# Patient Record
Sex: FEMALE | Race: BLACK OR AFRICAN AMERICAN | NOT HISPANIC OR LATINO | Employment: OTHER | URBAN - METROPOLITAN AREA
[De-identification: names, ages, dates, MRNs, and addresses within clinical notes are randomized per-mention and may not be internally consistent; named-entity substitution may affect disease eponyms.]

---

## 2017-06-26 ENCOUNTER — HOSPITAL ENCOUNTER (EMERGENCY)
Facility: HOSPITAL | Age: 82
Discharge: HOME | End: 2017-06-26
Attending: EMERGENCY MEDICINE
Payer: MEDICARE

## 2017-06-26 VITALS
OXYGEN SATURATION: 97 % | DIASTOLIC BLOOD PRESSURE: 80 MMHG | WEIGHT: 184.25 LBS | RESPIRATION RATE: 15 BRPM | TEMPERATURE: 98 F | BODY MASS INDEX: 33.9 KG/M2 | SYSTOLIC BLOOD PRESSURE: 143 MMHG | HEART RATE: 74 BPM | HEIGHT: 62 IN

## 2017-06-26 DIAGNOSIS — R06.2 WHEEZING: ICD-10-CM

## 2017-06-26 DIAGNOSIS — R03.0 ELEVATED BLOOD PRESSURE READING: Primary | ICD-10-CM

## 2017-06-26 LAB
ALBUMIN SERPL BCP-MCNC: 2.6 G/DL
ALP SERPL-CCNC: 98 U/L
ALT SERPL W/O P-5'-P-CCNC: 11 U/L
ANION GAP SERPL CALC-SCNC: 7 MMOL/L
ANISOCYTOSIS BLD QL SMEAR: SLIGHT
AST SERPL-CCNC: 21 U/L
BASOPHILS # BLD AUTO: 0.03 K/UL
BASOPHILS NFR BLD: 0.5 %
BILIRUB SERPL-MCNC: 0.3 MG/DL
BNP SERPL-MCNC: 53 PG/ML
BUN SERPL-MCNC: 9 MG/DL
CALCIUM SERPL-MCNC: 9 MG/DL
CHLORIDE SERPL-SCNC: 107 MMOL/L
CO2 SERPL-SCNC: 26 MMOL/L
CREAT SERPL-MCNC: 0.6 MG/DL
DIFFERENTIAL METHOD: ABNORMAL
EOSINOPHIL # BLD AUTO: 0.5 K/UL
EOSINOPHIL NFR BLD: 9.1 %
ERYTHROCYTE [DISTWIDTH] IN BLOOD BY AUTOMATED COUNT: 15.3 %
EST. GFR  (AFRICAN AMERICAN): >60 ML/MIN/1.73 M^2
EST. GFR  (NON AFRICAN AMERICAN): >60 ML/MIN/1.73 M^2
GLUCOSE SERPL-MCNC: 88 MG/DL
HCT VFR BLD AUTO: 32.6 %
HGB BLD-MCNC: 10 G/DL
HYPOCHROMIA BLD QL SMEAR: ABNORMAL
LYMPHOCYTES # BLD AUTO: 2 K/UL
LYMPHOCYTES NFR BLD: 33.9 %
MCH RBC QN AUTO: 21.6 PG
MCHC RBC AUTO-ENTMCNC: 30.7 %
MCV RBC AUTO: 71 FL
MONOCYTES # BLD AUTO: 0.7 K/UL
MONOCYTES NFR BLD: 12.7 %
NEUTROPHILS # BLD AUTO: 2.6 K/UL
NEUTROPHILS NFR BLD: 43.8 %
OVALOCYTES BLD QL SMEAR: ABNORMAL
PLATELET # BLD AUTO: 302 K/UL
PLATELET BLD QL SMEAR: ABNORMAL
PMV BLD AUTO: 9.1 FL
POLYCHROMASIA BLD QL SMEAR: ABNORMAL
POTASSIUM SERPL-SCNC: 3.4 MMOL/L
PROT SERPL-MCNC: 8 G/DL
RBC # BLD AUTO: 4.62 M/UL
SODIUM SERPL-SCNC: 140 MMOL/L
TARGETS BLD QL SMEAR: ABNORMAL
TROPONIN I SERPL DL<=0.01 NG/ML-MCNC: 0.01 NG/ML
WBC # BLD AUTO: 5.84 K/UL

## 2017-06-26 PROCEDURE — 85025 COMPLETE CBC W/AUTO DIFF WBC: CPT

## 2017-06-26 PROCEDURE — 99284 EMERGENCY DEPT VISIT MOD MDM: CPT

## 2017-06-26 PROCEDURE — 93010 ELECTROCARDIOGRAM REPORT: CPT | Mod: S$GLB,,, | Performed by: INTERNAL MEDICINE

## 2017-06-26 PROCEDURE — 80053 COMPREHEN METABOLIC PANEL: CPT

## 2017-06-26 PROCEDURE — 84484 ASSAY OF TROPONIN QUANT: CPT

## 2017-06-26 PROCEDURE — 83880 ASSAY OF NATRIURETIC PEPTIDE: CPT

## 2017-06-26 PROCEDURE — 93005 ELECTROCARDIOGRAM TRACING: CPT

## 2017-06-26 RX ORDER — LORAZEPAM 1 MG/1
TABLET ORAL EVERY 6 HOURS PRN
Status: ON HOLD | COMMUNITY
End: 2019-07-26 | Stop reason: HOSPADM

## 2017-06-26 RX ORDER — LISINOPRIL 40 MG/1
40 TABLET ORAL DAILY
Qty: 90 TABLET | Refills: 11 | Status: SHIPPED | OUTPATIENT
Start: 2017-06-26 | End: 2018-01-20

## 2017-06-26 RX ORDER — ALBUTEROL SULFATE 90 UG/1
1-2 AEROSOL, METERED RESPIRATORY (INHALATION) EVERY 6 HOURS PRN
Qty: 1 INHALER | Refills: 0 | Status: SHIPPED | OUTPATIENT
Start: 2017-06-26

## 2017-06-26 NOTE — ED PROVIDER NOTES
Encounter Date: 6/26/2017       History     Chief Complaint   Patient presents with    Shortness of Breath     Patient has been out of blood pressure medicationfor 6 months.  Also shortness of breath and wheezing and refusing to do breathing tratment.  Patient is refusing to follow up with doctor's so she has not had refills on medications.     Patient has had a cough and wheezing for the past several days.  Her caregiver is concerned because she is running out of her medications.  No chest pain or fever.          Review of patient's allergies indicates:   Allergen Reactions    Haldol [haloperidol lactate]      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-s/p right distal femur ORIF 6/5/15 6/5/2015    Seizure disorder     Seizures      Past Surgical History:   Procedure Laterality Date    EYE SURGERY      JOINT REPLACEMENT      TOTAL KNEE ARTHROPLASTY  Left     Family History   Problem Relation Age of Onset    Seizures Sister     Dementia Brother     Seizures Brother      Social History   Substance Use Topics    Smoking status: Former Smoker     Types: Cigarettes     Quit date: 9/4/2011    Smokeless tobacco: Former User    Alcohol use No     Review of Systems   Constitutional: Negative for chills and fever.   HENT: Negative for congestion.    Eyes: Negative for photophobia.   Respiratory: Positive for cough and wheezing.    Cardiovascular: Negative for chest pain and palpitations.   Gastrointestinal: Negative for abdominal pain.   Endocrine: Negative for polyuria.   Genitourinary: Negative for difficulty urinating.   Musculoskeletal: Negative for back pain.   Skin: Negative for rash.   Neurological: Negative for headaches.   Hematological: Negative for adenopathy.   Psychiatric/Behavioral: Positive for agitation.       Physical Exam     Initial Vitals [06/26/17 0925]   BP Pulse  Resp Temp SpO2   (!) 248/113 77 17 98.1 °F (36.7 °C) 95 %      MAP       158         Physical Exam    HENT:   Head: Normocephalic and atraumatic.   Eyes: Pupils are equal, round, and reactive to light.   Neck: Normal range of motion. Neck supple.   Cardiovascular: Normal rate, regular rhythm, normal heart sounds and intact distal pulses.   No murmur heard.  Pulmonary/Chest: Breath sounds normal.   Abdominal: Soft. Bowel sounds are normal.   Musculoskeletal: Normal range of motion.   Neurological: She is alert. She has normal strength. No sensory deficit.   Skin: Skin is warm and dry. Capillary refill takes less than 2 seconds.         ED Course   Procedures  Labs Reviewed   CBC W/ AUTO DIFFERENTIAL - Abnormal; Notable for the following:        Result Value    Hemoglobin 10.0 (*)     Hematocrit 32.6 (*)     MCV 71 (*)     MCH 21.6 (*)     MCHC 30.7 (*)     RDW 15.3 (*)     MPV 9.1 (*)     Eosinophil% 9.1 (*)     All other components within normal limits   COMPREHENSIVE METABOLIC PANEL - Abnormal; Notable for the following:     Potassium 3.4 (*)     Albumin 2.6 (*)     Anion Gap 7 (*)     All other components within normal limits   TROPONIN I   B-TYPE NATRIURETIC PEPTIDE     EKG Readings: (Independently Interpreted)   NSR at 70 without ischemic changes       X-Rays:   Independently Interpreted Readings:   Chest X-Ray: Normal heart size.  No infiltrates.  No acute abnormalities.                  Attending Attestation:   Physician Attestation Statement for Resident:  As the supervising MD . -: Doubt acs, PE.  Does have markedly high BP.    -: BP trended down spontaneosly  She feels fine - asymptomatic  Will refill Lisinopril and albuterol  Stable for discharge                    ED Course     Clinical Impression:   The primary encounter diagnosis was Elevated blood pressure reading. A diagnosis of Wheezing was also pertinent to this visit.                           Alcides Brito MD  06/26/17 1917

## 2017-06-26 NOTE — ED TRIAGE NOTES
"Pt to the ED with complaints of SOB and nonproductive cough that has been going on for about 2 weeks. Pt has been having high blood pressure since this morning. Family member states "I have tried to give her breathing treatments but she refuses it." Family member states that the patient has dementia. Pt denies any chest pain. Pt's family member states that patient has not taken BP meds for the past 6 months due to the patient's refusal to the see the doctor.  "

## 2017-07-10 ENCOUNTER — HOSPITAL ENCOUNTER (EMERGENCY)
Facility: HOSPITAL | Age: 82
Discharge: HOME | End: 2017-07-10
Attending: EMERGENCY MEDICINE
Payer: MEDICARE

## 2017-07-10 VITALS
DIASTOLIC BLOOD PRESSURE: 58 MMHG | RESPIRATION RATE: 22 BRPM | SYSTOLIC BLOOD PRESSURE: 135 MMHG | OXYGEN SATURATION: 93 % | WEIGHT: 170 LBS | TEMPERATURE: 98 F | BODY MASS INDEX: 31.09 KG/M2 | HEART RATE: 75 BPM

## 2017-07-10 DIAGNOSIS — R11.2 NAUSEA AND VOMITING, INTRACTABILITY OF VOMITING NOT SPECIFIED, UNSPECIFIED VOMITING TYPE: Primary | ICD-10-CM

## 2017-07-10 LAB
ALBUMIN SERPL BCP-MCNC: 2.5 G/DL
ALP SERPL-CCNC: 107 U/L
ALT SERPL W/O P-5'-P-CCNC: 8 U/L
ANION GAP SERPL CALC-SCNC: 11 MMOL/L
AST SERPL-CCNC: 15 U/L
BASOPHILS # BLD AUTO: 0.01 K/UL
BASOPHILS NFR BLD: 0.1 %
BILIRUB SERPL-MCNC: 0.4 MG/DL
BUN SERPL-MCNC: 12 MG/DL
CALCIUM SERPL-MCNC: 8.9 MG/DL
CHLORIDE SERPL-SCNC: 106 MMOL/L
CO2 SERPL-SCNC: 23 MMOL/L
CREAT SERPL-MCNC: 0.6 MG/DL
DIFFERENTIAL METHOD: ABNORMAL
EOSINOPHIL # BLD AUTO: 0 K/UL
EOSINOPHIL NFR BLD: 0.4 %
ERYTHROCYTE [DISTWIDTH] IN BLOOD BY AUTOMATED COUNT: 15.5 %
EST. GFR  (AFRICAN AMERICAN): >60 ML/MIN/1.73 M^2
EST. GFR  (NON AFRICAN AMERICAN): >60 ML/MIN/1.73 M^2
GLUCOSE SERPL-MCNC: 102 MG/DL
HCT VFR BLD AUTO: 34.4 %
HGB BLD-MCNC: 11 G/DL
LYMPHOCYTES # BLD AUTO: 1.8 K/UL
LYMPHOCYTES NFR BLD: 22.1 %
MCH RBC QN AUTO: 22.4 PG
MCHC RBC AUTO-ENTMCNC: 32 %
MCV RBC AUTO: 70 FL
MONOCYTES # BLD AUTO: 0.6 K/UL
MONOCYTES NFR BLD: 7 %
NEUTROPHILS # BLD AUTO: 5.7 K/UL
NEUTROPHILS NFR BLD: 70.2 %
PLATELET # BLD AUTO: 373 K/UL
PMV BLD AUTO: 9.3 FL
POTASSIUM SERPL-SCNC: 3.9 MMOL/L
PROT SERPL-MCNC: 8.3 G/DL
RBC # BLD AUTO: 4.92 M/UL
SODIUM SERPL-SCNC: 140 MMOL/L
WBC # BLD AUTO: 8.15 K/UL

## 2017-07-10 PROCEDURE — 25000003 PHARM REV CODE 250: Performed by: STUDENT IN AN ORGANIZED HEALTH CARE EDUCATION/TRAINING PROGRAM

## 2017-07-10 PROCEDURE — 96374 THER/PROPH/DIAG INJ IV PUSH: CPT

## 2017-07-10 PROCEDURE — 63600175 PHARM REV CODE 636 W HCPCS: Performed by: STUDENT IN AN ORGANIZED HEALTH CARE EDUCATION/TRAINING PROGRAM

## 2017-07-10 PROCEDURE — 96376 TX/PRO/DX INJ SAME DRUG ADON: CPT

## 2017-07-10 PROCEDURE — 80053 COMPREHEN METABOLIC PANEL: CPT

## 2017-07-10 PROCEDURE — 63600175 PHARM REV CODE 636 W HCPCS: Performed by: EMERGENCY MEDICINE

## 2017-07-10 PROCEDURE — 96375 TX/PRO/DX INJ NEW DRUG ADDON: CPT

## 2017-07-10 PROCEDURE — 99284 EMERGENCY DEPT VISIT MOD MDM: CPT | Mod: 25

## 2017-07-10 PROCEDURE — 99285 EMERGENCY DEPT VISIT HI MDM: CPT | Mod: ,,, | Performed by: EMERGENCY MEDICINE

## 2017-07-10 PROCEDURE — 85025 COMPLETE CBC W/AUTO DIFF WBC: CPT

## 2017-07-10 PROCEDURE — 96361 HYDRATE IV INFUSION ADD-ON: CPT

## 2017-07-10 PROCEDURE — 25000003 PHARM REV CODE 250: Performed by: EMERGENCY MEDICINE

## 2017-07-10 RX ORDER — ONDANSETRON 4 MG/1
4 TABLET, ORALLY DISINTEGRATING ORAL EVERY 6 HOURS PRN
Qty: 10 TABLET | Refills: 0 | Status: SHIPPED | OUTPATIENT
Start: 2017-07-10 | End: 2019-07-04

## 2017-07-10 RX ORDER — ONDANSETRON 2 MG/ML
4 INJECTION INTRAMUSCULAR; INTRAVENOUS
Status: COMPLETED | OUTPATIENT
Start: 2017-07-10 | End: 2017-07-10

## 2017-07-10 RX ORDER — HYDRALAZINE HYDROCHLORIDE 20 MG/ML
10 INJECTION INTRAMUSCULAR; INTRAVENOUS
Status: COMPLETED | OUTPATIENT
Start: 2017-07-10 | End: 2017-07-10

## 2017-07-10 RX ORDER — LISINOPRIL 20 MG/1
40 TABLET ORAL
Status: COMPLETED | OUTPATIENT
Start: 2017-07-10 | End: 2017-07-10

## 2017-07-10 RX ORDER — SODIUM CHLORIDE 9 MG/ML
500 INJECTION, SOLUTION INTRAVENOUS
Status: COMPLETED | OUTPATIENT
Start: 2017-07-10 | End: 2017-07-10

## 2017-07-10 RX ADMIN — LISINOPRIL 40 MG: 20 TABLET ORAL at 12:07

## 2017-07-10 RX ADMIN — HYDRALAZINE HYDROCHLORIDE 10 MG: 20 INJECTION INTRAMUSCULAR; INTRAVENOUS at 10:07

## 2017-07-10 RX ADMIN — ONDANSETRON 4 MG: 2 INJECTION INTRAMUSCULAR; INTRAVENOUS at 10:07

## 2017-07-10 RX ADMIN — HYDRALAZINE HYDROCHLORIDE 10 MG: 20 INJECTION INTRAMUSCULAR; INTRAVENOUS at 12:07

## 2017-07-10 RX ADMIN — SODIUM CHLORIDE 500 ML: 0.9 INJECTION, SOLUTION INTRAVENOUS at 10:07

## 2017-07-10 NOTE — ED PROVIDER NOTES
Encounter Date: 7/10/2017    SCRIBE #1 NOTE: I, Sherice Knapp, am scribing for, and in the presence of,  Dr. Brito. I have scribed the following portions of the note - the Resident attestation.       History     Chief Complaint   Patient presents with    Abdominal Pain     n/v/d; coffee-ground emesis noted by EMS.       HPI   Patient is 81 yo female with history of dementia, HTN and seizures presents to the ER with acute vomiting and nausea for two days. This morning she vomited twice and the EMS states vomitus looks coffee ground/dark in color. Patient has not history of PUD, she does not take NSAIDs. Patient has no recent weight loss, no blood in the stools. Patient denies stomach pain, fever, chills. Patient unable to provide a complete history,taken from daughter who is her care giver.   Review of patient's allergies indicates:   Allergen Reactions    Haldol [haloperidol lactate]      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-s/p right distal femur ORIF 6/5/15 6/5/2015    Seizure disorder     Seizures      Past Surgical History:   Procedure Laterality Date    EYE SURGERY      JOINT REPLACEMENT      TOTAL KNEE ARTHROPLASTY  Left     Family History   Problem Relation Age of Onset    Seizures Sister     Dementia Brother     Seizures Brother      Social History   Substance Use Topics    Smoking status: Former Smoker     Types: Cigarettes     Quit date: 9/4/2011    Smokeless tobacco: Former User    Alcohol use No     Review of Systems   Constitutional: Negative for appetite change, fatigue, fever and unexpected weight change.   HENT: Negative for trouble swallowing and voice change.    Respiratory: Negative for cough, chest tightness and shortness of breath.    Cardiovascular: Negative for chest pain, palpitations and leg swelling.   Gastrointestinal: Negative for abdominal  distention, abdominal pain, constipation, diarrhea, nausea and vomiting.   Endocrine: Negative for polydipsia, polyphagia and polyuria.   Genitourinary: Negative for difficulty urinating, dysuria, frequency and urgency.   Musculoskeletal: Negative for gait problem and joint swelling.   Skin: Negative for pallor and rash.   Neurological: Negative for dizziness, light-headedness and headaches.   Psychiatric/Behavioral: Positive for confusion.       Physical Exam     Initial Vitals [07/10/17 0907]   BP Pulse Resp Temp SpO2   (!) 163/66 72 18 98.2 °F (36.8 °C) 95 %      MAP       98.33         Physical Exam    Constitutional: She appears well-developed and well-nourished.   HENT:   Head: Normocephalic and atraumatic.   Eyes: EOM are normal. Pupils are equal, round, and reactive to light.   Neck: Normal range of motion. Neck supple.   Cardiovascular: Normal rate and regular rhythm. Exam reveals no friction rub.    No murmur heard.  Pulmonary/Chest: Breath sounds normal. No respiratory distress. She has no wheezes.   Abdominal: Soft. Bowel sounds are normal. She exhibits no distension. There is no tenderness. There is no rebound and no guarding.   Musculoskeletal: Normal range of motion. She exhibits no edema or tenderness.   Neurological: She is alert and oriented to person, place, and time. She has normal strength. No cranial nerve deficit.   Skin: Skin is warm. No rash noted. No erythema.         ED Course   Procedures  Labs Reviewed   CBC W/ AUTO DIFFERENTIAL - Abnormal; Notable for the following:        Result Value    Hemoglobin 11.0 (*)     Hematocrit 34.4 (*)     MCV 70 (*)     MCH 22.4 (*)     RDW 15.5 (*)     Platelets 373 (*)     All other components within normal limits   COMPREHENSIVE METABOLIC PANEL - Abnormal; Notable for the following:     Albumin 2.5 (*)     ALT 8 (*)     All other components within normal limits             Medical Decision Making:   History:   I obtained history from: someone other than  patient.  Old Medical Records: I decided to obtain old medical records.  Initial Assessment:   Patient with coffee ground emesis for 2 days. Patient clinically stable in the ER. DDx includes Indigestion vs upper GI bleed PUD vs gastritis. FOBT negative,lower GI bleed such as diverticulitis, ischemic bowel/colon less likely given patient has no blood in the stool, no fever, no abdominal pain, no significant history of atherosclerotic disease.  Differential Diagnosis:   Indigestion vs upper GI bleed 2/2 PUD vs Gastritis vs lower GI bleed/ischemic bowel/diverticulitis  Clinical Tests:   Lab Tests: Ordered and Reviewed  Medical Tests: Ordered and Reviewed  ED Management:  Zofran for nausea  CBC, CMP  FOBT  10 mg hydralazine for BP elevation  Another dose of hydralazin added and home dose lisinopril- SBP at 200's    Other:   I discussed test(s) with the performing physician.  I have discussed this case with another health care provider.  Lab result show anemia but improved from last lab values  FOBT was negative for occult blood.   Patient doing well, tolerating fluid, has not had v/n since presentation. Will discharge home with zofran.            Scribe Attestation:   Scribe #1: I performed the above scribed service and the documentation accurately describes the services I performed. I attest to the accuracy of the note.    Attending Attestation:   Physician Attestation Statement for Resident:  As the supervising MD   Physician Attestation Statement: I have personally seen and examined this patient.   I agree with the above history. -: Pt presents with vomiting, several episodes today She denies pain or nausea. There was a question of coffee ground emesis.   As the supervising MD I agree with the above PE.   -: Pt appears comfortable. She is hypertensive. Abdomen is soft and nontender.   As the supervising MD I agree with the above treatment, course, plan, and disposition.   -: Pt with vomiting. She had hemoccult  negative stool here. Will check labs. Hemoglobin is up compared to last time. Will give meds to lower BP. I doubt surgical abdomen, GI bleed or caridac event. Will d/c home.          Physician Attestation for Scribe:  Physician Attestation Statement for Scribe #1: I, Dr. Brito, reviewed documentation, as scribed by Sherice Knapp in my presence, and it is both accurate and complete.                 ED Course     Clinical Impression:   Nausea/vomiting                           Alcides Brito MD  07/11/17 1620

## 2017-07-10 NOTE — ED TRIAGE NOTES
Report received from EMS patient has abdominal pain and coffee ground emesis since yesterday but got worse today.  Patient has a hx of alzheimer's but does not take her meds.

## 2018-01-20 ENCOUNTER — HOSPITAL ENCOUNTER (EMERGENCY)
Facility: HOSPITAL | Age: 83
Discharge: HOME | End: 2018-01-20
Attending: EMERGENCY MEDICINE
Payer: MEDICARE

## 2018-01-20 VITALS
RESPIRATION RATE: 18 BRPM | OXYGEN SATURATION: 100 % | WEIGHT: 170 LBS | HEART RATE: 72 BPM | SYSTOLIC BLOOD PRESSURE: 128 MMHG | BODY MASS INDEX: 31.28 KG/M2 | DIASTOLIC BLOOD PRESSURE: 82 MMHG | TEMPERATURE: 98 F | HEIGHT: 62 IN

## 2018-01-20 DIAGNOSIS — J02.9 PHARYNGITIS: ICD-10-CM

## 2018-01-20 DIAGNOSIS — R22.0 FACIAL SWELLING: Primary | ICD-10-CM

## 2018-01-20 DIAGNOSIS — R05.9 COUGH: ICD-10-CM

## 2018-01-20 LAB
FLUAV AG SPEC QL IA: NEGATIVE
FLUBV AG SPEC QL IA: NEGATIVE
SPECIMEN SOURCE: NORMAL

## 2018-01-20 PROCEDURE — 63600175 PHARM REV CODE 636 W HCPCS: Performed by: EMERGENCY MEDICINE

## 2018-01-20 PROCEDURE — 99283 EMERGENCY DEPT VISIT LOW MDM: CPT

## 2018-01-20 PROCEDURE — 87400 INFLUENZA A/B EACH AG IA: CPT | Mod: 59

## 2018-01-20 RX ORDER — DEXAMETHASONE 4 MG/1
8 TABLET ORAL
Status: COMPLETED | OUTPATIENT
Start: 2018-01-20 | End: 2018-01-20

## 2018-01-20 RX ADMIN — DEXAMETHASONE 8 MG: 4 TABLET ORAL at 05:01

## 2018-01-20 NOTE — ED NOTES
Patient identifiers verified and correct for Susanna Byrnes.  Pt sitting up on edge of bed, awake and alert, daughter at bedside. PTs daughter stated that she gets some swelling to her face when she eats certain foods and it usually resolves with benadryl. No swelling noted, no resp distress noted. Pts daughter stated that she brought her here only because the pt stated she would go.   LOC: The patient is awake, alert and aware of environment with an appropriate affect, the patient is oriented x 3 and speaking appropriately.  APPEARANCE: Patient resting comfortably and in no acute distress, patient is clean and well groomed, patient's clothing is properly fastened.  SKIN: The skin is warm and dry, color consistent with ethnicity, patient has normal skin turgor and moist mucus membranes, skin intact, no breakdown or bruising noted.  MUSCULOSKELETAL: Patient moving all extremities spontaneously, no obvious swelling or deformities noted.  RESPIRATORY: Airway is open and patent, respirations are spontaneous, patient has a normal effort and rate, no accessory muscle use noted, BS clear upper, diminished lower.  CARDIAC: Patient has a normal rate and regular rhythm, no periphreal edema noted, capillary refill < 3 seconds.  ABDOMEN: Soft and non tender to palpation, no distention noted, normoactive bowel sounds present in all four quadrants.  NEUROLOGIC: PERRL, eyes open spontaneously, behavior appropriate to situation, follows commands, facial expression symmetrical, bilateral hand grasp equal and even, purposeful motor response noted, normal sensation in all extremities when touched with a finger.

## 2018-01-21 NOTE — ED PROVIDER NOTES
"Encounter Date: 1/20/2018       History     Chief Complaint   Patient presents with    Allergic Reaction     pt presents to ED today with daughter who reprots pt had an allergic reaction to unknow food last night. reports swelling to left side of mouth. pt was given one dose of benadryl last night and two today. pt in on acute distress. airway patent      82-year-old female brought to the emergency department by family for facial swelling, cough.  Cough is been going on intermittently for a few months now.  Family reports, "she never wants to go to the doctor, so when she was willing to go today which is brought her to the emergency room."  Family reports she has had swelling to her face in the past, and first noticed it last night.  She is given Benadryl last night and this morning.  Family reports the swelling is improving, but just wanted to make sure that there was not a bigger problem they were missing.  Patient is been tolerating by mouth today.  No increased work of breathing reported by family.  No other symptoms reported.  History of present illness and ROS limited secondary to dementia.          Review of patient's allergies indicates:   Allergen Reactions    Haldol [haloperidol lactate]      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-s/p right distal femur ORIF 6/5/15 6/5/2015    Seizure disorder     Seizures      Past Surgical History:   Procedure Laterality Date    EYE SURGERY      JOINT REPLACEMENT      TOTAL KNEE ARTHROPLASTY  Left     Family History   Problem Relation Age of Onset    Seizures Sister     Dementia Brother     Seizures Brother      Social History   Substance Use Topics    Smoking status: Former Smoker     Types: Cigarettes     Quit date: 9/4/2011    Smokeless tobacco: Former User    Alcohol use No     Review of Systems   Unable to perform ROS: " Dementia       Physical Exam     Initial Vitals [01/20/18 1601]   BP Pulse Resp Temp SpO2   (!) 133/92 68 18 98 °F (36.7 °C) 100 %      MAP       105.67         Physical Exam    Nursing note and vitals reviewed.  Constitutional: She appears well-developed and well-nourished. No distress.   HENT:   Head: Normocephalic and atraumatic.   Oropharynx clear; No oropharyngeal swelling; Mild swelling to upper lip, left > right   Eyes: Conjunctivae and EOM are normal. Pupils are equal, round, and reactive to light.   Neck: Normal range of motion. Neck supple. No tracheal deviation present.   Cardiovascular: Normal rate, regular rhythm, normal heart sounds and intact distal pulses.   Pulmonary/Chest: Breath sounds normal. No respiratory distress. She has no wheezes. She has no rhonchi. She has no rales.   Abdominal: Soft. Bowel sounds are normal. She exhibits no distension. There is no tenderness. There is no rebound and no guarding.   Musculoskeletal: Normal range of motion. She exhibits no tenderness.   Neurological: She is alert. She has normal strength. No cranial nerve deficit or sensory deficit.   Skin: Skin is warm and dry. Capillary refill takes less than 2 seconds.         ED Course   Procedures  Labs Reviewed   INFLUENZA A AND B ANTIGEN          X-Rays:   Independently Interpreted Readings:   Other Readings:  Imaging interpreted by radiologist and visualized by me:    Imaging Results          X-Ray Chest PA And Lateral (Final result)  Result time 01/20/18 17:14:27    Final result by Wendi Chicas MD (01/20/18 17:14:27)                 Impression:        No detrimental change or radiographic acute intrathoracic process seen.      Electronically signed by: WENDI CHICAS MD, MD  Date:     01/20/18  Time:    17:14              Narrative:    COMPARISON: Chest radiograph 6/26/17    FINDINGS: PA and lateral views of the chest. Senescent costochondral calcifications again noted.   Pulmonary vasculature and hilar regions  are within normal limits. Bibasilar few scattered linear opacities consistent with subsegmental scarring versus atelectasis. Otherwise, the bilateral lungs are symmetrically well expanded without large consolidation or new focal opacity.  No pleural effusion or pneumothorax.  Heart size is normal. Calcific atherosclerosis with grossly stable tortuosity of the thoracic aorta.  Included osseous structures appear grossly unchanged including remote deformity of the left clavicle, without acute process seen.                             X-Ray Neck Soft Tissue (Final result)  Result time 01/20/18 17:17:38    Final result by Wendi Chicas MD (01/20/18 17:17:38)                 Impression:        No radiographic acute process seen.      Electronically signed by: WENDI CHICAS MD, MD  Date:     01/20/18  Time:    17:17              Narrative:    COMPARISON: Chest radiograph same day and 6/26/17    FINDINGS:  2 views neck soft tissue series.    Included upper airway appears relatively midline and remains patent. The valleculae and piriform recesses are within normal limits. Epiglottis is normal in thickness. No prevertebral soft tissue swelling. Atherosclerotic calcifications at the bilateral carotid bifurcations. No radiodense foreign bodies seen within the included airway. No subcutaneous emphysema or radiodense retained foreign body. Included lung apices are clear. Moderate to advanced degenerative change of the included mid to upper cervical spine with chronic deformity of the left clavicle.                              Medical Decision Making:   Initial Assessment:   82-year-old female brought to emergency department for cough and upper lip swelling  Differential Diagnosis:   ALLERGIC reaction, angioedema, URI, pneumonia  Independently Interpreted Test(s):   I have ordered and independently interpreted X-rays - see prior notes.  Clinical Tests:   Lab Tests: Reviewed       <> Summary of Lab: Influenza negative  ED  Management:  Patient given oral Decadron.  Family reports swelling has continued to decrease.  Of note, patient is on lisinopril.  I counseled the family to hold the lisinopril until they're able to follow-up with primary care physician.  Instructed him to call the primary care physician Monday for follow-up appointment, discussed reasons return to the emergency room.  Patient and family are comfortable with discharge at this time.                   ED Course      Clinical Impression:   The primary encounter diagnosis was Facial swelling. Diagnoses of Pharyngitis and Cough were also pertinent to this visit.    Disposition:   Disposition: Discharged  Condition: Stable                        Sabas Ramires MD  01/20/18 2112

## 2018-02-11 ENCOUNTER — OFFICE VISIT (OUTPATIENT)
Dept: URGENT CARE | Facility: CLINIC | Age: 83
End: 2018-02-11
Payer: MEDICARE

## 2018-02-11 VITALS
HEART RATE: 64 BPM | TEMPERATURE: 98 F | WEIGHT: 170 LBS | OXYGEN SATURATION: 95 % | SYSTOLIC BLOOD PRESSURE: 159 MMHG | HEIGHT: 62 IN | BODY MASS INDEX: 31.28 KG/M2 | DIASTOLIC BLOOD PRESSURE: 83 MMHG | RESPIRATION RATE: 16 BRPM

## 2018-02-11 DIAGNOSIS — R06.2 WHEEZE: ICD-10-CM

## 2018-02-11 DIAGNOSIS — J40 BRONCHITIS: Primary | ICD-10-CM

## 2018-02-11 DIAGNOSIS — R05.9 COUGH: ICD-10-CM

## 2018-02-11 LAB
CTP QC/QA: YES
FLUAV AG NPH QL: NEGATIVE
FLUBV AG NPH QL: NEGATIVE

## 2018-02-11 PROCEDURE — 3008F BODY MASS INDEX DOCD: CPT | Mod: S$GLB,,, | Performed by: FAMILY MEDICINE

## 2018-02-11 PROCEDURE — 1159F MED LIST DOCD IN RCRD: CPT | Mod: S$GLB,,, | Performed by: FAMILY MEDICINE

## 2018-02-11 PROCEDURE — 99214 OFFICE O/P EST MOD 30 MIN: CPT | Mod: 25,S$GLB,, | Performed by: FAMILY MEDICINE

## 2018-02-11 PROCEDURE — 87804 INFLUENZA ASSAY W/OPTIC: CPT | Mod: 59,QW,S$GLB, | Performed by: FAMILY MEDICINE

## 2018-02-11 PROCEDURE — 96372 THER/PROPH/DIAG INJ SC/IM: CPT | Mod: S$GLB,,, | Performed by: FAMILY MEDICINE

## 2018-02-11 PROCEDURE — 94640 AIRWAY INHALATION TREATMENT: CPT | Mod: 59,S$GLB,, | Performed by: FAMILY MEDICINE

## 2018-02-11 RX ORDER — ALBUTEROL SULFATE 0.83 MG/ML
2.5 SOLUTION RESPIRATORY (INHALATION)
Status: COMPLETED | OUTPATIENT
Start: 2018-02-11 | End: 2018-02-11

## 2018-02-11 RX ORDER — BROMPHENIRAMINE MALEATE, PSEUDOEPHEDRINE HYDROCHLORIDE, AND DEXTROMETHORPHAN HYDROBROMIDE 2; 30; 10 MG/5ML; MG/5ML; MG/5ML
2.5 SYRUP ORAL
Qty: 100 ML | Refills: 0 | Status: SHIPPED | OUTPATIENT
Start: 2018-02-11 | End: 2018-02-18

## 2018-02-11 RX ORDER — PREDNISOLONE 15 MG/5ML
21 SOLUTION ORAL DAILY
Qty: 70 ML | Refills: 0 | Status: SHIPPED | OUTPATIENT
Start: 2018-02-11 | End: 2018-02-21

## 2018-02-11 RX ORDER — LEVOFLOXACIN 500 MG/1
500 TABLET, FILM COATED ORAL DAILY
Qty: 10 TABLET | Refills: 0 | Status: SHIPPED | OUTPATIENT
Start: 2018-02-11 | End: 2018-02-21

## 2018-02-11 RX ADMIN — ALBUTEROL SULFATE 2.5 MG: 0.83 SOLUTION RESPIRATORY (INHALATION) at 12:02

## 2018-02-11 NOTE — PATIENT INSTRUCTIONS
What Is Acute Bronchitis?  Acute bronchitis is when the airways in your lungs (bronchial tubes) become red and swollen (inflamed). It is usually caused by a viral infection. But it can also occur because of a bacteria or allergen. Symptoms include a cough that produces yellow or greenish mucus and can last for days or sometimes weeks.  Inside healthy lungs    Air travels in and out of the lungs through the airways. The linings of these airways produce sticky mucus. This mucus traps particles that enter the lungs. Tiny structures called cilia then sweep the particles out of the airways.     Healthy airway: Airways are normally open. Air moves in and out easily.      Healthy cilia: Tiny, hairlike cilia sweep mucus and particles up and out of the airways.   Lungs with bronchitis  Bronchitis often occurs with a cold or the flu virus. The airways become inflamed (red and swollen). There is a deep hacking cough from the extra mucus. Other symptoms may include:  · Wheezing  · Chest discomfort  · Shortness of breath  · Mild fever  A second infection, this time due to bacteria, may then occur. And airways irritated by allergens or smoke are more likely to get infected.        Inflamed airway: Inflammation and extra mucus narrow the airway, causing shortness of breath.      Impaired cilia: Extra mucus impairs cilia, causing congestion and wheezing. Smoking makes the problem worse.   Making a diagnosis  A physical exam, health history, and certain tests help your healthcare provider make the diagnosis.  Health history  Your healthcare provider will ask you about your symptoms.  The exam  Your provider listens to your chest for signs of congestion. He or she may also check your ears, nose, and throat.  Possible tests  · A sputum test for bacteria. This requires a sample of mucus from your lungs.  · A nasal or throat swab. This tests to see if you have a bacterial infection.  · A chest X-ray. This is done if your healthcare  provider thinks you have pneumonia.  · Tests to check for an underlying condition. Other tests may be done to check for things such as allergies, asthma, or COPD (chronic obstructive pulmonary disease). You may need to see a specialist for more lung function testing.  Treating a cough  The main treatment for bronchitis is easing symptoms. Avoiding smoke, allergens, and other things that trigger coughing can often help. If the infection is bacterial, you may be given antibiotics. During the illness, it's important to get plenty of sleep. To ease symptoms:  · Dont smoke. Also avoid secondhand smoke.  · Use a humidifier. Or try breathing in steam from a hot shower. This may help loosen mucus.  · Drink a lot of water and juice. They can soothe the throat and may help thin mucus.  · Sit up or use extra pillows when in bed. This helps to lessen coughing and congestion.  · Ask your provider about using medicine. Ask about using cough medicine, pain and fever medicine, or a decongestant.  Antibiotics  Most cases of bronchitis are caused by cold or flu viruses. They dont need antibiotics to treat them, even if your mucus is thick and green or yellow. Antibiotics dont treat viral illness and antibiotics have not been shown to have any benefit in cases of acute bronchitis. Taking antibiotics when they are not needed increases your risk of getting an infection later that is antibiotic-resistant. Antibiotics can also cause severe cases of diarrhea that require other antibiotics to treat.  It is important that you accept your healthcare provider's opinion to not use antibiotics. Your provider will prescribe antibiotics if the infection is caused by bacteria. If they are prescribed:  · Take all of the medicine. Take the medicine until it is used up, even if symptoms have improved. If you dont, the bronchitis may come back.  · Take the medicines as directed. For instance, some medicines should be taken with food.  · Ask about  side effects. Ask your provider or pharmacist what side effects are common, and what to do about them.  Follow-up care  You should see your provider again in 2 to 3 weeks. By this time, symptoms should have improved. An infection that lasts longer may mean you have a more serious problem.  Prevention  · Avoid tobacco smoke. If you smoke, quit. Stay away from smoky places. Ask friends and family not to smoke around you, or in your home or car.  · Get checked for allergies.  · Ask your provider about getting a yearly flu shot. Also ask about pneumococcal or pneumonia shots.  · Wash your hands often. This helps reduce the chance of picking up viruses that cause colds and flu.  Call your healthcare provider if:  · Symptoms worsen, or you have new symptoms  · Breathing problems worsen or  become severe  · Symptoms dont get better within a week, or within 3 days of taking antibiotics   Date Last Reviewed: 2/1/2017  © 2221-1495 [employer]. [address] [place] [address], [place] [address]. All rights reserved. This information is not intended as a substitute for professional medical care. Always follow your healthcare professional's instructions.    Follow up with your doctor in a few days.  Go to the ER if symptoms get worse.    London Lord MD

## 2018-02-11 NOTE — PROGRESS NOTES
Subjective:       Patient ID: Susanna Byrnes is a 83 y.o. female.    Vitals:    02/11/18 1202   BP: (!) 159/83   Pulse: 64   Resp: 16   Temp: 98 °F (36.7 °C)       Chief Complaint: Wheezing    Pt.'s daughter states pt was seen in the ER on 1/20/18. Pt.'s daughter states patient had an episode of wheezing this am. Pt was given a nebulizer treatment.      Wheezing    This is a recurrent problem. The current episode started today. The problem occurs intermittently. The problem has been gradually improving. Associated symptoms include coughing. Pertinent negatives include no abdominal pain, chest pain, chills, ear pain, fever, headaches, shortness of breath, sore throat or sputum production. Nothing aggravates the symptoms. Treatments tried: nebulizer treatment.     Review of Systems   Constitution: Negative for chills, fever and malaise/fatigue.   HENT: Negative for congestion, ear pain, hoarse voice and sore throat.    Eyes: Negative for discharge and redness.   Cardiovascular: Negative for chest pain, dyspnea on exertion and leg swelling.   Respiratory: Positive for cough and wheezing. Negative for shortness of breath and sputum production.    Musculoskeletal: Negative for myalgias.   Gastrointestinal: Negative for abdominal pain and nausea.   Neurological: Negative for headaches.       Objective:      Physical Exam   Constitutional: She is oriented to person, place, and time. She appears well-developed and well-nourished. She is cooperative.  Non-toxic appearance. She does not appear ill. No distress.   HENT:   Head: Normocephalic and atraumatic.   Right Ear: Hearing, tympanic membrane, external ear and ear canal normal.   Left Ear: Hearing, tympanic membrane, external ear and ear canal normal.   Nose: No mucosal edema, rhinorrhea or nasal deformity. No epistaxis. Right sinus exhibits no maxillary sinus tenderness and no frontal sinus tenderness. Left sinus exhibits no maxillary sinus tenderness and no frontal sinus  tenderness.   Mouth/Throat: Uvula is midline and mucous membranes are normal. No trismus in the jaw. Normal dentition. No uvula swelling. No posterior oropharyngeal erythema.   Eyes: Conjunctivae and lids are normal. No scleral icterus.   Sclera clear bilat   Neck: Trachea normal, full passive range of motion without pain and phonation normal. Neck supple.   Cardiovascular: Normal rate, regular rhythm, normal heart sounds, intact distal pulses and normal pulses.    Pulmonary/Chest: Effort normal and breath sounds normal. No respiratory distress.   Abdominal: Soft. Normal appearance and bowel sounds are normal. She exhibits no distension. There is no tenderness.   Musculoskeletal: Normal range of motion. She exhibits no edema or deformity.   Neurological: She is alert and oriented to person, place, and time. She exhibits normal muscle tone. Coordination normal.   Skin: Skin is warm, dry and intact. She is not diaphoretic. No pallor.   Psychiatric: She has a normal mood and affect. Her speech is normal and behavior is normal. Judgment and thought content normal. Cognition and memory are normal.   Nursing note and vitals reviewed.      Assessment:       1. Bronchitis    2. Cough    3. Wheeze        Plan:       Susanna was seen today for wheezing.    Diagnoses and all orders for this visit:    Bronchitis    Cough  -     POCT Influenza A/B  -     X-Ray Chest PA And Lateral; Future    Wheeze  -     methylPREDNISolone sod suc(PF) injection 125 mg; Inject 125 mg into the muscle one time.  -     albuterol nebulizer solution 2.5 mg; Take 3 mLs (2.5 mg total) by nebulization one time.    Other orders  -     levoFLOXacin (LEVAQUIN) 500 MG tablet; Take 1 tablet (500 mg total) by mouth once daily.  -     brompheniramine-pseudoeph-DM (BROMFED DM) 2-30-10 mg/5 mL Syrp; Take 2.5 mLs by mouth every 4 to 6 hours as needed.  -     prednisoLONE (PRELONE) 15 mg/5 mL syrup; Take 7 mLs (21 mg total) by mouth once daily.    Follow up with  your doctor in a few days.  Go to the ER if symptoms get worse.    London Lord MD

## 2018-03-09 ENCOUNTER — HOSPITAL ENCOUNTER (OUTPATIENT)
Facility: HOSPITAL | Age: 83
Discharge: HOME | End: 2018-03-11
Attending: EMERGENCY MEDICINE | Admitting: HOSPITALIST
Payer: MEDICARE

## 2018-03-09 DIAGNOSIS — I10 ESSENTIAL HYPERTENSION: ICD-10-CM

## 2018-03-09 DIAGNOSIS — R56.9 SEIZURE: ICD-10-CM

## 2018-03-09 DIAGNOSIS — G40.919 BREAKTHROUGH SEIZURE: Primary | ICD-10-CM

## 2018-03-09 LAB
ALBUMIN SERPL BCP-MCNC: 2.8 G/DL
ALP SERPL-CCNC: 108 U/L
ALT SERPL W/O P-5'-P-CCNC: 14 U/L
ANION GAP SERPL CALC-SCNC: 12 MMOL/L
AST SERPL-CCNC: 33 U/L
BASOPHILS # BLD AUTO: 0.01 K/UL
BASOPHILS NFR BLD: 0.2 %
BILIRUB SERPL-MCNC: 0.3 MG/DL
BUN SERPL-MCNC: 14 MG/DL
CALCIUM SERPL-MCNC: 9.2 MG/DL
CHLORIDE SERPL-SCNC: 107 MMOL/L
CO2 SERPL-SCNC: 21 MMOL/L
CREAT SERPL-MCNC: 0.6 MG/DL
DIFFERENTIAL METHOD: ABNORMAL
EOSINOPHIL # BLD AUTO: 0.4 K/UL
EOSINOPHIL NFR BLD: 6.8 %
ERYTHROCYTE [DISTWIDTH] IN BLOOD BY AUTOMATED COUNT: 16 %
EST. GFR  (AFRICAN AMERICAN): >60 ML/MIN/1.73 M^2
EST. GFR  (NON AFRICAN AMERICAN): >60 ML/MIN/1.73 M^2
GLUCOSE SERPL-MCNC: 81 MG/DL
HCT VFR BLD AUTO: 31.1 %
HGB BLD-MCNC: 9.6 G/DL
IMM GRANULOCYTES # BLD AUTO: 0 K/UL
IMM GRANULOCYTES NFR BLD AUTO: 0 %
LYMPHOCYTES # BLD AUTO: 2.5 K/UL
LYMPHOCYTES NFR BLD: 41.7 %
MCH RBC QN AUTO: 21.7 PG
MCHC RBC AUTO-ENTMCNC: 30.9 G/DL
MCV RBC AUTO: 70 FL
MONOCYTES # BLD AUTO: 0.6 K/UL
MONOCYTES NFR BLD: 9.8 %
NEUTROPHILS # BLD AUTO: 2.5 K/UL
NEUTROPHILS NFR BLD: 41.5 %
NRBC BLD-RTO: 0 /100 WBC
PLATELET # BLD AUTO: 334 K/UL
PMV BLD AUTO: 10.1 FL
POTASSIUM SERPL-SCNC: 4.4 MMOL/L
PROT SERPL-MCNC: 8.3 G/DL
RBC # BLD AUTO: 4.42 M/UL
SODIUM SERPL-SCNC: 140 MMOL/L
TSH SERPL DL<=0.005 MIU/L-ACNC: 2.04 UIU/ML
WBC # BLD AUTO: 6.02 K/UL

## 2018-03-09 PROCEDURE — 96375 TX/PRO/DX INJ NEW DRUG ADDON: CPT

## 2018-03-09 PROCEDURE — 99285 EMERGENCY DEPT VISIT HI MDM: CPT | Mod: 25

## 2018-03-09 PROCEDURE — 63600175 PHARM REV CODE 636 W HCPCS: Performed by: EMERGENCY MEDICINE

## 2018-03-09 PROCEDURE — 99220 PR INITIAL OBSERVATION CARE,LEVL III: CPT | Mod: ,,, | Performed by: PHYSICIAN ASSISTANT

## 2018-03-09 PROCEDURE — 63600175 PHARM REV CODE 636 W HCPCS: Performed by: STUDENT IN AN ORGANIZED HEALTH CARE EDUCATION/TRAINING PROGRAM

## 2018-03-09 PROCEDURE — 80053 COMPREHEN METABOLIC PANEL: CPT

## 2018-03-09 PROCEDURE — 84443 ASSAY THYROID STIM HORMONE: CPT

## 2018-03-09 PROCEDURE — 96374 THER/PROPH/DIAG INJ IV PUSH: CPT

## 2018-03-09 PROCEDURE — G0378 HOSPITAL OBSERVATION PER HR: HCPCS

## 2018-03-09 PROCEDURE — 85025 COMPLETE CBC W/AUTO DIFF WBC: CPT

## 2018-03-09 RX ORDER — IPRATROPIUM BROMIDE AND ALBUTEROL SULFATE 2.5; .5 MG/3ML; MG/3ML
3 SOLUTION RESPIRATORY (INHALATION) EVERY 4 HOURS PRN
Status: DISCONTINUED | OUTPATIENT
Start: 2018-03-09 | End: 2018-03-11 | Stop reason: HOSPADM

## 2018-03-09 RX ORDER — HYDRALAZINE HYDROCHLORIDE 20 MG/ML
20 INJECTION INTRAMUSCULAR; INTRAVENOUS ONCE
Status: COMPLETED | OUTPATIENT
Start: 2018-03-09 | End: 2018-03-09

## 2018-03-09 RX ORDER — RAMELTEON 8 MG/1
8 TABLET ORAL NIGHTLY PRN
Status: DISCONTINUED | OUTPATIENT
Start: 2018-03-09 | End: 2018-03-11 | Stop reason: HOSPADM

## 2018-03-09 RX ORDER — MONTELUKAST SODIUM 10 MG/1
10 TABLET ORAL NIGHTLY
Status: DISCONTINUED | OUTPATIENT
Start: 2018-03-09 | End: 2018-03-11 | Stop reason: HOSPADM

## 2018-03-09 RX ORDER — LORAZEPAM 2 MG/ML
2 INJECTION INTRAMUSCULAR
Status: DISCONTINUED | OUTPATIENT
Start: 2018-03-09 | End: 2018-03-09

## 2018-03-09 RX ORDER — ONDANSETRON 2 MG/ML
4 INJECTION INTRAMUSCULAR; INTRAVENOUS EVERY 8 HOURS PRN
Status: DISCONTINUED | OUTPATIENT
Start: 2018-03-09 | End: 2018-03-11 | Stop reason: HOSPADM

## 2018-03-09 RX ORDER — IBUPROFEN 200 MG
24 TABLET ORAL
Status: DISCONTINUED | OUTPATIENT
Start: 2018-03-09 | End: 2018-03-11 | Stop reason: HOSPADM

## 2018-03-09 RX ORDER — SODIUM CHLORIDE 0.9 % (FLUSH) 0.9 %
5 SYRINGE (ML) INJECTION
Status: DISCONTINUED | OUTPATIENT
Start: 2018-03-09 | End: 2018-03-11 | Stop reason: HOSPADM

## 2018-03-09 RX ORDER — LEVETIRACETAM 100 MG/ML
1000 SOLUTION ORAL 2 TIMES DAILY
Status: DISCONTINUED | OUTPATIENT
Start: 2018-03-10 | End: 2018-03-11 | Stop reason: HOSPADM

## 2018-03-09 RX ORDER — IBUPROFEN 200 MG
16 TABLET ORAL
Status: DISCONTINUED | OUTPATIENT
Start: 2018-03-09 | End: 2018-03-11 | Stop reason: HOSPADM

## 2018-03-09 RX ORDER — DIAZEPAM 5 MG/ML
5 INJECTION, SOLUTION INTRAMUSCULAR; INTRAVENOUS EVERY 4 HOURS PRN
Status: DISCONTINUED | OUTPATIENT
Start: 2018-03-09 | End: 2018-03-10 | Stop reason: ALTCHOICE

## 2018-03-09 RX ORDER — LEVETIRACETAM 10 MG/ML
1000 INJECTION INTRAVASCULAR
Status: COMPLETED | OUTPATIENT
Start: 2018-03-09 | End: 2018-03-09

## 2018-03-09 RX ORDER — ONDANSETRON 8 MG/1
8 TABLET, ORALLY DISINTEGRATING ORAL EVERY 8 HOURS PRN
Status: DISCONTINUED | OUTPATIENT
Start: 2018-03-09 | End: 2018-03-11 | Stop reason: HOSPADM

## 2018-03-09 RX ORDER — ACETAMINOPHEN 325 MG/1
650 TABLET ORAL EVERY 4 HOURS PRN
Status: DISCONTINUED | OUTPATIENT
Start: 2018-03-09 | End: 2018-03-11 | Stop reason: HOSPADM

## 2018-03-09 RX ORDER — ACETAMINOPHEN 500 MG
1000 TABLET ORAL EVERY 8 HOURS PRN
Status: DISCONTINUED | OUTPATIENT
Start: 2018-03-09 | End: 2018-03-11 | Stop reason: HOSPADM

## 2018-03-09 RX ORDER — GLUCAGON 1 MG
1 KIT INJECTION
Status: DISCONTINUED | OUTPATIENT
Start: 2018-03-09 | End: 2018-03-11 | Stop reason: HOSPADM

## 2018-03-09 RX ORDER — ACETAMINOPHEN 325 MG/1
650 TABLET ORAL EVERY 8 HOURS PRN
Status: DISCONTINUED | OUTPATIENT
Start: 2018-03-09 | End: 2018-03-11 | Stop reason: HOSPADM

## 2018-03-09 RX ORDER — LORAZEPAM 2 MG/ML
INJECTION INTRAMUSCULAR
Status: DISPENSED
Start: 2018-03-09 | End: 2018-03-10

## 2018-03-09 RX ORDER — POLYETHYLENE GLYCOL 3350 17 G/17G
17 POWDER, FOR SOLUTION ORAL DAILY
Status: DISCONTINUED | OUTPATIENT
Start: 2018-03-10 | End: 2018-03-11 | Stop reason: HOSPADM

## 2018-03-09 RX ORDER — NAPROXEN SODIUM 220 MG/1
81 TABLET, FILM COATED ORAL DAILY
Status: DISCONTINUED | OUTPATIENT
Start: 2018-03-10 | End: 2018-03-11 | Stop reason: HOSPADM

## 2018-03-09 RX ADMIN — LEVETIRACETAM 1000 MG: 10 INJECTION INTRAVENOUS at 07:03

## 2018-03-09 RX ADMIN — HYDRALAZINE HYDROCHLORIDE 20 MG: 20 INJECTION INTRAMUSCULAR; INTRAVENOUS at 08:03

## 2018-03-10 PROBLEM — I16.0 HYPERTENSIVE URGENCY: Status: ACTIVE | Noted: 2018-03-10

## 2018-03-10 PROBLEM — I16.1 HYPERTENSIVE EMERGENCY: Status: ACTIVE | Noted: 2018-03-10

## 2018-03-10 LAB
ANION GAP SERPL CALC-SCNC: 9 MMOL/L
BASOPHILS # BLD AUTO: 0.01 K/UL
BASOPHILS NFR BLD: 0.2 %
BILIRUB UR QL STRIP: NEGATIVE
BUN SERPL-MCNC: 13 MG/DL
CALCIUM SERPL-MCNC: 9 MG/DL
CHLORIDE SERPL-SCNC: 107 MMOL/L
CLARITY UR REFRACT.AUTO: CLEAR
CO2 SERPL-SCNC: 24 MMOL/L
COLOR UR AUTO: YELLOW
CREAT SERPL-MCNC: 0.5 MG/DL
DIFFERENTIAL METHOD: ABNORMAL
EOSINOPHIL # BLD AUTO: 0.2 K/UL
EOSINOPHIL NFR BLD: 3.5 %
ERYTHROCYTE [DISTWIDTH] IN BLOOD BY AUTOMATED COUNT: 15.8 %
EST. GFR  (AFRICAN AMERICAN): >60 ML/MIN/1.73 M^2
EST. GFR  (NON AFRICAN AMERICAN): >60 ML/MIN/1.73 M^2
GLUCOSE SERPL-MCNC: 67 MG/DL
GLUCOSE UR QL STRIP: NEGATIVE
HCT VFR BLD AUTO: 31.1 %
HGB BLD-MCNC: 9.6 G/DL
HGB UR QL STRIP: NEGATIVE
IMM GRANULOCYTES # BLD AUTO: 0.01 K/UL
IMM GRANULOCYTES NFR BLD AUTO: 0.2 %
KETONES UR QL STRIP: ABNORMAL
LEUKOCYTE ESTERASE UR QL STRIP: NEGATIVE
LYMPHOCYTES # BLD AUTO: 1.6 K/UL
LYMPHOCYTES NFR BLD: 32.8 %
MAGNESIUM SERPL-MCNC: 1.7 MG/DL
MCH RBC QN AUTO: 22 PG
MCHC RBC AUTO-ENTMCNC: 30.9 G/DL
MCV RBC AUTO: 71 FL
MONOCYTES # BLD AUTO: 0.5 K/UL
MONOCYTES NFR BLD: 10.6 %
NEUTROPHILS # BLD AUTO: 2.5 K/UL
NEUTROPHILS NFR BLD: 52.7 %
NITRITE UR QL STRIP: NEGATIVE
NRBC BLD-RTO: 0 /100 WBC
PH UR STRIP: 6 [PH] (ref 5–8)
PHOSPHATE SERPL-MCNC: 2.7 MG/DL
PLATELET # BLD AUTO: 305 K/UL
PMV BLD AUTO: 9.9 FL
POTASSIUM SERPL-SCNC: 3.6 MMOL/L
PROT UR QL STRIP: NEGATIVE
RBC # BLD AUTO: 4.37 M/UL
SODIUM SERPL-SCNC: 140 MMOL/L
SP GR UR STRIP: 1.01 (ref 1–1.03)
URN SPEC COLLECT METH UR: ABNORMAL
UROBILINOGEN UR STRIP-ACNC: NEGATIVE EU/DL
WBC # BLD AUTO: 4.81 K/UL

## 2018-03-10 PROCEDURE — G0378 HOSPITAL OBSERVATION PER HR: HCPCS

## 2018-03-10 PROCEDURE — 80048 BASIC METABOLIC PNL TOTAL CA: CPT

## 2018-03-10 PROCEDURE — 99214 OFFICE O/P EST MOD 30 MIN: CPT | Mod: GC,,, | Performed by: PSYCHIATRY & NEUROLOGY

## 2018-03-10 PROCEDURE — 85025 COMPLETE CBC W/AUTO DIFF WBC: CPT

## 2018-03-10 PROCEDURE — 63600175 PHARM REV CODE 636 W HCPCS: Performed by: PHYSICIAN ASSISTANT

## 2018-03-10 PROCEDURE — 99226 PR SUBSEQUENT OBSERVATION CARE,LEVEL III: CPT | Mod: ,,, | Performed by: PHYSICIAN ASSISTANT

## 2018-03-10 PROCEDURE — 36415 COLL VENOUS BLD VENIPUNCTURE: CPT

## 2018-03-10 PROCEDURE — 25000003 PHARM REV CODE 250: Performed by: PHYSICIAN ASSISTANT

## 2018-03-10 PROCEDURE — 84100 ASSAY OF PHOSPHORUS: CPT

## 2018-03-10 PROCEDURE — 83735 ASSAY OF MAGNESIUM: CPT

## 2018-03-10 PROCEDURE — 81003 URINALYSIS AUTO W/O SCOPE: CPT

## 2018-03-10 RX ORDER — AMLODIPINE BESYLATE 5 MG/1
5 TABLET ORAL DAILY
Status: DISCONTINUED | OUTPATIENT
Start: 2018-03-10 | End: 2018-03-11 | Stop reason: HOSPADM

## 2018-03-10 RX ORDER — HYDRALAZINE HYDROCHLORIDE 25 MG/1
25 TABLET, FILM COATED ORAL EVERY 8 HOURS PRN
Status: DISCONTINUED | OUTPATIENT
Start: 2018-03-10 | End: 2018-03-11 | Stop reason: HOSPADM

## 2018-03-10 RX ORDER — PANTOPRAZOLE SODIUM 40 MG/1
40 TABLET, DELAYED RELEASE ORAL DAILY
Status: DISCONTINUED | OUTPATIENT
Start: 2018-03-10 | End: 2018-03-11 | Stop reason: HOSPADM

## 2018-03-10 RX ORDER — LORAZEPAM 2 MG/ML
2 INJECTION INTRAMUSCULAR EVERY 4 HOURS PRN
Status: DISCONTINUED | OUTPATIENT
Start: 2018-03-10 | End: 2018-03-11 | Stop reason: HOSPADM

## 2018-03-10 RX ADMIN — LEVETIRACETAM 1000 MG: 100 SOLUTION ORAL at 10:03

## 2018-03-10 RX ADMIN — POLYETHYLENE GLYCOL 3350 17 G: 17 POWDER, FOR SOLUTION ORAL at 10:03

## 2018-03-10 RX ADMIN — PANTOPRAZOLE SODIUM 40 MG: 40 TABLET, DELAYED RELEASE ORAL at 10:03

## 2018-03-10 RX ADMIN — LORAZEPAM 2 MG: 2 INJECTION INTRAMUSCULAR; INTRAVENOUS at 12:03

## 2018-03-10 RX ADMIN — AMLODIPINE BESYLATE 5 MG: 5 TABLET ORAL at 10:03

## 2018-03-10 RX ADMIN — ASPIRIN 81 MG CHEWABLE TABLET 81 MG: 81 TABLET CHEWABLE at 10:03

## 2018-03-10 RX ADMIN — MONTELUKAST SODIUM 10 MG: 10 TABLET, FILM COATED ORAL at 11:03

## 2018-03-10 RX ADMIN — LEVETIRACETAM 1000 MG: 100 SOLUTION ORAL at 11:03

## 2018-03-10 NOTE — NURSING TRANSFER
Nursing Transfer Note      3/10/2018     Transfer to 1009 from ED.    Transfer via stretcher    Transfer with     Transported by transporter    Medicines sent: n/a    Chart send with patient: Yes    Notified: daughter is aware of transfer. Daughter went home to get belongings and will return.    Patient reassessed at: 3/10/18 @ 0145     Upon arrival to floor: Pt transferred to bed. Side rails padded. Suction set-up at bedside. Vitals stable. Unable to determine orientation. Pt mumbling. Pt arouses easily. Bed alarm on. Teds/Scds & yellow socks applied. Call light at bedside. Fall risk & allergy band applied. Will complete admit assessment when daughter arrives.

## 2018-03-10 NOTE — ASSESSMENT & PLAN NOTE
Ms. Byrnes is an 84 yo female w/ PMH significant for COPD, NSTEMI (2014), HTN, Dementia, seizure disorder, who presented on 3/10 with complaints of breakthrough seizure.      Suspect breakthrough seizure related to poor compliance with medications.  Daughter states hard to get pt to take full dose (1 g or 10 cc of keppra BID) at home, so pt has been receiving only ~7 cc ~BID.  Pt likely post-ictal on resident exam this AM, since improved by afternoon attending rounds.    Recommendations  -Keppra 500 mg TID (may help with medicine compliance)  -Consider CXR to look for other potential seizure triggers (e.g. Pneumonia/infection).    -No need for repeat EEG/MRI at this time unless pt clinically deteriorates or doesn't return to her baseline  -Correct/treat any metabolic/infectious abnormalities  -Seizure precautions  -No driving x6 months following a seizure    General neurology will sign off, please call for any questions/concerns.

## 2018-03-10 NOTE — H&P
History and Physical  Hospital Medicine       Patient Name: Ssuanna Byrnes  MRN:  [id number]  Hospital Medicine Team: [facility] MED F Connor Sharp PA-C  Date of Admission:  3/9/2018     Principal Problem:  Breakthrough seizure   Primary care Physician: Ignacia River MD      History of Present Illness:     Ms. Santamaria is a 83 y.o. female with PMH of COPD, NSTEMI (), HTN, dementia, and seizures presented to ED after seizure episode this afternoon. Patient not able to provide history, obtained from family.     Family reports patient was agitated last night and would not allow the family to put her to bed. Behavior improved today, however at 4:40 PM she had a seizure where she was shaking, not arousable, and possibly had an episode of bowel/ bladder incontinence. After the seizure, patient was noted to be confused, talkative. While in the ED the patient was having a grand mal seizure with her eyes rolled back, lip smacking, cleanching arms and straightened legs, and tachycardia. Patient's seizure aborted before administration of ativan, total lasting around 30 seconds. Patient was loaded with 1000 mg IV keppra and is now post-ictal. Family notes that the patient has been having some difficulty with behavior at home, and seeing  family members. Of note, patient missed her morning dose of Keppra today.     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 Patient takes ativan PO 3-4 times a month. Last dose was Friday per family, prior to that was several weeks ago.    Also of note, patient discontinue lisinopril last month after episode of angioedema. She has not been on anti-hypertensive medications since. BP on arrival 205/97, as macario as 220/90. She was given hydralazine IV with adequate response.       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    Past Surgical History: Patient has a past surgical history that includes Total knee arthroplasty (Left); Eye surgery; and Joint replacement.    Social History: Patient reports that she quit smoking about 6 years ago. Her smoking use included Cigarettes. She has quit using smokeless tobacco. She reports that she does not drink alcohol or use drugs.    Family History: family history includes Dementia in her brother; Seizures in her brother and sister.    Medications: Scheduled Meds:   amLODIPine  5 mg Oral Daily    aspirin  81 mg Oral Daily    levetiracetam oral soln  1,000 mg Oral BID    montelukast  10 mg Oral QHS    polyethylene glycol  17 g Oral Daily     Continuous Infusions:  PRN Meds:.acetaminophen, acetaminophen, acetaminophen, albuterol-ipratropium 2.5mg-0.5mg/3mL, dextrose 50%, dextrose 50%, glucagon (human recombinant), glucose, glucose, lorazepam, ondansetron, ondansetron, ramelteon, sodium chloride 0.9%    Allergies: Patient is allergic to haldol [haloperidol lactate].    Physical Exam:     Vital Signs (Most Recent):  Temp: 97.7 °F (36.5 °C) (03/09/18 1741)  Pulse: 93 (03/10/18 0100)  Resp: 18 (03/10/18 0100)  BP: 134/71 (03/10/18 0100)  SpO2: 99 % (03/09/18 2023) Vital Signs Range (Last 24H):  Temp:  [97.7 °F (36.5 °C)]   Pulse:  [79-93]   Resp:  [18]   BP: (134-205)/()   SpO2:  [98 %-99 %]    Body mass index is 35.15 kg/m².     Physical Exam:  Constitutional: Appears well-developed and well-nourished.   Head: Normocephalic and atraumatic.   Mouth/Throat: Oropharynx is clear and moist.   Eyes: EOM are normal. Pupils are equal, round, and reactive to light. No scleral icterus.   Neck: Normal range of motion. Neck supple.   Cardiovascular: Normal rate  and regular rhythm.  No murmur heard.  Pulmonary/Chest: Effort normal and breath sounds normal. No respiratory distress. No wheezes, rales, or rhonchi  Abdominal: Soft. Bowel sounds are normal.  No distension or tenderness  Musculoskeletal: Normal range of motion. No edema.   Neurological: Patient is oriented to person but not place or time. Is post-ictal and serverely confused. Agitated and did not want to speak with me. Does not understand what is going on and was talking to herself when I entered the room. Moving all extremities, does not follow simple commands.  Skin: Skin is warm and dry.   Psychiatric: Normal mood and affect. Behavior is normal.   Vitals reviewed.      Recent Labs  Lab 03/09/18 1945   WBC 6.02   HGB 9.6*   HCT 31.1*            Recent Labs  Lab 03/09/18 1945      K 4.4      CO2 21*   BUN 14   CREATININE 0.6   GLU 81   CALCIUM 9.2       Recent Labs  Lab 03/09/18 1945   ALKPHOS 108   ALT 14   AST 33   ALBUMIN 2.8*   PROT 8.3   BILITOT 0.3      No results for input(s): POCTGLUCOSE in the last 168 hours.      Assessment and Plan:     Ms. Santamaria is a 83 y.o. female who presented to [facility] on 3/9/2018 with seizures.     Active Hospital Problems    Diagnosis  POA    *Breakthrough seizure [G40.919]  Yes    Hypertensive emergency [I16.1]  Yes    Chronic combined systolic and diastolic CHF (congestive heart failure) [I50.42]  Yes    Dementia without behavioral disturbance [F03.90]  Yes    Seizure disorder [G40.909]  Yes    Essential hypertension [I10]  Yes      Resolved Hospital Problems    Diagnosis Date Resolved POA   No resolved problems to display.     Breakthru Seizure  83F with PMH of dementia and seizures who presents for break thru seizure. Admission in 2015 for breakthrough seizures felt 2/2 due to withdrawal from BZD and barbiturates. Patient continues to take ativan Rx about 3-4 times a month, administered by family, last done 1 day PTA, prior dose several  weeks ago. Patient noted to have missed her dose of Keppra this morning.     - general neurology consulted with appreciation  - pending CTH, UA   - continue keppra 1000 mg BID  - ?if behavior changes during this week are 2/2 hypertensive encephalopathy  - seizure precautions, ativan PRN GTC    Hypertensive Emergency  - BP on arrival 210/95, 220/90, with AMS and seizure  - BP improved with hydralazine IV, add PO PRN  - last month D/C'd lisinopril 2/2 suspected angioedema, no other medications given for BP  - start norvasc 5 mg in AM, titrate    Dementia with Behavioral disturbance  - previously on seroquel 50mg QHS during 2015 admission, patient developed tremor and was not acting her self per family and it was d/c'd    Combined CHF  - last echo 2014 with EF 30 and DD  - not on BB, ACE/ARB, or diuretic  - euvolemic on exam     Diet:  Cardiac  GI PPx:  PPI  DVT PPx:  SCD/QING  Goals of Care:  full    Disposition:  Home pending neuro reccs          Connor Sharp PA-C  [facility] Medicine

## 2018-03-10 NOTE — SUBJECTIVE & OBJECTIVE
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-s/p right distal femur ORIF 6/5/15 6/5/2015    Seizure disorder     Seizures        Current Facility-Administered Medications   Medication Dose Route Frequency Provider Last Rate Last Dose    acetaminophen tablet 1,000 mg  1,000 mg Oral Q8H PRN Connor Sharp, PA-C        acetaminophen tablet 650 mg  650 mg Oral Q4H PRN Connor Sharp, PA-C        acetaminophen tablet 650 mg  650 mg Oral Q8H PRN Connor Sharp, PA-C        albuterol-ipratropium 2.5mg-0.5mg/3mL nebulizer solution 3 mL  3 mL Nebulization Q4H PRN Connor Sharp, PA-C        amLODIPine tablet 5 mg  5 mg Oral Daily Connor Sharp, PA-C   5 mg at 03/10/18 1016    aspirin chewable tablet 81 mg  81 mg Oral Daily Connor Sharp, PA-C   81 mg at 03/10/18 1016    dextrose 50% injection 12.5 g  12.5 g Intravenous PRN Connor Sharp, PA-C        dextrose 50% injection 25 g  25 g Intravenous PRN Connor Sharp, PA-C        glucagon (human recombinant) injection 1 mg  1 mg Intramuscular PRN Connor Sharp, PA-C        glucose chewable tablet 16 g  16 g Oral PRN Connor Sharp, PA-C        glucose chewable tablet 24 g  24 g Oral PRN Connor Sharp, PA-C        hydrALAZINE tablet 25 mg  25 mg Oral Q8H PRN Connor Sharp, PA-C        levetiracetam oral soln Soln 1,000 mg  1,000 mg Oral BID Connor Sharp, PA-C   1,000 mg at 03/10/18 1022    lorazepam injection 2 mg  2 mg Intravenous Q4H PRN Connor Sharp, PA-C   2 mg at 03/10/18 0027    montelukast tablet 10 mg  10 mg Oral QHS Connor Sharp, PA-C        ondansetron disintegrating tablet 8 mg  8 mg Oral Q8H PRN Connor Sharp, PA-C        ondansetron injection 4 mg  4 mg Intravenous Q8H PRN Connor Sharp, PA-C        pantoprazole EC tablet 40 mg  40 mg Oral Daily Connor Sharp PA-C   40 mg at 03/10/18 1016    polyethylene glycol packet 17 g   17 g Oral Daily Connor Sharp PA-C   17 g at 03/10/18 1023    ramelteon tablet 8 mg  8 mg Oral Nightly PRN Connor Sharp PA-C        sodium chloride 0.9% flush 5 mL  5 mL Intravenous PRN Connor Sharp PA-C           Review of Systems   Unable to perform ROS: Mental status change   Constitutional: Negative for fever.   Neurological: Positive for seizures and weakness.     Objective:     Vital Signs (Most Recent):  Temp: 97.4 °F (36.3 °C) (03/10/18 1200)  Pulse: 71 (03/10/18 1200)  Resp: 14 (03/10/18 1200)  BP: (!) 116/55 (03/10/18 1200)  SpO2: 95 % (03/10/18 1200) Vital Signs (24h Range):  Temp:  [96.6 °F (35.9 °C)-97.7 °F (36.5 °C)] 97.4 °F (36.3 °C)  Pulse:  [66-94] 71  Resp:  [14-18] 14  SpO2:  [95 %-99 %] 95 %  BP: (116-205)/() 116/55     Weight: 70 kg (154 lb 5.2 oz)  Body mass index is 30.14 kg/m².    Physical Exam   Constitutional: She appears well-developed. No distress.   HENT:   Head: Normocephalic.   Cardiovascular: Normal rate and regular rhythm.  Exam reveals no friction rub.    No murmur heard.  Pulmonary/Chest: Effort normal. No respiratory distress. She has no wheezes.   Abdominal: Soft. She exhibits no distension. There is no tenderness.   Musculoskeletal: She exhibits no edema.   Neurological:   Reflex Scores:       Tricep reflexes are 1+ on the right side and 1+ on the left side.       Bicep reflexes are 1+ on the right side and 1+ on the left side.       Brachioradialis reflexes are 1+ on the right side and 1+ on the left side.       Patellar reflexes are 0 on the right side and 0 on the left side.       Achilles reflexes are 0 on the right side and 0 on the left side.      NEUROLOGICAL EXAMINATION:     MENTAL STATUS   Level of consciousness: alert       Pt drowsy on resident exam, but awake/interactive and eating on attending rounds.     CRANIAL NERVES     CN XI   Right sternocleidomastoid strength: normal  Left sternocleidomastoid strength: normal  Right trapezius strength:  normal  Left trapezius strength: normal       Blinks to threat b/l  EOMI  Corneal reflexes intact b/l  Face grossly symmetrical       MOTOR EXAM   Muscle bulk: normal  Overall muscle tone: normal    Strength   Right neck flexion: 4/5  Left neck flexion: 4/5  Right neck extension: 4/5  Left neck extension: 4/5  Right deltoid: 4/5  Left deltoid: 4/5  Right biceps: 4/5  Left biceps: 4/5  Right triceps: 4/5  Left triceps: 4/5  Right wrist flexion: 4/5  Left wrist flexion: 4/5  Right wrist extension: 4/5  Left wrist extension: 4/5  Right interossei: 4/5  Left interossei: 4/5  Right iliopsoas: 4/5  Left iliopsoas: 4/5  Right quadriceps: 4/5  Left quadriceps: 4/5  Right hamstrin/5  Left hamstrin/5  Right glutei: 4/5  Left glutei: 4/5  Right anterior tibial: 4/5  Left anterior tibial: 4/5  Right posterior tibial: 4/5  Left posterior tibial: 4/5  Right peroneal: 4/5  Left peroneal: 4/5  Right gastroc: 4/5  Left gastroc: 4/5       Exam limited by AMS     REFLEXES     Reflexes   Right brachioradialis: 1+  Left brachioradialis: 1+  Right biceps: 1+  Left biceps: 1+  Right triceps: 1+  Left triceps: 1+  Right patellar: 0  Left patellar: 0  Right achilles: 0  Left achilles: 0  Right plantar: upgoing  Left plantar: upgoing  Right ankle clonus: absent  Left ankle clonus: absent    SENSORY EXAM        Withdrawals to pain in all 4 extremities. Exam limited by AMS     GAIT AND COORDINATION        Deferred 2/2 seizure precautions and limited by AMS       Significant Labs:   Recent Results (from the past 24 hour(s))   CBC auto differential    Collection Time: 18  7:45 PM   Result Value Ref Range    WBC 6.02 3.90 - 12.70 K/uL    RBC 4.42 4.00 - 5.40 M/uL    Hemoglobin 9.6 (L) 12.0 - 16.0 g/dL    Hematocrit 31.1 (L) 37.0 - 48.5 %    MCV 70 (L) 82 - 98 fL    MCH 21.7 (L) 27.0 - 31.0 pg    MCHC 30.9 (L) 32.0 - 36.0 g/dL    RDW 16.0 (H) 11.5 - 14.5 %    Platelets 334 150 - 350 K/uL    MPV 10.1 9.2 - 12.9 fL    Immature  Granulocytes 0.0 0.0 - 0.5 %    Gran # (ANC) 2.5 1.8 - 7.7 K/uL    Immature Grans (Abs) 0.00 0.00 - 0.04 K/uL    Lymph # 2.5 1.0 - 4.8 K/uL    Mono # 0.6 0.3 - 1.0 K/uL    Eos # 0.4 0.0 - 0.5 K/uL    Baso # 0.01 0.00 - 0.20 K/uL    nRBC 0 0 /100 WBC    Gran% 41.5 38.0 - 73.0 %    Lymph% 41.7 18.0 - 48.0 %    Mono% 9.8 4.0 - 15.0 %    Eosinophil% 6.8 0.0 - 8.0 %    Basophil% 0.2 0.0 - 1.9 %    Differential Method Automated    Comprehensive metabolic panel    Collection Time: 03/09/18  7:45 PM   Result Value Ref Range    Sodium 140 136 - 145 mmol/L    Potassium 4.4 3.5 - 5.1 mmol/L    Chloride 107 95 - 110 mmol/L    CO2 21 (L) 23 - 29 mmol/L    Glucose 81 70 - 110 mg/dL    BUN, Bld 14 8 - 23 mg/dL    Creatinine 0.6 0.5 - 1.4 mg/dL    Calcium 9.2 8.7 - 10.5 mg/dL    Total Protein 8.3 6.0 - 8.4 g/dL    Albumin 2.8 (L) 3.5 - 5.2 g/dL    Total Bilirubin 0.3 0.1 - 1.0 mg/dL    Alkaline Phosphatase 108 55 - 135 U/L    AST 33 10 - 40 U/L    ALT 14 10 - 44 U/L    Anion Gap 12 8 - 16 mmol/L    eGFR if African American >60.0 >60 mL/min/1.73 m^2    eGFR if non African American >60.0 >60 mL/min/1.73 m^2   TSH    Collection Time: 03/09/18  7:45 PM   Result Value Ref Range    TSH 2.044 0.400 - 4.000 uIU/mL   Urinalysis    Collection Time: 03/10/18  2:20 AM   Result Value Ref Range    Specimen UA Urine, Catheterized     Color, UA Yellow Yellow, Straw, Kasey    Appearance, UA Clear Clear    pH, UA 6.0 5.0 - 8.0    Specific Gravity, UA 1.015 1.005 - 1.030    Protein, UA Negative Negative    Glucose, UA Negative Negative    Ketones, UA 1+ (A) Negative    Bilirubin (UA) Negative Negative    Occult Blood UA Negative Negative    Nitrite, UA Negative Negative    Urobilinogen, UA Negative <2.0 EU/dL    Leukocytes, UA Negative Negative   Basic Metabolic Panel (BMP)    Collection Time: 03/10/18  6:26 AM   Result Value Ref Range    Sodium 140 136 - 145 mmol/L    Potassium 3.6 3.5 - 5.1 mmol/L    Chloride 107 95 - 110 mmol/L    CO2 24 23 - 29  mmol/L    Glucose 67 (L) 70 - 110 mg/dL    BUN, Bld 13 8 - 23 mg/dL    Creatinine 0.5 0.5 - 1.4 mg/dL    Calcium 9.0 8.7 - 10.5 mg/dL    Anion Gap 9 8 - 16 mmol/L    eGFR if African American >60.0 >60 mL/min/1.73 m^2    eGFR if non African American >60.0 >60 mL/min/1.73 m^2   Magnesium    Collection Time: 03/10/18  6:26 AM   Result Value Ref Range    Magnesium 1.7 1.6 - 2.6 mg/dL   CBC with Automated Differential    Collection Time: 03/10/18  6:26 AM   Result Value Ref Range    WBC 4.81 3.90 - 12.70 K/uL    RBC 4.37 4.00 - 5.40 M/uL    Hemoglobin 9.6 (L) 12.0 - 16.0 g/dL    Hematocrit 31.1 (L) 37.0 - 48.5 %    MCV 71 (L) 82 - 98 fL    MCH 22.0 (L) 27.0 - 31.0 pg    MCHC 30.9 (L) 32.0 - 36.0 g/dL    RDW 15.8 (H) 11.5 - 14.5 %    Platelets 305 150 - 350 K/uL    MPV 9.9 9.2 - 12.9 fL    Immature Granulocytes 0.2 0.0 - 0.5 %    Gran # (ANC) 2.5 1.8 - 7.7 K/uL    Immature Grans (Abs) 0.01 0.00 - 0.04 K/uL    Lymph # 1.6 1.0 - 4.8 K/uL    Mono # 0.5 0.3 - 1.0 K/uL    Eos # 0.2 0.0 - 0.5 K/uL    Baso # 0.01 0.00 - 0.20 K/uL    nRBC 0 0 /100 WBC    Gran% 52.7 38.0 - 73.0 %    Lymph% 32.8 18.0 - 48.0 %    Mono% 10.6 4.0 - 15.0 %    Eosinophil% 3.5 0.0 - 8.0 %    Basophil% 0.2 0.0 - 1.9 %    Differential Method Automated    Phosphorus    Collection Time: 03/10/18  6:26 AM   Result Value Ref Range    Phosphorus 2.7 2.7 - 4.5 mg/dL         Significant Imaging:   3/10/18 C[facility]: Per independent resident review and interpretation,  Remote L posterior parietal infarction.  No acute hemorrhage, mass effect, nor infarct visualized.

## 2018-03-10 NOTE — HPI
Ms. Byrnes is an 82 yo female w/ PMH significant for COPD, NSTEMI (2014), HTN, Dementia, seizure disorder, who presented on 3/10 with complaints of breakthrough seizure.      Pt reported to have had GTC in ED.  Pt lives with daughter, who reports that she has trouble getting pt to take her keppra.  As a result, she normally places ~ 7cc of keppra in pt's drinks.  No signs of recent/current infection.  UA negative, WBC wnl on admit.  SBP elevated in 200s on arrival.    Takes ativan ~4x/month.  Seizures initially thought to be secondary to withdrawal from benzos in 2015.

## 2018-03-10 NOTE — ED PROVIDER NOTES
Encounter Date: 3/9/2018    SCRIBE #1 NOTE: I, Jen Desai, am scribing for, and in the presence of,  Dr. Graham. I have scribed the following portions of the note - the Resident attestation.       History     Chief Complaint   Patient presents with    Seizures     Pt had witnessed seizure lasting 1-2 min.  Pt has hx of seizures & alzheimers.  Pt missed dose of Keppra today.      Patient is a 83-year-old female with dementia, seizures, COPD, who presents with a seizure episode.  Patient was noted to be agitated last night and not allowing the family to get her to bed. She was normal today when at 4:40PM she had a seizure where she was not arousable, was shaking, and family is not sure if she lost bowel or bladder continence. Upon arousal patient was confused. Upon entry to the patient's room in ED, the patient had a grand mal seizure, she was lip smacking, cleanching arms and straightened legs, tachycardic, tachypnic with her eyes rolled back.  Patient's seizure aborted before administration of 2mg ativan, total lasting around 30 seconds. Patient was loaded with keppra. Patient is post ictal. Family notes that the patient has been having some difficulty with behavior at home, and seeing family that has not been present.  Patient is chronically on Keppra, but missed her morning dose of Keppra today. Patient does not answer any questions about her symptoms besides stating that she is hungry.           Review of patient's allergies indicates:   Allergen Reactions    Haldol [haloperidol lactate]      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-s/p right distal femur ORIF 6/5/15 6/5/2015    Seizure disorder     Seizures      Past Surgical History:   Procedure Laterality Date    EYE SURGERY      JOINT REPLACEMENT      TOTAL KNEE ARTHROPLASTY  Left     Family History   Problem Relation  Age of Onset    Seizures Sister     Dementia Brother     Seizures Brother      Social History   Substance Use Topics    Smoking status: Former Smoker     Types: Cigarettes     Quit date: 9/4/2011    Smokeless tobacco: Former User    Alcohol use No     Review of Systems   Unable to perform ROS: Acuity of condition       Physical Exam     Initial Vitals [03/09/18 1741]   BP Pulse Resp Temp SpO2   (!) 205/97 79 18 97.7 °F (36.5 °C) 98 %      MAP       133         Physical Exam    Vitals reviewed.  Constitutional: She appears well-developed and well-nourished. She is not diaphoretic. No distress.   HENT:   Head: Normocephalic and atraumatic.   Eyes: EOM are normal. Pupils are equal, round, and reactive to light.   Neck: Normal range of motion. No thyromegaly present. No tracheal deviation present. No JVD present.   Cardiovascular: Normal rate, regular rhythm and intact distal pulses.   No murmur heard.  Pulmonary/Chest: Breath sounds normal. She has no wheezes. She has no rales.   Abdominal: Soft. She exhibits no distension. There is no tenderness.   Musculoskeletal: Normal range of motion. She exhibits no edema or tenderness.   Neurological: No cranial nerve deficit or sensory deficit.   Patient is post-ictal   Skin: Skin is warm and dry. No rash noted.   Psychiatric: She has a normal mood and affect. Thought content normal.         ED Course   Procedures  Labs Reviewed   CBC W/ AUTO DIFFERENTIAL - Abnormal; Notable for the following:        Result Value    Hemoglobin 9.6 (*)     Hematocrit 31.1 (*)     MCV 70 (*)     MCH 21.7 (*)     MCHC 30.9 (*)     RDW 16.0 (*)     All other components within normal limits   COMPREHENSIVE METABOLIC PANEL - Abnormal; Notable for the following:     CO2 21 (*)     Albumin 2.8 (*)     All other components within normal limits   TSH             Medical Decision Making:   History:   Old Medical Records: I decided to obtain old medical records.  Initial Assessment:   Patient is a  83-year-old female presents with a seizure.  Suspect this is due to the patient missing her Keppra dose this morning.  We'll obtain basic labs, and a UA.  Patient was treated with 2 mg of Ativan, as well as Keppra loaded with 1000 IV.  Patient is currently post ictal.  Patient's vital signs are stable despite elevated blood pressure 210/95.  We'll continue to monitor.  We'll recheck this patient once she is less confused.  Clinical Tests:   Lab Tests: Ordered and Reviewed  Radiological Study: Ordered and Reviewed  ED Management:  8:05 PM  Patient still has elevated blood pressure 220/90, she is starting to waken from her postictal state.  Patient has not been able to have any blood pressure medication today, as family was stating that she had angioedema in January and was holding her lisinopril.  She was supposed to follow up with primary care, however she had exacerbation of COPD and has not been able to follow up with primary care.  Therefore she has not taking any blood pressure medication today.  We will start hydralazine.  10:13 PM  I have been checking on this patient every 20 minutes or so, and she is still remaining altered and postictal. Blood pressure back to normal. We will obtain CT of the head, and request observation admission to ensure that the patient returns to her normal baseline.            Scribe Attestation:   Scribe #1: I performed the above scribed service and the documentation accurately describes the services I performed. I attest to the accuracy of the note.    Attending Attestation:   Physician Attestation Statement for Resident:  As the supervising MD   Physician Attestation Statement: I have personally seen and examined this patient.   I agree with the above history. -:   As the supervising MD I agree with the above PE.    As the supervising MD I agree with the above treatment, course, plan, and disposition.          Physician Attestation for Scribe:      Comments: I, Dr. Graham,  personally performed the services described in this documentation. All medical record entries made by the scribe were at my direction and in my presence.  I have reviewed the chart and agree that the record reflects my personal performance and is accurate and complete.                  Clinical Impression:   The primary encounter diagnosis was Breakthrough seizure. Diagnoses of Seizure and Essential hypertension were also pertinent to this visit.                           Blake Graham MD  03/21/18 6841

## 2018-03-10 NOTE — NURSING
Patient difficult to arouse, very lethargic. Answers simple questions and will dose off mid sentence.   No family at bedside, unable to determine baseline mentation.

## 2018-03-10 NOTE — CONSULTS
[facility]  Neurology  Consult Note    Patient Name: Susanna Byrnes  MRN: [id number]  Admission Date: 3/9/2018  Hospital Length of Stay: 0 days  Code Status: Full Code   Attending Provider: Casie Merrill MD   Consulting Provider: Jhon Haider MD  Primary Care Physician: Ignacia River MD  Principal Problem:Breakthrough seizure    Inpatient consult to Neurology  Consult performed by: JHON HAIDER  Consult ordered by: CONNOR JUNG         Subjective:     Chief Complaint:  Breakthrough Seizure     HPI:   Ms. Byrnes is an 84 yo female w/ PMH significant for COPD, NSTEMI (2014), HTN, Dementia, seizure disorder, who presented on 3/10 with complaints of breakthrough seizure.      Pt reported to have had GTC in ED.  Pt lives with daughter, who reports that she has trouble getting pt to take her keppra.  As a result, she normally places ~ 7cc of keppra in pt's drinks.  No signs of recent/current infection.  UA negative, WBC wnl on admit.  SBP elevated in 200s on arrival.    Takes ativan ~4x/month.  Seizures initially thought to be secondary to withdrawal from benzos in 2015.     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-s/p right distal femur ORIF 6/5/15 6/5/2015    Seizure disorder     Seizures        Current Facility-Administered Medications   Medication Dose Route Frequency Provider Last Rate Last Dose    acetaminophen tablet 1,000 mg  1,000 mg Oral Q8H PRN Connor Jung PA-C        acetaminophen tablet 650 mg  650 mg Oral Q4H PRN TEAGAN Ricketts-LISANDRA        acetaminophen tablet 650 mg  650 mg Oral Q8H PRN Connor Jung PA-C        albuterol-ipratropium 2.5mg-0.5mg/3mL nebulizer solution 3 mL  3 mL Nebulization Q4H PRN Connor Jung PA-C        amLODIPine tablet 5 mg  5 mg Oral Daily Connor Jung PA-C   5 mg at 03/10/18 1016    aspirin chewable tablet  81 mg  81 mg Oral Daily Connor Sharp, PA-C   81 mg at 03/10/18 1016    dextrose 50% injection 12.5 g  12.5 g Intravenous PRN Connor Sharp, PA-C        dextrose 50% injection 25 g  25 g Intravenous PRN Connor Sharp, PA-C        glucagon (human recombinant) injection 1 mg  1 mg Intramuscular PRN Connor Sharp, PA-C        glucose chewable tablet 16 g  16 g Oral PRN Connor Sharp, PA-C        glucose chewable tablet 24 g  24 g Oral PRN Connor Sharp, PA-C        hydrALAZINE tablet 25 mg  25 mg Oral Q8H PRN Connor Sharp, PA-C        levetiracetam oral soln Soln 1,000 mg  1,000 mg Oral BID Connor Sharp, PA-C   1,000 mg at 03/10/18 1022    lorazepam injection 2 mg  2 mg Intravenous Q4H PRN Connor Sharp, PA-C   2 mg at 03/10/18 0027    montelukast tablet 10 mg  10 mg Oral QHS Connor Sharp, PA-C        ondansetron disintegrating tablet 8 mg  8 mg Oral Q8H PRN Connor Sharp, PA-C        ondansetron injection 4 mg  4 mg Intravenous Q8H PRN Connor Sharp, PA-C        pantoprazole EC tablet 40 mg  40 mg Oral Daily Connor Sharp, PA-C   40 mg at 03/10/18 1016    polyethylene glycol packet 17 g  17 g Oral Daily Connor Sharp, PA-C   17 g at 03/10/18 1023    ramelteon tablet 8 mg  8 mg Oral Nightly PRN Connor Sharp, PA-C        sodium chloride 0.9% flush 5 mL  5 mL Intravenous PRN Connor Sharp, PA-C           Review of Systems   Unable to perform ROS: Mental status change   Constitutional: Negative for fever.   Neurological: Positive for seizures and weakness.     Objective:     Vital Signs (Most Recent):  Temp: 97.4 °F (36.3 °C) (03/10/18 1200)  Pulse: 71 (03/10/18 1200)  Resp: 14 (03/10/18 1200)  BP: (!) 116/55 (03/10/18 1200)  SpO2: 95 % (03/10/18 1200) Vital Signs (24h Range):  Temp:  [96.6 °F (35.9 °C)-97.7 °F (36.5 °C)] 97.4 °F (36.3 °C)  Pulse:  [66-94] 71  Resp:  [14-18] 14  SpO2:  [95 %-99 %] 95 %  BP: (116-205)/() 116/55     Weight: 70 kg (154 lb 5.2 oz)  Body mass index is 30.14  kg/m².    Physical Exam   Constitutional: She appears well-developed. No distress.   HENT:   Head: Normocephalic.   Cardiovascular: Normal rate and regular rhythm.  Exam reveals no friction rub.    No murmur heard.  Pulmonary/Chest: Effort normal. No respiratory distress. She has no wheezes.   Abdominal: Soft. She exhibits no distension. There is no tenderness.   Musculoskeletal: She exhibits no edema.   Neurological:   Reflex Scores:       Tricep reflexes are 1+ on the right side and 1+ on the left side.       Bicep reflexes are 1+ on the right side and 1+ on the left side.       Brachioradialis reflexes are 1+ on the right side and 1+ on the left side.       Patellar reflexes are 0 on the right side and 0 on the left side.       Achilles reflexes are 0 on the right side and 0 on the left side.      NEUROLOGICAL EXAMINATION:     MENTAL STATUS   Level of consciousness: alert       Pt drowsy on resident exam, but awake/interactive and eating on attending rounds.     CRANIAL NERVES     CN XI   Right sternocleidomastoid strength: normal  Left sternocleidomastoid strength: normal  Right trapezius strength: normal  Left trapezius strength: normal       Blinks to threat b/l  EOMI  Corneal reflexes intact b/l  Face grossly symmetrical       MOTOR EXAM   Muscle bulk: normal  Overall muscle tone: normal    Strength   Right neck flexion: 4/5  Left neck flexion: 4/5  Right neck extension: 4/5  Left neck extension: 4/5  Right deltoid: 4/5  Left deltoid: 4/5  Right biceps: 4/5  Left biceps: 4/5  Right triceps: 4/5  Left triceps: 4/5  Right wrist flexion: 4/5  Left wrist flexion: 4/5  Right wrist extension: 4/5  Left wrist extension: 4/5  Right interossei: 4/5  Left interossei: 4/5  Right iliopsoas: 4/5  Left iliopsoas: 4/5  Right quadriceps: 4/5  Left quadriceps: 4/5  Right hamstrin/5  Left hamstrin/5  Right glutei: 4/5  Left glutei: 4/5  Right anterior tibial: 4/5  Left anterior tibial: 4/5  Right posterior tibial:  4/5  Left posterior tibial: 4/5  Right peroneal: 4/5  Left peroneal: 4/5  Right gastroc: 4/5  Left gastroc: 4/5       Exam limited by AMS     REFLEXES     Reflexes   Right brachioradialis: 1+  Left brachioradialis: 1+  Right biceps: 1+  Left biceps: 1+  Right triceps: 1+  Left triceps: 1+  Right patellar: 0  Left patellar: 0  Right achilles: 0  Left achilles: 0  Right plantar: upgoing  Left plantar: upgoing  Right ankle clonus: absent  Left ankle clonus: absent    SENSORY EXAM        Withdrawals to pain in all 4 extremities. Exam limited by AMS     GAIT AND COORDINATION        Deferred 2/2 seizure precautions and limited by AMS       Significant Labs:   Recent Results (from the past 24 hour(s))   CBC auto differential    Collection Time: 03/09/18  7:45 PM   Result Value Ref Range    WBC 6.02 3.90 - 12.70 K/uL    RBC 4.42 4.00 - 5.40 M/uL    Hemoglobin 9.6 (L) 12.0 - 16.0 g/dL    Hematocrit 31.1 (L) 37.0 - 48.5 %    MCV 70 (L) 82 - 98 fL    MCH 21.7 (L) 27.0 - 31.0 pg    MCHC 30.9 (L) 32.0 - 36.0 g/dL    RDW 16.0 (H) 11.5 - 14.5 %    Platelets 334 150 - 350 K/uL    MPV 10.1 9.2 - 12.9 fL    Immature Granulocytes 0.0 0.0 - 0.5 %    Gran # (ANC) 2.5 1.8 - 7.7 K/uL    Immature Grans (Abs) 0.00 0.00 - 0.04 K/uL    Lymph # 2.5 1.0 - 4.8 K/uL    Mono # 0.6 0.3 - 1.0 K/uL    Eos # 0.4 0.0 - 0.5 K/uL    Baso # 0.01 0.00 - 0.20 K/uL    nRBC 0 0 /100 WBC    Gran% 41.5 38.0 - 73.0 %    Lymph% 41.7 18.0 - 48.0 %    Mono% 9.8 4.0 - 15.0 %    Eosinophil% 6.8 0.0 - 8.0 %    Basophil% 0.2 0.0 - 1.9 %    Differential Method Automated    Comprehensive metabolic panel    Collection Time: 03/09/18  7:45 PM   Result Value Ref Range    Sodium 140 136 - 145 mmol/L    Potassium 4.4 3.5 - 5.1 mmol/L    Chloride 107 95 - 110 mmol/L    CO2 21 (L) 23 - 29 mmol/L    Glucose 81 70 - 110 mg/dL    BUN, Bld 14 8 - 23 mg/dL    Creatinine 0.6 0.5 - 1.4 mg/dL    Calcium 9.2 8.7 - 10.5 mg/dL    Total Protein 8.3 6.0 - 8.4 g/dL    Albumin 2.8 (L) 3.5 -  5.2 g/dL    Total Bilirubin 0.3 0.1 - 1.0 mg/dL    Alkaline Phosphatase 108 55 - 135 U/L    AST 33 10 - 40 U/L    ALT 14 10 - 44 U/L    Anion Gap 12 8 - 16 mmol/L    eGFR if African American >60.0 >60 mL/min/1.73 m^2    eGFR if non African American >60.0 >60 mL/min/1.73 m^2   TSH    Collection Time: 03/09/18  7:45 PM   Result Value Ref Range    TSH 2.044 0.400 - 4.000 uIU/mL   Urinalysis    Collection Time: 03/10/18  2:20 AM   Result Value Ref Range    Specimen UA Urine, Catheterized     Color, UA Yellow Yellow, Straw, Kasey    Appearance, UA Clear Clear    pH, UA 6.0 5.0 - 8.0    Specific Gravity, UA 1.015 1.005 - 1.030    Protein, UA Negative Negative    Glucose, UA Negative Negative    Ketones, UA 1+ (A) Negative    Bilirubin (UA) Negative Negative    Occult Blood UA Negative Negative    Nitrite, UA Negative Negative    Urobilinogen, UA Negative <2.0 EU/dL    Leukocytes, UA Negative Negative   Basic Metabolic Panel (BMP)    Collection Time: 03/10/18  6:26 AM   Result Value Ref Range    Sodium 140 136 - 145 mmol/L    Potassium 3.6 3.5 - 5.1 mmol/L    Chloride 107 95 - 110 mmol/L    CO2 24 23 - 29 mmol/L    Glucose 67 (L) 70 - 110 mg/dL    BUN, Bld 13 8 - 23 mg/dL    Creatinine 0.5 0.5 - 1.4 mg/dL    Calcium 9.0 8.7 - 10.5 mg/dL    Anion Gap 9 8 - 16 mmol/L    eGFR if African American >60.0 >60 mL/min/1.73 m^2    eGFR if non African American >60.0 >60 mL/min/1.73 m^2   Magnesium    Collection Time: 03/10/18  6:26 AM   Result Value Ref Range    Magnesium 1.7 1.6 - 2.6 mg/dL   CBC with Automated Differential    Collection Time: 03/10/18  6:26 AM   Result Value Ref Range    WBC 4.81 3.90 - 12.70 K/uL    RBC 4.37 4.00 - 5.40 M/uL    Hemoglobin 9.6 (L) 12.0 - 16.0 g/dL    Hematocrit 31.1 (L) 37.0 - 48.5 %    MCV 71 (L) 82 - 98 fL    MCH 22.0 (L) 27.0 - 31.0 pg    MCHC 30.9 (L) 32.0 - 36.0 g/dL    RDW 15.8 (H) 11.5 - 14.5 %    Platelets 305 150 - 350 K/uL    MPV 9.9 9.2 - 12.9 fL    Immature Granulocytes 0.2 0.0 - 0.5 %     Gran # (ANC) 2.5 1.8 - 7.7 K/uL    Immature Grans (Abs) 0.01 0.00 - 0.04 K/uL    Lymph # 1.6 1.0 - 4.8 K/uL    Mono # 0.5 0.3 - 1.0 K/uL    Eos # 0.2 0.0 - 0.5 K/uL    Baso # 0.01 0.00 - 0.20 K/uL    nRBC 0 0 /100 WBC    Gran% 52.7 38.0 - 73.0 %    Lymph% 32.8 18.0 - 48.0 %    Mono% 10.6 4.0 - 15.0 %    Eosinophil% 3.5 0.0 - 8.0 %    Basophil% 0.2 0.0 - 1.9 %    Differential Method Automated    Phosphorus    Collection Time: 03/10/18  6:26 AM   Result Value Ref Range    Phosphorus 2.7 2.7 - 4.5 mg/dL         Significant Imaging:   3/10/18 CTH: Per independent resident review and interpretation,  Remote L posterior parietal infarction.  No acute hemorrhage, mass effect, nor infarct visualized.        Assessment and Plan:     * Breakthrough seizure    Ms. Byrnes is an 84 yo female w/ PMH significant for COPD, NSTEMI (2014), HTN, Dementia, seizure disorder, who presented on 3/10 with complaints of breakthrough seizure.      Suspect breakthrough seizure related to poor compliance with medications.  Daughter states hard to get pt to take full dose (1 g or 10 cc of keppra BID) at home, so pt has been receiving only ~7 cc ~BID.  Pt likely post-ictal on resident exam this AM, since improved by afternoon attending rounds.    Recommendations  -Keppra 500 mg TID (may help with medicine compliance)  -Consider CXR to look for other potential seizure triggers (e.g. Pneumonia/infection).    -No need for repeat EEG/MRI at this time unless pt clinically deteriorates or doesn't return to her baseline  -Correct/treat any metabolic/infectious abnormalities  -Seizure precautions  -No driving x6 months following a seizure    General neurology will sign off, please call for any questions/concerns.              VTE Risk Mitigation         Ordered     Medium Risk of VTE  Once      03/09/18 2224     Place sequential compression device  Until discontinued      03/09/18 2224     Place QING hose  Until discontinued      03/09/18 2224           Thank you for your consult. I will sign off. Please contact us if you have any additional questions.    Jhon Haider MD  Neurology  [facility]

## 2018-03-10 NOTE — PROGRESS NOTES
Progress Note   Hospital Medicine       Team: [facility] XIOMARA Lee PA-C  Admit Date: 3/9/2018  Length of Stay:  LOS: 0 days   LETICIA 3/11/18  Principal Problem:  Breakthrough seizure    Interval hx / overnight events: No events, patient agitated. Daughter at bedside redirecting patient    Areas of concern/ handoff: Seizures    Review of Systems   Unable to perform ROS: Acuity of condition       Temp:  [96.6 °F (35.9 °C)-97.7 °F (36.5 °C)]   Pulse:  [66-94]   Resp:  [16-18]   BP: (134-205)/()   SpO2:  [95 %-99 %]       Physical Exam   Constitutional: She is well-developed, well-nourished, and in no distress.   Eyes: EOM are normal. Pupils are equal, round, and reactive to light. No scleral icterus.   Cardiovascular: Normal rate, regular rhythm, normal heart sounds and intact distal pulses.    Pulmonary/Chest: Effort normal and breath sounds normal. No respiratory distress.   Abdominal: Soft. Bowel sounds are normal. There is no tenderness.   Neurological:   Patient is oriented to person but not place or time. Is post-ictal and serverely confused. Agitated and did not want to speak with me. Does not understand what is going on and was talking to herself when I entered the room. Moving all extremities, does not follow simple commands.       Recent Labs  Lab 03/09/18  1945 03/10/18  0626   WBC 6.02 4.81   HGB 9.6* 9.6*   HCT 31.1* 31.1*    305       Recent Labs  Lab 03/09/18  1945 03/10/18  0626    140   K 4.4 3.6    107   CO2 21* 24   BUN 14 13   CREATININE 0.6 0.5   GLU 81 67*   CALCIUM 9.2 9.0   MG  --  1.7   PHOS  --  2.7       Recent Labs  Lab 03/09/18 1945   ALKPHOS 108   ALT 14   AST 33   ALBUMIN 2.8*   PROT 8.3   BILITOT 0.3     No results for input(s): POCTGLUCOSE in the last 168 hours.         amLODIPine  5 mg Oral Daily    aspirin  81 mg Oral Daily    levetiracetam oral soln  1,000 mg Oral BID    montelukast  10 mg Oral QHS    pantoprazole  40 mg Oral Daily     polyethylene glycol  17 g Oral Daily       Assessment and Plan     Active Hospital Problems    Diagnosis  POA    *Breakthrough seizure [G40.919]  Yes    Hypertensive emergency [I16.1]  Yes    Chronic combined systolic and diastolic CHF (congestive heart failure) [I50.42]  Yes    Dementia without behavioral disturbance [F03.90]  Yes    Seizure disorder [G40.909]  Yes    Essential hypertension [I10]  Yes      Resolved Hospital Problems    Diagnosis Date Resolved POA   No resolved problems to display.       Overview:    Ms. Santamaria is a 83 y.o. female with PMH of COPD, NSTEMI (), HTN, dementia, and seizures presented to ED after seizure episode this afternoon. Patient not able to provide history, obtained from family.      Family reports patient was agitated last night and would not allow the family to put her to bed. Behavior improved today, however at 4:40 PM she had a seizure where she was shaking, not arousable, and possibly had an episode of bowel/ bladder incontinence. After the seizure, patient was noted to be confused, talkative. While in the ED the patient was having a grand mal seizure with her eyes rolled back, lip smacking, cleanching arms and straightened legs, and tachycardia. Patient's seizure aborted before administration of ativan, total lasting around 30 seconds. Patient was loaded with 1000 mg IV keppra and is now post-ictal. Family notes that the patient has been having some difficulty with behavior at home, and seeing  family members. Of note, patient missed her morning dose of Keppra today.      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 Patient takes ativan PO 3-4 times a month. Last dose was Friday per family, prior to that was several weeks ago.     Also of note, patient discontinue lisinopril last month after episode of angioedema. She has not been on anti-hypertensive  medications since. BP on arrival 205/97, as macario as 220/90. She was given hydralazine IV with adequate response.     Problems Addressed today:    Breakthru Seizure  83F with PMH of dementia and seizures who presents for break thru seizure. Admission in 2015 for breakthrough seizures felt 2/2 due to withdrawal from BZD and barbiturates. Patient continues to take ativan Rx about 3-4 times a month, administered by family, last done 1 day PTA, prior dose several weeks ago. Patient noted to have missed her dose of Keppra 3/9.     - general neurology recs pending  - CTH, UA negative  - continue keppra 1000 mg BID  - seizure precautions, ativan PRN GTC     Hypertensive Emergency  - BP on arrival 210/95, 220/90, with AMS and seizure  - BP improved with hydralazine IV, add PO PRN  - last month D/C'd lisinopril 2/2 suspected angioedema, no other medications given for BP  - start norvasc 5 mg in AM, titrate     Dementia with Behavioral disturbance  - previously on seroquel 50mg QHS during 2015 admission, patient developed tremor and was not acting her self per family and it was d/c'd     Combined CHF  - last echo 2014 with EF 30 and DD  - not on BB, ACE/ARB, or diuretic  - euvolemic on exam    HIGH RISK CONDITION(S):     Patient has an abrupt change in neurologic status: Seizure     DVT/ GI PPX: SCD/QING, Protonix    Goals of Care: Full    Disposition: Pending neurology recs, home in 1 day.    Time spent in care of the patient (Greater than 1/2 spent in direct face-to-face contact) 36 minutes        Jayy Lee PA-C   [facility]

## 2018-03-10 NOTE — ED TRIAGE NOTES
83 year old pt presents with seizure activity following a hx of seizure. Pts family states pt had seizure at 16:42 x 1 minute. Pt now presents in ems with seizure, moved to room 5 , and given antiepileptic medication. Pt is now post ictal. Pt lives at home. Pt does not complain of chest pain or any signs of cva.

## 2018-03-11 VITALS
BODY MASS INDEX: 30.29 KG/M2 | RESPIRATION RATE: 16 BRPM | HEIGHT: 60 IN | OXYGEN SATURATION: 96 % | SYSTOLIC BLOOD PRESSURE: 184 MMHG | TEMPERATURE: 98 F | DIASTOLIC BLOOD PRESSURE: 82 MMHG | WEIGHT: 154.31 LBS | HEART RATE: 73 BPM

## 2018-03-11 LAB
ANION GAP SERPL CALC-SCNC: 7 MMOL/L
BASOPHILS # BLD AUTO: 0.01 K/UL
BASOPHILS NFR BLD: 0.2 %
BUN SERPL-MCNC: 13 MG/DL
CALCIUM SERPL-MCNC: 8.6 MG/DL
CHLORIDE SERPL-SCNC: 108 MMOL/L
CO2 SERPL-SCNC: 24 MMOL/L
CREAT SERPL-MCNC: 0.5 MG/DL
DIFFERENTIAL METHOD: ABNORMAL
EOSINOPHIL # BLD AUTO: 0.3 K/UL
EOSINOPHIL NFR BLD: 7.2 %
ERYTHROCYTE [DISTWIDTH] IN BLOOD BY AUTOMATED COUNT: 16.1 %
EST. GFR  (AFRICAN AMERICAN): >60 ML/MIN/1.73 M^2
EST. GFR  (NON AFRICAN AMERICAN): >60 ML/MIN/1.73 M^2
GLUCOSE SERPL-MCNC: 64 MG/DL
HCT VFR BLD AUTO: 29 %
HGB BLD-MCNC: 9.1 G/DL
IMM GRANULOCYTES # BLD AUTO: 0.01 K/UL
IMM GRANULOCYTES NFR BLD AUTO: 0.2 %
LYMPHOCYTES # BLD AUTO: 1.9 K/UL
LYMPHOCYTES NFR BLD: 40.7 %
MAGNESIUM SERPL-MCNC: 1.5 MG/DL
MCH RBC QN AUTO: 22 PG
MCHC RBC AUTO-ENTMCNC: 31.4 G/DL
MCV RBC AUTO: 70 FL
MONOCYTES # BLD AUTO: 0.6 K/UL
MONOCYTES NFR BLD: 12.7 %
NEUTROPHILS # BLD AUTO: 1.8 K/UL
NEUTROPHILS NFR BLD: 39 %
NRBC BLD-RTO: 0 /100 WBC
PHOSPHATE SERPL-MCNC: 3.2 MG/DL
PLATELET # BLD AUTO: 286 K/UL
PMV BLD AUTO: 9.5 FL
POTASSIUM SERPL-SCNC: 3.6 MMOL/L
RBC # BLD AUTO: 4.13 M/UL
SODIUM SERPL-SCNC: 139 MMOL/L
WBC # BLD AUTO: 4.57 K/UL

## 2018-03-11 PROCEDURE — 36415 COLL VENOUS BLD VENIPUNCTURE: CPT

## 2018-03-11 PROCEDURE — 83735 ASSAY OF MAGNESIUM: CPT

## 2018-03-11 PROCEDURE — 25000003 PHARM REV CODE 250: Performed by: PHYSICIAN ASSISTANT

## 2018-03-11 PROCEDURE — 99217 PR OBSERVATION CARE DISCHARGE: CPT | Mod: ,,, | Performed by: PHYSICIAN ASSISTANT

## 2018-03-11 PROCEDURE — 84100 ASSAY OF PHOSPHORUS: CPT

## 2018-03-11 PROCEDURE — 80048 BASIC METABOLIC PNL TOTAL CA: CPT

## 2018-03-11 PROCEDURE — 85025 COMPLETE CBC W/AUTO DIFF WBC: CPT

## 2018-03-11 PROCEDURE — G0378 HOSPITAL OBSERVATION PER HR: HCPCS

## 2018-03-11 RX ORDER — LEVETIRACETAM 100 MG/ML
500 SOLUTION ORAL 3 TIMES DAILY
Status: ON HOLD
Start: 2018-03-11 | End: 2019-07-26 | Stop reason: HOSPADM

## 2018-03-11 RX ORDER — AMLODIPINE BESYLATE 5 MG/1
5 TABLET ORAL DAILY
Qty: 30 TABLET | Refills: 5 | Status: ON HOLD | OUTPATIENT
Start: 2018-03-11 | End: 2019-07-26 | Stop reason: HOSPADM

## 2018-03-11 RX ADMIN — PANTOPRAZOLE SODIUM 40 MG: 40 TABLET, DELAYED RELEASE ORAL at 09:03

## 2018-03-11 RX ADMIN — ASPIRIN 81 MG CHEWABLE TABLET 81 MG: 81 TABLET CHEWABLE at 09:03

## 2018-03-11 RX ADMIN — HYDRALAZINE HYDROCHLORIDE 25 MG: 25 TABLET, FILM COATED ORAL at 09:03

## 2018-03-11 RX ADMIN — LEVETIRACETAM 1000 MG: 100 SOLUTION ORAL at 09:03

## 2018-03-11 RX ADMIN — AMLODIPINE BESYLATE 5 MG: 5 TABLET ORAL at 09:03

## 2018-03-11 NOTE — PLAN OF CARE
[facility]    HOME HEALTH ORDERS  FACE TO FACE ENCOUNTER    Patient Name: Susanna Byrnes  YOB: 1935    PCP: Ignacia River MD   PCP Address: [address] / [place]  PCP Phone Number: [phone]  PCP Fax: [phone]    Encounter Date: 03/11/2018    Admit to Home Health    Diagnoses:  Active Hospital Problems    Diagnosis  POA    *Breakthrough seizure [G40.919]  Yes    Hypertensive emergency [I16.1]  Yes    Chronic combined systolic and diastolic CHF (congestive heart failure) [I50.42]  Yes    Dementia without behavioral disturbance [F03.90]  Yes    Seizure disorder [G40.909]  Yes    Essential hypertension [I10]  Yes      Resolved Hospital Problems    Diagnosis Date Resolved POA   No resolved problems to display.       No future appointments.        I have seen and examined this patient face to face today. My clinical findings that support the need for the home health skilled services and home bound status are the following:  Weakness/numbness causing balance and gait disturbance due to Weakness/Debility making it taxing to leave home.    Allergies:  Review of patient's allergies indicates:   Allergen Reactions    Haldol [haloperidol lactate]        Diet: cardiac diet    Activities: Activity as tolerated    Nursing:   SN to complete comprehensive assessment including routine vital signs. Instruct on disease process and s/s of complications to report to MD. Review/verify medication list sent home with the patient at time of discharge  and instruct patient/caregiver as needed. Frequency may be adjusted depending on start of care date.    Notify MD if SBP > 160 or < 90; DBP > 90 or < 50; HR > 120 or < 50; Temp > 101; Other:        CONSULTS:    Physical Therapy to evaluate and treat. Evaluate for home safety and equipment needs; Establish/upgrade home exercise program. Perform / instruct on therapeutic exercises, gait training, transfer training, and  Range of Motion.  Occupational Therapy to evaluate and treat. Evaluate home environment for safety and equipment needs. Perform/Instruct on transfers, ADL training, ROM, and therapeutic exercises.   to evaluate for community resources/long-range planning.  Aide to provide assistance with personal care, ADLs, and vital signs.    MISCELLANEOUS CARE:  N/A    WOUND CARE ORDERS  n/a      Medications: Review discharge medications with patient and family and provide education.      Current Discharge Medication List      START taking these medications    Details   amLODIPine (NORVASC) 5 MG tablet Take 1 tablet (5 mg total) by mouth once daily.  Qty: 30 tablet, Refills: 5         CONTINUE these medications which have CHANGED    Details   levetiracetam oral soln 500 mg/5 mL (5 mL) Soln Take 5 mLs (500 mg total) by mouth 3 (three) times daily.         CONTINUE these medications which have NOT CHANGED    Details   albuterol 90 mcg/actuation inhaler Inhale 1-2 puffs into the lungs every 6 (six) hours as needed for Wheezing. Rescue  Qty: 1 Inhaler, Refills: 0      aspirin 81 MG Chew Take 1 tablet (81 mg total) by mouth once daily.  Qty: 30 tablet, Refills: 1      cetirizine (ZYRTEC) 10 MG tablet Take 10 mg by mouth once daily.      cholecalciferol, vitamin D3, (VITAMIN D3) 2,000 unit Cap Take 2 capsules by mouth once daily.        lorazepam (ATIVAN) 1 MG tablet Take 1 mg by mouth every 6 (six) hours as needed for Anxiety.      mirtazapine (REMERON) 15 MG tablet Take 1 tablet (15 mg total) by mouth nightly as needed (insomnia).      montelukast (SINGULAIR) 10 mg tablet TAKE 1 TABLET BY MOUTH EVERY EVENING  Qty: 90 tablet, Refills: 11    Comments: **Patient requests 90 days supply**      multivitamin (MULTIVITAMIN) per tablet Take 1 tablet by mouth once daily.        ondansetron (ZOFRAN-ODT) 4 MG TbDL Take 1 tablet (4 mg total) by mouth every 6 (six) hours as needed.  Qty: 10 tablet, Refills: 0      polyethylene glycol  (GLYCOLAX) 17 gram PwPk Take 17 g by mouth once daily.      triamcinolone acetonide 0.1% (KENALOG) 0.1 % cream Apply topically to affected area as needed for skin rash      vitamin E 1000 UNIT capsule Take 1,000 Units by mouth once daily.               I certify that this patient is confined to her home and needs intermittent skilled nursing care, physical therapy and occupational therapy.

## 2018-03-11 NOTE — NURSING
Patient discharge packet reviewed with patient, daughter, and granddaughter. Team notified about family request for home health,  notified and in contact with family. Patient and family verbalized understanding of discharge information. Telemetry and IV removed from patient, patient transported to [facility] via wheelchair by family.

## 2018-03-11 NOTE — PLAN OF CARE
Pt being discharged home with HH.  Pt has PHN, [phone].  Spoke with Marsha, on call nurse and she stated they will set patient up and call us back with company.  CM faxed orders and paperwork to [phone].  CM did confirm patient's address and phone numbers.  Pt's family is providing transportation home.  Pt ready to discharge home.

## 2018-03-11 NOTE — PLAN OF CARE
Problem: Patient Care Overview  Goal: Plan of Care Review  Outcome: Ongoing (interventions implemented as appropriate)  Pt remained free from falls. Call light and personal items in reach. Side rails padded. Bed in lowest position. Suction at bed side. No new changes over night. Will continue to monitor pt.

## 2018-03-13 NOTE — DISCHARGE SUMMARY
Discharge Summary  Hospital Medicine    Attending Provider on Discharge: Jayy Lee PA-C  [facility] Medicine Team: [facility] MED E  Date of Admission:  3/9/2018     Date of Discharge:  3/11/2018    Active Hospital Problems    Diagnosis  POA    *Breakthrough seizure [G40.919]  Yes    Hypertensive emergency [I16.1]  Yes    Chronic combined systolic and diastolic CHF (congestive heart failure) [I50.42]  Yes    Dementia without behavioral disturbance [F03.90]  Yes    Seizure disorder [G40.909]  Yes    Essential hypertension [I10]  Yes      Resolved Hospital Problems    Diagnosis Date Resolved POA   No resolved problems to display.       History of the Present Illness:      Ms. Santamaria is a 83 y.o. female with PMH of COPD, NSTEMI (), HTN, dementia, and seizures presented to ED after seizure episode this afternoon. Patient not able to provide history, obtained from family.      Family reports patient was agitated last night and would not allow the family to put her to bed. Behavior improved today, however at 4:40 PM she had a seizure where she was shaking, not arousable, and possibly had an episode of bowel/ bladder incontinence. After the seizure, patient was noted to be confused, talkative. While in the ED the patient was having a grand mal seizure with her eyes rolled back, lip smacking, cleanching arms and straightened legs, and tachycardia. Patient's seizure aborted before administration of ativan, total lasting around 30 seconds. Patient was loaded with 1000 mg IV keppra and is now post-ictal. Family notes that the patient has been having some difficulty with behavior at home, and seeing  family members. Of note, patient missed her morning dose of Keppra today.      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 Patient takes ativan PO 3-4 times a month. Last dose was Friday per family, prior to  that was several weeks ago.     Also of note, patient discontinue lisinopril last month after episode of angioedema. She has not been on anti-hypertensive medications since. BP on arrival 205/97, as macario as 220/90. She was given hydralazine IV with adequate response.     Hospital Course of Principle Problem Addressed:       Breakthru Seizure  83F with PMH of dementia and seizures who presents for break thru seizure. Admission in 2015 for breakthrough seizures felt 2/2 due to withdrawal from BZD and barbiturates. Patient continues to take ativan Rx about 3-4 times a month, administered by family, last done 1 day PTA, prior dose several weeks ago. Patient noted to have missed her dose of Keppra 3/9.     - general neurology reccommed Keppra 500mg TID to ease dosing  - CTH, UA negative  - continue keppra 1000 mg BID  - seizure precautions, ativan PRN GTC  - patient back to baseline, stable for discharge    Other Medical Problems Addressed in the Hospital:     Hypertensive Emergency  - BP on arrival 210/95, 220/90, with AMS and seizure  - BP improved with hydralazine IV, add PO PRN  - last month D/C'd lisinopril 2/2 suspected angioedema, no other medications given for BP  - start norvasc 5 mg in AM, titrate     Dementia with Behavioral disturbance  - previously on seroquel 50mg QHS during 2015 admission, patient developed tremor and was not acting her self per family and it was d/c'd     Combined CHF  - last echo 2014 with EF 30 and DD  - not on BB, ACE/ARB, or diuretic  - euvolemic on exam    Significant diagnostic tests       Recent Labs  Lab 03/09/18  1945 03/10/18  0626 03/11/18  0631   WBC 6.02 4.81 4.57   HGB 9.6* 9.6* 9.1*   HCT 31.1* 31.1* 29.0*    305 286       Recent Labs  Lab 03/09/18  1945 03/10/18  0626 03/11/18  0631    140 139   K 4.4 3.6 3.6    107 108   CO2 21* 24 24   BUN 14 13 13   CREATININE 0.6 0.5 0.5   CALCIUM 9.2 9.0 8.6*   MG  --  1.7 1.5*   PHOS  --  2.7 3.2   PROT 8.3  --    --    BILITOT 0.3  --   --    ALKPHOS 108  --   --    ALT 14  --   --    AST 33  --   --        Other diagnostic tests     Special Treatments/ Procedures:     None    Discharge Medications:        Discharge Medication List as of 3/11/2018 11:58 AM      START taking these medications    Details   amLODIPine (NORVASC) 5 MG tablet Take 1 tablet (5 mg total) by mouth once daily., Starting Sun 3/11/2018, Until Mon 3/11/2019, Normal         CONTINUE these medications which have CHANGED    Details   levetiracetam oral soln 500 mg/5 mL (5 mL) Soln Take 5 mLs (500 mg total) by mouth 3 (three) times daily., Starting Sun 3/11/2018, No Print         CONTINUE these medications which have NOT CHANGED    Details   albuterol 90 mcg/actuation inhaler Inhale 1-2 puffs into the lungs every 6 (six) hours as needed for Wheezing. Rescue, Starting Mon 6/26/2017, Print      aspirin 81 MG Chew Take 1 tablet (81 mg total) by mouth once daily., Starting Wed 4/30/2014, Until Mon 6/26/2017, Normal      cetirizine (ZYRTEC) 10 MG tablet Take 10 mg by mouth once daily., Until Discontinued, Historical Med      cholecalciferol, vitamin D3, (VITAMIN D3) 2,000 unit Cap Take 2 capsules by mouth once daily.  , Until Discontinued, Historical Med      lorazepam (ATIVAN) 1 MG tablet Take 1 mg by mouth every 6 (six) hours as needed for Anxiety., Historical Med      mirtazapine (REMERON) 15 MG tablet Take 1 tablet (15 mg total) by mouth nightly as needed (insomnia)., Starting 6/19/2015, Until Discontinued, No Print      montelukast (SINGULAIR) 10 mg tablet TAKE 1 TABLET BY MOUTH EVERY EVENING, Normal      multivitamin (MULTIVITAMIN) per tablet Take 1 tablet by mouth once daily.  , Until Discontinued, Historical Med      ondansetron (ZOFRAN-ODT) 4 MG TbDL Take 1 tablet (4 mg total) by mouth every 6 (six) hours as needed., Starting Mon 7/10/2017, Print      polyethylene glycol (GLYCOLAX) 17 gram PwPk Take 17 g by mouth once daily., Starting 6/19/2015, Until  Discontinued, No Print      triamcinolone acetonide 0.1% (KENALOG) 0.1 % cream Apply topically to affected area as needed for skin rash, Starting 12/6/2012, Until Discontinued, Historical Med      vitamin E 1000 UNIT capsule Take 1,000 Units by mouth once daily.  , Until Discontinued, Historical Med             Discharge Diet:cardiac diet     Activity: Activity as tolerated    Discharge Condition: Good    Disposition: Home-Health Care Svc    Tests pending at the time of discharge: none      Time spent  on the discharge of the patient including review of hospital course with the patient. reviewing discharge medications and arranging follow-up care 37 minutes    Discharge examination Patient was seen and examined on the date of discharge and determined to be suitable for discharge.    No future appointments.        GREGORY HermosilloC   [facility] Medicine

## 2018-03-22 ENCOUNTER — OFFICE VISIT (OUTPATIENT)
Dept: URGENT CARE | Facility: CLINIC | Age: 83
End: 2018-03-22
Payer: MEDICARE

## 2018-03-22 VITALS
HEIGHT: 60 IN | SYSTOLIC BLOOD PRESSURE: 168 MMHG | OXYGEN SATURATION: 96 % | WEIGHT: 154 LBS | TEMPERATURE: 98 F | HEART RATE: 80 BPM | BODY MASS INDEX: 30.23 KG/M2 | DIASTOLIC BLOOD PRESSURE: 70 MMHG

## 2018-03-22 DIAGNOSIS — N30.00 ACUTE CYSTITIS WITHOUT HEMATURIA: Primary | ICD-10-CM

## 2018-03-22 DIAGNOSIS — Z86.59 HISTORY OF DEMENTIA: ICD-10-CM

## 2018-03-22 DIAGNOSIS — R30.0 DYSURIA: ICD-10-CM

## 2018-03-22 LAB
BILIRUB UR QL STRIP: NEGATIVE
GLUCOSE UR QL STRIP: NEGATIVE
KETONES UR QL STRIP: NEGATIVE
LEUKOCYTE ESTERASE UR QL STRIP: POSITIVE
PH, POC UA: 5.5 (ref 5–8)
POC BLOOD, URINE: NEGATIVE
POC NITRATES, URINE: POSITIVE
PROT UR QL STRIP: POSITIVE
SP GR UR STRIP: 1.02 (ref 1–1.03)
UROBILINOGEN UR STRIP-ACNC: ABNORMAL (ref 0.1–1.1)

## 2018-03-22 PROCEDURE — 3077F SYST BP >= 140 MM HG: CPT | Mod: CPTII,S$GLB,, | Performed by: NURSE PRACTITIONER

## 2018-03-22 PROCEDURE — 81003 URINALYSIS AUTO W/O SCOPE: CPT | Mod: QW,S$GLB,, | Performed by: NURSE PRACTITIONER

## 2018-03-22 PROCEDURE — 87186 SC STD MICRODIL/AGAR DIL: CPT

## 2018-03-22 PROCEDURE — 87088 URINE BACTERIA CULTURE: CPT

## 2018-03-22 PROCEDURE — 87086 URINE CULTURE/COLONY COUNT: CPT

## 2018-03-22 PROCEDURE — 87077 CULTURE AEROBIC IDENTIFY: CPT

## 2018-03-22 PROCEDURE — 99214 OFFICE O/P EST MOD 30 MIN: CPT | Mod: 25,S$GLB,, | Performed by: NURSE PRACTITIONER

## 2018-03-22 PROCEDURE — 3078F DIAST BP <80 MM HG: CPT | Mod: CPTII,S$GLB,, | Performed by: NURSE PRACTITIONER

## 2018-03-22 RX ORDER — SULFAMETHOXAZOLE AND TRIMETHOPRIM 800; 160 MG/1; MG/1
1 TABLET ORAL 2 TIMES DAILY
Qty: 10 TABLET | Refills: 0 | Status: SHIPPED | OUTPATIENT
Start: 2018-03-22 | End: 2018-03-27

## 2018-03-22 NOTE — PATIENT INSTRUCTIONS
"  Please follow up with your Primary Provider if symptoms do not resolve.  If patient becomes febrile, or her condition worsens, please go to the Emergency Department.    Please make sure the patient is getting adequate intake of water daily (at least eight 8 ounce glasses of water).    Bladder Infection, Female (Adult)     Urine is normally doesn't have any bacteria in it. But bacteria can get into the urinary tract from the skin around the rectum. Or they can travel in the blood from elsewhere in the body. Once they are in your urinary tract, they can cause infection in the urethra (urethritis), the bladder (cystitis), or the kidneys (pyelonephritis).  The most common place for an infection is in the bladder. This is called a bladder infection. This is one of the most common infections in women. Most bladder infections are easily treated. They are not serious unless the infection spreads to the kidney.  The phrases "bladder infection," "UTI," and "cystitis" are often used to describe the same thing. But they are not always the same. Cystitis is an inflammation of the bladder. The most common cause of cystitis is an infection.  Symptoms  The infection causes inflammation in the urethra and bladder. This causes many of the symptoms. The most common symptoms of a bladder infection are:  · Pain or burning when urinating  · Having to urinate more often than usual  · Urgent need to urinate  · Only a small amount of urine comes out  · Blood in urine  · Abdominal discomfort. This is usually in the lower abdomen above the pubic bone.  · Cloudy urine  · Strong- or bad-smelling urine  · Unable to urinate (urinary retention)  · Unable to hold urine in (urinary incontinence)  · Fever  · Loss of appetite  · Confusion (in older adults)  Causes  Bladder infections are not contagious. You can't get one from someone else, from a toilet seat, or from sharing a bath.  The most common cause of bladder infections is bacteria from the " bowels. The bacteria get onto the skin around the opening of the urethra. From there, they can get into the urine and travel up to the bladder, causing inflammation and infection. This usually happens because of:  · Wiping improperly after urinating. Always wipe from front to back.  · Bowel incontinence  · Pregnancy  · Procedures such as having a catheter inserted  · Older age  · Not emptying your bladder. This can allow bacteria a chance to grow in your urine.  · Dehydration  · Constipation  · Sex  · Use of a diaphragm for birth control   Treatment  Bladder infections are diagnosed by a urine test. They are treated with antibiotics and usually clear up quickly without complications. Treatment helps prevent a more serious kidney infection.  Medicines  Medicines can help in the treatment of a bladder infection:  · Take antibiotics until they are used up, even if you feel better. It is important to finish them to make sure the infection has cleared.  · You can use acetaminophen or ibuprofen for pain, fever, or discomfort, unless another medicine was prescribed. If you have chronic liver or kidney disease, talk with your healthcare provider before using these medicines. Also talk with your provider if you've ever had a stomach ulcer or gastrointestinal bleeding, or are taking blood-thinner medicines.  · If you are given phenazopydridine to reduce burning with urination, it will cause your urine to become a bright orange color. This can stain clothing.  Care and prevention  These self-care steps can help prevent future infections:  · Drink plenty of fluids to prevent dehydration and flush out your bladder. Do this unless you must restrict fluids for other health reasons, or your doctor told you not to.  · Proper cleaning after going to the bathroom is important. Wipe from front to back after using the toilet to prevent the spread of bacteria.  · Urinate more often. Don't try to hold urine in for a long time.  · Wear  loose-fitting clothes and cotton underwear. Avoid tight-fitting pants.  · Improve your diet and prevent constipation. Eat more fresh fruit and vegetables, and fiber, and less junk and fatty foods.  · Avoid sex until your symptoms are gone.  · Avoid caffeine, alcohol, and spicy foods. These can irritate your bladder.  · Urinate right after intercourse to flush out your bladder.  · If you use birth control pills and have frequent bladder infections, discuss it with your doctor.  Follow-up care  Call your healthcare provider if all symptoms are not gone after 3 days of treatment. This is especially important if you have repeat infections.  If a culture was done, you will be told if your treatment needs to be changed. If directed, you can call to find out the results.  If X-rays were done, you will be told if the results will affect your treatment.  Call 911  Call 911 if any of the following occur:  · Trouble breathing  · Hard to wake up or confusion  · Fainting or loss of consciousness  · Rapid heart rate  When to seek medical advice  Call your healthcare provider right away if any of these occur:  · Fever of 100.4ºF (38.0ºC) or higher, or as directed by your healthcare provider  · Symptoms are not better by the third day of treatment  · Back or belly (abdominal) pain that gets worse  · Repeated vomiting, or unable to keep medicine down  · Weakness or dizziness  · Vaginal discharge  · Pain, redness, or swelling in the outer vaginal area (labia)  Date Last Reviewed: 10/1/2016  © 7087-7119 [employer]. [address] [place] [address], [place] [address]. All rights reserved. This information is not intended as a substitute for professional medical care. Always follow your healthcare professional's instructions.

## 2018-03-22 NOTE — PROGRESS NOTES
"Subjective:       Patient ID: Susanna Byrnes is a 83 y.o. female.    Vitals:  height is 5' (1.524 m) and weight is 69.9 kg (154 lb). Her temperature is 98.1 °F (36.7 °C). Her blood pressure is 168/70 (abnormal) and her pulse is 80. Her oxygen saturation is 96%.     Chief Complaint: Urinary Tract Infection    Patient is complaining of body pain. Extreme dementia and recently hospitalized for seizures.    Daughter is historian and related the patient has had worsening delirium lately ("not her normal self") and expresses discomfort in her grown/lower abdomen.  No teatments tried. Hx of UTI. Recently hospitalized with cath. Wears adult attends daily.      Urinary Tract Infection    This is a new problem. The current episode started in the past 7 days. She is not sexually active. Pertinent negatives include no chills, hematuria, nausea, urgency or vomiting.     Review of Systems   Constitution: Negative for chills and fever.   Skin: Negative for itching.   Musculoskeletal: Negative for back pain.   Gastrointestinal: Negative for abdominal pain, nausea and vomiting.   Genitourinary: Negative for dysuria, genital sores, hematuria, missed menses, non-menstrual bleeding and urgency.   All other systems reviewed and are negative.      Objective:      Physical Exam   Constitutional: She is oriented to person, place, and time. She appears well-developed and well-nourished.   HENT:   Head: Normocephalic and atraumatic.   Right Ear: External ear normal.   Left Ear: External ear normal.   Nose: Nose normal. No nasal deformity. No epistaxis.   Mouth/Throat: Mucous membranes are normal.   Eyes: Conjunctivae and lids are normal.   Neck: Trachea normal, normal range of motion and phonation normal. Neck supple.   Cardiovascular: Normal rate, regular rhythm, normal heart sounds and normal pulses.    No murmur heard.  Pulmonary/Chest: Effort normal and breath sounds normal. No respiratory distress. She has no wheezes.   Abdominal: Soft. " Normal appearance and bowel sounds are normal. She exhibits no distension and no mass. There is no tenderness. There is no CVA tenderness.   Neurological: She is alert and oriented to person, place, and time.   Skin: Skin is warm, dry and intact.   Psychiatric: Her mood appears anxious. Her speech is rapid and/or pressured and tangential. She is agitated. Cognition and memory are impaired. She expresses impulsivity. She exhibits abnormal recent memory and abnormal remote memory. She is inattentive.   Nursing note and vitals reviewed.      POCT Urinalysis, Dipstick, Automated, W/O Scope   Order: [id number]   Status:  Final result   Visible to patient:  No (Not Released)   Next appt:  None   Dx:  Dysuria    Ref Range & Units 17:05   POC Blood, Urine Negative Negative    POC Bilirubin, Urine Negative Negative    POC Urobilinogen, Urine 0.1 - 1.1 norm    POC Ketones, Urine Negative Negative    POC Protein, Urine Negative Positive     Comments: 30   POC Nitrates, Urine Negative Positive     POC Glucose, Urine Negative Negative    pH, UA 5 - 8 5.5    POC Specific Gravity, Urine 1.003 - 1.029 1.025    POC Leukocytes, Urine Negative Positive     Comments: 500   Resulting Agency  [facility]             Assessment:       1. Acute cystitis without hematuria    2. Dysuria    3. History of dementia        Plan:         Acute cystitis without hematuria  -     Urine culture  -     sulfamethoxazole-trimethoprim 800-160mg (BACTRIM DS) 800-160 mg Tab; Take 1 tablet by mouth 2 (two) times daily.  Dispense: 10 tablet; Refill: 0    Dysuria  -     POCT Urinalysis, Dipstick, Automated, W/O Scope  -     Urine culture    History of dementia      Patient Instructions     Please follow up with your Primary Provider if symptoms do not resolve.  If patient becomes febrile, or her condition worsens, please go to the Emergency Department.    Please make sure the patient is getting adequate intake of water daily (at least eight 8 ounce glasses of  "water).    Bladder Infection, Female (Adult)     Urine is normally doesn't have any bacteria in it. But bacteria can get into the urinary tract from the skin around the rectum. Or they can travel in the blood from elsewhere in the body. Once they are in your urinary tract, they can cause infection in the urethra (urethritis), the bladder (cystitis), or the kidneys (pyelonephritis).  The most common place for an infection is in the bladder. This is called a bladder infection. This is one of the most common infections in women. Most bladder infections are easily treated. They are not serious unless the infection spreads to the kidney.  The phrases "bladder infection," "UTI," and "cystitis" are often used to describe the same thing. But they are not always the same. Cystitis is an inflammation of the bladder. The most common cause of cystitis is an infection.  Symptoms  The infection causes inflammation in the urethra and bladder. This causes many of the symptoms. The most common symptoms of a bladder infection are:  · Pain or burning when urinating  · Having to urinate more often than usual  · Urgent need to urinate  · Only a small amount of urine comes out  · Blood in urine  · Abdominal discomfort. This is usually in the lower abdomen above the pubic bone.  · Cloudy urine  · Strong- or bad-smelling urine  · Unable to urinate (urinary retention)  · Unable to hold urine in (urinary incontinence)  · Fever  · Loss of appetite  · Confusion (in older adults)  Causes  Bladder infections are not contagious. You can't get one from someone else, from a toilet seat, or from sharing a bath.  The most common cause of bladder infections is bacteria from the bowels. The bacteria get onto the skin around the opening of the urethra. From there, they can get into the urine and travel up to the bladder, causing inflammation and infection. This usually happens because of:  · Wiping improperly after urinating. Always wipe from front to " back.  · Bowel incontinence  · Pregnancy  · Procedures such as having a catheter inserted  · Older age  · Not emptying your bladder. This can allow bacteria a chance to grow in your urine.  · Dehydration  · Constipation  · Sex  · Use of a diaphragm for birth control   Treatment  Bladder infections are diagnosed by a urine test. They are treated with antibiotics and usually clear up quickly without complications. Treatment helps prevent a more serious kidney infection.  Medicines  Medicines can help in the treatment of a bladder infection:  · Take antibiotics until they are used up, even if you feel better. It is important to finish them to make sure the infection has cleared.  · You can use acetaminophen or ibuprofen for pain, fever, or discomfort, unless another medicine was prescribed. If you have chronic liver or kidney disease, talk with your healthcare provider before using these medicines. Also talk with your provider if you've ever had a stomach ulcer or gastrointestinal bleeding, or are taking blood-thinner medicines.  · If you are given phenazopydridine to reduce burning with urination, it will cause your urine to become a bright orange color. This can stain clothing.  Care and prevention  These self-care steps can help prevent future infections:  · Drink plenty of fluids to prevent dehydration and flush out your bladder. Do this unless you must restrict fluids for other health reasons, or your doctor told you not to.  · Proper cleaning after going to the bathroom is important. Wipe from front to back after using the toilet to prevent the spread of bacteria.  · Urinate more often. Don't try to hold urine in for a long time.  · Wear loose-fitting clothes and cotton underwear. Avoid tight-fitting pants.  · Improve your diet and prevent constipation. Eat more fresh fruit and vegetables, and fiber, and less junk and fatty foods.  · Avoid sex until your symptoms are gone.  · Avoid caffeine, alcohol, and spicy  foods. These can irritate your bladder.  · Urinate right after intercourse to flush out your bladder.  · If you use birth control pills and have frequent bladder infections, discuss it with your doctor.  Follow-up care  Call your healthcare provider if all symptoms are not gone after 3 days of treatment. This is especially important if you have repeat infections.  If a culture was done, you will be told if your treatment needs to be changed. If directed, you can call to find out the results.  If X-rays were done, you will be told if the results will affect your treatment.  Call 911  Call 911 if any of the following occur:  · Trouble breathing  · Hard to wake up or confusion  · Fainting or loss of consciousness  · Rapid heart rate  When to seek medical advice  Call your healthcare provider right away if any of these occur:  · Fever of 100.4ºF (38.0ºC) or higher, or as directed by your healthcare provider  · Symptoms are not better by the third day of treatment  · Back or belly (abdominal) pain that gets worse  · Repeated vomiting, or unable to keep medicine down  · Weakness or dizziness  · Vaginal discharge  · Pain, redness, or swelling in the outer vaginal area (labia)  Date Last Reviewed: 10/1/2016  © 1679-8546 [employer]. [address] [place] [address], [place] [address]. All rights reserved. This information is not intended as a substitute for professional medical care. Always follow your healthcare professional's instructions.

## 2018-03-26 ENCOUNTER — TELEPHONE (OUTPATIENT)
Dept: URGENT CARE | Facility: CLINIC | Age: 83
End: 2018-03-26

## 2018-03-26 LAB — BACTERIA UR CULT: NORMAL

## 2018-03-26 RX ORDER — CIPROFLOXACIN 250 MG/1
250 TABLET, FILM COATED ORAL 2 TIMES DAILY
Qty: 6 TABLET | Refills: 0 | Status: SHIPPED | OUTPATIENT
Start: 2018-03-26 | End: 2018-03-29

## 2018-03-26 NOTE — TELEPHONE ENCOUNTER
Spoke with daughter of patient (caregiver, pt with pronounced dementia) regarding lab results. Discontinued bactrim as UTI was resistant and reordered Cipro. Pt daughter acknowledged and will begin new abx today.

## 2018-08-17 ENCOUNTER — OFFICE VISIT (OUTPATIENT)
Dept: URGENT CARE | Facility: CLINIC | Age: 83
End: 2018-08-17
Payer: MEDICARE

## 2018-08-17 VITALS
TEMPERATURE: 98 F | BODY MASS INDEX: 34.36 KG/M2 | SYSTOLIC BLOOD PRESSURE: 146 MMHG | RESPIRATION RATE: 16 BRPM | OXYGEN SATURATION: 95 % | DIASTOLIC BLOOD PRESSURE: 65 MMHG | HEART RATE: 82 BPM | HEIGHT: 60 IN | WEIGHT: 175 LBS

## 2018-08-17 DIAGNOSIS — R06.02 SHORTNESS OF BREATH: Primary | ICD-10-CM

## 2018-08-17 PROCEDURE — 99214 OFFICE O/P EST MOD 30 MIN: CPT | Mod: S$GLB,,, | Performed by: NURSE PRACTITIONER

## 2018-08-17 PROCEDURE — 3078F DIAST BP <80 MM HG: CPT | Mod: CPTII,S$GLB,, | Performed by: NURSE PRACTITIONER

## 2018-08-17 PROCEDURE — 3077F SYST BP >= 140 MM HG: CPT | Mod: CPTII,S$GLB,, | Performed by: NURSE PRACTITIONER

## 2018-08-17 RX ORDER — ESCITALOPRAM OXALATE 5 MG/1
5 TABLET ORAL DAILY
COMMUNITY

## 2018-08-17 RX ORDER — FLUTICASONE PROPIONATE 50 MCG
SPRAY, SUSPENSION (ML) NASAL
Refills: 5 | COMMUNITY
Start: 2018-07-11

## 2018-08-17 RX ORDER — RISPERIDONE 0.25 MG/1
0.25 TABLET ORAL DAILY
Refills: 2 | Status: ON HOLD | COMMUNITY
Start: 2018-07-02 | End: 2019-07-26 | Stop reason: HOSPADM

## 2018-08-17 NOTE — PROGRESS NOTES
Subjective:       Patient ID: Susanna Byrnes is a 83 y.o. female.    Vitals:  height is 5' (1.524 m) and weight is 79.4 kg (175 lb). Her oral temperature is 98.4 °F (36.9 °C). Her blood pressure is 146/65 (abnormal) and her pulse is 82. Her respiration is 16 and oxygen saturation is 95%.     Chief Complaint: Wheezing    Susanna complains of wheezing, which has been a recurrent problem the last few months. Her symptoms seem to be worse in the morning and while eating.      Wheezing    This is a recurrent problem. The current episode started more than 1 month ago (may or june). The problem occurs constantly. The problem has been waxing and waning. Associated symptoms include coughing and shortness of breath. Pertinent negatives include no abdominal pain, chest pain, chills, ear pain, fever, headaches, sore throat or sputum production. The symptoms are aggravated by eating. The treatment provided mild relief. Her past medical history is significant for asthma, bronchiolitis and COPD.     Review of Systems   Constitution: Negative for chills, fever and malaise/fatigue.   HENT: Negative for congestion, ear pain, hoarse voice and sore throat.    Eyes: Negative for discharge and redness.   Cardiovascular: Negative for chest pain, dyspnea on exertion and leg swelling.   Respiratory: Positive for cough, shortness of breath and wheezing. Negative for sputum production.    Musculoskeletal: Negative for myalgias.   Gastrointestinal: Negative for abdominal pain and nausea.   Neurological: Negative for headaches.       Objective:      Physical Exam   Constitutional: She is oriented to person, place, and time. She appears well-developed and well-nourished. She is cooperative.  Non-toxic appearance. She does not appear ill. No distress.   HENT:   Head: Normocephalic and atraumatic.   Right Ear: Hearing, tympanic membrane and ear canal normal.   Left Ear: Hearing, tympanic membrane and ear canal normal.   Nose: No mucosal edema,  rhinorrhea or nasal deformity. No epistaxis. Right sinus exhibits no maxillary sinus tenderness and no frontal sinus tenderness. Left sinus exhibits no maxillary sinus tenderness and no frontal sinus tenderness.   Mouth/Throat: Uvula is midline and mucous membranes are normal. No trismus in the jaw. Normal dentition. No uvula swelling. No posterior oropharyngeal erythema.   Eyes: Conjunctivae and lids are normal. No scleral icterus.   Sclera clear bilat   Neck: Trachea normal, full passive range of motion without pain and phonation normal. Neck supple.   Cardiovascular: Normal rate, regular rhythm, normal heart sounds, intact distal pulses and normal pulses.   Pulmonary/Chest: No respiratory distress. She has wheezes.   Abdominal: Normal appearance. She exhibits no distension. There is no tenderness.   Musculoskeletal: Normal range of motion. She exhibits no edema or deformity.   Neurological: She is alert and oriented to person, place, and time. She exhibits normal muscle tone. Coordination normal.   Skin: Skin is warm, dry and intact. She is not diaphoretic. No pallor.   Psychiatric: Her speech is normal. Her mood appears anxious. She is agitated. Cognition and memory are normal.   Pt with hx of dementia   Nursing note and vitals reviewed.      Pt refused XRAY  Assessment:       1. Shortness of breath        Plan:       Patient Instructions     Bronchitis with Wheezing (Viral or Bacterial: Adult)    Bronchitis is an infection of the air passages. It often occurs during a cold and is usually caused by a virus. Symptoms include cough with mucus (phlegm) and low-grade fever. This illness is contagious during the first few days and is spread through the air by coughing and sneezing, or by direct contact (touching the sick person and then touching your own eyes, nose, or mouth).  If there is a lot of inflammation, air flow is restricted. The air passages may also go into spasm, especially if you have asthma. This  causes wheezing and difficulty breathing even in people who do not have asthma.  Bronchitis usually lasts 7 to 14 days. The wheezing should improve with treatment during the first week. An inhaler is often prescribed to relax the air passages and stop wheezing. Antibiotics will be prescribed if your doctor thinks there is also a secondary bacterial infection.  Home care  · If symptoms are severe, rest at home for the first 2 to 3 days. When you go back to your usual activities, don't let yourself get too tired.  · Do not smoke. Also avoid being exposed to secondhand smoke.  · You may use over-the-counter medicine to control fever or pain, unless another medicine was prescribed. Note: If you have chronic liver or kidney disease or have ever had a stomach ulcer or gastrointestinal bleeding, talk with your healthcare provider before using these medicines. Also talk to your provider if you are taking medicine to prevent blood clots.) Aspirin should never be given to anyone younger than 18 years of age who is ill with a viral infection or fever. It may cause severe liver or brain damage.  · Your appetite may be poor, so a light diet is fine. Avoid dehydration by drinking 6 to 8 glasses of fluids per day (such as water, soft drinks, sports drinks, juices, tea, or soup). Extra fluids will help loosen secretions in the nose and lungs.  · Over-the-counter cough, cold, and sore-throat medicines will not shorten the length of the illness, but they may be helpful to reduce symptoms. (Note: Do not use decongestants if you have high blood pressure.)  · If you were given an inhaler, use it exactly as directed. If you need to use it more often than prescribed, your condition may be worsening. If this happens, contact your healthcare provider.  · If prescribed, finish all antibiotic medicine, even if you are feeling better after only a few days.  Follow-up care  Follow up with your healthcare provider, or as advised. If you had an  X-ray or ECG (electrocardiogram), a specialist will review it. You will be notified of any new findings that may affect your care.  Note: If you are age 65 or older, or if you have a chronic lung disease or condition that affects your immune system, or you smoke, talk to your healthcare provider about having a pneumococcal vaccinations and a yearly influenza vaccination (flu shot).  When to seek medical advice  Call your healthcare provider right away if any of these occur:  · Fever of 100.4°F (38°C) or higher  · Coughing up increasing amounts of colored sputum  · Weakness, drowsiness, headache, facial pain, ear pain, or a stiff neck  Call 911, or get immediate medical care  Contact emergency services right away if any of these occur.  · Coughing up blood  · Worsening weakness, drowsiness, headache, or stiff neck  · Increased wheezing not helped with medication, shortness of breath, or pain with breathing  Date Last Reviewed: 9/13/2015  © 6419-9476 [employer]. [address] [place] [address]. All rights reserved. This information is not intended as a substitute for professional medical care. Always follow your healthcare professional's instructions.            Shortness of breath  -     Cancel: X-Ray Chest PA And Lateral; Future; Expected date: 08/17/2018  -     IN OFFICE EKG 12-LEAD (to [place])    Other orders  -     prednisoLONE (ORAPRED) 15 mg/5 mL (3 mg/mL) solution; Take 16.7 mLs (50 mg total) by mouth once daily. for 5 days  Dispense: 83.5 mL; Refill: 0

## 2018-08-20 RX ORDER — PREDNISOLONE SODIUM PHOSPHATE 15 MG/5ML
50 SOLUTION ORAL DAILY
Qty: 83.5 ML | Refills: 0 | Status: SHIPPED | OUTPATIENT
Start: 2018-08-20 | End: 2018-08-25

## 2018-08-20 NOTE — PATIENT INSTRUCTIONS
Bronchitis with Wheezing (Viral or Bacterial: Adult)    Bronchitis is an infection of the air passages. It often occurs during a cold and is usually caused by a virus. Symptoms include cough with mucus (phlegm) and low-grade fever. This illness is contagious during the first few days and is spread through the air by coughing and sneezing, or by direct contact (touching the sick person and then touching your own eyes, nose, or mouth).  If there is a lot of inflammation, air flow is restricted. The air passages may also go into spasm, especially if you have asthma. This causes wheezing and difficulty breathing even in people who do not have asthma.  Bronchitis usually lasts 7 to 14 days. The wheezing should improve with treatment during the first week. An inhaler is often prescribed to relax the air passages and stop wheezing. Antibiotics will be prescribed if your doctor thinks there is also a secondary bacterial infection.  Home care  · If symptoms are severe, rest at home for the first 2 to 3 days. When you go back to your usual activities, don't let yourself get too tired.  · Do not smoke. Also avoid being exposed to secondhand smoke.  · You may use over-the-counter medicine to control fever or pain, unless another medicine was prescribed. Note: If you have chronic liver or kidney disease or have ever had a stomach ulcer or gastrointestinal bleeding, talk with your healthcare provider before using these medicines. Also talk to your provider if you are taking medicine to prevent blood clots.) Aspirin should never be given to anyone younger than 18 years of age who is ill with a viral infection or fever. It may cause severe liver or brain damage.  · Your appetite may be poor, so a light diet is fine. Avoid dehydration by drinking 6 to 8 glasses of fluids per day (such as water, soft drinks, sports drinks, juices, tea, or soup). Extra fluids will help loosen secretions in the nose and lungs.  · Over-the-counter  cough, cold, and sore-throat medicines will not shorten the length of the illness, but they may be helpful to reduce symptoms. (Note: Do not use decongestants if you have high blood pressure.)  · If you were given an inhaler, use it exactly as directed. If you need to use it more often than prescribed, your condition may be worsening. If this happens, contact your healthcare provider.  · If prescribed, finish all antibiotic medicine, even if you are feeling better after only a few days.  Follow-up care  Follow up with your healthcare provider, or as advised. If you had an X-ray or ECG (electrocardiogram), a specialist will review it. You will be notified of any new findings that may affect your care.  Note: If you are age 65 or older, or if you have a chronic lung disease or condition that affects your immune system, or you smoke, talk to your healthcare provider about having a pneumococcal vaccinations and a yearly influenza vaccination (flu shot).  When to seek medical advice  Call your healthcare provider right away if any of these occur:  · Fever of 100.4°F (38°C) or higher  · Coughing up increasing amounts of colored sputum  · Weakness, drowsiness, headache, facial pain, ear pain, or a stiff neck  Call 911, or get immediate medical care  Contact emergency services right away if any of these occur.  · Coughing up blood  · Worsening weakness, drowsiness, headache, or stiff neck  · Increased wheezing not helped with medication, shortness of breath, or pain with breathing  Date Last Reviewed: 9/13/2015  © 7249-6475 [employer]. [address] [place] [address], [place] [address]. All rights reserved. This information is not intended as a substitute for professional medical care. Always follow your healthcare professional's instructions.

## 2018-10-24 DIAGNOSIS — J44.9 CHRONIC OBSTRUCTIVE PULMONARY DISEASE (COPD): Primary | ICD-10-CM

## 2018-11-15 ENCOUNTER — HOSPITAL ENCOUNTER (OUTPATIENT)
Dept: PULMONOLOGY | Facility: HOSPITAL | Age: 83
Discharge: HOME | End: 2018-11-15
Attending: INTERNAL MEDICINE
Payer: MEDICARE

## 2018-11-15 DIAGNOSIS — J44.9 CHRONIC OBSTRUCTIVE PULMONARY DISEASE (COPD): ICD-10-CM

## 2018-11-15 NOTE — PROCEDURES
patient unable to do procedure. After numerous attempts patient was not able to maintain a seal, or do procedure adequately.  Patient did/could not comprehend or follow directions

## 2019-03-08 NOTE — ED NOTES
[id number]   Speech Language Pathology      Sheryl Martines  MRN: [id number]    Patient discharged from ST 2' to transfer to ICU. Please re-consult ST when indicated.    Danny Norman CF-SLP  Speech-Language Pathology  Pager: [phone]

## 2019-07-02 ENCOUNTER — HOSPITAL ENCOUNTER (INPATIENT)
Facility: HOSPITAL | Age: 84
LOS: 6 days | Discharge: NURSING, REHAB OR LONG-TERM CARE | DRG: 872 | End: 2019-07-08
Attending: EMERGENCY MEDICINE | Admitting: HOSPITALIST
Payer: MEDICARE

## 2019-07-02 DIAGNOSIS — N30.01 ACUTE CYSTITIS WITH HEMATURIA: ICD-10-CM
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
## 2019-07-02 DIAGNOSIS — R06.02 SHORTNESS OF BREATH: ICD-10-CM

## 2019-07-02 DIAGNOSIS — A41.9 SEPSIS, DUE TO UNSPECIFIED ORGANISM: Primary | ICD-10-CM
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
## 2019-07-02 PROBLEM — N30.00 ACUTE CYSTITIS WITHOUT HEMATURIA: Status: ACTIVE | Noted: 2019-07-02

## 2019-07-02 PROBLEM — E44.1 MILD MALNUTRITION: Status: ACTIVE | Noted: 2019-07-02

## 2019-07-02 LAB
ALBUMIN SERPL BCP-MCNC: 2.6 G/DL (ref 3.5–5.2)
ALP SERPL-CCNC: 101 U/L (ref 55–135)
ALT SERPL W/O P-5'-P-CCNC: 10 U/L (ref 10–44)
ANION GAP SERPL CALC-SCNC: 11 MMOL/L (ref 8–16)
AST SERPL-CCNC: 20 U/L (ref 10–40)
BACTERIA #/AREA URNS AUTO: ABNORMAL /HPF
BASOPHILS # BLD AUTO: 0.02 K/UL (ref 0–0.2)
BASOPHILS NFR BLD: 0.3 % (ref 0–1.9)
BILIRUB SERPL-MCNC: 0.2 MG/DL (ref 0.1–1)
BILIRUB UR QL STRIP: NEGATIVE
BNP SERPL-MCNC: 62 PG/ML (ref 0–99)
BUN SERPL-MCNC: 6 MG/DL (ref 6–30)
BUN SERPL-MCNC: 8 MG/DL (ref 8–23)
CALCIUM SERPL-MCNC: 8.7 MG/DL (ref 8.7–10.5)
CHLORIDE SERPL-SCNC: 97 MMOL/L (ref 95–110)
CHLORIDE SERPL-SCNC: 99 MMOL/L (ref 95–110)
CLARITY UR REFRACT.AUTO: ABNORMAL
CO2 SERPL-SCNC: 26 MMOL/L (ref 23–29)
COLOR UR AUTO: YELLOW
CREAT SERPL-MCNC: 0.4 MG/DL (ref 0.5–1.4)
CREAT SERPL-MCNC: 0.6 MG/DL (ref 0.5–1.4)
DIFFERENTIAL METHOD: ABNORMAL
EOSINOPHIL # BLD AUTO: 0.1 K/UL (ref 0–0.5)
EOSINOPHIL NFR BLD: 1 % (ref 0–8)
ERYTHROCYTE [DISTWIDTH] IN BLOOD BY AUTOMATED COUNT: 17.5 % (ref 11.5–14.5)
EST. GFR  (AFRICAN AMERICAN): >60 ML/MIN/1.73 M^2
EST. GFR  (NON AFRICAN AMERICAN): >60 ML/MIN/1.73 M^2
GLUCOSE SERPL-MCNC: 105 MG/DL (ref 70–110)
GLUCOSE SERPL-MCNC: 108 MG/DL (ref 70–110)
GLUCOSE UR QL STRIP: NEGATIVE
HCT VFR BLD AUTO: 32.2 % (ref 37–48.5)
HCT VFR BLD CALC: 30 %PCV (ref 36–54)
HGB BLD-MCNC: 9.8 G/DL (ref 12–16)
HGB UR QL STRIP: ABNORMAL
IMM GRANULOCYTES # BLD AUTO: 0.05 K/UL (ref 0–0.04)
IMM GRANULOCYTES NFR BLD AUTO: 0.6 % (ref 0–0.5)
INFLUENZA A, MOLECULAR: NEGATIVE
INFLUENZA B, MOLECULAR: NEGATIVE
KETONES UR QL STRIP: NEGATIVE
LACTATE SERPL-SCNC: 1.1 MMOL/L (ref 0.5–2.2)
LEUKOCYTE ESTERASE UR QL STRIP: ABNORMAL
LYMPHOCYTES # BLD AUTO: 1.4 K/UL (ref 1–4.8)
LYMPHOCYTES NFR BLD: 17.5 % (ref 18–48)
MCH RBC QN AUTO: 21.9 PG (ref 27–31)
MCHC RBC AUTO-ENTMCNC: 30.4 G/DL (ref 32–36)
MCV RBC AUTO: 72 FL (ref 82–98)
MICROSCOPIC COMMENT: ABNORMAL
MONOCYTES # BLD AUTO: 0.6 K/UL (ref 0.3–1)
MONOCYTES NFR BLD: 7.1 % (ref 4–15)
NEUTROPHILS # BLD AUTO: 5.7 K/UL (ref 1.8–7.7)
NEUTROPHILS NFR BLD: 73.5 % (ref 38–73)
NITRITE UR QL STRIP: POSITIVE
NRBC BLD-RTO: 0 /100 WBC
PH UR STRIP: 7 [PH] (ref 5–8)
PLATELET # BLD AUTO: 357 K/UL (ref 150–350)
PMV BLD AUTO: 10.1 FL (ref 9.2–12.9)
POC IONIZED CALCIUM: 1.07 MMOL/L (ref 1.06–1.42)
POC TCO2 (MEASURED): 26 MMOL/L (ref 23–29)
POTASSIUM BLD-SCNC: 3.6 MMOL/L (ref 3.5–5.1)
POTASSIUM SERPL-SCNC: 3.7 MMOL/L (ref 3.5–5.1)
PROT SERPL-MCNC: 7.3 G/DL (ref 6–8.4)
PROT UR QL STRIP: NEGATIVE
RBC # BLD AUTO: 4.47 M/UL (ref 4–5.4)
RBC #/AREA URNS AUTO: 3 /HPF (ref 0–4)
SAMPLE: ABNORMAL
SODIUM BLD-SCNC: 135 MMOL/L (ref 136–145)
SODIUM SERPL-SCNC: 136 MMOL/L (ref 136–145)
SP GR UR STRIP: 1.01 (ref 1–1.03)
SPECIMEN SOURCE: NORMAL
SQUAMOUS #/AREA URNS AUTO: 1 /HPF
TROPONIN I SERPL DL<=0.01 NG/ML-MCNC: 0.03 NG/ML (ref 0–0.03)
URN SPEC COLLECT METH UR: ABNORMAL
WBC # BLD AUTO: 7.78 K/UL (ref 3.9–12.7)
WBC #/AREA URNS AUTO: 44 /HPF (ref 0–5)

## 2019-07-02 PROCEDURE — 96361 HYDRATE IV INFUSION ADD-ON: CPT

## 2019-07-02 PROCEDURE — 93010 ELECTROCARDIOGRAM REPORT: CPT | Mod: ,,, | Performed by: INTERNAL MEDICINE

## 2019-07-02 PROCEDURE — 25000242 PHARM REV CODE 250 ALT 637 W/ HCPCS: Performed by: EMERGENCY MEDICINE

## 2019-07-02 PROCEDURE — 99285 EMERGENCY DEPT VISIT HI MDM: CPT | Mod: ,,, | Performed by: EMERGENCY MEDICINE

## 2019-07-02 PROCEDURE — 85025 COMPLETE CBC W/AUTO DIFF WBC: CPT

## 2019-07-02 PROCEDURE — 99223 PR INITIAL HOSPITAL CARE,LEVL III: ICD-10-PCS | Mod: AI,GC,, | Performed by: HOSPITALIST

## 2019-07-02 PROCEDURE — 25000003 PHARM REV CODE 250: Performed by: EMERGENCY MEDICINE

## 2019-07-02 PROCEDURE — 87040 BLOOD CULTURE FOR BACTERIA: CPT

## 2019-07-02 PROCEDURE — 87088 URINE BACTERIA CULTURE: CPT

## 2019-07-02 PROCEDURE — 93005 ELECTROCARDIOGRAM TRACING: CPT

## 2019-07-02 PROCEDURE — 83540 ASSAY OF IRON: CPT

## 2019-07-02 PROCEDURE — 63600175 PHARM REV CODE 636 W HCPCS: Performed by: EMERGENCY MEDICINE

## 2019-07-02 PROCEDURE — 27000221 HC OXYGEN, UP TO 24 HOURS

## 2019-07-02 PROCEDURE — 80053 COMPREHEN METABOLIC PANEL: CPT

## 2019-07-02 PROCEDURE — 81001 URINALYSIS AUTO W/SCOPE: CPT

## 2019-07-02 PROCEDURE — 87502 INFLUENZA DNA AMP PROBE: CPT

## 2019-07-02 PROCEDURE — 87186 SC STD MICRODIL/AGAR DIL: CPT

## 2019-07-02 PROCEDURE — 99223 1ST HOSP IP/OBS HIGH 75: CPT | Mod: AI,GC,, | Performed by: HOSPITALIST

## 2019-07-02 PROCEDURE — 82728 ASSAY OF FERRITIN: CPT

## 2019-07-02 PROCEDURE — 84484 ASSAY OF TROPONIN QUANT: CPT

## 2019-07-02 PROCEDURE — 25000242 PHARM REV CODE 250 ALT 637 W/ HCPCS: Performed by: STUDENT IN AN ORGANIZED HEALTH CARE EDUCATION/TRAINING PROGRAM

## 2019-07-02 PROCEDURE — 51702 INSERT TEMP BLADDER CATH: CPT

## 2019-07-02 PROCEDURE — 12000002 HC ACUTE/MED SURGE SEMI-PRIVATE ROOM

## 2019-07-02 PROCEDURE — 83880 ASSAY OF NATRIURETIC PEPTIDE: CPT

## 2019-07-02 PROCEDURE — 99285 PR EMERGENCY DEPT VISIT,LEVEL V: ICD-10-PCS | Mod: ,,, | Performed by: EMERGENCY MEDICINE

## 2019-07-02 PROCEDURE — 94761 N-INVAS EAR/PLS OXIMETRY MLT: CPT

## 2019-07-02 PROCEDURE — 99900035 HC TECH TIME PER 15 MIN (STAT)

## 2019-07-02 PROCEDURE — 96372 THER/PROPH/DIAG INJ SC/IM: CPT | Mod: 59

## 2019-07-02 PROCEDURE — 99284 EMERGENCY DEPT VISIT MOD MDM: CPT

## 2019-07-02 PROCEDURE — 87086 URINE CULTURE/COLONY COUNT: CPT

## 2019-07-02 PROCEDURE — 94640 AIRWAY INHALATION TREATMENT: CPT

## 2019-07-02 PROCEDURE — 87077 CULTURE AEROBIC IDENTIFY: CPT

## 2019-07-02 PROCEDURE — 93010 EKG 12-LEAD: ICD-10-PCS | Mod: ,,, | Performed by: INTERNAL MEDICINE

## 2019-07-02 PROCEDURE — 83605 ASSAY OF LACTIC ACID: CPT

## 2019-07-02 PROCEDURE — 96365 THER/PROPH/DIAG IV INF INIT: CPT

## 2019-07-02 RX ORDER — RIVASTIGMINE TARTRATE 1.5 MG/1
1.5 CAPSULE ORAL 2 TIMES DAILY
Status: DISCONTINUED | OUTPATIENT
Start: 2019-07-02 | End: 2019-07-07

## 2019-07-02 RX ORDER — CEFTRIAXONE 1 G/1
1 INJECTION, POWDER, FOR SOLUTION INTRAMUSCULAR; INTRAVENOUS
Status: DISCONTINUED | OUTPATIENT
Start: 2019-07-03 | End: 2019-07-03

## 2019-07-02 RX ORDER — IPRATROPIUM BROMIDE AND ALBUTEROL SULFATE 2.5; .5 MG/3ML; MG/3ML
3 SOLUTION RESPIRATORY (INHALATION)
Status: COMPLETED | OUTPATIENT
Start: 2019-07-02 | End: 2019-07-02

## 2019-07-02 RX ORDER — RISPERIDONE 0.25 MG/1
0.25 TABLET ORAL DAILY
Status: DISCONTINUED | OUTPATIENT
Start: 2019-07-03 | End: 2019-07-04

## 2019-07-02 RX ORDER — MIRTAZAPINE 15 MG/1
15 TABLET, FILM COATED ORAL NIGHTLY PRN
Status: DISCONTINUED | OUTPATIENT
Start: 2019-07-02 | End: 2019-07-08 | Stop reason: HOSPADM

## 2019-07-02 RX ORDER — AMLODIPINE BESYLATE 5 MG/1
5 TABLET ORAL DAILY
Status: DISCONTINUED | OUTPATIENT
Start: 2019-07-03 | End: 2019-07-08 | Stop reason: HOSPADM

## 2019-07-02 RX ORDER — RIVASTIGMINE TARTRATE 1.5 MG/1
1.5 CAPSULE ORAL 2 TIMES DAILY
Status: ON HOLD | COMMUNITY
End: 2019-07-04

## 2019-07-02 RX ORDER — ENOXAPARIN SODIUM 100 MG/ML
40 INJECTION SUBCUTANEOUS EVERY 24 HOURS
Status: DISCONTINUED | OUTPATIENT
Start: 2019-07-02 | End: 2019-07-08 | Stop reason: HOSPADM

## 2019-07-02 RX ORDER — IPRATROPIUM BROMIDE AND ALBUTEROL SULFATE 2.5; .5 MG/3ML; MG/3ML
3 SOLUTION RESPIRATORY (INHALATION)
Status: DISCONTINUED | OUTPATIENT
Start: 2019-07-02 | End: 2019-07-08 | Stop reason: HOSPADM

## 2019-07-02 RX ORDER — NAPROXEN SODIUM 220 MG/1
81 TABLET, FILM COATED ORAL DAILY
Status: DISCONTINUED | OUTPATIENT
Start: 2019-07-03 | End: 2019-07-08 | Stop reason: HOSPADM

## 2019-07-02 RX ORDER — ESCITALOPRAM OXALATE 5 MG/1
5 TABLET ORAL DAILY
Status: DISCONTINUED | OUTPATIENT
Start: 2019-07-03 | End: 2019-07-08 | Stop reason: HOSPADM

## 2019-07-02 RX ORDER — CETIRIZINE HYDROCHLORIDE 5 MG/1
10 TABLET ORAL DAILY
Status: DISCONTINUED | OUTPATIENT
Start: 2019-07-03 | End: 2019-07-08 | Stop reason: HOSPADM

## 2019-07-02 RX ORDER — CHLORTHALIDONE 25 MG/1
12.5 TABLET ORAL DAILY
Status: ON HOLD | COMMUNITY
End: 2019-07-26 | Stop reason: HOSPADM

## 2019-07-02 RX ORDER — ACETAMINOPHEN 325 MG/1
650 TABLET ORAL EVERY 4 HOURS PRN
Status: DISCONTINUED | OUTPATIENT
Start: 2019-07-02 | End: 2019-07-08 | Stop reason: HOSPADM

## 2019-07-02 RX ORDER — BENZONATATE 100 MG/1
100 CAPSULE ORAL 3 TIMES DAILY PRN
Status: DISCONTINUED | OUTPATIENT
Start: 2019-07-02 | End: 2019-07-08 | Stop reason: HOSPADM

## 2019-07-02 RX ORDER — LEVETIRACETAM 100 MG/ML
500 SOLUTION ORAL 3 TIMES DAILY
Status: DISCONTINUED | OUTPATIENT
Start: 2019-07-02 | End: 2019-07-08 | Stop reason: HOSPADM

## 2019-07-02 RX ORDER — ACETAMINOPHEN 650 MG/20.3ML
650 LIQUID ORAL
Status: COMPLETED | OUTPATIENT
Start: 2019-07-02 | End: 2019-07-03

## 2019-07-02 RX ORDER — MONTELUKAST SODIUM 10 MG/1
10 TABLET ORAL NIGHTLY
Status: DISCONTINUED | OUTPATIENT
Start: 2019-07-02 | End: 2019-07-08 | Stop reason: HOSPADM

## 2019-07-02 RX ORDER — SODIUM CHLORIDE 0.9 % (FLUSH) 0.9 %
10 SYRINGE (ML) INJECTION
Status: DISCONTINUED | OUTPATIENT
Start: 2019-07-02 | End: 2019-07-08 | Stop reason: HOSPADM

## 2019-07-02 RX ADMIN — IPRATROPIUM BROMIDE AND ALBUTEROL SULFATE 3 ML: .5; 3 SOLUTION RESPIRATORY (INHALATION) at 05:07

## 2019-07-02 RX ADMIN — IPRATROPIUM BROMIDE AND ALBUTEROL SULFATE 3 ML: .5; 3 SOLUTION RESPIRATORY (INHALATION) at 10:07

## 2019-07-02 RX ADMIN — SODIUM CHLORIDE, SODIUM LACTATE, POTASSIUM CHLORIDE, AND CALCIUM CHLORIDE 1000 ML: .6; .31; .03; .02 INJECTION, SOLUTION INTRAVENOUS at 05:07

## 2019-07-02 RX ADMIN — CEFTRIAXONE 2 G: 2 INJECTION, SOLUTION INTRAVENOUS at 05:07

## 2019-07-02 NOTE — ED PROVIDER NOTES
Encounter Date: 7/2/2019    SCRIBE #1 NOTE: I, Denisse Pardo, am scribing for, and in the presence of,  Dr. Reis. I have scribed the entire note.       History     Chief Complaint   Patient presents with    Fall     arrived via [facility] with c/o fall 20 minutes PTA, reports lost balance walking with walker witnessed by family, c/o right arm and right leg pain, no obvious deformities, at baseline mental status per family, deneis head injury or LOC     84 y.o.  Female with a pmhx of HTN, dementia, and arthritis with complaints of right arm and right leg pain. The patient's daughter, who is the patient's primary care giver, reports that the patient fell approximately 20 minutes prior to arrival to the ED. The patient did not lose consciousness or hit her head. The patient's daughter reports that the patient had a fever of 99.7 and increased thirst yesterday. The patient was given 2 bottles of water by her daughter. The patient has also had an intermittent cough for the past several days. Patient's daughter states that the patient has had a similar cough in the past and visited an urgent care center and was prescribed prednisone at that time. Patient has no history of heart failure or irregular heartbeat. Denies nausea and vomiting.     The history is provided by a relative and medical records.     Review of patient's allergies indicates:   Allergen Reactions    Haldol [haloperidol lactate]      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-s/p right distal femur ORIF 6/5/15 6/5/2015    Seizure disorder     Seizures      Past Surgical History:   Procedure Laterality Date    ARTHROPLASTY, KNEE, TOTAL Right 1/21/2013    [facility]formed [facility] OR [address] [place] [address]    EYE SURGERY      JOINT REPLACEMENT      OPEN REDUCTION INTERNAL FIXATION-FEMUR - Periprosthetic  supracondylar 6/5/15 Right 2015    [facility]    TOTAL KNEE ARTHROPLASTY  Left     Family History   Problem Relation Age of Onset    Seizures Sister     Dementia Brother     Seizures Brother      Social History     Tobacco Use    Smoking status: Former Smoker     Types: Cigarettes     Last attempt to quit: 2011     Years since quittin.8    Smokeless tobacco: Former User   Substance Use Topics    Alcohol use: No    Drug use: No     Review of Systems   Constitutional: Positive for fever.   HENT: Negative for congestion and sore throat.    Eyes: Negative for visual disturbance.   Respiratory: Positive for cough. Negative for shortness of breath.    Cardiovascular: Negative for chest pain.   Gastrointestinal: Negative for nausea and vomiting.   Genitourinary: Negative for dysuria and hematuria.   Musculoskeletal: Negative for back pain.        Right arm and right leg pain.   Skin: Negative for rash.   Neurological: Negative for weakness and numbness.   Hematological: Does not bruise/bleed easily.       Physical Exam     Initial Vitals [19 1508]   BP Pulse Resp Temp SpO2   (!) 167/86 81 18 99.2 °F (37.3 °C) 100 %      MAP       --         Physical Exam    Vitals reviewed.  Constitutional:   84-year-old Afro-American woman, moderate discomfort noted, febrile   HENT:   Head: Normocephalic and atraumatic.   Moderately desiccated mucous membranes noted without evidence of acute intraoral injury   Eyes: EOM are normal. Pupils are equal, round, and reactive to light.   Neck: No tracheal deviation present. No JVD present.   Cardiovascular: Normal rate, regular rhythm, normal heart sounds and intact distal pulses.   Pulmonary/Chest: No stridor.   Air movement slightly diminished at bilateral bases without associated tachypnea or accessory muscle use   Abdominal: Soft. She exhibits no distension. There is tenderness.   Obese, diffusely tender without rebound or  guarding   Musculoskeletal: She exhibits no edema.   Moving all 4 extremities.   Neurological: She is alert and oriented to person, place, and time. GCS score is 15. GCS eye subscore is 4. GCS verbal subscore is 5. GCS motor subscore is 6.   Skin: Skin is warm and dry.   Psychiatric: Her behavior is normal. Thought content normal.         ED Course   Procedures  Labs Reviewed   CBC W/ AUTO DIFFERENTIAL - Abnormal; Notable for the following components:       Result Value    Hemoglobin 9.8 (*)     Hematocrit 32.2 (*)     Mean Corpuscular Volume 72 (*)     Mean Corpuscular Hemoglobin 21.9 (*)     Mean Corpuscular Hemoglobin Conc 30.4 (*)     RDW 17.5 (*)     Platelets 357 (*)     Immature Granulocytes 0.6 (*)     Immature Grans (Abs) 0.05 (*)     Gran% 73.5 (*)     Lymph% 17.5 (*)     All other components within normal limits   URINALYSIS, REFLEX TO URINE CULTURE - Abnormal; Notable for the following components:    Appearance, UA Hazy (*)     Occult Blood UA 1+ (*)     Nitrite, UA Positive (*)     Leukocytes, UA 3+ (*)     All other components within normal limits    Narrative:     Preferred Collection Type->Urine, Clean Catch   TROPONIN I - Abnormal; Notable for the following components:    Troponin I 0.027 (*)     All other components within normal limits   URINALYSIS MICROSCOPIC - Abnormal; Notable for the following components:    WBC, UA 44 (*)     All other components within normal limits    Narrative:     Preferred Collection Type->Urine, Clean Catch   COMPREHENSIVE METABOLIC PANEL - Abnormal; Notable for the following components:    Albumin 2.6 (*)     All other components within normal limits    Narrative:     Lab collect   ISTAT PROCEDURE - Abnormal; Notable for the following components:    POC Creatinine 0.4 (*)     POC Sodium 135 (*)     POC Hematocrit 30 (*)     All other components within normal limits   INFLUENZA A & B BY MOLECULAR   CULTURE, BLOOD   CULTURE, BLOOD   CULTURE, URINE   LACTIC ACID, PLASMA    B-TYPE NATRIURETIC PEPTIDE   POCT INFLUENZA A/B MOLECULAR     EKG Readings: (Independently Interpreted)   Normal sinus rhythm at 71bpm. Normal axis. Normal ST segments.       Imaging Results          X-Ray Pelvis Routine AP (Final result)  Result time 07/02/19 19:21:32    Final result by Jonnathan Marvin MD (07/02/19 19:21:32)                 Impression:      No evidence of fracture or dislocation of the pelvic bones.    Abnormal angulation of the right hip.  Dedicated right hip series is suggested for further evaluation.      Electronically signed by: Jonnathan Marvin MD  Date:    07/02/2019  Time:    19:21             Narrative:    EXAMINATION:  XR PELVIS ROUTINE AP    CLINICAL HISTORY:  fall at home;    TECHNIQUE:  AP view of the pelvis was performed.    COMPARISON:  X-ray of the pelvis dated 06/04/2015.    FINDINGS:  Monitoring EKG leads are present.  There is diffuse osteopenia.  The pelvic ring appears intact.  No cortical step-off is identified.  There is mild leftward curvature of the lumbar alignment.  There is joint space narrowing of the sacroiliac joints.  There is no evidence of widening of the pubic symphysis.  There is no evidence of fracture or dislocation of the pelvic bones.    There is abnormal angulation of the right hip.  This is incompletely assessed on this dedicated pelvic radiograph.                               X-Ray Chest AP Portable (In process)                  Medical Decision Making:   History:   Old Medical Records: I decided to obtain old medical records.  Differential Diagnosis:   Sepsis, septic shock, CHF, pneumonia, pleural effusion, electrolyte derangement, lethal arrhythmia  Independently Interpreted Test(s):   I have ordered and independently interpreted X-rays - see prior notes.  I have ordered and independently interpreted EKG Reading(s) - see prior notes  Clinical Tests:   Lab Tests: Ordered and Reviewed  Radiological Study: Ordered and Reviewed  Medical Tests: Ordered and  Reviewed  Other:   I have discussed this case with another health care provider.            Scribe Attestation:   Scribe #1: I performed the above scribed service and the documentation accurately describes the services I performed. I attest to the accuracy of the note.    Attending Attestation:             Attending ED Notes:   Emergency department findings today do not reveal evidence of acute abnormalities on chest x-ray or electrocardiogram.  Laboratory evaluation reveals a baseline microcytic anemia with a normal WBC with a mild-to-moderate granulocytosis.  Urinalysis does reveal evidence of nitrite; and the serum lactic acid is notably within normal limits. Due to her underlying cardiomyopathy with an ejection fraction approximately 30%, initial aggressive fluid resuscitation with 30 cc/kilogram of crystalloid was withheld due to mild hypertension.  After obtaining cultures of blood in urine, ceftriaxone 2 g has been administered.  Metabolic profile does not reveal evidence of solid organ injury. Findings and concerns have been discussed with internal medicine on call, and the patient will be admitted to their service in fair condition for further therapy and management.             Clinical Impression:       ICD-10-CM ICD-9-CM   1. Sepsis, due to unspecified organism A41.9 038.9     995.91   2. Fall at home, initial encounter W19.XXXA E888.9    Y92.009 E849.0   3. Shortness of breath R06.02 786.05   4. Acute cystitis with hematuria N30.01 595.0         Disposition:   Disposition: Admitted  Condition: Fair  IM                        Porfirio Reis MD  07/02/19 2005

## 2019-07-02 NOTE — ED NOTES
LOC: The patient is awake, disoriented,  Dementia.   Affect is appropriate.  Speech is clear.    APPEARANCE: Patient resting comfortably in no acute distress.  Patient is clean and well groomed.    SKIN: The skin is warm and dry; color consistent with ethnicity.  Patient has normal skin turgor and moist mucus membranes.  Skin intact; no breakdown or bruising noted.     MUSCULOSKELETAL: Patient moving upper and lower extremities without difficulty.   Left arm contracture.  Assistance needed for mobility.    RESPIRATORY: Airway is open and patent. Respirations spontaneous,   and non-labored.  Patient has an increased  effort and rate.  No accessory muscle use noted. cough.  O2 sats 88 % RA, O2 3l NC placed.   BS coarse.     CARDIAC: .  No peripheral edema noted. No c/o chest pain.    ABDOMEN: OBESE, Soft and non tender to palpation.  No distention noted.     NEUROLOGIC: Eyes open spontaneously.  Behavior appropriate to situation.  Follows commands; facial expression symmetrical.  Purposeful motor response noted; normal sensation in all extremities.

## 2019-07-03 LAB
ANION GAP SERPL CALC-SCNC: 10 MMOL/L (ref 8–16)
BASOPHILS # BLD AUTO: 0.02 K/UL (ref 0–0.2)
BASOPHILS NFR BLD: 0.3 % (ref 0–1.9)
BUN SERPL-MCNC: 7 MG/DL (ref 8–23)
CALCIUM SERPL-MCNC: 8.5 MG/DL (ref 8.7–10.5)
CHLORIDE SERPL-SCNC: 101 MMOL/L (ref 95–110)
CO2 SERPL-SCNC: 25 MMOL/L (ref 23–29)
CREAT SERPL-MCNC: 0.6 MG/DL (ref 0.5–1.4)
DIFFERENTIAL METHOD: ABNORMAL
EOSINOPHIL # BLD AUTO: 0.1 K/UL (ref 0–0.5)
EOSINOPHIL NFR BLD: 1.7 % (ref 0–8)
ERYTHROCYTE [DISTWIDTH] IN BLOOD BY AUTOMATED COUNT: 17.2 % (ref 11.5–14.5)
EST. GFR  (AFRICAN AMERICAN): >60 ML/MIN/1.73 M^2
EST. GFR  (NON AFRICAN AMERICAN): >60 ML/MIN/1.73 M^2
FERRITIN SERPL-MCNC: 57 NG/ML (ref 20–300)
GLUCOSE SERPL-MCNC: 86 MG/DL (ref 70–110)
HCT VFR BLD AUTO: 29.3 % (ref 37–48.5)
HGB BLD-MCNC: 8.7 G/DL (ref 12–16)
IMM GRANULOCYTES # BLD AUTO: 0.02 K/UL (ref 0–0.04)
IMM GRANULOCYTES NFR BLD AUTO: 0.3 % (ref 0–0.5)
IRON SERPL-MCNC: 14 UG/DL (ref 30–160)
LYMPHOCYTES # BLD AUTO: 2.4 K/UL (ref 1–4.8)
LYMPHOCYTES NFR BLD: 37.4 % (ref 18–48)
MCH RBC QN AUTO: 21.9 PG (ref 27–31)
MCHC RBC AUTO-ENTMCNC: 29.7 G/DL (ref 32–36)
MCV RBC AUTO: 74 FL (ref 82–98)
MONOCYTES # BLD AUTO: 0.7 K/UL (ref 0.3–1)
MONOCYTES NFR BLD: 11.4 % (ref 4–15)
NEUTROPHILS # BLD AUTO: 3.1 K/UL (ref 1.8–7.7)
NEUTROPHILS NFR BLD: 48.9 % (ref 38–73)
NRBC BLD-RTO: 0 /100 WBC
PLATELET # BLD AUTO: 285 K/UL (ref 150–350)
PMV BLD AUTO: 9.5 FL (ref 9.2–12.9)
POTASSIUM SERPL-SCNC: 3.7 MMOL/L (ref 3.5–5.1)
RBC # BLD AUTO: 3.97 M/UL (ref 4–5.4)
SATURATED IRON: 5 % (ref 20–50)
SODIUM SERPL-SCNC: 136 MMOL/L (ref 136–145)
TOTAL IRON BINDING CAPACITY: 263 UG/DL (ref 250–450)
TRANSFERRIN SERPL-MCNC: 178 MG/DL (ref 200–375)
WBC # BLD AUTO: 6.34 K/UL (ref 3.9–12.7)

## 2019-07-03 PROCEDURE — 63600175 PHARM REV CODE 636 W HCPCS: Performed by: STUDENT IN AN ORGANIZED HEALTH CARE EDUCATION/TRAINING PROGRAM

## 2019-07-03 PROCEDURE — 63600175 PHARM REV CODE 636 W HCPCS: Performed by: HOSPITALIST

## 2019-07-03 PROCEDURE — 97161 PT EVAL LOW COMPLEX 20 MIN: CPT

## 2019-07-03 PROCEDURE — 25000003 PHARM REV CODE 250: Performed by: EMERGENCY MEDICINE

## 2019-07-03 PROCEDURE — 97165 OT EVAL LOW COMPLEX 30 MIN: CPT

## 2019-07-03 PROCEDURE — 86580 TB INTRADERMAL TEST: CPT | Performed by: HOSPITALIST

## 2019-07-03 PROCEDURE — 92610 EVALUATE SWALLOWING FUNCTION: CPT

## 2019-07-03 PROCEDURE — 80048 BASIC METABOLIC PNL TOTAL CA: CPT

## 2019-07-03 PROCEDURE — 85025 COMPLETE CBC W/AUTO DIFF WBC: CPT

## 2019-07-03 PROCEDURE — 30200315 PPD INTRADERMAL TEST REV CODE 302: Performed by: HOSPITALIST

## 2019-07-03 PROCEDURE — 87040 BLOOD CULTURE FOR BACTERIA: CPT | Mod: 59

## 2019-07-03 PROCEDURE — 25000003 PHARM REV CODE 250: Performed by: STUDENT IN AN ORGANIZED HEALTH CARE EDUCATION/TRAINING PROGRAM

## 2019-07-03 PROCEDURE — 94640 AIRWAY INHALATION TREATMENT: CPT

## 2019-07-03 PROCEDURE — 36415 COLL VENOUS BLD VENIPUNCTURE: CPT

## 2019-07-03 PROCEDURE — 12000002 HC ACUTE/MED SURGE SEMI-PRIVATE ROOM

## 2019-07-03 PROCEDURE — 94761 N-INVAS EAR/PLS OXIMETRY MLT: CPT

## 2019-07-03 PROCEDURE — 25000242 PHARM REV CODE 250 ALT 637 W/ HCPCS: Performed by: STUDENT IN AN ORGANIZED HEALTH CARE EDUCATION/TRAINING PROGRAM

## 2019-07-03 RX ORDER — FERROUS SULFATE 325(65) MG
325 TABLET, DELAYED RELEASE (ENTERIC COATED) ORAL DAILY
Status: DISCONTINUED | OUTPATIENT
Start: 2019-07-04 | End: 2019-07-08 | Stop reason: HOSPADM

## 2019-07-03 RX ADMIN — AMLODIPINE BESYLATE 5 MG: 10 TABLET ORAL at 09:07

## 2019-07-03 RX ADMIN — MONTELUKAST SODIUM 10 MG: 10 TABLET, COATED ORAL at 02:07

## 2019-07-03 RX ADMIN — IPRATROPIUM BROMIDE AND ALBUTEROL SULFATE 3 ML: .5; 3 SOLUTION RESPIRATORY (INHALATION) at 01:07

## 2019-07-03 RX ADMIN — CHLORTHALIDONE 12.5 MG: 25 TABLET ORAL at 01:07

## 2019-07-03 RX ADMIN — ESCITALOPRAM OXALATE 5 MG: 5 TABLET, FILM COATED ORAL at 09:07

## 2019-07-03 RX ADMIN — CEFTRIAXONE 2 G: 2 INJECTION, SOLUTION INTRAVENOUS at 03:07

## 2019-07-03 RX ADMIN — ASPIRIN 81 MG CHEWABLE TABLET 81 MG: 81 TABLET CHEWABLE at 09:07

## 2019-07-03 RX ADMIN — RIVASTIGMINE TARTRATE 1.5 MG: 1.5 CAPSULE ORAL at 02:07

## 2019-07-03 RX ADMIN — Medication 5 UNITS: at 09:07

## 2019-07-03 RX ADMIN — MONTELUKAST SODIUM 10 MG: 10 TABLET, COATED ORAL at 08:07

## 2019-07-03 RX ADMIN — IPRATROPIUM BROMIDE AND ALBUTEROL SULFATE 3 ML: .5; 3 SOLUTION RESPIRATORY (INHALATION) at 07:07

## 2019-07-03 RX ADMIN — LEVETIRACETAM 500 MG: 100 SOLUTION ORAL at 03:07

## 2019-07-03 RX ADMIN — RIVASTIGMINE TARTRATE 1.5 MG: 1.5 CAPSULE ORAL at 08:07

## 2019-07-03 RX ADMIN — LEVETIRACETAM 500 MG: 100 SOLUTION ORAL at 09:07

## 2019-07-03 RX ADMIN — RIVASTIGMINE TARTRATE 1.5 MG: 1.5 CAPSULE ORAL at 01:07

## 2019-07-03 RX ADMIN — ENOXAPARIN SODIUM 40 MG: 100 INJECTION SUBCUTANEOUS at 02:07

## 2019-07-03 RX ADMIN — ACETAMINOPHEN 650 MG: 650 SOLUTION ORAL at 12:07

## 2019-07-03 RX ADMIN — LEVETIRACETAM 500 MG: 100 SOLUTION ORAL at 08:07

## 2019-07-03 RX ADMIN — RISPERIDONE 0.25 MG: 0.25 TABLET ORAL at 01:07

## 2019-07-03 RX ADMIN — CETIRIZINE HYDROCHLORIDE 10 MG: 5 TABLET ORAL at 09:07

## 2019-07-03 RX ADMIN — ENOXAPARIN SODIUM 40 MG: 100 INJECTION SUBCUTANEOUS at 05:07

## 2019-07-03 RX ADMIN — LEVETIRACETAM 500 MG: 100 SOLUTION ORAL at 12:07

## 2019-07-03 NOTE — PT/OT/SLP EVAL
Speech Language Pathology Evaluation  Bedside Swallow    Patient Name:  Susanna Byrnes   MRN:  [id number]  Admitting Diagnosis: Sepsis    Recommendations:                 General Recommendations:  ST to f/u to monitor toleranec, educate Patient and family/caregivers   Diet recommendations:  Mechanical soft, Thin   Aspiration Precautions: 1 bite/sip at a time, Assistance with meals, Eliminate distractions, Frequent oral care, Meds whole 1 at a time, Monitor for s/s of aspiration and Small bites/sips Continue to monitor for signs and symptoms of aspiration and discontinue oral feeding should you notice any of the following: watery eyes, reddened facial area, wet vocal quality, increased work of breathing, change in respiratory status, increased congestion, coughing, fever, etc.  General Precautions: Standard, aspiration, fall  Communication strategies:  provide increased time to answer and go to room if call light pushed    History:     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-s/p right distal femur ORIF 6/5/15 6/5/2015    Seizure disorder     Seizures        Past Surgical History:   Procedure Laterality Date    ARTHROPLASTY, KNEE, TOTAL Right 1/21/2013    [facility] OR [address] [place] [address]    EYE SURGERY      JOINT REPLACEMENT      OPEN REDUCTION INTERNAL FIXATION-FEMUR - Periprosthetic supracondylar 6/5/15 Right 6/5/2015    Performed by Joshua Banks MD at [facility] OR [address] [place] [address]    TOTAL KNEE ARTHROPLASTY  Left       Social/Occupational History: Patient limited 2/2 underlying cognitive status, Patient reported she lived at home by herself. No family present at bedside.     Prior Intubation HX: none this admission    Modified Barium Swallow: none prior at this facility    Chest X-Rays: 7/2/19: No failure or pneumonia.    Prior diet: unknown     Subjective     SLP  "reviewed Pt with RN pre/post session, reported no c/o of difficulty with meals or medications received in report   Pt presents calm, cooperative  She explains, "Mix the eggs with the grits please"     Pain/Comfort:  · Pain Rating 1: 4/10  · Location - Orientation 1: generalized  · Location 1: hip  · Pain Addressed 1: Pre-medicate for activity, Distraction, Nurse notified  · Pain Rating Post-Intervention 1: 4/10    Objective:   Pt found asleep in bed with peripheral IV and cathter in place. She woke per simple verbal and tactile cues. HOB elevated. SLP noted untouched breakfast meal tray for regular (cardiac) diet at bedside. SLP provided set-up with breakfast meal tray. CAll light within reach.     Oral Musculature Evaluation  · Oral Musculature: general weakness  · Dentition: present and adequate  · Secretion Management: adequate  · Mucosal Quality: adequate  · Mandibular Strength and Mobility: impaired(mildly decreased rotary chew )  · Oral Labial Strength and Mobility: functional pursing, functional seal  · Lingual Strength and Mobility: functional strength, functional protrusion  · Volitional Cough: elicited upon command   · Volitional Swallow: elicited   · Voice Prior to PO Intake: mildly strained, adequate intensity     Bedside Swallow Eval:   Consistencies Assessed:  · Thin liquids cup edge sips x9  · Puree : tsp bites grits x7  · Mixed consistencies tsp bites eggs mixed with grits x3  · Soft solids bites of scrambled eggs x2  · Solids : bites of sausage x5     Oral Phase:   · Excess chewing  · Prolonged mastication  · Impaired rotational chew    Pharyngeal Phase:   · no overt clinical signs/symptoms of aspiration    Compensatory Strategies  · small bites/sips    Treatment: Pt required 1:1 assistance with bites/sips 2/2 decreased R UE coordination, c/o "swelling" in R (dominant) hand. Patient attempted to feed self with L hand; however, demonstrated decreased L hand .  No overt S/S aspiration with bites " of puree, soft solids or sausage on meal tray provided 1:1 assistance for bites/sips and small bites/sips. Pt noted to chew on bites of grits and softer items and continue to move jaw in rotary chew motion following bites/sips at resting position.  SLP reviewed SLP role, safe swallow strategies and recommendations for ongoing 1:1 assistance with all PO for safety. Pt nodded head; however, she was not able to verbalize or demonstrate understanding 2/2 underlying cognitive status. RN in room at end of session to provide morning medications. SLP reviewed findings from assessment,  Recommendations for ongoing 1:1 assistance and chopped/soft diet, and patient's c/o R hand discomfort with RN, RN v/u.  No additional questions noted. Pt remained upright in bed with RN in room upon discontinuation of session. Whiteboard updated. Findings reviewed with MD team.       Assessment:     Susanna Byrnes is a 84 y.o. female with an SLP diagnosis of Mild Oral dysphagia.  She would benefit from 1:1 assistance with all meals for safety and standard aspiration precautions.     Goals:   Multidisciplinary Problems     SLP Goals        Problem: SLP Goal    Goal Priority Disciplines Outcome   SLP Goal     SLP Ongoing (interventions implemented as appropriate)   Description:  Speech Language Pathology Goals  Goals expected to be met by 7/10/19  1. Pt will tolerate Level V mechanical soft diet with thin liquids w/o overt S/S aspiration, MIN A  2. Educate Pt and family on S/S aspiration and aspiration precautions                       Plan:     · Patient to be seen:  3 x/week   · Plan of Care expires:  08/02/19  · Plan of Care reviewed with:  patient   · SLP Follow-Up:  Yes       Discharge recommendations:  other (see comments)(pending PT/OT recs)     Time Tracking:     SLP Treatment Date:   07/03/19  Speech Start Time:  0915  Speech Stop Time:  0946     Speech Total Time (min):  31 min    Billable Minutes: Eval Swallow and Oral Function  31    RICHA Tapia., [facility]-SLP  Speech-Language Pathology  Pager: [phone]      07/03/2019

## 2019-07-03 NOTE — ASSESSMENT & PLAN NOTE
Pt's daughter notes patient has not been seen in cardiology clinic for years; pt may benefit from ambulatory referral to cardiology prior to discharge

## 2019-07-03 NOTE — ED NOTES
Attempted to have patient take dose of tylenol. Pt still refusing medication, all blankest removed from patient and AC turned down in patient's room

## 2019-07-03 NOTE — PLAN OF CARE
Problem: Adult Inpatient Plan of Care  Goal: Plan of Care Review  Outcome: Ongoing (interventions implemented as appropriate)  Pt in bed with no complaints voiced.  Skin warm and dry to touch.  Call light in reach, encouraged to call for assistance, safety measures remain in place.  nadn at this time

## 2019-07-03 NOTE — HPI
Ms. Byrnes is an 85 yo female with Dementia, HTN, Arthritis, Suspected COPD (pt unable to complete PFT 2/2 dementia), seizures, Previous NSTEMI (4/14) with reduced EF (25-30%) who presented to the ED after two days of fever at home and a fall while ambulating to the bathroom. Pt has severe dementia and is completely disoriented at baseline. History is provided by patient's daughter at bedside. Pt's daughter reports that her mother has had increased thirst, increased urinary frequency, and fever for the past 2 days with a Tmax at home of 100.4 that was improved with tylenol. Additonally, this evening pt was attempting to get into the shower and had a witness fall. Pt's daughter denies that the patient hit her head and states that she seems to have slid down her walker and landed on her bottom. Pt's daughter reports her mother did not appear to have any alteration in her mental status but did report feeling sore on her bottom. Pt's daughter also reports that Ms. Byrnes has had cough for the past 6 weeks; The cough initially started in Late may and wwas associated with dark green sputum production.  Pt's daughter reports that she was seen in an urgent care center in early June and given a three day prednisone course with resolution of sputum production but pt continues to have a cough. Pt's daughter notes that she had previously followed with Dr. Looney with Pulmonolgy at [facility], though no notes could be found in EMR or [employer] document viewer.     In the ED, pt was noted to be hypertensive to 180/85 and febrile to 100.5 but without leukocytosis. Her EKG demonstrated NSR with a troponin of 0.027 and a BNP of 63. Her UA demonstrated 44 WBC per HPF with occasional bacteria. Pelvic Xray without evidence of acute fracture, and CXR without consolidation or opacity concerning for pneumonia or pulmonary edema.

## 2019-07-03 NOTE — ED NOTES
Telemetry Verification   Patient placed on Telemetry Box  Verified with War Room  Box # [id number]       Rate 61   Rhythm NSR

## 2019-07-03 NOTE — PROGRESS NOTES
[facility] Hospital Medicine  Progress Note    Patient Name: Susanna Byrnes  MRN: [id number]  Patient Class: IP- Inpatient   Admission Date: 7/2/2019  Length of Stay: 1 days  Attending Physician: Marley Moreira MD  Primary Care Provider: Ignacia River MD    [facility] Medicine Team: [facility] Hany Diaz MD    Subjective:     Principal Problem:Sepsis      HPI:  Ms. Byrnes is an 83 yo female with Dementia, HTN, Arthritis, Suspected COPD (pt unable to complete PFT 2/2 dementia), seizures, Previous NSTEMI (4/14) with reduced EF (25-30%) who presented to the ED after two days of fever at home and a fall while ambulating to the bathroom. Pt has severe dementia and is completely disoriented at baseline. History is provided by patient's daughter at bedside. Pt's daughter reports that her mother has had increased thirst, increased urinary frequency, and fever for the past 2 days with a Tmax at home of 100.4 that was improved with tylenol. Additonally, this evening pt was attempting to get into the shower and had a witness fall. Pt's daughter denies that the patient hit her head and states that she seems to have slid down her walker and landed on her bottom. Pt's daughter reports her mother did not appear to have any alteration in her mental status but did report feeling sore on her bottom. Pt's daughter also reports that Ms. Byrnes has had cough for the past 6 weeks; The cough initially started in Late may and wwas associated with dark green sputum production.  Pt's daughter reports that she was seen in an urgent care center in early June and given a three day prednisone course with resolution of sputum production but pt continues to have a cough. Pt's daughter notes that she had previously followed with Dr. Looney with Pulmonolgy at [facility], though no notes could be found in EMR or legacy document viewer.     In the ED, pt was noted to be hypertensive to 180/85 and febrile to 100.5  "but without leukocytosis. Her EKG demonstrated NSR with a troponin of 0.027 and a BNP of 63. Her UA demonstrated 44 WBC per HPF with occasional bacteria. Pelvic Xray without evidence of acute fracture, and CXR without consolidation or opacity concerning for pneumonia or pulmonary edema.     Overview/Hospital Course:  Since admission, pt has been afebrile and vitals have been stable on ceftriaxone. White count has stayed WNL. Pt resting comfortably in bed and reports "feeling better". Tolerating PO, diet changed to softs, will likely d/c tomorrow if she continues to improve.      Review of Systems   Constitutional: Negative for chills and fever.   HENT: Negative for congestion and sore throat.    Respiratory: Negative for cough, sputum production, shortness of breath and wheezing.    Cardiovascular: Negative for chest pain and palpitations.   Gastrointestinal: Negative for abdominal pain, nausea and vomiting.   Genitourinary: Negative for dysuria and frequency.   Musculoskeletal: Positive for falls. Negative for back pain and neck pain.   Neurological: Positive for weakness. Negative for dizziness and headaches. Seizures: does not remember seizure        Objective:     Vital Signs (Most Recent):  Temp: 97.9 °F (36.6 °C) (07/03/19 1539)  Pulse: 61 (07/03/19 1539)  Resp: 16 (07/03/19 1310)  BP: 133/60 (07/03/19 1539)  SpO2: (!) 92 % (07/03/19 1539) Vital Signs (24h Range):  Temp:  [97.2 °F (36.2 °C)-100.5 °F (38.1 °C)] 97.9 °F (36.6 °C)  Pulse:  [56-82] 61  Resp:  [15-27] 16  SpO2:  [89 %-99 %] 92 %  BP: (124-180)/(57-85) 133/60     Weight: 79.4 kg (175 lb)  Body mass index is 34.18 kg/m².    Intake/Output Summary (Last 24 hours) at 7/3/2019 1556  Last data filed at 7/3/2019 0500  Gross per 24 hour   Intake 1150 ml   Output 950 ml   Net 200 ml      Physical Exam   Constitutional: She appears well-nourished. No distress.   Eyes: Pupils are equal, round, and reactive to light. EOM are normal.   Cardiovascular: Normal " rate and regular rhythm.   Pulmonary/Chest: Effort normal. She has wheezes (diffuse).   Abdominal: Soft. Bowel sounds are normal. She exhibits no distension. There is no tenderness. There is no rebound.   Skin: She is not diaphoretic.       Significant Labs:   Blood Culture:   Recent Labs   Lab 07/02/19  1719 07/02/19  1738   LABBLOO Gram stain aer bottle: Gram positive cocci in clusters resembling Staph  Results called to and read back by:  Yani Cooper RN 07/03/2019    14:36 No Growth to date     CBC:   Recent Labs   Lab 07/02/19  1719 07/02/19  1908 07/03/19  0439   WBC 7.78  --  6.34   HGB 9.8*  --  8.7*   HCT 32.2* 30* 29.3*   *  --  285       Significant Imaging: I have reviewed all pertinent imaging results/findings within the past 24 hours.      Assessment/Plan:      * Sepsis  Pt with Fever and documented RR >20 with laboratory evidence and physical exam consistent with urinary source of infection. Pt treated in ED with LR bolus and IV ceftriaxone.    -continue antibiotics with Ceftriaxone 1g IV q24hrs  -Pt vitally stable and breathing comfortably at 16 breaths per minutes on RA during my examination   -Urine cultures and blood cultures pending  -CXR without evidence of pneumonia    Acute cystitis without hematuria  As sepsis above      Mild malnutrition  Suspected based on Albumin 2.6 on admission  Will consult RD for recommendations      Chronic combined systolic and diastolic CHF (congestive heart failure)  Pt's daughter notes patient has not been seen in cardiology clinic for years; pt may benefit from ambulatory referral to cardiology prior to discharge       Dementia without behavioral disturbance  Follows with Dr. Benita Melchor with [facility], last seen 6/24.  Pt recently started on Rivastigmine for dementia, will continue along with her Risperidone, Lexapro and Remeron  Chronic cough possibly concerning for silent aspiration; will consult SLP      Debility  Pt/OT  consulted    Chronic anemia  Pt with chronic microcytic anemia  Iron 14, Sat 5, TIBC 263, ferritin 57  -no iron on home meds, will add PO for d/c when ready    Seizure disorder  Continue leviteracitam       Essential hypertension  Continue home amlodipine and Chlorthalidone         VTE Risk Mitigation (From admission, onward)        Ordered     enoxaparin injection 40 mg  Daily      07/02/19 2111     IP VTE HIGH RISK PATIENT  Once      07/02/19 2111                Hany Diaz MD  Department of Hospital Medicine   [facility]

## 2019-07-03 NOTE — PT/OT/SLP EVAL
Physical Therapy Evaluation    Patient Name:  Susanna Byrnes   MRN:  [id number]    Recommendations:     Discharge Recommendations:  nursing facility, skilled   Discharge Equipment Recommendations: none   Barriers to discharge: Decreased caregiver support    Assessment:     Susanna Byrnes is a 84 y.o. female admitted with a medical diagnosis of Sepsis.  She presents with the following impairments/functional limitations:  weakness, impaired endurance, gait instability, impaired balance, impaired functional mobilty, decreased lower extremity function, impaired cardiopulmonary response to activity, decreased safety awareness, impaired cognition. Patient had a fall when walking with her walker prior to coming into the hospital. Patient would continue to benefit from PT in order to get back to PLOF.     Rehab Prognosis: Fair; patient would benefit from acute skilled PT services to address these deficits and reach maximum level of function.    Recent Surgery: * No surgery found *      Plan:     During this hospitalization, patient to be seen 3 x/week to address the identified rehab impairments via gait training, therapeutic activities, therapeutic exercises and progress toward the following goals:    · Plan of Care Expires:  08/03/19    Subjective     Chief Complaint: No complaint.   Patient/Family Comments/goals: To go home.   Pain/Comfort:  · Pain Rating 1: 0/10    Living Environment: Patient appears to be a poor historian and no family present to confirm information given by patient.   Patient stated that she lives with her  who works during the day. Prior to admission, patients level of function was mod I with RW.  Equipment used at home: walker, rolling, wheelchair, bedside commode. Upon discharge, patient will have assistance from .    Objective:     Communicated with RN prior to session.  Patient found supine with telemetry, peripheral IV  upon PT entry to room.    General Precautions: Standard,  aspiration, fall   Orthopedic Precautions:N/A   Braces: N/A     Exams:  · Cognitive Exam:  Patient is oriented to Person  · RLE ROM: WFL  · RLE Strength: WFL  · LLE ROM: WFL  · LLE Strength: WFL    Functional Mobility:  · Bed Mobility:     · Scooting: maximal assistance  · Supine to Sit: maximal assistance  · Transfers:     · Sit to Stand:  maximal assistance with no AD  · Gait: Patient ambulated ~4 steps with max A and no AD.       Therapeutic Activities and Exercises:   Patient educated in:  -PT role and POC  -safety with transfers including hand placement  -OOB activity to maximize recovery     AM-PAC 6 CLICK MOBILITY  Total Score:10     Patient left up in chair with all lines intact, call button in reach and RN notified.    GOALS:   Multidisciplinary Problems     Physical Therapy Goals        Problem: Physical Therapy Goal    Goal Priority Disciplines Outcome Goal Variances Interventions   Physical Therapy Goal     PT, PT/OT      Description:  Goals to be met by: 8/3/19    Patient will increase functional independence with mobility by performin. Supine to sit with MInimal Assistance  2. Sit to stand transfer with Minimal Assistance  3. Gait  x 10 feet with Minimal Assistance using Rolling Walker  4. Lower extremity exercise program x10 reps per handout, with assistance as needed                      History:     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-s/p right distal femur ORIF 6/5/15 2015    Seizure disorder     Seizures        Past Surgical History:   Procedure Laterality Date    ARTHROPLASTY, KNEE, TOTAL Right 2013    [facility]    EYE SURGERY      JOINT REPLACEMENT      OPEN REDUCTION INTERNAL FIXATION-FEMUR - Periprosthetic supracondylar 6/5/15 Right 2015    Performed by Joshua [facility] MD at [facility] OR [employer]  [facility]    TOTAL KNEE ARTHROPLASTY  Left       Time Tracking:     PT Received On: 07/03/19  PT Start Time: 1408     PT Stop Time: 1420  PT Total Time (min): 12 min     Billable Minutes: Evaluation 12      Jyoti Perez, PT  07/03/2019

## 2019-07-03 NOTE — PT/OT/SLP EVAL
Occupational Therapy   Evaluation    Name: Susanna Byrnes  MRN: [id number]  Admitting Diagnosis:  Sepsis      Recommendations:     Discharge Recommendations: nursing facility, skilled  Discharge Equipment Recommendations:  none  Barriers to discharge:  None    Assessment:     Susanna Byrnes is a 84 y.o. female with a medical diagnosis of Sepsis.  She presents with impairments listed below. Pt tolerated session fair on this date. Pt presents w/ decreased ability to perform ADLs and mobility from baseline. Pt displayed global deconditioning requiring increased assist for ADLs and mobility at this time. Pt would benefit from skilled OT services to improve independence and overall occupational functioning.     Performance deficits affecting function: weakness, impaired endurance, impaired self care skills, impaired functional mobilty, gait instability, impaired balance, decreased lower extremity function, impaired cardiopulmonary response to activity.      Rehab Prognosis: Good; patient would benefit from acute skilled OT services to address these deficits and reach maximum level of function.       Plan:     Patient to be seen 3 x/week to address the above listed problems via self-care/home management, therapeutic activities, therapeutic exercises  · Plan of Care Expires: 08/03/19  · Plan of Care Reviewed with: patient    Subjective     Chief Complaint: No complaints   Patient/Family Comments/goals: return home    Occupational Profile:  Living Environment: Pt appears confused and no family present to provide info.   Previous level of function: Pt states she was indep w/ ADLs and ANGIE for ambulation (RW).   Roles and Routines: N/A  Equipment Used at Home:  walker, rolling, wheelchair, hospital bed  Assistance upon Discharge: Pt has assistance upon D/C.     Pain/Comfort:  · Pain Rating 1: (no c/o pain)    Patients cultural, spiritual, Amish conflicts given the current situation:      Objective:     Communicated with: RN  prior to session.  Patient found HOB elevated with telemetry, peripheral IV upon OT entry to room.    General Precautions: Standard, fall, aspiration   Orthopedic Precautions:N/A   Braces: N/A     Occupational Performance:    Bed Mobility:    · Patient completed Scooting/Bridging with maximal assistance  · Patient completed Supine to Sit with maximal assistance    Functional Mobility/Transfers:  · Patient completed Sit <> Stand Transfer with maximal assistance  with  no assistive device   · Patient completed Bed <> Chair Transfer using Step Transfer technique with maximal assistance with no assistive device  · Functional Mobility: Pt ambulated ~4 steps at max a w/o AD.     Activities of Daily Living:  · Lower Body Dressing: total assistance donne socks seated EOB    Cognitive/Visual Perceptual:  Cognitive/Psychosocial Skills:     -       Oriented to: Person   -       Follows Commands/attention:Follows one-step commands  -       Communication: clear/fluent  -       Memory: pt appears confused   -       Safety awareness/insight to disability: impaired   -       Mood/Affect/Coping skills/emotional control: Appropriate to situation  Visual/Perceptual:  -Intact      Physical Exam:  Balance:    -       Pt dislayed good sitting balance and poor overall balance w/ oob activities   Postural examination/scapula alignment:    -       Rounded shoulders  Skin integrity: Visible skin intact  Upper Extremity Range of Motion:     -       Right Upper Extremity: WFL  -       Left Upper Extremity: WFL  Upper Extremity Strength:    -       Right Upper Extremity: WFL  -       Left Upper Extremity: WFL   Strength:    -       Right Upper Extremity: WFL  -       Left Upper Extremity: WFL  Fine Motor Coordination:    -       Intact  Gross motor coordination:   WFL    AMPAC 6 Click ADL:  AMPAC Total Score: 10    Treatment & Education:  Pt educated on POC.   Education:    Patient left up in chair with all lines intact, call button in  reach and RN notified    GOALS:   Multidisciplinary Problems     Occupational Therapy Goals        Problem: Occupational Therapy Goal    Goal Priority Disciplines Outcome Interventions   Occupational Therapy Goal     OT, PT/OT     Description:  Goals to be met by: 7/20/2019     Patient will increase functional independence with ADLs by performing:    UE Dressing with Minimal Assistance.  LE Dressing with Maximum Assistance.  Grooming while seated with Minimal Assistance.  Toileting from bedside commode with Maximum Assistance for hygiene and clothing management.   Toilet transfer to bedside commode with Moderate Assistance.                      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 OR [address] [place] [address]    EYE SURGERY      JOINT REPLACEMENT      OPEN REDUCTION INTERNAL FIXATION-FEMUR - Periprosthetic supracondylar 6/5/15 Right 6/5/2015    Performed by Joshua Banks MD at [facility] OR [address] [place] [address]    TOTAL KNEE ARTHROPLASTY  Left       Time Tracking:     OT Date of Treatment: 07/03/19  OT Start Time: 1408  OT Stop Time: 1420  OT Total Time (min): 12 min    Billable Minutes:Evaluation 12 minutes    Parviz Rincon, LUCI  7/3/2019

## 2019-07-03 NOTE — PLAN OF CARE
Problem: Adult Inpatient Plan of Care  Goal: Plan of Care Review      Recommendations    Recommendation/Intervention:     1. Continue cardiac diet + Boost Plus as tolerated with texture per SLP.   2. Encourage po intake.   3. RD following.     Goals: consume >75% of all meals  Nutrition Goal Status: new

## 2019-07-03 NOTE — ASSESSMENT & PLAN NOTE
Follows with Dr. Benita Melchor with [facility], last seen 6/24.  Pt recently started on Rivastigmine for dementia, will continue along with her Risperidone, Lexapro and Remeron  Chronic cough possibly concerning for silent aspiration; will consult SLP

## 2019-07-03 NOTE — H&P
[facility] Hospital Medicine  History & Physical    Patient Name: Susanna Byrnes  MRN: [id number]  Admission Date: 7/2/2019  Attending Physician: Marley Moreira MD   Primary Care Provider: Ignacia River MD    [facility] Medicine Team: [facility] 2 Dannie Huertas DO     Patient information was obtained from relative(s), past medical records and ER records.     Subjective:     Principal Problem:Sepsis    Chief Complaint:   Chief Complaint   Patient presents with    Fall     arrived via NO EMS with c/o fall 20 minutes PTA, reports lost balance walking with walker witnessed by family, c/o right arm and right leg pain, no obvious deformities, at baseline mental status per family, deneis head injury or LOC        HPI: Ms. Byrnes is an 85 yo female with Dementia, HTN, Arthritis, Suspected COPD (pt unable to complete PFT 2/2 dementia), seizures, Previous NSTEMI (4/14) with reduced EF (25-30%) who presented to the ED after two days of fever at home and a fall while ambulating to the bathroom. Pt has severe dementia and is completely disoriented at baseline. History is provided by patient's daughter at bedside. Pt's daughter reports that her mother has had increased thirst, increased urinary frequency, and fever for the past 2 days with a Tmax at home of 100.4 that was improved with tylenol. Additonally, this evening pt was attempting to get into the shower and had a witness fall. Pt's daughter denies that the patient hit her head and states that she seems to have slid down her walker and landed on her bottom. Pt's daughter reports her mother did not appear to have any alteration in her mental status but did report feeling sore on her bottom. Pt's daughter also reports that Ms. Byrnes has had cough for the past 6 weeks; The cough initially started in Late may and wwas associated with dark green sputum production.  Pt's daughter reports that she was seen in an urgent care center in early June and  given a three day prednisone course with resolution of sputum production but pt continues to have a cough. Pt's daughter notes that she had previously followed with Dr. Looney with [facility] at [facility], though no notes could be found in EMR or legacy document viewer.     In the ED, pt was noted to be hypertensive to 180/85 and febrile to 100.5 but without leukocytosis. Her EKG demonstrated NSR with a troponin of 0.027 and a BNP of 63. Her UA demonstrated 44 WBC per HPF with occasional bacteria. Pelvic Xray without evidence of acute fracture, and CXR without consolidation or opacity concerning for pneumonia or pulmonary edema.     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 OR [address] [place] [address]    EYE SURGERY      JOINT REPLACEMENT      OPEN REDUCTION INTERNAL FIXATION-FEMUR - Periprosthetic supracondylar 6/5/15 Right 6/5/2015    Performed by [facility] at [facility] OR 2ND F[facility]    TOTAL KNEE ARTHROPLASTY  Left       Review of patient's allergies indicates:   Allergen Reactions    Haldol [haloperidol lactate]        No current facility-administered medications on file prior to encounter.      Current Outpatient Medications on File Prior to Encounter   Medication Sig    albuterol 90 mcg/actuation inhaler Inhale 1-2 puffs into the lungs every 6 (six) hours as needed for Wheezing. Rescue    cetirizine (ZYRTEC) 10 MG tablet Take 10 mg by mouth once daily.    escitalopram oxalate (LEXAPRO) 5 MG Tab Take 5 mg by mouth once daily.    fluticasone (FLONASE) 50 mcg/actuation nasal spray SHAKE LQ AND U 2 SPRAYS IEN QD    levetiracetam oral soln 500 mg/5 mL (5 mL) Soln  Take 5 mLs (500 mg total) by mouth 3 (three) times daily.    lorazepam (ATIVAN) 1 MG tablet Take 1 mg by mouth every 6 (six) hours as needed for Anxiety.    mirtazapine (REMERON) 15 MG tablet Take 1 tablet (15 mg total) by mouth nightly as needed (insomnia).    montelukast (SINGULAIR) 10 mg tablet TAKE 1 TABLET BY MOUTH EVERY EVENING    risperiDONE (RISPERDAL) 0.25 MG Tab     amLODIPine (NORVASC) 5 MG tablet Take 1 tablet (5 mg total) by mouth once daily.    aspirin 81 MG Chew Take 1 tablet (81 mg total) by mouth once daily.    chlorthalidone (HYGROTEN) 25 MG Tab Take 12.5 mg by mouth once daily.    cholecalciferol, vitamin D3, (VITAMIN D3) 2,000 unit Cap Take 2 capsules by mouth once daily.      multivitamin (MULTIVITAMIN) per tablet Take 1 tablet by mouth once daily.      polyethylene glycol (GLYCOLAX) 17 gram PwPk Take 17 g by mouth once daily.    rivastigmine tartrate (EXELON) 1.5 MG capsule Take 1.5 mg by mouth 2 (two) times daily.    triamcinolone acetonide 0.1% (KENALOG) 0.1 % cream Apply topically to affected area as needed for skin rash    vitamin E 1000 UNIT capsule Take 1,000 Units by mouth once daily.      [DISCONTINUED] ondansetron (ZOFRAN-ODT) 4 MG TbDL Take 1 tablet (4 mg total) by mouth every 6 (six) hours as needed.     Family History     Problem Relation (Age of Onset)    Dementia Brother    Seizures Sister, Brother        Tobacco Use    Smoking status: Former Smoker     Types: Cigarettes     Last attempt to quit: 2011     Years since quittin.8    Smokeless tobacco: Former User   Substance and Sexual Activity    Alcohol use: No    Drug use: No    Sexual activity: Never     Review of Systems   Unable to perform ROS: Dementia     Objective:     Vital Signs (Most Recent):  Temp: 99.3 °F (37.4 °C) (19)  Pulse: 76 (19 1738)  Resp: (!) 25 (19 1724)  BP: (!) 180/83 (19 1738)  SpO2: (!) 94 % (19 1738) Vital Signs (24h Range):  Temp:  [99.2 °F  (37.3 °C)-100.5 °F (38.1 °C)] 99.3 °F (37.4 °C)  Pulse:  [72-82] 76  Resp:  [18-25] 25  SpO2:  [89 %-100 %] 94 %  BP: (167-180)/(83-86) 180/83     Weight: 79.4 kg (175 lb)  Body mass index is 34.18 kg/m².    Physical Exam   Constitutional: She appears well-developed and well-nourished. No distress.   HENT:   Head: Normocephalic and atraumatic.   Mouth/Throat: Oropharynx is clear and moist.   Eyes: Pupils are equal, round, and reactive to light. EOM are normal.   Neck: No JVD present.   Cardiovascular: Normal rate, regular rhythm and normal heart sounds.   Pulmonary/Chest: Effort normal. No stridor. No respiratory distress. She has wheezes. She has no rales. She exhibits no tenderness.   Abdominal: Soft. Bowel sounds are normal. She exhibits no distension and no mass. There is tenderness in the suprapubic area. There is no rebound and no guarding. No hernia.   Musculoskeletal: She exhibits no edema, tenderness or deformity.   Neurological: She is alert. She is disoriented. GCS eye subscore is 4. GCS verbal subscore is 4. GCS motor subscore is 6.   Face symmetrical, moves all extremities spontaneously   Skin: Skin is warm and dry. Capillary refill takes less than 2 seconds. No rash noted. She is not diaphoretic. No erythema.         CRANIAL NERVES     CN III, IV, VI   Pupils are equal, round, and reactive to light.  Extraocular motions are normal.        Significant Labs:   A1C: No results for input(s): HGBA1C in the last 4320 hours.  Bilirubin:   Recent Labs   Lab 07/02/19  1900   BILITOT 0.2     Blood Culture: No results for input(s): LABBLOO in the last 48 hours.  CBC:   Recent Labs   Lab 07/02/19  1719 07/02/19  1908   WBC 7.78  --    HGB 9.8*  --    HCT 32.2* 30*   *  --      CMP:   Recent Labs   Lab 07/02/19  1900      K 3.7   CL 99   CO2 26      BUN 8   CREATININE 0.6   CALCIUM 8.7   PROT 7.3   ALBUMIN 2.6*   BILITOT 0.2   ALKPHOS 101   AST 20   ALT 10   ANIONGAP 11   EGFRNONAA >60.0      Lactic Acid:   Recent Labs   Lab 07/02/19  1719   LACTATE 1.1     Urine Culture: No results for input(s): LABURIN in the last 48 hours.  Urine Studies:   Recent Labs   Lab 07/02/19  1738   COLORU Yellow   APPEARANCEUA Hazy*   PHUR 7.0   SPECGRAV 1.015   PROTEINUA Negative   GLUCUA Negative   KETONESU Negative   BILIRUBINUA Negative   OCCULTUA 1+*   NITRITE Positive*   LEUKOCYTESUR 3+*   RBCUA 3   WBCUA 44*   BACTERIA Occasional   SQUAMEPITHEL 1     All pertinent labs within the past 24 hours have been reviewed.    Significant Imaging: I have reviewed all pertinent imaging results/findings within the past 24 hours.   X-Ray Chest AP Portable  Narrative: EXAMINATION:  XR CHEST AP PORTABLE    CLINICAL HISTORY:  Shortness of breath    TECHNIQUE:  Single frontal view of the chest was performed.    COMPARISON:  03/10/2018 chest film    FINDINGS:  Single AP portable view 17:53.    Heart is normal size.  Hilar shadows and trachea appear normal.  No pneumothorax or pleural effusion or interstitial edema or consolidation.  The medial half of the left clavicle is absent, unchanged.  Visualized abdomen appears normal.  There are overlying leads  Impression: No failure or pneumonia.    Electronically signed by: Candace Gamez  Date:    07/02/2019  Time:    21:54  X-Ray Pelvis Routine AP  Narrative: EXAMINATION:  XR PELVIS ROUTINE AP    CLINICAL HISTORY:  fall at home;    TECHNIQUE:  AP view of the pelvis was performed.    COMPARISON:  X-ray of the pelvis dated 06/04/2015.    FINDINGS:  Monitoring EKG leads are present.  There is diffuse osteopenia.  The pelvic ring appears intact.  No cortical step-off is identified.  There is mild leftward curvature of the lumbar alignment.  There is joint space narrowing of the sacroiliac joints.  There is no evidence of widening of the pubic symphysis.  There is no evidence of fracture or dislocation of the pelvic bones.    There is abnormal angulation of the right hip.  This is  incompletely assessed on this dedicated pelvic radiograph.  Impression: No evidence of fracture or dislocation of the pelvic bones.    Abnormal angulation of the right hip.  Dedicated right hip series is suggested for further evaluation.    Electronically signed by: Jonnathan Marvin MD  Date:    07/02/2019  Time:    19:21        Assessment/Plan:     * Sepsis  Pt with Fever and documented RR >20 with laboratory evidence and physical exam consistent with urinary source of infection. Pt treated in ED with LR bolus and IV ceftriaxone.    -continue antibiotics with Ceftriaxone 1g IV q24hrs  -Pt vitally stable and breathing comfortably at 16 breaths per minutes on RA during my examination   -Urine cultures and blood cultures pending  -CXR without evidence of pneumonia    Acute cystitis without hematuria  As sepsis above      Mild malnutrition  Suspected based on Albumin 2.6 on admission  Will consult RD for recommendations      Chronic combined systolic and diastolic CHF (congestive heart failure)  Pt's daughter notes patient has not been seen in cardiology clinic for years; pt may benefit from ambulatory referral to cardiology prior to discharge      Dementia without behavioral disturbance  Follows with Dr. Benita Melchor with [facility], last seen 6/24.  Pt recently started on Rivastigmine for dementia, will continue along with her Risperidone, Lexapro and Remeron  Chronic cough possibly concerning for silent aspiration; will consult SLP      Debility  Pt/OT consulted    Chronic anemia    Pt with chronic microcytic anemia  Previously iron deficient, will repeat iron studies    Seizure disorder  Continue leviteracitam      Essential hypertension  Continue home amlodipine and Chlorthalidone      VTE Risk Mitigation (From admission, onward)        Ordered     enoxaparin injection 40 mg  Daily      07/02/19 2111     IP VTE HIGH RISK PATIENT  Once      07/02/19 2111             Dannie Huertas DO  Department of  Hospital Medicine [facility]

## 2019-07-03 NOTE — SUBJECTIVE & OBJECTIVE
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 MD at [facility] OR 2ND FLR    EYE SURGERY      JOINT REPLACEMENT      OPEN REDUCTION INTERNAL FIXATION-FEMUR - Periprosthetic supracondylar 6/5/15 Right 6/5/2015    Performed by Joshua Banks MD at [facility] OR 2ND FLR    TOTAL KNEE ARTHROPLASTY  Left       Review of patient's allergies indicates:   Allergen Reactions    Haldol [haloperidol lactate]        No current facility-administered medications on file prior to encounter.      Current Outpatient Medications on File Prior to Encounter   Medication Sig    albuterol 90 mcg/actuation inhaler Inhale 1-2 puffs into the lungs every 6 (six) hours as needed for Wheezing. Rescue    cetirizine (ZYRTEC) 10 MG tablet Take 10 mg by mouth once daily.    escitalopram oxalate (LEXAPRO) 5 MG Tab Take 5 mg by mouth once daily.    fluticasone (FLONASE) 50 mcg/actuation nasal spray SHAKE LQ AND U 2 SPRAYS IEN QD    levetiracetam oral soln 500 mg/5 mL (5 mL) Soln Take 5 mLs (500 mg total) by mouth 3 (three) times daily.    lorazepam (ATIVAN) 1 MG tablet Take 1 mg by mouth every 6 (six) hours as needed for Anxiety.    mirtazapine (REMERON) 15 MG tablet Take 1 tablet (15 mg total) by mouth nightly as needed (insomnia).    montelukast (SINGULAIR) 10 mg tablet TAKE 1 TABLET BY MOUTH EVERY EVENING    risperiDONE (RISPERDAL) 0.25 MG Tab     amLODIPine (NORVASC) 5 MG tablet Take 1 tablet (5 mg total) by mouth once daily.    aspirin 81 MG Chew Take 1 tablet (81 mg total) by mouth once daily.    chlorthalidone (HYGROTEN) 25 MG Tab Take 12.5 mg by mouth once daily.    cholecalciferol, vitamin D3,  (VITAMIN D3) 2,000 unit Cap Take 2 capsules by mouth once daily.      multivitamin (MULTIVITAMIN) per tablet Take 1 tablet by mouth once daily.      polyethylene glycol (GLYCOLAX) 17 gram PwPk Take 17 g by mouth once daily.    rivastigmine tartrate (EXELON) 1.5 MG capsule Take 1.5 mg by mouth 2 (two) times daily.    triamcinolone acetonide 0.1% (KENALOG) 0.1 % cream Apply topically to affected area as needed for skin rash    vitamin E 1000 UNIT capsule Take 1,000 Units by mouth once daily.      [DISCONTINUED] ondansetron (ZOFRAN-ODT) 4 MG TbDL Take 1 tablet (4 mg total) by mouth every 6 (six) hours as needed.     Family History     Problem Relation (Age of Onset)    Dementia Brother    Seizures Sister, Brother        Tobacco Use    Smoking status: Former Smoker     Types: Cigarettes     Last attempt to quit: 2011     Years since quittin.8    Smokeless tobacco: Former User   Substance and Sexual Activity    Alcohol use: No    Drug use: No    Sexual activity: Never     Review of Systems   Unable to perform ROS: Dementia     Objective:     Vital Signs (Most Recent):  Temp: 99.3 °F (37.4 °C) (19 1926)  Pulse: 76 (19 1738)  Resp: (!) 25 (19 1724)  BP: (!) 180/83 (19)  SpO2: (!) 94 % (19) Vital Signs (24h Range):  Temp:  [99.2 °F (37.3 °C)-100.5 °F (38.1 °C)] 99.3 °F (37.4 °C)  Pulse:  [72-82] 76  Resp:  [18-25] 25  SpO2:  [89 %-100 %] 94 %  BP: (167-180)/(83-86) 180/83     Weight: 79.4 kg (175 lb)  Body mass index is 34.18 kg/m².    Physical Exam   Constitutional: She appears well-developed and well-nourished. No distress.   HENT:   Head: Normocephalic and atraumatic.   Mouth/Throat: Oropharynx is clear and moist.   Eyes: Pupils are equal, round, and reactive to light. EOM are normal.   Neck: No JVD present.   Cardiovascular: Normal rate, regular rhythm and normal heart sounds.   Pulmonary/Chest: Effort normal. No stridor. No respiratory distress. She has  wheezes. She has no rales. She exhibits no tenderness.   Abdominal: Soft. Bowel sounds are normal. She exhibits no distension and no mass. There is tenderness in the suprapubic area. There is no rebound and no guarding. No hernia.   Musculoskeletal: She exhibits no edema, tenderness or deformity.   Neurological: She is alert. She is disoriented. GCS eye subscore is 4. GCS verbal subscore is 4. GCS motor subscore is 6.   Face symmetrical, moves all extremities spontaneously   Skin: Skin is warm and dry. Capillary refill takes less than 2 seconds. No rash noted. She is not diaphoretic. No erythema.         CRANIAL NERVES     CN III, IV, VI   Pupils are equal, round, and reactive to light.  Extraocular motions are normal.        Significant Labs:   A1C: No results for input(s): HGBA1C in the last 4320 hours.  Bilirubin:   Recent Labs   Lab 07/02/19  1900   BILITOT 0.2     Blood Culture: No results for input(s): LABBLOO in the last 48 hours.  CBC:   Recent Labs   Lab 07/02/19  1719 07/02/19  1908   WBC 7.78  --    HGB 9.8*  --    HCT 32.2* 30*   *  --      CMP:   Recent Labs   Lab 07/02/19  1900      K 3.7   CL 99   CO2 26      BUN 8   CREATININE 0.6   CALCIUM 8.7   PROT 7.3   ALBUMIN 2.6*   BILITOT 0.2   ALKPHOS 101   AST 20   ALT 10   ANIONGAP 11   EGFRNONAA >60.0     Lactic Acid:   Recent Labs   Lab 07/02/19  1719   LACTATE 1.1     Urine Culture: No results for input(s): LABURIN in the last 48 hours.  Urine Studies:   Recent Labs   Lab 07/02/19  1738   COLORU Yellow   APPEARANCEUA Hazy*   PHUR 7.0   SPECGRAV 1.015   PROTEINUA Negative   GLUCUA Negative   KETONESU Negative   BILIRUBINUA Negative   OCCULTUA 1+*   NITRITE Positive*   LEUKOCYTESUR 3+*   RBCUA 3   WBCUA 44*   BACTERIA Occasional   SQUAMEPITHEL 1     All pertinent labs within the past 24 hours have been reviewed.    Significant Imaging: I have reviewed all pertinent imaging results/findings within the past 24 hours.   X-Ray Chest AP  Portable  Narrative: EXAMINATION:  XR CHEST AP PORTABLE    CLINICAL HISTORY:  Shortness of breath    TECHNIQUE:  Single frontal view of the chest was performed.    COMPARISON:  03/10/2018 chest film    FINDINGS:  Single AP portable view 17:53.    Heart is normal size.  Hilar shadows and trachea appear normal.  No pneumothorax or pleural effusion or interstitial edema or consolidation.  The medial half of the left clavicle is absent, unchanged.  Visualized abdomen appears normal.  There are overlying leads  Impression: No failure or pneumonia.    Electronically signed by: Candace Gamez  Date:    07/02/2019  Time:    21:54  X-Ray Pelvis Routine AP  Narrative: EXAMINATION:  XR PELVIS ROUTINE AP    CLINICAL HISTORY:  fall at home;    TECHNIQUE:  AP view of the pelvis was performed.    COMPARISON:  X-ray of the pelvis dated 06/04/2015.    FINDINGS:  Monitoring EKG leads are present.  There is diffuse osteopenia.  The pelvic ring appears intact.  No cortical step-off is identified.  There is mild leftward curvature of the lumbar alignment.  There is joint space narrowing of the sacroiliac joints.  There is no evidence of widening of the pubic symphysis.  There is no evidence of fracture or dislocation of the pelvic bones.    There is abnormal angulation of the right hip.  This is incompletely assessed on this dedicated pelvic radiograph.  Impression: No evidence of fracture or dislocation of the pelvic bones.    Abnormal angulation of the right hip.  Dedicated right hip series is suggested for further evaluation.    Electronically signed by: Jonnathan Marvin MD  Date:    07/02/2019  Time:    19:21

## 2019-07-03 NOTE — PLAN OF CARE
Problem: SLP Goal  Goal: SLP Goal  Speech Language Pathology Goals  Goals expected to be met by 7/10/19  1. Pt will tolerate Level V mechanical soft diet with thin liquids w/o overt S/S aspiration, MIN A  2. Educate Pt and family on S/S aspiration and aspiration precautions     Outcome: Ongoing (interventions implemented as appropriate)  SLP Bedside Swallow Study initiated. Patient presents with mildly prolonged mastication of solids. She required assistance with eating 2/2 decreased UE coordination. No overt S/S aspiration with breakfast meal tray items. REC: Level V mechanical soft diet with thin liquids, medications one at a time ok, provided 1:1 assistance with all PO and standard aspiration precautions. Please continue to monitor for signs and symptoms of aspiration and discontinue oral feeding should you notice any of the following: watery eyes, reddened facial area, wet vocal quality, increased work of breathing, change in respiratory status, increased congestion, coughing, fever, and/or change in CXR. Findings/recs reviewed with Pt, RN and MD team.    RICHA Tapia., [facility]-SLP  Speech-Language Pathology  Pager: [phone]  7/3/2019

## 2019-07-03 NOTE — PLAN OF CARE
Problem: Physical Therapy Goal  Goal: Physical Therapy Goal  Goals to be met by: 8/3/19    Patient will increase functional independence with mobility by performin. Supine to sit with MInimal Assistance  2. Sit to stand transfer with Minimal Assistance  3. Gait  x 10 feet with Minimal Assistance using Rolling Walker  4. Lower extremity exercise program x10 reps per handout, with assistance as needed      Patient would continue to benefit from PT in order to get back to PLOF.

## 2019-07-03 NOTE — SUBJECTIVE & OBJECTIVE
Review of Systems   Constitutional: Negative for chills and fever.   HENT: Negative for congestion and sore throat.    Respiratory: Negative for cough, sputum production, shortness of breath and wheezing.    Cardiovascular: Negative for chest pain and palpitations.   Gastrointestinal: Negative for abdominal pain, nausea and vomiting.   Genitourinary: Negative for dysuria and frequency.   Musculoskeletal: Positive for falls. Negative for back pain and neck pain.   Neurological: Positive for weakness. Negative for dizziness and headaches. Seizures: does not remember seizure        Objective:     Vital Signs (Most Recent):  Temp: 97.9 °F (36.6 °C) (07/03/19 1539)  Pulse: 61 (07/03/19 1539)  Resp: 16 (07/03/19 1310)  BP: 133/60 (07/03/19 1539)  SpO2: (!) 92 % (07/03/19 1539) Vital Signs (24h Range):  Temp:  [97.2 °F (36.2 °C)-100.5 °F (38.1 °C)] 97.9 °F (36.6 °C)  Pulse:  [56-82] 61  Resp:  [15-27] 16  SpO2:  [89 %-99 %] 92 %  BP: (124-180)/(57-85) 133/60     Weight: 79.4 kg (175 lb)  Body mass index is 34.18 kg/m².    Intake/Output Summary (Last 24 hours) at 7/3/2019 1556  Last data filed at 7/3/2019 0500  Gross per 24 hour   Intake 1150 ml   Output 950 ml   Net 200 ml      Physical Exam   Constitutional: She appears well-nourished. No distress.   Eyes: Pupils are equal, round, and reactive to light. EOM are normal.   Cardiovascular: Normal rate and regular rhythm.   Pulmonary/Chest: Effort normal. She has wheezes (diffuse).   Abdominal: Soft. Bowel sounds are normal. She exhibits no distension. There is no tenderness. There is no rebound.   Skin: She is not diaphoretic.       Significant Labs:   Blood Culture:   Recent Labs   Lab 07/02/19  1719 07/02/19  1738   LABBLOO Gram stain aer bottle: Gram positive cocci in clusters resembling Staph  Results called to and read back by:  Yani Cooper RN 07/03/2019    14:36 No Growth to date     CBC:   Recent Labs   Lab 07/02/19  1719 07/02/19  1908 07/03/19  0439   WBC  7.78  --  6.34   HGB 9.8*  --  8.7*   HCT 32.2* 30* 29.3*   *  --  285       Significant Imaging: I have reviewed all pertinent imaging results/findings within the past 24 hours.

## 2019-07-03 NOTE — HOSPITAL COURSE
"Since admission, pt has been afebrile and vitals have been stable on ceftriaxone. White count has stayed WNL. Pt resting comfortably in bed and reports "feeling better". Tolerating PO, diet changed to softs. Urine culture positive for GNR, abx changed to PO cefdinir 300 BID for 8 more days of abx. Follow-up xray of left hip negative for fracture    Some concern expressed by daughter regarding functional status and weakness affecting ADLs. Final urine culture positive for E.coli sensitive to tetracyline, so pt transitioned to PO Doxy. Plan to go to SNF on 7/8  "

## 2019-07-03 NOTE — PLAN OF CARE
07/03/19 1633   Discharge Assessment   Assessment Type Discharge Planning Assessment   Confirmed/corrected address and phone number on facesheet? Yes   Assessment information obtained from? Patient   Expected Length of Stay (days) 3   Communicated expected length of stay with patient/caregiver yes   Prior to hospitilization cognitive status: Unable to Assess   Prior to hospitalization functional status: Assistive Equipment   Current cognitive status: Unable to Assess   Current Functional Status: Assistive Equipment   Lives With child(kimberly), adult   Able to Return to Prior Arrangements yes   Is patient able to care for self after discharge? No   Who are your caregiver(s) and their phone number(s)? Katie [phone]   Patient's perception of discharge disposition skilled nursing facility   Readmission Within the Last 30 Days no previous admission in last 30 days   Patient currently being followed by outpatient case management? No   Patient currently receives any other outside agency services? No   Equipment Currently Used at Home nebulizer;walker, rolling;wheelchair;bedside commode   Do you have any problems affording any of your prescribed medications? No   Is the patient taking medications as prescribed? yes   Does the patient have transportation home? Yes   Transportation Anticipated family or friend will provide   Does the patient receive services at the [facility]? No   Discharge Plan A Skilled Nursing Facility   Discharge Plan B Home Health   DME Needed Upon Discharge  none   Patient/Family in Agreement with Plan yes

## 2019-07-03 NOTE — ASSESSMENT & PLAN NOTE
Pt with chronic microcytic anemia  Iron 14, Sat 5, TIBC 263, ferritin 57  -no iron on home meds, will add PO for d/c when ready

## 2019-07-03 NOTE — ASSESSMENT & PLAN NOTE
Pt with Fever and documented RR >20 with laboratory evidence and physical exam consistent with urinary source of infection. Pt treated in ED with LR bolus and IV ceftriaxone.    -continue antibiotics with Ceftriaxone 1g IV q24hrs  -Pt vitally stable and breathing comfortably at 16 breaths per minutes on RA during my examination   -Urine cultures and blood cultures pending  -CXR without evidence of pneumonia

## 2019-07-03 NOTE — ED NOTES
Assumed care for this patient. Pt resting comfortably in stretcher with bed locked in low position with side rails up x2. Respirations even and unlabored with visible chest rise noted. Pt offered bathroom assistance and denies needs at this time. Pt updated on POC, verbalizes understanding. Pt instructed to call for assistance, call bell within reach. Pt on continuous cardiac monitoring, BP and O2 monitoring. Will continue to monitor.    Patient Identifiers for Susanna Byrnes checked and correct  LOC: The patient is awake, alert, disoriented with history of dementia  APPEARANCE: Patient resting comfortably and in no acute distress, patient is clean and well groomed, patient's clothing is properly fastened.  SKIN: The skin is warm and dry, patient has normal skin turgor and moist mucus membranes,no rashes or lesions.Skin Intact , No Breakdown Noted  Musculoskeletal :  Normal range of motion noted. Moves all extremeties well, No swelling or tenderness noted  RESPIRATORY: Airway is open and patent, respirations are spontaneous, patient has a normal effort and rate. Pt on 3L NC  CARDIAC: Patient has a normal rate and rhythm, no periphreal edema noted, capillary refill < 3 seconds.   ABDOMEN: Soft and non tender to palpation, no distention noted.   PULSES: 2+  And symmetrical in all extremeties  NEUROLOGIC: PERRL. facial expression is symmetrical, patient moving all extremities, normal sensation in all extremities when touched with a finger.The patient is awake, alert and cooperative with a calm affect, patient is aware of environment.    Will continue to monitor

## 2019-07-03 NOTE — PLAN OF CARE
Problem: Occupational Therapy Goal  Goal: Occupational Therapy Goal  Goals to be met by: 7/20/2019     Patient will increase functional independence with ADLs by performing:    UE Dressing with Minimal Assistance.  LE Dressing with Maximum Assistance.  Grooming while seated with Minimal Assistance.  Toileting from bedside commode with Maximum Assistance for hygiene and clothing management.   Toilet transfer to bedside commode with Moderate Assistance.    Initiate OT POC     Comments: Parviz Rincon OTR/L  7/3/2019

## 2019-07-04 LAB
ANION GAP SERPL CALC-SCNC: 8 MMOL/L (ref 8–16)
BASOPHILS # BLD AUTO: 0.04 K/UL (ref 0–0.2)
BASOPHILS NFR BLD: 0.8 % (ref 0–1.9)
BUN SERPL-MCNC: 12 MG/DL (ref 8–23)
CALCIUM SERPL-MCNC: 8.5 MG/DL (ref 8.7–10.5)
CHLORIDE SERPL-SCNC: 98 MMOL/L (ref 95–110)
CO2 SERPL-SCNC: 29 MMOL/L (ref 23–29)
CREAT SERPL-MCNC: 0.6 MG/DL (ref 0.5–1.4)
DIFFERENTIAL METHOD: ABNORMAL
EOSINOPHIL # BLD AUTO: 0.4 K/UL (ref 0–0.5)
EOSINOPHIL NFR BLD: 7.5 % (ref 0–8)
ERYTHROCYTE [DISTWIDTH] IN BLOOD BY AUTOMATED COUNT: 17.1 % (ref 11.5–14.5)
EST. GFR  (AFRICAN AMERICAN): >60 ML/MIN/1.73 M^2
EST. GFR  (NON AFRICAN AMERICAN): >60 ML/MIN/1.73 M^2
GLUCOSE SERPL-MCNC: 87 MG/DL (ref 70–110)
HCT VFR BLD AUTO: 29.4 % (ref 37–48.5)
HGB BLD-MCNC: 9.1 G/DL (ref 12–16)
IMM GRANULOCYTES # BLD AUTO: 0.01 K/UL (ref 0–0.04)
IMM GRANULOCYTES NFR BLD AUTO: 0.2 % (ref 0–0.5)
LYMPHOCYTES # BLD AUTO: 2.3 K/UL (ref 1–4.8)
LYMPHOCYTES NFR BLD: 42.9 % (ref 18–48)
MCH RBC QN AUTO: 21.8 PG (ref 27–31)
MCHC RBC AUTO-ENTMCNC: 31 G/DL (ref 32–36)
MCV RBC AUTO: 70 FL (ref 82–98)
MONOCYTES # BLD AUTO: 0.6 K/UL (ref 0.3–1)
MONOCYTES NFR BLD: 11.3 % (ref 4–15)
NEUTROPHILS # BLD AUTO: 2 K/UL (ref 1.8–7.7)
NEUTROPHILS NFR BLD: 37.3 % (ref 38–73)
NRBC BLD-RTO: 0 /100 WBC
PLATELET # BLD AUTO: 340 K/UL (ref 150–350)
PMV BLD AUTO: 9.6 FL (ref 9.2–12.9)
POTASSIUM SERPL-SCNC: 3 MMOL/L (ref 3.5–5.1)
RBC # BLD AUTO: 4.18 M/UL (ref 4–5.4)
SODIUM SERPL-SCNC: 135 MMOL/L (ref 136–145)
WBC # BLD AUTO: 5.32 K/UL (ref 3.9–12.7)

## 2019-07-04 PROCEDURE — 85025 COMPLETE CBC W/AUTO DIFF WBC: CPT

## 2019-07-04 PROCEDURE — 25000242 PHARM REV CODE 250 ALT 637 W/ HCPCS: Performed by: STUDENT IN AN ORGANIZED HEALTH CARE EDUCATION/TRAINING PROGRAM

## 2019-07-04 PROCEDURE — 94761 N-INVAS EAR/PLS OXIMETRY MLT: CPT

## 2019-07-04 PROCEDURE — 25000003 PHARM REV CODE 250: Performed by: STUDENT IN AN ORGANIZED HEALTH CARE EDUCATION/TRAINING PROGRAM

## 2019-07-04 PROCEDURE — 63600175 PHARM REV CODE 636 W HCPCS: Performed by: STUDENT IN AN ORGANIZED HEALTH CARE EDUCATION/TRAINING PROGRAM

## 2019-07-04 PROCEDURE — 80048 BASIC METABOLIC PNL TOTAL CA: CPT

## 2019-07-04 PROCEDURE — 99232 PR SUBSEQUENT HOSPITAL CARE,LEVL II: ICD-10-PCS | Mod: GC,,, | Performed by: HOSPITALIST

## 2019-07-04 PROCEDURE — 99232 SBSQ HOSP IP/OBS MODERATE 35: CPT | Mod: GC,,, | Performed by: HOSPITALIST

## 2019-07-04 PROCEDURE — 63600175 PHARM REV CODE 636 W HCPCS: Performed by: HOSPITALIST

## 2019-07-04 PROCEDURE — 12000002 HC ACUTE/MED SURGE SEMI-PRIVATE ROOM

## 2019-07-04 PROCEDURE — 36415 COLL VENOUS BLD VENIPUNCTURE: CPT

## 2019-07-04 PROCEDURE — 94640 AIRWAY INHALATION TREATMENT: CPT

## 2019-07-04 RX ORDER — GUAIFENESIN 600 MG/1
600 TABLET, EXTENDED RELEASE ORAL DAILY PRN
Status: ON HOLD | COMMUNITY
End: 2019-07-26 | Stop reason: HOSPADM

## 2019-07-04 RX ORDER — DEXTROMETHORPHAN HYDROBROMIDE, GUAIFENESIN 5; 100 MG/5ML; MG/5ML
1300 LIQUID ORAL DAILY PRN
Status: ON HOLD | COMMUNITY
End: 2019-07-26 | Stop reason: HOSPADM

## 2019-07-04 RX ORDER — IPRATROPIUM BROMIDE AND ALBUTEROL SULFATE 2.5; .5 MG/3ML; MG/3ML
3 SOLUTION RESPIRATORY (INHALATION) EVERY 6 HOURS PRN
COMMUNITY

## 2019-07-04 RX ORDER — POTASSIUM CHLORIDE 20 MEQ/15ML
40 SOLUTION ORAL EVERY 4 HOURS
Status: DISPENSED | OUTPATIENT
Start: 2019-07-04 | End: 2019-07-04

## 2019-07-04 RX ORDER — RISPERIDONE 0.25 MG/1
0.25 TABLET ORAL NIGHTLY
Status: DISCONTINUED | OUTPATIENT
Start: 2019-07-05 | End: 2019-07-08 | Stop reason: HOSPADM

## 2019-07-04 RX ORDER — POTASSIUM CHLORIDE 20 MEQ/1
40 TABLET, EXTENDED RELEASE ORAL EVERY 4 HOURS
Status: DISCONTINUED | OUTPATIENT
Start: 2019-07-04 | End: 2019-07-04

## 2019-07-04 RX ORDER — CEFDINIR 125 MG/5ML
300 POWDER, FOR SUSPENSION ORAL EVERY 12 HOURS
Status: DISCONTINUED | OUTPATIENT
Start: 2019-07-05 | End: 2019-07-06

## 2019-07-04 RX ADMIN — CHLORTHALIDONE 12.5 MG: 25 TABLET ORAL at 08:07

## 2019-07-04 RX ADMIN — FERROUS SULFATE TAB EC 325 MG (65 MG FE EQUIVALENT) 325 MG: 325 (65 FE) TABLET DELAYED RESPONSE at 08:07

## 2019-07-04 RX ADMIN — RIVASTIGMINE TARTRATE 1.5 MG: 1.5 CAPSULE ORAL at 08:07

## 2019-07-04 RX ADMIN — LEVETIRACETAM 500 MG: 100 SOLUTION ORAL at 02:07

## 2019-07-04 RX ADMIN — ASPIRIN 81 MG CHEWABLE TABLET 81 MG: 81 TABLET CHEWABLE at 08:07

## 2019-07-04 RX ADMIN — ENOXAPARIN SODIUM 40 MG: 100 INJECTION SUBCUTANEOUS at 04:07

## 2019-07-04 RX ADMIN — ESCITALOPRAM OXALATE 5 MG: 5 TABLET, FILM COATED ORAL at 08:07

## 2019-07-04 RX ADMIN — MIRTAZAPINE 15 MG: 15 TABLET, FILM COATED ORAL at 09:07

## 2019-07-04 RX ADMIN — RISPERIDONE 0.25 MG: 0.25 TABLET ORAL at 08:07

## 2019-07-04 RX ADMIN — CEFTRIAXONE 2 G: 2 INJECTION, SOLUTION INTRAVENOUS at 02:07

## 2019-07-04 RX ADMIN — CETIRIZINE HYDROCHLORIDE 10 MG: 5 TABLET ORAL at 08:07

## 2019-07-04 RX ADMIN — LEVETIRACETAM 500 MG: 100 SOLUTION ORAL at 09:07

## 2019-07-04 RX ADMIN — LEVETIRACETAM 500 MG: 100 SOLUTION ORAL at 08:07

## 2019-07-04 RX ADMIN — POTASSIUM CHLORIDE 40 MEQ: 20 SOLUTION ORAL at 08:07

## 2019-07-04 RX ADMIN — AMLODIPINE BESYLATE 5 MG: 10 TABLET ORAL at 08:07

## 2019-07-04 RX ADMIN — MONTELUKAST SODIUM 10 MG: 10 TABLET, COATED ORAL at 09:07

## 2019-07-04 RX ADMIN — IPRATROPIUM BROMIDE AND ALBUTEROL SULFATE 3 ML: .5; 3 SOLUTION RESPIRATORY (INHALATION) at 01:07

## 2019-07-04 RX ADMIN — IPRATROPIUM BROMIDE AND ALBUTEROL SULFATE 3 ML: .5; 3 SOLUTION RESPIRATORY (INHALATION) at 08:07

## 2019-07-04 RX ADMIN — IPRATROPIUM BROMIDE AND ALBUTEROL SULFATE 3 ML: .5; 3 SOLUTION RESPIRATORY (INHALATION) at 07:07

## 2019-07-04 NOTE — ASSESSMENT & PLAN NOTE
Pt with chronic microcytic anemia  Iron 14, Sat 5, TIBC 263, ferritin 57  -no iron on home meds, added PO ferrous sulfate.

## 2019-07-04 NOTE — ASSESSMENT & PLAN NOTE
Pt with Fever and documented RR >20 with laboratory evidence and physical exam consistent with urinary source of infection. Pt treated in ED with LR bolus and IV ceftriaxone.    -Ceftriaxone 1g IV q24hrs for 2 days--> cefdinir 300 mg PO for 8 days (10 days total coverage)  -Pt vitally stable and breathing comfortably  -Urine culture revealed GNRs, blood culture had 1/2 + for coag neg staph which was likely contaminant. Repeat x4 NGTD  -CXR without evidence of pneumonia

## 2019-07-04 NOTE — PROGRESS NOTES
[facility] Hospital Medicine  Progress Note    Patient Name: Susanna Byrnes  MRN: [id number]  Patient Class: IP- Inpatient   Admission Date: 7/2/2019  Length of Stay: 2 days  Attending Physician: Marley Moreira MD  Primary Care Provider: Ignacia River MD    [facility] Medicine Team: [facility] MED 2 Hany Diaz MD    Subjective:     Principal Problem:Sepsis      HPI:  Ms. Byrnes is an 85 yo female with Dementia, HTN, Arthritis, Suspected COPD (pt unable to complete PFT 2/2 dementia), seizures, Previous NSTEMI (4/14) with reduced EF (25-30%) who presented to the ED after two days of fever at home and a fall while ambulating to the bathroom. Pt has severe dementia and is completely disoriented at baseline. History is provided by patient's daughter at bedside. Pt's daughter reports that her mother has had increased thirst, increased urinary frequency, and fever for the past 2 days with a Tmax at home of 100.4 that was improved with tylenol. Additonally, this evening pt was attempting to get into the shower and had a witness fall. Pt's daughter denies that the patient hit her head and states that she seems to have slid down her walker and landed on her bottom. Pt's daughter reports her mother did not appear to have any alteration in her mental status but did report feeling sore on her bottom. Pt's daughter also reports that Ms. Byrnes has had cough for the past 6 weeks; The cough initially started in Late may and wwas associated with dark green sputum production.  Pt's daughter reports that she was seen in an urgent care center in early June and given a three day prednisone course with resolution of sputum production but pt continues to have a cough. Pt's daughter notes that she had previously followed with Dr. Looney with Pulmonolgy at [facility], though no notes could be found in EMR or legacy document viewer.     In the ED, pt was noted to be hypertensive to 180/85 and febrile to 100.5  "but without leukocytosis. Her EKG demonstrated NSR with a troponin of 0.027 and a BNP of 63. Her UA demonstrated 44 WBC per HPF with occasional bacteria. Pelvic Xray without evidence of acute fracture, and CXR without consolidation or opacity concerning for pneumonia or pulmonary edema.     Overview/Hospital Course:  Since admission, pt has been afebrile and vitals have been stable on ceftriaxone. White count has stayed WNL. Pt resting comfortably in bed and reports "feeling better". Tolerating PO, diet changed to softs. Urine culture positive for GNR, abx changed to PO cefdinir 300 BID for 8 more days of abx. Follow-up xray of left hip negative for fracture    Some concern expressed by daughter regarding functional status and weakness affecting ADLs. Awaiting further PT/OT eval tomorrow to determine need for home health/placement    Interval History: Urine culture positive for GNR, abx changed to PO cefdinir 300 BID for 8 more days of abx.    Some concern expressed by daughter regarding functional status and weakness affecting ADLs. Awaiting further PT/OT eval tomorrow to determine need for home health/placement. Also somewhat tremulous today and daughter informed that pt usually takes risperdol differently than she is currently getting; will adjust.    Review of Systems   Constitutional: Negative for chills and fever.   HENT: Negative for congestion and sore throat.    Respiratory: Negative for cough, sputum production, shortness of breath and wheezing.    Cardiovascular: Negative for chest pain and palpitations.   Gastrointestinal: Negative for abdominal pain, nausea and vomiting.   Genitourinary: Negative for dysuria and frequency.   Musculoskeletal: Positive for falls. Negative for back pain and neck pain.   Neurological: Positive for weakness. Negative for dizziness and headaches. Seizures: does not remember seizure      Objective:     Vital Signs (Most Recent):  Temp: 98 °F (36.7 °C) (07/04/19 1613)  Pulse: " 64 (07/04/19 1613)  Resp: 18 (07/04/19 1613)  BP: 107/70 (07/04/19 1613)  SpO2: (!) 94 % (07/04/19 1613) Vital Signs (24h Range):  Temp:  [97.9 °F (36.6 °C)-99.4 °F (37.4 °C)] 98 °F (36.7 °C)  Pulse:  [58-74] 64  Resp:  [16-24] 18  SpO2:  [89 %-97 %] 94 %  BP: (107-150)/(62-77) 107/70     Weight: 79.4 kg (175 lb)  Body mass index is 34.18 kg/m².    Intake/Output Summary (Last 24 hours) at 7/4/2019 1715  Last data filed at 7/4/2019 0841  Gross per 24 hour   Intake --   Output 3020 ml   Net -3020 ml      Physical Exam   Constitutional: She appears well-nourished. No distress.   Eyes: Pupils are equal, round, and reactive to light. EOM are normal.   Cardiovascular: Normal rate and regular rhythm.   Pulmonary/Chest: Effort normal. She has wheezes (diffuse).   Abdominal: Soft. Bowel sounds are normal. She exhibits no distension. There is no tenderness. There is no rebound.   No CVA tenderness   Neurological: She is alert.   Oriented only to self, which appears to be baseline   Skin: She is not diaphoretic.       Significant Labs:   Blood Culture:   Recent Labs   Lab 07/02/19 1719 07/02/19  1738 07/03/19  2047   LABBLOO Gram stain aer bottle: Gram positive cocci in clusters resembling Staph  Results called to and read back by:  Yani Cooper RN 07/03/2019    14:36  Gram stain jeremias bottle: Gram positive cocci in clusters resembling Staph   Positive results previously called  COAGULASE-NEGATIVE STAPHYLOCOCCUS SPECIES  Organism is a probable contaminant  * Gram stain jeremias bottle: Gram positive cocci   Results called to and read back by:Mavis Vital RN 07/04/2019  12:58 No Growth to date  No Growth to date     BMP:   Recent Labs   Lab 07/04/19  0357   GLU 87   *   K 3.0*   CL 98   CO2 29   BUN 12   CREATININE 0.6   CALCIUM 8.5*     CBC:   Recent Labs   Lab 07/02/19 1719 07/02/19  1908 07/03/19  0439 07/04/19  0357   WBC 7.78  --  6.34 5.32   HGB 9.8*  --  8.7* 9.1*   HCT 32.2* 30* 29.3* 29.4*   *  --   285 340     Urine Culture:   Recent Labs   Lab 07/02/19  1738   LABURIN GRAM NEGATIVE IAN  >100,000 cfu/ml  Identification and susceptibility pending  *       Significant Imaging: I have reviewed and interpreted all pertinent imaging results/findings within the past 24 hours.      Assessment/Plan:      * Sepsis  Pt with Fever and documented RR >20 with laboratory evidence and physical exam consistent with urinary source of infection. Pt treated in ED with LR bolus and IV ceftriaxone.    -Ceftriaxone 1g IV q24hrs for 2 days--> cefdinir 300 mg PO for 8 days (10 days total coverage)  -Pt vitally stable and breathing comfortably  -Urine culture revealed GNRs, blood culture had 1/2 + for coag neg staph which was likely contaminant. Repeat x4 NGTD  -CXR without evidence of pneumonia    Acute cystitis without hematuria  As sepsis above      Mild malnutrition  Suspected based on Albumin 2.6 on admission  RD recommended softs      Chronic combined systolic and diastolic CHF (congestive heart failure)  Pt's daughter notes patient has not been seen in cardiology clinic for years; pt may benefit from ambulatory referral to cardiology prior to discharge       Dementia without behavioral disturbance  Follows with Dr. Benita Melchor with [facility], last seen 6/24.  Pt recently started on Rivastigmine for dementia, will continue along with her Risperidone, Lexapro and Remeron  Chronic cough possibly concerning for silent aspiration; will consult SLP  -Per pt daughter, normally receives Risperdol 0.25 mg nightly and 0.125mg in the mornings; added this to current treatment as pt has been mildly tremulous      Debility  Pt/OT consulted    Chronic anemia  Pt with chronic microcytic anemia  Iron 14, Sat 5, TIBC 263, ferritin 57  -no iron on home meds, added PO ferrous sulfate.    Seizure disorder  Continue leviteracitam       Essential hypertension  Continue home amlodipine and Chlorthalidone         VTE Risk Mitigation  (From admission, onward)        Ordered     enoxaparin injection 40 mg  Daily      07/02/19 2111     IP VTE HIGH RISK PATIENT  Once      07/02/19 2111                Hany Diaz MD  Department of Hospital Medicine   [facility]

## 2019-07-04 NOTE — SUBJECTIVE & OBJECTIVE
Interval History: Urine culture positive for GNR, abx changed to PO cefdinir 300 BID for 8 more days of abx.    Some concern expressed by daughter regarding functional status and weakness affecting ADLs. Awaiting further PT/OT eval tomorrow to determine need for home health/placement. Also somewhat tremulous today and daughter informed that pt usually takes risperdol differently than she is currently getting; will adjust.    Review of Systems   Constitutional: Negative for chills and fever.   HENT: Negative for congestion and sore throat.    Respiratory: Negative for cough, sputum production, shortness of breath and wheezing.    Cardiovascular: Negative for chest pain and palpitations.   Gastrointestinal: Negative for abdominal pain, nausea and vomiting.   Genitourinary: Negative for dysuria and frequency.   Musculoskeletal: Positive for falls. Negative for back pain and neck pain.   Neurological: Positive for weakness. Negative for dizziness and headaches. Seizures: does not remember seizure      Objective:     Vital Signs (Most Recent):  Temp: 98 °F (36.7 °C) (07/04/19 1613)  Pulse: 64 (07/04/19 1613)  Resp: 18 (07/04/19 1613)  BP: 107/70 (07/04/19 1613)  SpO2: (!) 94 % (07/04/19 1613) Vital Signs (24h Range):  Temp:  [97.9 °F (36.6 °C)-99.4 °F (37.4 °C)] 98 °F (36.7 °C)  Pulse:  [58-74] 64  Resp:  [16-24] 18  SpO2:  [89 %-97 %] 94 %  BP: (107-150)/(62-77) 107/70     Weight: 79.4 kg (175 lb)  Body mass index is 34.18 kg/m².    Intake/Output Summary (Last 24 hours) at 7/4/2019 1715  Last data filed at 7/4/2019 0841  Gross per 24 hour   Intake --   Output 3020 ml   Net -3020 ml      Physical Exam   Constitutional: She appears well-nourished. No distress.   Eyes: Pupils are equal, round, and reactive to light. EOM are normal.   Cardiovascular: Normal rate and regular rhythm.   Pulmonary/Chest: Effort normal. She has wheezes (diffuse).   Abdominal: Soft. Bowel sounds are normal. She exhibits no distension. There is no  tenderness. There is no rebound.   No CVA tenderness   Neurological: She is alert.   Oriented only to self, which appears to be baseline   Skin: She is not diaphoretic.       Significant Labs:   Blood Culture:   Recent Labs   Lab 07/02/19 1719 07/02/19 1738 07/03/19  2047   LABBLOO Gram stain aer bottle: Gram positive cocci in clusters resembling Staph  Results called to and read back by:  Yani Cooper RN 07/03/2019    14:36  Gram stain jeremias bottle: Gram positive cocci in clusters resembling Staph   Positive results previously called  COAGULASE-NEGATIVE STAPHYLOCOCCUS SPECIES  Organism is a probable contaminant  * Gram stain jeremias bottle: Gram positive cocci   Results called to and read back by:Mavis Vital RN 07/04/2019  12:58 No Growth to date  No Growth to date     BMP:   Recent Labs   Lab 07/04/19  0357   GLU 87   *   K 3.0*   CL 98   CO2 29   BUN 12   CREATININE 0.6   CALCIUM 8.5*     CBC:   Recent Labs   Lab 07/02/19  1719 07/02/19  1908 07/03/19  0439 07/04/19  0357   WBC 7.78  --  6.34 5.32   HGB 9.8*  --  8.7* 9.1*   HCT 32.2* 30* 29.3* 29.4*   *  --  285 340     Urine Culture:   Recent Labs   Lab 07/02/19  1738   LABURIN GRAM NEGATIVE IAN  >100,000 cfu/ml  Identification and susceptibility pending  *       Significant Imaging: I have reviewed and interpreted all pertinent imaging results/findings within the past 24 hours.

## 2019-07-04 NOTE — ASSESSMENT & PLAN NOTE
Follows with Dr. Benita Melchor with [facility], last seen 6/24.  Pt recently started on Rivastigmine for dementia, will continue along with her Risperidone, Lexapro and Remeron  Chronic cough possibly concerning for silent aspiration; will consult SLP  -Per pt daughter, normally receives Risperdol 0.25 mg nightly and 0.125mg in the mornings; added this to current treatment as pt has been mildly tremulous

## 2019-07-04 NOTE — PLAN OF CARE
Problem: Adult Inpatient Plan of Care  Goal: Plan of Care Review  Outcome: Ongoing (interventions implemented as appropriate)  Plan of care reviewed and updated. Pt awake, alert and oriented to self. Pt's pain is managed with the medication ordered at this time. Pt's VS are as charted.  No falls this shift. Pt is oriented to room and call system. Will continue to [facility].

## 2019-07-05 LAB
ANION GAP SERPL CALC-SCNC: 7 MMOL/L (ref 8–16)
ANISOCYTOSIS BLD QL SMEAR: SLIGHT
BACTERIA UR CULT: ABNORMAL
BASOPHILS # BLD AUTO: 0.02 K/UL (ref 0–0.2)
BASOPHILS NFR BLD: 0.4 % (ref 0–1.9)
BUN SERPL-MCNC: 12 MG/DL (ref 8–23)
CALCIUM SERPL-MCNC: 8.5 MG/DL (ref 8.7–10.5)
CHLORIDE SERPL-SCNC: 103 MMOL/L (ref 95–110)
CO2 SERPL-SCNC: 28 MMOL/L (ref 23–29)
CREAT SERPL-MCNC: 0.6 MG/DL (ref 0.5–1.4)
DIFFERENTIAL METHOD: ABNORMAL
EOSINOPHIL # BLD AUTO: 0.5 K/UL (ref 0–0.5)
EOSINOPHIL NFR BLD: 9.6 % (ref 0–8)
ERYTHROCYTE [DISTWIDTH] IN BLOOD BY AUTOMATED COUNT: 17.2 % (ref 11.5–14.5)
EST. GFR  (AFRICAN AMERICAN): >60 ML/MIN/1.73 M^2
EST. GFR  (NON AFRICAN AMERICAN): >60 ML/MIN/1.73 M^2
GLUCOSE SERPL-MCNC: 87 MG/DL (ref 70–110)
HCT VFR BLD AUTO: 30.7 % (ref 37–48.5)
HGB BLD-MCNC: 9.2 G/DL (ref 12–16)
HYPOCHROMIA BLD QL SMEAR: ABNORMAL
IMM GRANULOCYTES # BLD AUTO: 0.01 K/UL (ref 0–0.04)
IMM GRANULOCYTES NFR BLD AUTO: 0.2 % (ref 0–0.5)
LYMPHOCYTES # BLD AUTO: 2.4 K/UL (ref 1–4.8)
LYMPHOCYTES NFR BLD: 44.4 % (ref 18–48)
MCH RBC QN AUTO: 22.1 PG (ref 27–31)
MCHC RBC AUTO-ENTMCNC: 30 G/DL (ref 32–36)
MCV RBC AUTO: 74 FL (ref 82–98)
MONOCYTES # BLD AUTO: 0.7 K/UL (ref 0.3–1)
MONOCYTES NFR BLD: 12.5 % (ref 4–15)
NEUTROPHILS # BLD AUTO: 1.7 K/UL (ref 1.8–7.7)
NEUTROPHILS NFR BLD: 32.9 % (ref 38–73)
NRBC BLD-RTO: 0 /100 WBC
PLATELET # BLD AUTO: 319 K/UL (ref 150–350)
PLATELET BLD QL SMEAR: ABNORMAL
PMV BLD AUTO: 9.8 FL (ref 9.2–12.9)
POIKILOCYTOSIS BLD QL SMEAR: SLIGHT
POTASSIUM SERPL-SCNC: 3.4 MMOL/L (ref 3.5–5.1)
RBC # BLD AUTO: 4.17 M/UL (ref 4–5.4)
SODIUM SERPL-SCNC: 138 MMOL/L (ref 136–145)
TARGETS BLD QL SMEAR: ABNORMAL
WBC # BLD AUTO: 5.29 K/UL (ref 3.9–12.7)

## 2019-07-05 PROCEDURE — 80048 BASIC METABOLIC PNL TOTAL CA: CPT

## 2019-07-05 PROCEDURE — 25000003 PHARM REV CODE 250: Performed by: STUDENT IN AN ORGANIZED HEALTH CARE EDUCATION/TRAINING PROGRAM

## 2019-07-05 PROCEDURE — 99232 PR SUBSEQUENT HOSPITAL CARE,LEVL II: ICD-10-PCS | Mod: GC,,, | Performed by: HOSPITALIST

## 2019-07-05 PROCEDURE — 12000002 HC ACUTE/MED SURGE SEMI-PRIVATE ROOM

## 2019-07-05 PROCEDURE — 94761 N-INVAS EAR/PLS OXIMETRY MLT: CPT

## 2019-07-05 PROCEDURE — 36415 COLL VENOUS BLD VENIPUNCTURE: CPT

## 2019-07-05 PROCEDURE — 99232 SBSQ HOSP IP/OBS MODERATE 35: CPT | Mod: GC,,, | Performed by: HOSPITALIST

## 2019-07-05 PROCEDURE — 85025 COMPLETE CBC W/AUTO DIFF WBC: CPT

## 2019-07-05 PROCEDURE — 97535 SELF CARE MNGMENT TRAINING: CPT

## 2019-07-05 PROCEDURE — 97116 GAIT TRAINING THERAPY: CPT

## 2019-07-05 PROCEDURE — 92526 ORAL FUNCTION THERAPY: CPT

## 2019-07-05 PROCEDURE — 63600175 PHARM REV CODE 636 W HCPCS: Performed by: STUDENT IN AN ORGANIZED HEALTH CARE EDUCATION/TRAINING PROGRAM

## 2019-07-05 PROCEDURE — 94640 AIRWAY INHALATION TREATMENT: CPT

## 2019-07-05 PROCEDURE — 25000242 PHARM REV CODE 250 ALT 637 W/ HCPCS: Performed by: STUDENT IN AN ORGANIZED HEALTH CARE EDUCATION/TRAINING PROGRAM

## 2019-07-05 RX ORDER — POTASSIUM CHLORIDE 20 MEQ/15ML
40 SOLUTION ORAL EVERY 4 HOURS
Status: DISPENSED | OUTPATIENT
Start: 2019-07-05 | End: 2019-07-05

## 2019-07-05 RX ORDER — RISPERIDONE 1 MG/ML
0.12 SOLUTION ORAL DAILY
Status: DISCONTINUED | OUTPATIENT
Start: 2019-07-05 | End: 2019-07-08 | Stop reason: HOSPADM

## 2019-07-05 RX ORDER — CEFDINIR 125 MG/5ML
300 POWDER, FOR SUSPENSION ORAL EVERY 12 HOURS
Qty: 180 ML | Refills: 0 | Status: SHIPPED | OUTPATIENT
Start: 2019-07-05 | End: 2019-07-08

## 2019-07-05 RX ADMIN — CETIRIZINE HYDROCHLORIDE 10 MG: 5 TABLET ORAL at 08:07

## 2019-07-05 RX ADMIN — CEFDINIR 300 MG: 125 POWDER, FOR SUSPENSION ORAL at 09:07

## 2019-07-05 RX ADMIN — IPRATROPIUM BROMIDE AND ALBUTEROL SULFATE 3 ML: .5; 3 SOLUTION RESPIRATORY (INHALATION) at 08:07

## 2019-07-05 RX ADMIN — LEVETIRACETAM 500 MG: 100 SOLUTION ORAL at 08:07

## 2019-07-05 RX ADMIN — MONTELUKAST SODIUM 10 MG: 10 TABLET, COATED ORAL at 09:07

## 2019-07-05 RX ADMIN — LEVETIRACETAM 500 MG: 100 SOLUTION ORAL at 02:07

## 2019-07-05 RX ADMIN — RISPERIDONE 0.25 MG: 0.25 TABLET ORAL at 09:07

## 2019-07-05 RX ADMIN — RISPERIDONE 0.12 MG: 1 SOLUTION ORAL at 02:07

## 2019-07-05 RX ADMIN — IPRATROPIUM BROMIDE AND ALBUTEROL SULFATE 3 ML: .5; 3 SOLUTION RESPIRATORY (INHALATION) at 12:07

## 2019-07-05 RX ADMIN — IPRATROPIUM BROMIDE AND ALBUTEROL SULFATE 3 ML: .5; 3 SOLUTION RESPIRATORY (INHALATION) at 07:07

## 2019-07-05 RX ADMIN — CEFDINIR 300 MG: 125 POWDER, FOR SUSPENSION ORAL at 11:07

## 2019-07-05 RX ADMIN — POTASSIUM CHLORIDE 40 MEQ: 20 SOLUTION ORAL at 08:07

## 2019-07-05 RX ADMIN — MIRTAZAPINE 15 MG: 15 TABLET, FILM COATED ORAL at 10:07

## 2019-07-05 RX ADMIN — ENOXAPARIN SODIUM 40 MG: 100 INJECTION SUBCUTANEOUS at 05:07

## 2019-07-05 RX ADMIN — LEVETIRACETAM 500 MG: 100 SOLUTION ORAL at 09:07

## 2019-07-05 RX ADMIN — ASPIRIN 81 MG CHEWABLE TABLET 81 MG: 81 TABLET CHEWABLE at 08:07

## 2019-07-05 RX ADMIN — FERROUS SULFATE TAB EC 325 MG (65 MG FE EQUIVALENT) 325 MG: 325 (65 FE) TABLET DELAYED RESPONSE at 08:07

## 2019-07-05 RX ADMIN — ESCITALOPRAM OXALATE 5 MG: 5 TABLET, FILM COATED ORAL at 08:07

## 2019-07-05 NOTE — PT/OT/SLP PROGRESS
"Speech Language Pathology Treatment  Discharge    Patient Name:  Susanna Byrnes   MRN:  [id number]   527/527 A    Admitting Diagnosis: Sepsis    Recommendations:                 General Recommendations:  Follow-up not indicated  Diet recommendations:  Dental Soft, Liquid Diet Level: Thin   Aspiration Precautions:  · 1 small bite/sip at a time,   · Assistance with meals,   · Eliminate distractions,   · Frequent oral care,   · Meds whole 1 at a time,   · Continue to monitor for signs and symptoms of aspiration and discontinue oral feeding should you notice any of the following: watery eyes, reddened facial area, wet vocal quality, increased work of breathing, change in respiratory status, increased congestion, coughing, fever, etc.  General Precautions: Standard, aspiration, fall  Communication strategies:  none    Subjective     Patient seen with breakfast tray with daughter (patient lives with) present in room.   Daughter states, "She eats slow. She always has."    Objective:     Has the patient been evaluated by SLP for swallowing?   Yes  Keep patient NPO? No   Current Respiratory Status: room air      Patient seen to assess tolerance of diet, ensure compliance with safe swallowing precautions, and provide family education. Daughter assisted pt with partial denture placement. Patient assessed with breakfast tray (bites of eggs, grits, sausage and sips of coffee). Patient's daughter reports she helps patient with meals as needed 2/2 arthritis in arms. Patient feeding self upon SLP assessment. She presents with no overt s/s of aspiration or difficulties with dental soft tray. SLP provided extensive family education on SLP recommendations, SLP role, s/s and risks of aspiration, safe swallow precautions, importance of swallowing precautions, and d/c from ST services. All questions addressed. Patient's daughter verbalized understanding of all discussed.     Assessment:     Susanna Byrnes is a 84 y.o. female tolerating " current diet. Silent aspiration cannot be ruled out at the bedside, however, is not suspected at this time. No further skilled acute ST services warranted at this time. Please re-consult as needed.     Goals:   Multidisciplinary Problems     SLP Goals     Not on file          Multidisciplinary Problems (Resolved)        Problem: SLP Goal    Goal Priority Disciplines Outcome   SLP Goal   (Resolved)     SLP Outcome(s) achieved   Description:  Speech Language Pathology Goals  Goals expected to be met by 7/10/19  1. Pt will tolerate Level V mechanical soft diet with thin liquids w/o overt S/S aspiration, MIN A  2. Educate Pt and family on S/S aspiration and aspiration precautions                       Plan:     · Plan of Care reviewed with:  patient, daughter   · SLP Follow-Up:  No       Discharge recommendations:  other (see comments)(pending PT/OT recs)   Barriers to Discharge:  None per ST dsicipline    Time Tracking:     SLP Treatment Date:   07/05/19  Speech Start Time:  0853  Speech Stop Time:  0910     Speech Total Time (min):  17 min    Billable Minutes: Treatment Swallowing Dysfunction 8 and Seld Care/Home Management Training 9    RANJEET Bales, CCC-SLP   Pager: [phone]  07/05/2019

## 2019-07-05 NOTE — PROGRESS NOTES
[facility] Hospital Medicine  Progress Note    Patient Name: Susanna Byrnes  MRN: [id number]  Patient Class: IP- Inpatient   Admission Date: 7/2/2019  Length of Stay: 3 days  Attending Physician: Marley Moreira MD  Primary Care Provider: Ignacia River MD    [facility] Medicine Team: [facility]OSP MED 2 Hany Diaz MD    Subjective:     Principal Problem:Sepsis      HPI:  Ms. Byrnes is an 85 yo female with Dementia, HTN, Arthritis, Suspected COPD (pt unable to complete PFT 2/2 dementia), seizures, Previous NSTEMI (4/14) with reduced EF (25-30%) who presented to the ED after two days of fever at home and a fall while ambulating to the bathroom. Pt has severe dementia and is completely disoriented at baseline. History is provided by patient's daughter at bedside. Pt's daughter reports that her mother has had increased thirst, increased urinary frequency, and fever for the past 2 days with a Tmax at home of 100.4 that was improved with tylenol. Additonally, this evening pt was attempting to get into the shower and had a witness fall. Pt's daughter denies that the patient hit her head and states that she seems to have slid down her walker and landed on her bottom. Pt's daughter reports her mother did not appear to have any alteration in her mental status but did report feeling sore on her bottom. Pt's daughter also reports that Ms. Byrnes has had cough for the past 6 weeks; The cough initially started in Late may and wwas associated with dark green sputum production.  Pt's daughter reports that she was seen in an urgent care center in early June and given a three day prednisone course with resolution of sputum production but pt continues to have a cough. Pt's daughter notes that she had previously followed with Dr. Looney with Pulmonolgy at [facility], though no notes could be found in EMR or legacy document viewer.     In the ED, pt was noted to be hypertensive to 180/85 and febrile to 100.5  "but without leukocytosis. Her EKG demonstrated NSR with a troponin of 0.027 and a BNP of 63. Her UA demonstrated 44 WBC per HPF with occasional bacteria. Pelvic Xray without evidence of acute fracture, and CXR without consolidation or opacity concerning for pneumonia or pulmonary edema.     Overview/Hospital Course:  Since admission, pt has been afebrile and vitals have been stable on ceftriaxone. White count has stayed WNL. Pt resting comfortably in bed and reports "feeling better". Tolerating PO, diet changed to softs. Urine culture positive for GNR, abx changed to PO cefdinir 300 BID for 8 more days of abx. Follow-up xray of left hip negative for fracture    Some concern expressed by daughter regarding functional status and weakness affecting ADLs. Plan to go to SNF on 7/8    Interval History: Pt stable, tolerating PO, will work with PT/OT. Plan for SNF next week    Review of Systems   Constitutional: Negative for chills and fever.   HENT: Negative for congestion and sore throat.    Respiratory: Negative for cough, sputum production, shortness of breath and wheezing.    Cardiovascular: Negative for chest pain and palpitations.   Gastrointestinal: Negative for abdominal pain, nausea and vomiting.   Genitourinary: Negative for dysuria and frequency.   Musculoskeletal: Positive for falls. Negative for back pain and neck pain.   Neurological: Positive for weakness. Negative for dizziness and headaches. Seizures: does not remember seizure      Objective:     Vital Signs (Most Recent):  Temp: 98.8 °F (37.1 °C) (07/05/19 0811)  Pulse: 67 (07/05/19 1550)  Resp: 18 (07/05/19 1258)  BP: (!) 151/67 (07/05/19 1258)  SpO2: 100 % (07/05/19 1258) Vital Signs (24h Range):  Temp:  [97.3 °F (36.3 °C)-99.3 °F (37.4 °C)] 98.8 °F (37.1 °C)  Pulse:  [63-74] 67  Resp:  [16-20] 18  SpO2:  [92 %-100 %] 100 %  BP: (125-151)/(58-85) 151/67     Weight: 79.4 kg (175 lb)  Body mass index is 34.18 kg/m².    Intake/Output Summary (Last 24 " hours) at 7/5/2019 1615  Last data filed at 7/5/2019 1534  Gross per 24 hour   Intake 930 ml   Output --   Net 930 ml      Physical Exam   Constitutional: She appears well-nourished. No distress.   Eyes: Pupils are equal, round, and reactive to light. EOM are normal.   Cardiovascular: Normal rate and regular rhythm.   Pulmonary/Chest: Effort normal. She has wheezes (diffuse).   Abdominal: Soft. Bowel sounds are normal. She exhibits no distension. There is no tenderness. There is no rebound.   No CVA tenderness   Neurological: She is alert.   Oriented only to self, which appears to be baseline   Skin: She is not diaphoretic.       Significant Labs:   BMP:   Recent Labs   Lab 07/05/19  0426   GLU 87      K 3.4*      CO2 28   BUN 12   CREATININE 0.6   CALCIUM 8.5*     CBC:   Recent Labs   Lab 07/04/19  0357 07/05/19  0426   WBC 5.32 5.29   HGB 9.1* 9.2*   HCT 29.4* 30.7*    319       Significant Imaging: I have reviewed all pertinent imaging results/findings within the past 24 hours.      Assessment/Plan:      * Sepsis  Pt with Fever and documented RR >20 with laboratory evidence and physical exam consistent with urinary source of infection. Pt treated in ED with LR bolus and IV ceftriaxone.    -Ceftriaxone 1g IV q24hrs for 2 days--> cefdinir 300 mg PO for 8 days (10 days total coverage)  -Pt vitally stable and breathing comfortably  -Urine culture revealed GNRs, blood culture had 1/2 + for coag neg staph which was likely contaminant. Repeat x4 NGTD  -CXR without evidence of pneumonia    Acute cystitis without hematuria  As sepsis above      Mild malnutrition  Suspected based on Albumin 2.6 on admission  RD recommended softs      Chronic combined systolic and diastolic CHF (congestive heart failure)  Pt's daughter notes patient has not been seen in cardiology clinic for years; pt may benefit from ambulatory referral to cardiology prior to discharge       Dementia without behavioral  disturbance  Follows with Dr. Benita Melchor with [facility], last seen 6/24.  Pt recently started on Rivastigmine for dementia, will continue along with her Risperidone, Lexapro and Remeron  Chronic cough possibly concerning for silent aspiration; will consult SLP  -Per pt daughter, normally receives Risperdol 0.25 mg nightly and 0.125mg in the mornings; added this to current treatment as pt has been mildly tremulous      Debility  Pt/OT consulted    Chronic anemia  Pt with chronic microcytic anemia  Iron 14, Sat 5, TIBC 263, ferritin 57  -no iron on home meds, added PO ferrous sulfate.    Seizure disorder  Continue leviteracitam       Essential hypertension  Continue home amlodipine and Chlorthalidone         VTE Risk Mitigation (From admission, onward)        Ordered     enoxaparin injection 40 mg  Daily      07/02/19 2111     IP VTE HIGH RISK PATIENT  Once      07/02/19 2111                Hany Diaz MD  Department of Hospital Medicine   [facility]

## 2019-07-05 NOTE — PLAN OF CARE
Problem: Adult Inpatient Plan of Care  Goal: Plan of Care Review  Outcome: Ongoing (interventions implemented as appropriate)  Plan of care reviewed and updated. Pt AA+O to self. Pt's pain is managed with the medication ordered at this time. Pt's VS are as charted.  No falls this shift. Pt is oriented to room and call system. Will continue to [facility].

## 2019-07-05 NOTE — PLAN OF CARE
[facility]     Department of Hospital Medicine     [address] [place] [address]     [phone] after hours  [phone] fax       NURSING HOME ORDERS    07/05/2019    Admit to Nursing Home:   Skilled Bed                                             Diagnoses:  Active Hospital Problems    Diagnosis  POA    *Sepsis [A41.9]  Yes    Mild malnutrition [E44.1]  Yes    Acute cystitis without hematuria [N30.00]  Yes    Chronic combined systolic and diastolic CHF (congestive heart failure) [I50.42]  Yes    Dementia without behavioral disturbance [F03.90]  Yes    Debility [R53.81]  Yes    Seizure disorder [G40.909]  Yes    Chronic anemia [D64.9]  Yes    Essential hypertension [I10]  Yes      Resolved Hospital Problems   No resolved problems to display.       Patient is homebound due to:  Sepsis    Allergies:  Review of patient's allergies indicates:   Allergen Reactions    Lisinopril Other (See Comments)     ANGIOEDEMA    Haldol [haloperidol lactate]        Vitals: Per facility protocol   Diet: Dysphagia soft     Acitivities:     - Up in a chair each morning as tolerated   - Ambulate with assistance to bathroom   - Scheduled walks once each shift (every 8 hours)   - May ambulate independently   - May use walker, cane, or self-propelled wheelchair   - Weight bearing: yes    LABS:  Per facility protocol    Nursing Precautions:     - Aspiration precautions:             - Total assistance with meals            -  Upright 90 degrees befor during and after meals             -  Suction at bedside          - Fall precautions per nursing home protocol     - Decubitus precautions:        -  for positioning   - Pressure reducing foam mattress   - Turn patient every two hours. Use wedge pillows to anchor patient    CONSULTS:     Physical Therapy to evaluate and treat     Occupational Therapy to evaluate and treat                            Medications: Discontinue  all previous medication orders, if any. See new list below.     Susanna Byrnes   Home Medication Instructions NATHAN:[id number]    Printed on:07/05/19 5416   Medication Information                      acetaminophen (TYLENOL ARTHRITIS PAIN) 650 MG TbSR  Take 1,300 mg by mouth daily as needed (pain).             albuterol 90 mcg/actuation inhaler  Inhale 1-2 puffs into the lungs every 6 (six) hours as needed for Wheezing. Rescue             albuterol-ipratropium (DUO-NEB) 2.5 mg-0.5 mg/3 mL nebulizer solution  Take 3 mLs by nebulization every 6 (six) hours as needed (allergies, congestion). Rescue             amLODIPine (NORVASC) 5 MG tablet  Take 1 tablet (5 mg total) by mouth once daily.             aspirin 81 MG Chew  Take 1 tablet (81 mg total) by mouth once daily.             cefdinir (OMNICEF) 125 mg/5 mL suspension  Take 12 mLs (300 mg total) by mouth every 12 (twelve) hours. for 7 days             cetirizine (ZYRTEC) 10 MG tablet  Take 10 mg by mouth once daily.             chlorthalidone (HYGROTEN) 25 MG Tab  Take 12.5 mg by mouth once daily.             escitalopram oxalate (LEXAPRO) 5 MG Tab  Take 5 mg by mouth once daily.             fluticasone (FLONASE) 50 mcg/actuation nasal spray  Spray 2 puffs into each nare daily as needed for allergies             guaiFENesin (MUCINEX) 600 mg 12 hr tablet  Take 600 mg by mouth daily as needed (allergies).             levetiracetam oral soln 500 mg/5 mL (5 mL) Soln  Take 5 mLs (500 mg total) by mouth 3 (three) times daily.             lorazepam (ATIVAN) 1 MG tablet  every 6 (six) hours as needed for Anxiety. Take 1/2 to 1 tablet by mouth as needed for anxiety             mirtazapine (REMERON) 15 MG tablet  Take 1 tablet (15 mg total) by mouth nightly as needed (insomnia).             montelukast (SINGULAIR) 10 mg tablet  TAKE 1 TABLET BY MOUTH EVERY EVENING             NON FORMULARY MEDICATION  Hospital Heavy Cream Apply topically daily to folds of skin as needed  (buttocks and under breasts)             risperiDONE (RISPERDAL) 0.25 MG Tab  Take 0.25 mg by mouth once daily. At bedtime             triamcinolone acetonide 0.1% (KENALOG) 0.1 % cream  Apply topically to affected area as needed for skin rash                       _________________________________  Ameya Talavera MD  07/05/2019

## 2019-07-05 NOTE — PHYSICIAN QUERY
PT Name: Susanna Byrnes  MR #: [id number]    Physician Query Form - Cause and Effect Relationship Clarification      CDS/: Gabino ZAPATA, RN-RUSSELL [email or web address]    This form is a permanent document in the medical record.     Query Date: July 5, 2019    By submitting this query, we are merely seeking further clarification of documentation. Please utilize your independent clinical judgment when addressing the question(s) below.    The Medical record contains the following:  Supporting Clinical Findings   Location in record    * Sepsis  Pt with Fever and documented RR >20 with laboratory evidence and physical exam consistent with urinary source of infection. Pt treated in ED with LR bolus and IV ceftriaxone.  -continue antibiotics with Ceftriaxone 1g IV q24hrs  -Pt vitally stable and breathing comfortably at 16 breaths per minutes on RA during my examination   -Urine cultures and blood cultures pending  -CXR without evidence of pneumonia        Respiration  25, Temp 100.5      Urine analysis: nitrites positive, 3+ leukocytes, WBC 44                                                                                                                                                                                   H&P Dr. Huertas(resident)/Dr. Moreira 7/2/2019                               VS 7/2/2019      Urine analysis 7/2/2019                                                                  ESCHERICHIA COLI   >100,000 cfu/ml                                                                                                                      Urine culture 7/2/2019     Provider, please clarify if there is any correlation between sepsis and EBSL E. Coli.           Are the conditions:    [ x ] Due to or associated with each other   [  ] Unrelated to each other   [  ] Other (Please Specify): _________________________   [  ] Clinically Undetermined

## 2019-07-05 NOTE — PLAN OF CARE
07/05/19 1304   Discharge Reassessment   Assessment Type Discharge Planning Reassessment   Provided patient/caregiver education on the expected discharge date and the discharge plan Yes   Do you have any problems affording any of your prescribed medications? No   Discharge Plan A Skilled Nursing Facility   Discharge Plan B Home Health   DME Needed Upon Discharge  none   Anticipated Discharge Disposition SNF   Can the patient answer the patient profile reliably? No, cognitively impaired   How does the patient rate their overall health at the present time? Good   Describe the patient's ability to walk at the present time. Walks with the help of equipment   How often would a person be available to care for the patient? Often   Number of comorbid conditions (as recorded on the chart) Five or more   During the past month, has the patient often been bothered by feeling down, depressed or hopeless? No   During the past month, has the patient often been bothered by little interest or pleasure in doing things? No   Post-Acute Status   Post-Acute Authorization Placement   Post-Acute Placement Status Pending Post-Acute Clinical Review

## 2019-07-05 NOTE — PLAN OF CARE
Problem: Occupational Therapy Goal  Goal: Occupational Therapy Goal  Goals to be met by: 7/20/2019     Patient will increase functional independence with ADLs by performing:    UE Dressing with Minimal Assistance.  LE Dressing with Maximum Assistance.  Grooming while seated with Minimal Assistance.  Toileting from bedside commode with Maximum Assistance for hygiene and clothing management.   Toilet transfer to bedside commode with Moderate Assistance.     Outcome: Ongoing (interventions implemented as appropriate)  Pt continues to participate well with therapy.    Comments: Cont OT POC

## 2019-07-05 NOTE — SUBJECTIVE & OBJECTIVE
Interval History: Pt stable, tolerating PO, will work with PT/OT. Plan for SNF next week    Review of Systems   Constitutional: Negative for chills and fever.   HENT: Negative for congestion and sore throat.    Respiratory: Negative for cough, sputum production, shortness of breath and wheezing.    Cardiovascular: Negative for chest pain and palpitations.   Gastrointestinal: Negative for abdominal pain, nausea and vomiting.   Genitourinary: Negative for dysuria and frequency.   Musculoskeletal: Positive for falls. Negative for back pain and neck pain.   Neurological: Positive for weakness. Negative for dizziness and headaches. Seizures: does not remember seizure      Objective:     Vital Signs (Most Recent):  Temp: 98.8 °F (37.1 °C) (07/05/19 0811)  Pulse: 67 (07/05/19 1550)  Resp: 18 (07/05/19 1258)  BP: (!) 151/67 (07/05/19 1258)  SpO2: 100 % (07/05/19 1258) Vital Signs (24h Range):  Temp:  [97.3 °F (36.3 °C)-99.3 °F (37.4 °C)] 98.8 °F (37.1 °C)  Pulse:  [63-74] 67  Resp:  [16-20] 18  SpO2:  [92 %-100 %] 100 %  BP: (125-151)/(58-85) 151/67     Weight: 79.4 kg (175 lb)  Body mass index is 34.18 kg/m².    Intake/Output Summary (Last 24 hours) at 7/5/2019 1615  Last data filed at 7/5/2019 1534  Gross per 24 hour   Intake 930 ml   Output --   Net 930 ml      Physical Exam   Constitutional: She appears well-nourished. No distress.   Eyes: Pupils are equal, round, and reactive to light. EOM are normal.   Cardiovascular: Normal rate and regular rhythm.   Pulmonary/Chest: Effort normal. She has wheezes (diffuse).   Abdominal: Soft. Bowel sounds are normal. She exhibits no distension. There is no tenderness. There is no rebound.   No CVA tenderness   Neurological: She is alert.   Oriented only to self, which appears to be baseline   Skin: She is not diaphoretic.       Significant Labs:   BMP:   Recent Labs   Lab 07/05/19  0426   GLU 87      K 3.4*      CO2 28   BUN 12   CREATININE 0.6   CALCIUM 8.5*     CBC:    Recent Labs   Lab 07/04/19  0357 07/05/19  0426   WBC 5.32 5.29   HGB 9.1* 9.2*   HCT 29.4* 30.7*    319       Significant Imaging: I have reviewed all pertinent imaging results/findings within the past 24 hours.

## 2019-07-05 NOTE — PT/OT/SLP PROGRESS
Occupational Therapy   Treatment    Name: Susanna Byrnes  MRN: [id number]  Admitting Diagnosis:  Sepsis       Recommendations:     Discharge Recommendations: nursing facility, skilled  Discharge Equipment Recommendations:  none  Barriers to discharge:  None    Assessment:     Susanna Byrnes is a 84 y.o. female with a medical diagnosis of Sepsis.  She presents with the following performance deficits affecting function:  weakness, impaired endurance, impaired self care skills, impaired balance, gait instability, impaired functional mobilty, impaired cognition, decreased lower extremity function, decreased upper extremity function, decreased ROM, impaired cardiopulmonary response to activity.     Rehab Prognosis:  Good; patient would benefit from acute skilled OT services to address these deficits and reach maximum level of function.       Plan:     Patient to be seen 3 x/week to address the above listed problems via self-care/home management, therapeutic activities, therapeutic exercises  · Plan of Care Expires: 08/03/19  · Plan of Care Reviewed with: patient    Subjective     Pain/Comfort:  · Pain Rating 1: 0/10  · Pain Rating Post-Intervention 1: 0/10    Objective:     Communicated with: RN prior to session.  Patient found up in chair with telemetry, peripheral IV upon OT entry to room.    General Precautions: Standard, aspiration, fall   Orthopedic Precautions:N/A   Braces: N/A     Occupational Performance:     Bed Mobility:    · Patient completed Scooting/Bridging with moderate assistance  · Patient completed Supine to Sit with maximal assistance     Functional Mobility/Transfers:  · Patient completed Sit <> Stand Transfer with maximal assistance  with  rolling walker   · Patient completed Bed <> Chair Transfer using Step Transfer technique with moderate assistance with rolling walker  · Functional Mobility: Pt ambulated 10 small, quick steps to bedside chair w/ RW and Mod A. No signs of LOB or SOB noted.      Activities of Daily Living:  · Grooming: moderate cueing required for patient to complete entire task of washing her face while sitting EOB. Pt had no LOB while seated performing ADLs.       [facility] 6 Click ADL: 9    Treatment & Education:  - OT POC  - Importance of OOB activity   - Safety w/ functional mobility; hand placement to ensure safe transfers  - cueing to continue/complete entire grooming task    Patient left up in chair with all lines intact, call button in reach and RN notifiedEducation:      GOALS:   Multidisciplinary Problems     Occupational Therapy Goals        Problem: Occupational Therapy Goal    Goal Priority Disciplines Outcome Interventions   Occupational Therapy Goal     OT, PT/OT Ongoing (interventions implemented as appropriate)    Description:  Goals to be met by: 7/20/2019     Patient will increase functional independence with ADLs by performing:    UE Dressing with Minimal Assistance.  LE Dressing with Maximum Assistance.  Grooming while seated with Minimal Assistance.  Toileting from bedside commode with Maximum Assistance for hygiene and clothing management.   Toilet transfer to bedside commode with Moderate Assistance.                      Time Tracking:     OT Date of Treatment: 07/05/19  OT Start Time: 1120  OT Stop Time: 1135  OT Total Time (min): 15 min    Billable Minutes:Self Care/Home Management 15    Uma Felder OT  7/5/2019

## 2019-07-05 NOTE — PLAN OF CARE
Problem: Physical Therapy Goal  Goal: Physical Therapy Goal  Goals to be met by: 8/3/19    Patient will increase functional independence with mobility by performin. Supine to sit with MInimal Assistance  2. Sit to stand transfer with Minimal Assistance  3. Gait  x 10 feet with Minimal Assistance using Rolling Walker  4. Lower extremity exercise program x10 reps per handout, with assistance as needed     Outcome: Ongoing (interventions implemented as appropriate)    Cont POC.

## 2019-07-05 NOTE — PHARMACY MED REC
"Admission Medication Reconciliation - Pharmacy Consult Note    The home medication history was taken by Mackenzie Byrd, Pharmacy Technician.  Based on information gathered and subsequent review by the clinical pharmacist, the items below may need attention.     You may go to "Admission" then "Reconcile Home Medications" tabs to review and/or act upon these items.     Potentially problematic discrepancies with current MAR  o Patient IS NOT taking the following which was ordered upon admit  o Patient's family reports not taking rivastigmine 1.5 mg oral twice daily, however #60 were filled on 06/24/2019   o Ferrous sulfate 325 mg oral daily  o Patient is taking a drug DIFFERENTLY than how ordered upon admit  o Patient's family reports taking mirtazapine 15 mg oral QHS; mirtazapine 15 mg oral QHS as needed for insomnia ordered.  o Patient's family reports taking risperidone 0.25 mg oral QHS; risperidone 0.125 mg oral in AM and 0.25 oral QHS ordered.    Please address this information as you see fit.  Feel free to contact us if you have any questions or require assistance.    Thank you,   Dk Moulton, PharmD  Emergency Medicine Clinical Pharmacist  [phone] (2pm-midnight daily)      .    .            "

## 2019-07-05 NOTE — PT/OT/SLP PROGRESS
Physical Therapy Treatment    Patient Name:  Susanna Byrnes   MRN:  [id number]    Recommendations:     Discharge Recommendations:  nursing facility, skilled   Discharge Equipment Recommendations: none   Barriers to discharge: requires increased assistance for functional mobility     Assessment:     Susanna Byrnes is a 84 y.o. female admitted with a medical diagnosis of Sepsis.  She presents with the following impairments/functional limitations:  weakness, impaired endurance, impaired functional mobilty, gait instability, impaired balance, decreased lower extremity function, impaired cognition, impaired cardiopulmonary response to activity. Patient tolerated PT session fairly well today. She had increased participation in PT session. She was mod A for bed mobility, max A to stand, and mod A to take ~10 steps with RW from bed to bedside chair. She would continue to benefit from PT in order to get back to OF.     Rehab Prognosis: Fair; patient would benefit from acute skilled PT services to address these deficits and reach maximum level of function.    Recent Surgery: * No surgery found *      Plan:     During this hospitalization, patient to be seen 3 x/week to address the identified rehab impairments via gait training, therapeutic activities, therapeutic exercises and progress toward the following goals:    · Plan of Care Expires:  08/03/19    Subjective     Chief Complaint: No complaint. Patient agreeable to participate in PT session.   Patient/Family Comments/goals: To walk.   Pain/Comfort:  · Pain Rating 1: 0/10      Objective:     Communicated with RN prior to session.  Patient found supine with telemetry, peripheral IV upon PT entry to room.     General Precautions: Standard, aspiration, fall   Orthopedic Precautions:N/A   Braces: N/A     Functional Mobility:  · Bed Mobility:     · Rolling Right: moderate assistance  · Supine to Sit: moderate assistance  · Transfers:     · Sit to Stand:  maximal assistance with  rolling walker  · Gait: Patient ambulated ~10 steps with RW and mod A. She demonstrated decreased stength length. She was limited in ambulation distance due to fatigue.       AM-PAC 6 CLICK MOBILITY  Turning over in bed (including adjusting bedclothes, sheets and blankets)?: 2  Sitting down on and standing up from a chair with arms (e.g., wheelchair, bedside commode, etc.): 2  Moving from lying on back to sitting on the side of the bed?: 2  Moving to and from a bed to a chair (including a wheelchair)?: 2  Need to walk in hospital room?: 2  Climbing 3-5 steps with a railing?: 1  Basic Mobility Total Score: 11       Therapeutic Activities and Exercises:   Patient educated in:  -PT role and POC  -safety with transfers including hand placement  -gait sequencing and RW management  -OOB activity to maximize recovery       Patient left up in chair with all lines intact, call button in reach and RN notified.    GOALS:   Multidisciplinary Problems     Physical Therapy Goals        Problem: Physical Therapy Goal    Goal Priority Disciplines Outcome Goal Variances Interventions   Physical Therapy Goal     PT, PT/OT Ongoing (interventions implemented as appropriate)     Description:  Goals to be met by: 8/3/19    Patient will increase functional independence with mobility by performin. Supine to sit with MInimal Assistance  2. Sit to stand transfer with Minimal Assistance  3. Gait  x 10 feet with Minimal Assistance using Rolling Walker  4. Lower extremity exercise program x10 reps per handout, with assistance as needed                      Time Tracking:     PT Received On: 19  PT Start Time: 1121     PT Stop Time: 1132  PT Total Time (min): 11 min     Billable Minutes: Gait Training 11    Treatment Type: Treatment  PT/PTA: PT     PTA Visit Number: 0     Jyoti Perez, PT  2019

## 2019-07-05 NOTE — PLAN OF CARE
Problem: SLP Goal  Goal: SLP Goal  Speech Language Pathology Goals  Goals expected to be met by 7/10/19  1. Pt will tolerate Level V mechanical soft diet with thin liquids w/o overt S/S aspiration, MIN A  2. Educate Pt and family on S/S aspiration and aspiration precautions      Outcome: Outcome(s) achieved Date Met: 07/05/19  Patient tolerating current diet, aspiration precautions in place, and family independent with safe swallowing precautions. No further skilled acute ST services warranted at this time. Please re-consult as needed.   RICHA Aviles., CCC-SLP  Pager: [phone]  07/05/2019

## 2019-07-06 LAB
ANION GAP SERPL CALC-SCNC: 9 MMOL/L (ref 8–16)
ANISOCYTOSIS BLD QL SMEAR: SLIGHT
BASOPHILS # BLD AUTO: 0.03 K/UL (ref 0–0.2)
BASOPHILS NFR BLD: 0.5 % (ref 0–1.9)
BUN SERPL-MCNC: 16 MG/DL (ref 8–23)
CALCIUM SERPL-MCNC: 8.5 MG/DL (ref 8.7–10.5)
CHLORIDE SERPL-SCNC: 104 MMOL/L (ref 95–110)
CO2 SERPL-SCNC: 25 MMOL/L (ref 23–29)
CREAT SERPL-MCNC: 0.6 MG/DL (ref 0.5–1.4)
DIFFERENTIAL METHOD: ABNORMAL
EOSINOPHIL # BLD AUTO: 0.5 K/UL (ref 0–0.5)
EOSINOPHIL NFR BLD: 8.3 % (ref 0–8)
ERYTHROCYTE [DISTWIDTH] IN BLOOD BY AUTOMATED COUNT: 17.6 % (ref 11.5–14.5)
EST. GFR  (AFRICAN AMERICAN): >60 ML/MIN/1.73 M^2
EST. GFR  (NON AFRICAN AMERICAN): >60 ML/MIN/1.73 M^2
GLUCOSE SERPL-MCNC: 77 MG/DL (ref 70–110)
HCT VFR BLD AUTO: 30.6 % (ref 37–48.5)
HGB BLD-MCNC: 9.1 G/DL (ref 12–16)
HYPOCHROMIA BLD QL SMEAR: ABNORMAL
IMM GRANULOCYTES # BLD AUTO: 0.02 K/UL (ref 0–0.04)
IMM GRANULOCYTES NFR BLD AUTO: 0.3 % (ref 0–0.5)
LYMPHOCYTES # BLD AUTO: 3 K/UL (ref 1–4.8)
LYMPHOCYTES NFR BLD: 51.2 % (ref 18–48)
MCH RBC QN AUTO: 21.9 PG (ref 27–31)
MCHC RBC AUTO-ENTMCNC: 29.7 G/DL (ref 32–36)
MCV RBC AUTO: 74 FL (ref 82–98)
MONOCYTES # BLD AUTO: 0.7 K/UL (ref 0.3–1)
MONOCYTES NFR BLD: 11.6 % (ref 4–15)
NEUTROPHILS # BLD AUTO: 1.6 K/UL (ref 1.8–7.7)
NEUTROPHILS NFR BLD: 28.1 % (ref 38–73)
NRBC BLD-RTO: 0 /100 WBC
OVALOCYTES BLD QL SMEAR: ABNORMAL
PLATELET # BLD AUTO: 312 K/UL (ref 150–350)
PLATELET BLD QL SMEAR: ABNORMAL
PMV BLD AUTO: 9.8 FL (ref 9.2–12.9)
POIKILOCYTOSIS BLD QL SMEAR: SLIGHT
POLYCHROMASIA BLD QL SMEAR: ABNORMAL
POTASSIUM SERPL-SCNC: 4.1 MMOL/L (ref 3.5–5.1)
RBC # BLD AUTO: 4.15 M/UL (ref 4–5.4)
SODIUM SERPL-SCNC: 138 MMOL/L (ref 136–145)
TARGETS BLD QL SMEAR: ABNORMAL
WBC # BLD AUTO: 5.8 K/UL (ref 3.9–12.7)

## 2019-07-06 PROCEDURE — 80048 BASIC METABOLIC PNL TOTAL CA: CPT

## 2019-07-06 PROCEDURE — 99232 PR SUBSEQUENT HOSPITAL CARE,LEVL II: ICD-10-PCS | Mod: GC,,, | Performed by: HOSPITALIST

## 2019-07-06 PROCEDURE — 25000003 PHARM REV CODE 250: Performed by: STUDENT IN AN ORGANIZED HEALTH CARE EDUCATION/TRAINING PROGRAM

## 2019-07-06 PROCEDURE — 94640 AIRWAY INHALATION TREATMENT: CPT

## 2019-07-06 PROCEDURE — 25000242 PHARM REV CODE 250 ALT 637 W/ HCPCS: Performed by: STUDENT IN AN ORGANIZED HEALTH CARE EDUCATION/TRAINING PROGRAM

## 2019-07-06 PROCEDURE — 99232 SBSQ HOSP IP/OBS MODERATE 35: CPT | Mod: GC,,, | Performed by: HOSPITALIST

## 2019-07-06 PROCEDURE — 12000002 HC ACUTE/MED SURGE SEMI-PRIVATE ROOM

## 2019-07-06 PROCEDURE — 85025 COMPLETE CBC W/AUTO DIFF WBC: CPT

## 2019-07-06 PROCEDURE — 94761 N-INVAS EAR/PLS OXIMETRY MLT: CPT

## 2019-07-06 PROCEDURE — 63600175 PHARM REV CODE 636 W HCPCS: Performed by: STUDENT IN AN ORGANIZED HEALTH CARE EDUCATION/TRAINING PROGRAM

## 2019-07-06 PROCEDURE — 36415 COLL VENOUS BLD VENIPUNCTURE: CPT

## 2019-07-06 RX ORDER — DOXYCYCLINE HYCLATE 100 MG
100 TABLET ORAL EVERY 12 HOURS
Status: DISCONTINUED | OUTPATIENT
Start: 2019-07-06 | End: 2019-07-08 | Stop reason: HOSPADM

## 2019-07-06 RX ADMIN — CEFDINIR 300 MG: 125 POWDER, FOR SUSPENSION ORAL at 09:07

## 2019-07-06 RX ADMIN — CETIRIZINE HYDROCHLORIDE 10 MG: 5 TABLET ORAL at 09:07

## 2019-07-06 RX ADMIN — LEVETIRACETAM 500 MG: 100 SOLUTION ORAL at 09:07

## 2019-07-06 RX ADMIN — RISPERIDONE 0.25 MG: 0.25 TABLET ORAL at 09:07

## 2019-07-06 RX ADMIN — RISPERIDONE 0.12 MG: 1 SOLUTION ORAL at 09:07

## 2019-07-06 RX ADMIN — ENOXAPARIN SODIUM 40 MG: 100 INJECTION SUBCUTANEOUS at 05:07

## 2019-07-06 RX ADMIN — AMLODIPINE BESYLATE 5 MG: 10 TABLET ORAL at 09:07

## 2019-07-06 RX ADMIN — FERROUS SULFATE TAB EC 325 MG (65 MG FE EQUIVALENT) 325 MG: 325 (65 FE) TABLET DELAYED RESPONSE at 09:07

## 2019-07-06 RX ADMIN — DOXYCYCLINE HYCLATE 100 MG: 100 TABLET, COATED ORAL at 09:07

## 2019-07-06 RX ADMIN — IPRATROPIUM BROMIDE AND ALBUTEROL SULFATE 3 ML: .5; 3 SOLUTION RESPIRATORY (INHALATION) at 01:07

## 2019-07-06 RX ADMIN — MONTELUKAST SODIUM 10 MG: 10 TABLET, COATED ORAL at 09:07

## 2019-07-06 RX ADMIN — CHLORTHALIDONE 12.5 MG: 25 TABLET ORAL at 09:07

## 2019-07-06 RX ADMIN — MIRTAZAPINE 15 MG: 15 TABLET, FILM COATED ORAL at 10:07

## 2019-07-06 RX ADMIN — ASPIRIN 81 MG CHEWABLE TABLET 81 MG: 81 TABLET CHEWABLE at 09:07

## 2019-07-06 RX ADMIN — RIVASTIGMINE TARTRATE 1.5 MG: 1.5 CAPSULE ORAL at 09:07

## 2019-07-06 RX ADMIN — ESCITALOPRAM OXALATE 5 MG: 5 TABLET, FILM COATED ORAL at 09:07

## 2019-07-06 RX ADMIN — IPRATROPIUM BROMIDE AND ALBUTEROL SULFATE 3 ML: .5; 3 SOLUTION RESPIRATORY (INHALATION) at 07:07

## 2019-07-06 RX ADMIN — LEVETIRACETAM 500 MG: 100 SOLUTION ORAL at 03:07

## 2019-07-06 NOTE — PLAN OF CARE
Problem: Adult Inpatient Plan of Care  Goal: Plan of Care Review  Outcome: Ongoing (interventions implemented as appropriate)  Pt in bed feeding self dinner, oriented to self, no complaints, VS as charted, no falls noted, will continue to monitor.

## 2019-07-06 NOTE — PLAN OF CARE
Problem: Adult Inpatient Plan of Care  Goal: Plan of Care Review  Outcome: Outcome(s) achieved Date Met: 07/06/19  Pt AAOx4, VSS, No falls noted, fall precautions remain. Pain assessed, pain controlled with PRN regimen, pt comfortable, frequent rounds made for safety and patient care. Call light within reach, bed wheels locked, bed in lowest position, side rails ^x2, safety maintained. NADN, Will continue monitor.

## 2019-07-07 PROBLEM — N10 ACUTE PYELONEPHRITIS: Status: ACTIVE | Noted: 2019-07-07

## 2019-07-07 PROBLEM — A41.9 SEPSIS: Status: RESOLVED | Noted: 2019-07-02 | Resolved: 2019-07-07

## 2019-07-07 LAB
ANION GAP SERPL CALC-SCNC: 8 MMOL/L (ref 8–16)
BACTERIA BLD CULT: ABNORMAL
BASOPHILS # BLD AUTO: 0.03 K/UL (ref 0–0.2)
BASOPHILS NFR BLD: 0.4 % (ref 0–1.9)
BUN SERPL-MCNC: 11 MG/DL (ref 8–23)
CALCIUM SERPL-MCNC: 8.8 MG/DL (ref 8.7–10.5)
CHLORIDE SERPL-SCNC: 106 MMOL/L (ref 95–110)
CO2 SERPL-SCNC: 26 MMOL/L (ref 23–29)
CREAT SERPL-MCNC: 0.5 MG/DL (ref 0.5–1.4)
DIFFERENTIAL METHOD: ABNORMAL
EOSINOPHIL # BLD AUTO: 0.6 K/UL (ref 0–0.5)
EOSINOPHIL NFR BLD: 8.2 % (ref 0–8)
ERYTHROCYTE [DISTWIDTH] IN BLOOD BY AUTOMATED COUNT: 17.6 % (ref 11.5–14.5)
EST. GFR  (AFRICAN AMERICAN): >60 ML/MIN/1.73 M^2
EST. GFR  (NON AFRICAN AMERICAN): >60 ML/MIN/1.73 M^2
GLUCOSE SERPL-MCNC: 82 MG/DL (ref 70–110)
HCT VFR BLD AUTO: 30.8 % (ref 37–48.5)
HGB BLD-MCNC: 9.2 G/DL (ref 12–16)
IMM GRANULOCYTES # BLD AUTO: 0.03 K/UL (ref 0–0.04)
IMM GRANULOCYTES NFR BLD AUTO: 0.4 % (ref 0–0.5)
LYMPHOCYTES # BLD AUTO: 3.1 K/UL (ref 1–4.8)
LYMPHOCYTES NFR BLD: 41 % (ref 18–48)
MCH RBC QN AUTO: 21.9 PG (ref 27–31)
MCHC RBC AUTO-ENTMCNC: 29.9 G/DL (ref 32–36)
MCV RBC AUTO: 73 FL (ref 82–98)
MONOCYTES # BLD AUTO: 0.7 K/UL (ref 0.3–1)
MONOCYTES NFR BLD: 9.5 % (ref 4–15)
NEUTROPHILS # BLD AUTO: 3 K/UL (ref 1.8–7.7)
NEUTROPHILS NFR BLD: 40.5 % (ref 38–73)
NRBC BLD-RTO: 0 /100 WBC
PLATELET # BLD AUTO: 333 K/UL (ref 150–350)
PMV BLD AUTO: 9.6 FL (ref 9.2–12.9)
POTASSIUM SERPL-SCNC: 3.8 MMOL/L (ref 3.5–5.1)
RBC # BLD AUTO: 4.21 M/UL (ref 4–5.4)
SODIUM SERPL-SCNC: 140 MMOL/L (ref 136–145)
WBC # BLD AUTO: 7.48 K/UL (ref 3.9–12.7)

## 2019-07-07 PROCEDURE — 25000242 PHARM REV CODE 250 ALT 637 W/ HCPCS: Performed by: STUDENT IN AN ORGANIZED HEALTH CARE EDUCATION/TRAINING PROGRAM

## 2019-07-07 PROCEDURE — 80048 BASIC METABOLIC PNL TOTAL CA: CPT

## 2019-07-07 PROCEDURE — 85025 COMPLETE CBC W/AUTO DIFF WBC: CPT

## 2019-07-07 PROCEDURE — 99232 PR SUBSEQUENT HOSPITAL CARE,LEVL II: ICD-10-PCS | Mod: ,,, | Performed by: HOSPITALIST

## 2019-07-07 PROCEDURE — 25000003 PHARM REV CODE 250: Performed by: STUDENT IN AN ORGANIZED HEALTH CARE EDUCATION/TRAINING PROGRAM

## 2019-07-07 PROCEDURE — 12000002 HC ACUTE/MED SURGE SEMI-PRIVATE ROOM

## 2019-07-07 PROCEDURE — 94761 N-INVAS EAR/PLS OXIMETRY MLT: CPT

## 2019-07-07 PROCEDURE — 36415 COLL VENOUS BLD VENIPUNCTURE: CPT

## 2019-07-07 PROCEDURE — 99232 SBSQ HOSP IP/OBS MODERATE 35: CPT | Mod: ,,, | Performed by: HOSPITALIST

## 2019-07-07 PROCEDURE — 94640 AIRWAY INHALATION TREATMENT: CPT

## 2019-07-07 PROCEDURE — 63600175 PHARM REV CODE 636 W HCPCS: Performed by: STUDENT IN AN ORGANIZED HEALTH CARE EDUCATION/TRAINING PROGRAM

## 2019-07-07 RX ADMIN — IPRATROPIUM BROMIDE AND ALBUTEROL SULFATE 3 ML: .5; 3 SOLUTION RESPIRATORY (INHALATION) at 07:07

## 2019-07-07 RX ADMIN — FERROUS SULFATE TAB EC 325 MG (65 MG FE EQUIVALENT) 325 MG: 325 (65 FE) TABLET DELAYED RESPONSE at 08:07

## 2019-07-07 RX ADMIN — LEVETIRACETAM 500 MG: 100 SOLUTION ORAL at 02:07

## 2019-07-07 RX ADMIN — CHLORTHALIDONE 12.5 MG: 25 TABLET ORAL at 08:07

## 2019-07-07 RX ADMIN — ESCITALOPRAM OXALATE 5 MG: 5 TABLET, FILM COATED ORAL at 08:07

## 2019-07-07 RX ADMIN — CETIRIZINE HYDROCHLORIDE 10 MG: 5 TABLET ORAL at 08:07

## 2019-07-07 RX ADMIN — LEVETIRACETAM 500 MG: 100 SOLUTION ORAL at 09:07

## 2019-07-07 RX ADMIN — DOXYCYCLINE HYCLATE 100 MG: 100 TABLET, COATED ORAL at 08:07

## 2019-07-07 RX ADMIN — RISPERIDONE 0.12 MG: 1 SOLUTION ORAL at 08:07

## 2019-07-07 RX ADMIN — LEVETIRACETAM 500 MG: 100 SOLUTION ORAL at 08:07

## 2019-07-07 RX ADMIN — ENOXAPARIN SODIUM 40 MG: 100 INJECTION SUBCUTANEOUS at 04:07

## 2019-07-07 RX ADMIN — IPRATROPIUM BROMIDE AND ALBUTEROL SULFATE 3 ML: .5; 3 SOLUTION RESPIRATORY (INHALATION) at 01:07

## 2019-07-07 RX ADMIN — RIVASTIGMINE TARTRATE 1.5 MG: 1.5 CAPSULE ORAL at 08:07

## 2019-07-07 RX ADMIN — MIRTAZAPINE 15 MG: 15 TABLET, FILM COATED ORAL at 09:07

## 2019-07-07 RX ADMIN — DOXYCYCLINE HYCLATE 100 MG: 100 TABLET, COATED ORAL at 09:07

## 2019-07-07 RX ADMIN — ASPIRIN 81 MG CHEWABLE TABLET 81 MG: 81 TABLET CHEWABLE at 08:07

## 2019-07-07 RX ADMIN — RISPERIDONE 0.25 MG: 0.25 TABLET ORAL at 09:07

## 2019-07-07 RX ADMIN — MONTELUKAST SODIUM 10 MG: 10 TABLET, COATED ORAL at 09:07

## 2019-07-07 RX ADMIN — AMLODIPINE BESYLATE 5 MG: 10 TABLET ORAL at 08:07

## 2019-07-07 NOTE — SUBJECTIVE & OBJECTIVE
Interval History: Pt stable, tolerating PO, will work with PT/OT. Plan for SNF next week    Review of Systems   Constitutional: Negative for chills and fever.   HENT: Negative for congestion and sore throat.    Respiratory: Negative for cough, sputum production, shortness of breath and wheezing.    Cardiovascular: Negative for chest pain and palpitations.   Gastrointestinal: Negative for abdominal pain, nausea and vomiting.   Genitourinary: Negative for dysuria and frequency.   Musculoskeletal: Positive for falls. Negative for back pain and neck pain.   Neurological: Positive for weakness. Negative for dizziness and headaches. Seizures: does not remember seizure      Objective:     Vital Signs (Most Recent):  Temp: 97.3 °F (36.3 °C) (07/06/19 1608)  Pulse: 70 (07/06/19 1905)  Resp: 17 (07/06/19 1608)  BP: (!) 142/65 (07/06/19 1608)  SpO2: 96 % (07/06/19 1608) Vital Signs (24h Range):  Temp:  [96.3 °F (35.7 °C)-98.9 °F (37.2 °C)] 97.3 °F (36.3 °C)  Pulse:  [64-84] 70  Resp:  [16-20] 17  SpO2:  [92 %-98 %] 96 %  BP: (130-155)/(63-70) 142/65     Weight: 79.4 kg (175 lb)  Body mass index is 34.18 kg/m².    Intake/Output Summary (Last 24 hours) at 7/6/2019 1928  Last data filed at 7/6/2019 0800  Gross per 24 hour   Intake 750 ml   Output --   Net 750 ml      Physical Exam   Constitutional: She appears well-nourished. No distress.   Eyes: Pupils are equal, round, and reactive to light. EOM are normal.   Cardiovascular: Normal rate and regular rhythm.   Pulmonary/Chest: Effort normal. She has wheezes (diffuse).   Abdominal: Soft. Bowel sounds are normal. She exhibits no distension. There is no tenderness. There is no rebound.   No CVA tenderness   Neurological: She is alert.   Oriented only to self, which appears to be baseline   Skin: She is not diaphoretic.       Significant Labs:   BMP:   Recent Labs   Lab 07/06/19  0458   GLU 77      K 4.1      CO2 25   BUN 16   CREATININE 0.6   CALCIUM 8.5*     CBC:    Recent Labs   Lab 07/05/19  0426 07/06/19  0458   WBC 5.29 5.80   HGB 9.2* 9.1*   HCT 30.7* 30.6*    312       Significant Imaging: I have reviewed all pertinent imaging results/findings within the past 24 hours.

## 2019-07-07 NOTE — ASSESSMENT & PLAN NOTE
Pt with Fever and documented RR >20 with laboratory evidence and physical exam consistent with urinary source of infection. Pt treated in ED with LR bolus and IV ceftriaxone.    -Ceftriaxone 1g IV q24hrs for 2 days--> cefdinir 300 mg PO stopped 7/6--> doxycycline 100mg q12 BID started 7/6  -Pt vitally stable and breathing comfortably  -Urine culture final is E.coli sensitive to tetracycline, blood culture had 1/2 + for coag neg staph which was likely contaminant. Repeat x4 NGTD  -CXR without evidence of pneumonia

## 2019-07-07 NOTE — NURSING
Paged Med team O, patient daughter requesting exelon get discontinued, she does not want her mother getting this medication.  This nurse was notified of this after daughter arrived and medications were already administered to patient.

## 2019-07-07 NOTE — PLAN OF CARE
Problem: Adult Inpatient Plan of Care  Goal: Plan of Care Review  Patient AAOX1, call light and belongings in reach, siderails up x3.  Patient shows no signs of pain or discomfort on dementia pain scale, resting comfortably.  Tolerating diet with no n/v.  Remains on telemetry with sinus ant to NSR.  Patient remains free from falls and safety maintained this shift.

## 2019-07-07 NOTE — PROGRESS NOTES
Hospital Medicine  Progress note    [place] [facility] [place] [facility] [place] Bk Arevalo MD  Admit Date: 7/2/2019  LETICIA 7/9/2019  Length of Stay:  LOS: 5 days   Code status: Full Code    Principal Problem:  Sepsis    HPI 84F with dementia (on Exelon patch; Dr Tejeda), HTN, HLD, CAD/NSTEMI ('14), chronic sCHF/ICMP (LVEF 25-30), seizure disorder on keppra, OA, suspected COPD, chronic anemia, h/o MDR UTIs (Ecoli in '18 and '15, e faecalis '14, pseudomonas and enterobacter '12), who presented to ED with daughter for thirst, urinary frequency, and fever (up to 100.5) x 2 days (no encephalopathy). In ED: fever 100.4, RR 27, WBC 8K with 73.5% gran.  Flu negative. Lactate 1.1. CXR clear.  Dx-ed with sepsis 2/2 UTI, given empiric CTX IV,     Hospital Course.  Started on CTX / IVF. Improved overnight with WBC 7.7 > 5.8. UC >  E coli sens to CTX/ tetracyclines CTX IV to Doxy PO to complete 14 days total of ABx (for pyelo) Awaiting SNF placement as bridge to Home w HH    Interval hx:  Sleeping most of day. Long d/w daughter about her mothers psych history and behavioral issues with dementia    ROS unable to assess due to dementia    PEx     Temp:  [96.7 °F (35.9 °C)-98.8 °F (37.1 °C)]   Pulse:  [64-84]   Resp:  [16-25]   BP: (132-162)/(60-82)   SpO2:  [92 %-98 %]     Intake/Output Summary (Last 24 hours) at 7/7/2019 0707  Last data filed at 7/7/2019 0600  Gross per 24 hour   Intake 550 ml   Output --   Net 550 ml       General Appearance: no acute distress   Heart: regular rate and rhythm  Respiratory: Normal respiratory effort, no crackles   Abdomen: Soft, non-tender; bowel sounds active  Skin: intact.Skin intact  Neurologic:  No focal numbness or weakness  Mental status: Alert, oriented x 1, somnolent    Recent Labs   Lab 07/04/19  0357 07/05/19  0426 07/06/19  0458   WBC 5.32 5.29 5.80   HGB 9.1* 9.2* 9.1*   HCT 29.4* 30.7* 30.6*    319 312     Recent Labs   Lab 07/04/19  0357 07/05/19  0426 07/06/19  0458   * 138 138   K 3.0*  3.4* 4.1   CL 98 103 104   CO2 29 28 25   BUN 12 12 16   CREATININE 0.6 0.6 0.6   GLU 87 87 77   CALCIUM 8.5* 8.5* 8.5*     Recent Labs   Lab 07/02/19  1900   ALKPHOS 101   ALT 10   AST 20   ALBUMIN 2.6*   PROT 7.3   BILITOT 0.2        CXR/ KUB   (7/2) Heart is normal size.  Hilar shadows and trachea appear normal.  No pneumothorax or pleural effusion or interstitial edema or consolidation.  The medial half of the left clavicle is absent, unchanged.  Visualized abdomen appears normal.     Scheduled Meds:   albuterol-ipratropium  3 mL Nebulization Q6H WAKE    amLODIPine  5 mg Oral Daily    aspirin  81 mg Oral Daily    cetirizine  10 mg Oral Daily    chlorthalidone  12.5 mg Oral Daily    doxycycline  100 mg Oral Q12H    enoxaparin  40 mg Subcutaneous Daily    escitalopram oxalate  5 mg Oral Daily    ferrous sulfate  325 mg Oral Daily    levetiracetam oral soln  500 mg Oral TID    montelukast  10 mg Oral QHS    risperidone 1 mg/ml  0.12 mg Oral Daily    risperiDONE  0.25 mg Oral QHS    rivastigmine tartrate  1.5 mg Oral BID     Continuous Infusions:  As Needed:  acetaminophen, benzonatate, mirtazapine, sodium chloride 0.9%    Active Hospital Problems    Diagnosis  POA    *Acute pyelonephritis [N10]  Yes    Mild malnutrition [E44.1]  Yes    Chronic combined systolic and diastolic CHF (congestive heart failure) [I50.42]  Yes    Dementia without behavioral disturbance [F03.90]  Yes    Debility [R53.81]  Yes    Seizure disorder [G40.909]  Yes    Chronic anemia [D64.9]  Yes    Essential hypertension [I10]  Yes      Resolved Hospital Problems    Diagnosis Date Resolved POA    Sepsis [A41.9] 07/07/2019 Yes     Assessment and Plan    84 yr o woman adm 7/2 with fever likely 2/2 pyelonephritis.  Pt treated in ED with LR bolus and IV ceftriaxone.     UTI / pyelonephritis  Sepsis  MDR E coli  CTX IV to Doxy PO to complete 14 days total of ABx (for pyelo)   BC 1/2 Coag neg staff > Likely contaminant Repeat x  4 NGTD    Fall with R hip pain.   XR Pelvis: No evidence of fracture or dislocation of the pelvic bones    Malnutrition mild  Suspected based on Albumin 2.6 on admission  RD recommended soft diet    Chronic combined systolic and diastolic CHF   Pt's daughter notes patient has not been seen in cardiology clinic for years;   pt may benefit from ambulatory referral to cardiology prior to discharge      Dementia without behavioral disturbance  Follows with Dr. Benita Melchor with [facility], last seen 6/24.  At baseline  Delirium precautions  Rivastigmine (discontinued)/ Lexapro / Remeron / Risperidone (tremors)    Chronic cough   concerning for silent aspiration  CXR neg  SLP > no overt s/s of aspiration or difficulties with dental soft tray. SLP provided extensive family education on SLP recommendations, SLP role, s/s and risks of aspiration, safe swallow precautions, importance of swallowing precautions,    Debility  Weakness  Pt/OT      Chronic anemia  Pt with chronic microcytic anemia  Iron 14, Sat 5, TIBC 263, ferritin 57  no iron on home meds, added PO ferrous sulfate.     Seizure disorder  Continue leviteracitam      Essential hypertension  Continue home amlodipine and Chlorthalidone     Diet: Dysphagia Soft (IDDSI Level 6): Dysphagia 3 (Mechanical Soft Chopped) starting at 07/03 1600  Dietary nutrition supplements [facility]; Boost Plus - Any flavor starting at 07/03 0745  GI PPx: not indicated  DVT PPx:  lovenox  Lines: peripheral IV    Goals of Care review with daughter    Discharge plan:await short stay SNF to bridge back to home with daughter Katie SNF placement greatly delayed due to insurance closed this [id number] for July 4th holiday, and she likely will not go until next Tues as will need insurance authorization Monday. Medically ready for d/c.     Time (minutes) spent in care of the patient (Greater than 1/2 spent in direct face-to-face contact) 65     Bk rAevalo  MD  [facility] Medicine

## 2019-07-07 NOTE — PROGRESS NOTES
[facility] Hospital Medicine  Progress Note    Patient Name: Susanna Byrnes  MRN: [id number]  Patient Class: IP- Inpatient   Admission Date: 7/2/2019  Length of Stay: 4 days  Attending Physician: Bk Arevalo MD  Primary Care Provider: Ignacia River MD    [facility] Medicine Team: [facility] [place] [facility] O Hany Diaz MD    Subjective:     Principal Problem:Sepsis      HPI:  Ms. Byrnes is an 85 yo female with Dementia, HTN, Arthritis, Suspected COPD (pt unable to complete PFT 2/2 dementia), seizures, Previous NSTEMI (4/14) with reduced EF (25-30%) who presented to the ED after two days of fever at home and a fall while ambulating to the bathroom. Pt has severe dementia and is completely disoriented at baseline. History is provided by patient's daughter at bedside. Pt's daughter reports that her mother has had increased thirst, increased urinary frequency, and fever for the past 2 days with a Tmax at home of 100.4 that was improved with tylenol. Additonally, this evening pt was attempting to get into the shower and had a witness fall. Pt's daughter denies that the patient hit her head and states that she seems to have slid down her walker and landed on her bottom. Pt's daughter reports her mother did not appear to have any alteration in her mental status but did report feeling sore on her bottom. Pt's daughter also reports that Ms. Byrnes has had cough for the past 6 weeks; The cough initially started in Late may and wwas associated with dark green sputum production.  Pt's daughter reports that she was seen in an urgent care center in early June and given a three day prednisone course with resolution of sputum production but pt continues to have a cough. Pt's daughter notes that she had previously followed with Dr. Looney with [facility] at [facility], though no notes could be found in EMR or [employer]out document viewer.     In the ED, pt was noted to be hypertensive to 180/85 and febrile to 100.5  "but without leukocytosis. Her EKG demonstrated NSR with a troponin of 0.027 and a BNP of 63. Her UA demonstrated 44 WBC per HPF with occasional bacteria. Pelvic Xray without evidence of acute fracture, and CXR without consolidation or opacity concerning for pneumonia or pulmonary edema.     Overview/Hospital Course:  Since admission, pt has been afebrile and vitals have been stable on ceftriaxone. White count has stayed WNL. Pt resting comfortably in bed and reports "feeling better". Tolerating PO, diet changed to softs. Urine culture positive for GNR, abx changed to PO cefdinir 300 BID for 8 more days of abx. Follow-up xray of left hip negative for fracture    Some concern expressed by daughter regarding functional status and weakness affecting ADLs. Final urine culture positive for E.coli sensitive to tetracyline, so pt transitioned to PO Doxy. Plan to go to SNF on 7/8    Interval History: Pt stable, tolerating PO, will work with PT/OT. Plan for SNF next week    Review of Systems   Constitutional: Negative for chills and fever.   HENT: Negative for congestion and sore throat.    Respiratory: Negative for cough, sputum production, shortness of breath and wheezing.    Cardiovascular: Negative for chest pain and palpitations.   Gastrointestinal: Negative for abdominal pain, nausea and vomiting.   Genitourinary: Negative for dysuria and frequency.   Musculoskeletal: Positive for falls. Negative for back pain and neck pain.   Neurological: Positive for weakness. Negative for dizziness and headaches. Seizures: does not remember seizure      Objective:     Vital Signs (Most Recent):  Temp: 97.3 °F (36.3 °C) (07/06/19 1608)  Pulse: 70 (07/06/19 1905)  Resp: 17 (07/06/19 1608)  BP: (!) 142/65 (07/06/19 1608)  SpO2: 96 % (07/06/19 1608) Vital Signs (24h Range):  Temp:  [96.3 °F (35.7 °C)-98.9 °F (37.2 °C)] 97.3 °F (36.3 °C)  Pulse:  [64-84] 70  Resp:  [16-20] 17  SpO2:  [92 %-98 %] 96 %  BP: (130-155)/(63-70) 142/65 "     Weight: 79.4 kg (175 lb)  Body mass index is 34.18 kg/m².    Intake/Output Summary (Last 24 hours) at 7/6/2019 1928  Last data filed at 7/6/2019 0800  Gross per 24 hour   Intake 750 ml   Output --   Net 750 ml      Physical Exam   Constitutional: She appears well-nourished. No distress.   Eyes: Pupils are equal, round, and reactive to light. EOM are normal.   Cardiovascular: Normal rate and regular rhythm.   Pulmonary/Chest: Effort normal. She has wheezes (diffuse).   Abdominal: Soft. Bowel sounds are normal. She exhibits no distension. There is no tenderness. There is no rebound.   No CVA tenderness   Neurological: She is alert.   Oriented only to self, which appears to be baseline   Skin: She is not diaphoretic.       Significant Labs:   BMP:   Recent Labs   Lab 07/06/19  0458   GLU 77      K 4.1      CO2 25   BUN 16   CREATININE 0.6   CALCIUM 8.5*     CBC:   Recent Labs   Lab 07/05/19  0426 07/06/19  0458   WBC 5.29 5.80   HGB 9.2* 9.1*   HCT 30.7* 30.6*    312       Significant Imaging: I have reviewed all pertinent imaging results/findings within the past 24 hours.            Assessment/Plan:      * Sepsis  Pt with Fever and documented RR >20 with laboratory evidence and physical exam consistent with urinary source of infection. Pt treated in ED with LR bolus and IV ceftriaxone.    -Ceftriaxone 1g IV q24hrs for 2 days--> cefdinir 300 mg PO stopped 7/6--> doxycycline 100mg q12 BID started 7/6  -Pt vitally stable and breathing comfortably  -Urine culture final is E.coli sensitive to tetracycline, blood culture had 1/2 + for coag neg staph which was likely contaminant. Repeat x4 NGTD  -CXR without evidence of pneumonia    Acute cystitis without hematuria  As sepsis above      Mild malnutrition  Suspected based on Albumin 2.6 on admission  RD recommended softs      Chronic combined systolic and diastolic CHF (congestive heart failure)  Pt's daughter notes patient has not been seen in  cardiology clinic for years; pt may benefit from ambulatory referral to cardiology prior to discharge       Dementia without behavioral disturbance  Follows with Dr. Benita Melchor with [facility], last seen 6/24.  Pt recently started on Rivastigmine for dementia, will continue along with her Risperidone, Lexapro and Remeron  Chronic cough possibly concerning for silent aspiration; will consult SLP  -Per pt daughter, normally receives Risperdol 0.25 mg nightly and 0.125mg in the mornings; added this to current treatment as pt has been mildly tremulous      Debility  Pt/OT consulted    Chronic anemia  Pt with chronic microcytic anemia  Iron 14, Sat 5, TIBC 263, ferritin 57  -no iron on home meds, added PO ferrous sulfate.    Seizure disorder  Continue leviteracitam       Essential hypertension  Continue home amlodipine and Chlorthalidone         VTE Risk Mitigation (From admission, onward)        Ordered     enoxaparin injection 40 mg  Daily      07/02/19 2111     IP VTE HIGH RISK PATIENT  Once      07/02/19 2111                Hany Diaz MD  Department of Hospital Medicine   [facility]

## 2019-07-08 ENCOUNTER — HOSPITAL ENCOUNTER (INPATIENT)
Facility: HOSPITAL | Age: 84
LOS: 18 days | Discharge: HOME | DRG: 872 | End: 2019-07-26
Attending: INTERNAL MEDICINE | Admitting: INTERNAL MEDICINE
Payer: MEDICARE

## 2019-07-08 VITALS
DIASTOLIC BLOOD PRESSURE: 67 MMHG | TEMPERATURE: 96 F | RESPIRATION RATE: 16 BRPM | HEART RATE: 68 BPM | HEIGHT: 60 IN | WEIGHT: 176 LBS | BODY MASS INDEX: 34.55 KG/M2 | SYSTOLIC BLOOD PRESSURE: 145 MMHG | OXYGEN SATURATION: 96 %

## 2019-07-08 DIAGNOSIS — A41.9 SEPSIS, DUE TO UNSPECIFIED ORGANISM: ICD-10-CM

## 2019-07-08 DIAGNOSIS — R53.81 DEBILITY: ICD-10-CM

## 2019-07-08 DIAGNOSIS — I50.42 CHRONIC COMBINED SYSTOLIC AND DIASTOLIC CHF (CONGESTIVE HEART FAILURE): ICD-10-CM

## 2019-07-08 DIAGNOSIS — I25.5 ISCHEMIC CARDIOMYOPATHY: ICD-10-CM

## 2019-07-08 DIAGNOSIS — N10 ACUTE PYELONEPHRITIS: Primary | ICD-10-CM

## 2019-07-08 DIAGNOSIS — R56.9 SEIZURE: ICD-10-CM

## 2019-07-08 DIAGNOSIS — N39.0 UTI (URINARY TRACT INFECTION): ICD-10-CM

## 2019-07-08 DIAGNOSIS — G40.909 SEIZURE DISORDER: ICD-10-CM

## 2019-07-08 DIAGNOSIS — I10 ESSENTIAL HYPERTENSION: ICD-10-CM

## 2019-07-08 LAB
ANION GAP SERPL CALC-SCNC: 9 MMOL/L (ref 8–16)
BASOPHILS # BLD AUTO: 0.04 K/UL (ref 0–0.2)
BASOPHILS NFR BLD: 0.6 % (ref 0–1.9)
BUN SERPL-MCNC: 14 MG/DL (ref 8–23)
CALCIUM SERPL-MCNC: 8.9 MG/DL (ref 8.7–10.5)
CHLORIDE SERPL-SCNC: 105 MMOL/L (ref 95–110)
CO2 SERPL-SCNC: 28 MMOL/L (ref 23–29)
CREAT SERPL-MCNC: 0.6 MG/DL (ref 0.5–1.4)
DIFFERENTIAL METHOD: ABNORMAL
EOSINOPHIL # BLD AUTO: 0.6 K/UL (ref 0–0.5)
EOSINOPHIL NFR BLD: 8.5 % (ref 0–8)
ERYTHROCYTE [DISTWIDTH] IN BLOOD BY AUTOMATED COUNT: 17.6 % (ref 11.5–14.5)
EST. GFR  (AFRICAN AMERICAN): >60 ML/MIN/1.73 M^2
EST. GFR  (NON AFRICAN AMERICAN): >60 ML/MIN/1.73 M^2
GLUCOSE SERPL-MCNC: 73 MG/DL (ref 70–110)
HCT VFR BLD AUTO: 29.7 % (ref 37–48.5)
HGB BLD-MCNC: 9 G/DL (ref 12–16)
IMM GRANULOCYTES # BLD AUTO: 0.03 K/UL (ref 0–0.04)
IMM GRANULOCYTES NFR BLD AUTO: 0.5 % (ref 0–0.5)
LYMPHOCYTES # BLD AUTO: 2.8 K/UL (ref 1–4.8)
LYMPHOCYTES NFR BLD: 43.4 % (ref 18–48)
MCH RBC QN AUTO: 21.8 PG (ref 27–31)
MCHC RBC AUTO-ENTMCNC: 30.3 G/DL (ref 32–36)
MCV RBC AUTO: 72 FL (ref 82–98)
MONOCYTES # BLD AUTO: 0.6 K/UL (ref 0.3–1)
MONOCYTES NFR BLD: 9.3 % (ref 4–15)
NEUTROPHILS # BLD AUTO: 2.5 K/UL (ref 1.8–7.7)
NEUTROPHILS NFR BLD: 37.7 % (ref 38–73)
NRBC BLD-RTO: 0 /100 WBC
PLATELET # BLD AUTO: 345 K/UL (ref 150–350)
PMV BLD AUTO: 10 FL (ref 9.2–12.9)
POTASSIUM SERPL-SCNC: 3.9 MMOL/L (ref 3.5–5.1)
RBC # BLD AUTO: 4.12 M/UL (ref 4–5.4)
SODIUM SERPL-SCNC: 142 MMOL/L (ref 136–145)
WBC # BLD AUTO: 6.55 K/UL (ref 3.9–12.7)

## 2019-07-08 PROCEDURE — 99231 PR SUBSEQUENT HOSPITAL CARE,LEVL I: ICD-10-PCS | Mod: ,,, | Performed by: HOSPITALIST

## 2019-07-08 PROCEDURE — 63600175 PHARM REV CODE 636 W HCPCS: Performed by: HOSPITALIST

## 2019-07-08 PROCEDURE — 11000004 HC SNF PRIVATE

## 2019-07-08 PROCEDURE — 94640 AIRWAY INHALATION TREATMENT: CPT

## 2019-07-08 PROCEDURE — 36415 COLL VENOUS BLD VENIPUNCTURE: CPT

## 2019-07-08 PROCEDURE — 25000003 PHARM REV CODE 250: Performed by: STUDENT IN AN ORGANIZED HEALTH CARE EDUCATION/TRAINING PROGRAM

## 2019-07-08 PROCEDURE — 85025 COMPLETE CBC W/AUTO DIFF WBC: CPT

## 2019-07-08 PROCEDURE — 97535 SELF CARE MNGMENT TRAINING: CPT

## 2019-07-08 PROCEDURE — 97530 THERAPEUTIC ACTIVITIES: CPT

## 2019-07-08 PROCEDURE — 94761 N-INVAS EAR/PLS OXIMETRY MLT: CPT

## 2019-07-08 PROCEDURE — 80048 BASIC METABOLIC PNL TOTAL CA: CPT

## 2019-07-08 PROCEDURE — 99231 SBSQ HOSP IP/OBS SF/LOW 25: CPT | Mod: ,,, | Performed by: HOSPITALIST

## 2019-07-08 PROCEDURE — 25000242 PHARM REV CODE 250 ALT 637 W/ HCPCS: Performed by: HOSPITALIST

## 2019-07-08 PROCEDURE — 25000242 PHARM REV CODE 250 ALT 637 W/ HCPCS: Performed by: STUDENT IN AN ORGANIZED HEALTH CARE EDUCATION/TRAINING PROGRAM

## 2019-07-08 PROCEDURE — 25000003 PHARM REV CODE 250: Performed by: HOSPITALIST

## 2019-07-08 RX ORDER — ENOXAPARIN SODIUM 100 MG/ML
40 INJECTION SUBCUTANEOUS EVERY 24 HOURS
Status: DISCONTINUED | OUTPATIENT
Start: 2019-07-08 | End: 2019-07-26 | Stop reason: HOSPADM

## 2019-07-08 RX ORDER — ESCITALOPRAM OXALATE 5 MG/1
5 TABLET ORAL DAILY
Status: DISCONTINUED | OUTPATIENT
Start: 2019-07-09 | End: 2019-07-26 | Stop reason: HOSPADM

## 2019-07-08 RX ORDER — CALCIUM CARBONATE 200(500)MG
500 TABLET,CHEWABLE ORAL 2 TIMES DAILY PRN
Status: CANCELLED | OUTPATIENT
Start: 2019-07-08

## 2019-07-08 RX ORDER — ACETAMINOPHEN 325 MG/1
650 TABLET ORAL EVERY 8 HOURS PRN
Status: DISCONTINUED | OUTPATIENT
Start: 2019-07-08 | End: 2019-07-26 | Stop reason: HOSPADM

## 2019-07-08 RX ORDER — MONTELUKAST SODIUM 10 MG/1
10 TABLET ORAL NIGHTLY
Status: DISCONTINUED | OUTPATIENT
Start: 2019-07-08 | End: 2019-07-26 | Stop reason: HOSPADM

## 2019-07-08 RX ORDER — RISPERIDONE 1 MG/ML
0.12 SOLUTION ORAL DAILY
Status: CANCELLED | OUTPATIENT
Start: 2019-07-09

## 2019-07-08 RX ORDER — IPRATROPIUM BROMIDE AND ALBUTEROL SULFATE 2.5; .5 MG/3ML; MG/3ML
3 SOLUTION RESPIRATORY (INHALATION)
Status: DISCONTINUED | OUTPATIENT
Start: 2019-07-08 | End: 2019-07-18

## 2019-07-08 RX ORDER — NAPROXEN SODIUM 220 MG/1
81 TABLET, FILM COATED ORAL DAILY
Status: CANCELLED | OUTPATIENT
Start: 2019-07-08

## 2019-07-08 RX ORDER — LORAZEPAM 0.5 MG/1
0.5 TABLET ORAL EVERY 6 HOURS PRN
Status: DISCONTINUED | OUTPATIENT
Start: 2019-07-08 | End: 2019-07-22

## 2019-07-08 RX ORDER — NAPROXEN SODIUM 220 MG/1
81 TABLET, FILM COATED ORAL DAILY
Status: DISCONTINUED | OUTPATIENT
Start: 2019-07-09 | End: 2019-07-26 | Stop reason: HOSPADM

## 2019-07-08 RX ORDER — IPRATROPIUM BROMIDE AND ALBUTEROL SULFATE 2.5; .5 MG/3ML; MG/3ML
3 SOLUTION RESPIRATORY (INHALATION) EVERY 6 HOURS PRN
Status: CANCELLED | OUTPATIENT
Start: 2019-07-08

## 2019-07-08 RX ORDER — CETIRIZINE HYDROCHLORIDE 5 MG/1
10 TABLET ORAL DAILY
Status: DISCONTINUED | OUTPATIENT
Start: 2019-07-09 | End: 2019-07-08

## 2019-07-08 RX ORDER — AMLODIPINE BESYLATE 5 MG/1
5 TABLET ORAL DAILY
Status: CANCELLED | OUTPATIENT
Start: 2019-07-09

## 2019-07-08 RX ORDER — ACETAMINOPHEN 325 MG/1
650 TABLET ORAL EVERY 6 HOURS PRN
Status: DISCONTINUED | OUTPATIENT
Start: 2019-07-08 | End: 2019-07-26 | Stop reason: HOSPADM

## 2019-07-08 RX ORDER — AMLODIPINE BESYLATE 5 MG/1
5 TABLET ORAL DAILY
Status: DISCONTINUED | OUTPATIENT
Start: 2019-07-09 | End: 2019-07-18

## 2019-07-08 RX ORDER — ACETAMINOPHEN 325 MG/1
650 TABLET ORAL EVERY 4 HOURS PRN
Status: DISCONTINUED | OUTPATIENT
Start: 2019-07-08 | End: 2019-07-26 | Stop reason: HOSPADM

## 2019-07-08 RX ORDER — AMOXICILLIN 250 MG
1 CAPSULE ORAL 2 TIMES DAILY
Status: DISCONTINUED | OUTPATIENT
Start: 2019-07-08 | End: 2019-07-12

## 2019-07-08 RX ORDER — ESCITALOPRAM OXALATE 5 MG/1
5 TABLET ORAL DAILY
Status: CANCELLED | OUTPATIENT
Start: 2019-07-08

## 2019-07-08 RX ORDER — GUAIFENESIN 600 MG/1
600 TABLET, EXTENDED RELEASE ORAL DAILY PRN
Status: DISCONTINUED | OUTPATIENT
Start: 2019-07-08 | End: 2019-07-18

## 2019-07-08 RX ORDER — ACETAMINOPHEN 325 MG/1
650 TABLET ORAL EVERY 4 HOURS PRN
Status: CANCELLED | OUTPATIENT
Start: 2019-07-08

## 2019-07-08 RX ORDER — LEVETIRACETAM 100 MG/ML
500 SOLUTION ORAL 3 TIMES DAILY
Status: CANCELLED | OUTPATIENT
Start: 2019-07-08

## 2019-07-08 RX ORDER — MONTELUKAST SODIUM 10 MG/1
10 TABLET ORAL NIGHTLY
Status: CANCELLED | OUTPATIENT
Start: 2019-07-08

## 2019-07-08 RX ORDER — RISPERIDONE 0.25 MG/1
0.25 TABLET ORAL DAILY
Status: DISCONTINUED | OUTPATIENT
Start: 2019-07-09 | End: 2019-07-08

## 2019-07-08 RX ORDER — FERROUS SULFATE 325(65) MG
325 TABLET, DELAYED RELEASE (ENTERIC COATED) ORAL DAILY
Status: DISCONTINUED | OUTPATIENT
Start: 2019-07-09 | End: 2019-07-11

## 2019-07-08 RX ORDER — RAMELTEON 8 MG/1
8 TABLET ORAL NIGHTLY PRN
Status: DISCONTINUED | OUTPATIENT
Start: 2019-07-08 | End: 2019-07-11

## 2019-07-08 RX ORDER — ESCITALOPRAM OXALATE 5 MG/1
5 TABLET ORAL DAILY
Status: CANCELLED | OUTPATIENT
Start: 2019-07-09

## 2019-07-08 RX ORDER — MIRTAZAPINE 15 MG/1
15 TABLET, FILM COATED ORAL NIGHTLY PRN
Status: CANCELLED | OUTPATIENT
Start: 2019-07-08

## 2019-07-08 RX ORDER — AMOXICILLIN 250 MG
1 CAPSULE ORAL 2 TIMES DAILY
Status: CANCELLED | OUTPATIENT
Start: 2019-07-08

## 2019-07-08 RX ORDER — BENZONATATE 100 MG/1
100 CAPSULE ORAL 3 TIMES DAILY PRN
Status: CANCELLED | OUTPATIENT
Start: 2019-07-08

## 2019-07-08 RX ORDER — AMLODIPINE BESYLATE 5 MG/1
5 TABLET ORAL DAILY
Status: DISCONTINUED | OUTPATIENT
Start: 2019-07-09 | End: 2019-07-08

## 2019-07-08 RX ORDER — CETIRIZINE HYDROCHLORIDE 5 MG/1
10 TABLET ORAL DAILY
Status: CANCELLED | OUTPATIENT
Start: 2019-07-09

## 2019-07-08 RX ORDER — IPRATROPIUM BROMIDE AND ALBUTEROL SULFATE 2.5; .5 MG/3ML; MG/3ML
3 SOLUTION RESPIRATORY (INHALATION)
Status: CANCELLED | OUTPATIENT
Start: 2019-07-08

## 2019-07-08 RX ORDER — RISPERIDONE 1 MG/ML
0.12 SOLUTION ORAL DAILY
Status: DISCONTINUED | OUTPATIENT
Start: 2019-07-09 | End: 2019-07-18

## 2019-07-08 RX ORDER — CALCIUM CARBONATE 200(500)MG
500 TABLET,CHEWABLE ORAL 2 TIMES DAILY PRN
Status: DISCONTINUED | OUTPATIENT
Start: 2019-07-08 | End: 2019-07-26 | Stop reason: HOSPADM

## 2019-07-08 RX ORDER — LEVETIRACETAM 100 MG/ML
500 SOLUTION ORAL 3 TIMES DAILY
Status: DISCONTINUED | OUTPATIENT
Start: 2019-07-08 | End: 2019-07-08

## 2019-07-08 RX ORDER — ENOXAPARIN SODIUM 100 MG/ML
40 INJECTION SUBCUTANEOUS EVERY 24 HOURS
Status: CANCELLED | OUTPATIENT
Start: 2019-07-08

## 2019-07-08 RX ORDER — MIRTAZAPINE 7.5 MG/1
15 TABLET, FILM COATED ORAL NIGHTLY PRN
Status: DISCONTINUED | OUTPATIENT
Start: 2019-07-08 | End: 2019-07-11

## 2019-07-08 RX ORDER — TRIAMCINOLONE ACETONIDE 1 MG/G
CREAM TOPICAL DAILY PRN
Status: CANCELLED | OUTPATIENT
Start: 2019-07-08

## 2019-07-08 RX ORDER — FLUTICASONE PROPIONATE 50 MCG
2 SPRAY, SUSPENSION (ML) NASAL DAILY PRN
Status: DISCONTINUED | OUTPATIENT
Start: 2019-07-08 | End: 2019-07-12

## 2019-07-08 RX ORDER — IPRATROPIUM BROMIDE AND ALBUTEROL SULFATE 2.5; .5 MG/3ML; MG/3ML
3 SOLUTION RESPIRATORY (INHALATION) EVERY 6 HOURS PRN
Status: DISCONTINUED | OUTPATIENT
Start: 2019-07-08 | End: 2019-07-19

## 2019-07-08 RX ORDER — LEVETIRACETAM 100 MG/ML
500 SOLUTION ORAL 3 TIMES DAILY
Status: DISCONTINUED | OUTPATIENT
Start: 2019-07-08 | End: 2019-07-11

## 2019-07-08 RX ORDER — ESCITALOPRAM OXALATE 5 MG/1
5 TABLET ORAL DAILY
Status: DISCONTINUED | OUTPATIENT
Start: 2019-07-09 | End: 2019-07-08

## 2019-07-08 RX ORDER — DOXYCYCLINE HYCLATE 100 MG
100 TABLET ORAL EVERY 12 HOURS
Status: CANCELLED | OUTPATIENT
Start: 2019-07-08 | End: 2019-07-11

## 2019-07-08 RX ORDER — FERROUS SULFATE 325(65) MG
325 TABLET, DELAYED RELEASE (ENTERIC COATED) ORAL DAILY
Status: CANCELLED | OUTPATIENT
Start: 2019-07-09

## 2019-07-08 RX ORDER — GUAIFENESIN 600 MG/1
600 TABLET, EXTENDED RELEASE ORAL DAILY PRN
Status: CANCELLED | OUTPATIENT
Start: 2019-07-08

## 2019-07-08 RX ORDER — ACETAMINOPHEN 325 MG/1
650 TABLET ORAL EVERY 6 HOURS PRN
Status: CANCELLED | OUTPATIENT
Start: 2019-07-08

## 2019-07-08 RX ORDER — CETIRIZINE HYDROCHLORIDE 5 MG/1
10 TABLET ORAL DAILY
Status: DISCONTINUED | OUTPATIENT
Start: 2019-07-09 | End: 2019-07-11

## 2019-07-08 RX ORDER — NAPROXEN SODIUM 220 MG/1
81 TABLET, FILM COATED ORAL DAILY
Status: DISCONTINUED | OUTPATIENT
Start: 2019-07-09 | End: 2019-07-08

## 2019-07-08 RX ORDER — MIRTAZAPINE 7.5 MG/1
15 TABLET, FILM COATED ORAL NIGHTLY
Status: DISCONTINUED | OUTPATIENT
Start: 2019-07-08 | End: 2019-07-08

## 2019-07-08 RX ORDER — FLUTICASONE PROPIONATE 50 MCG
2 SPRAY, SUSPENSION (ML) NASAL DAILY PRN
Status: CANCELLED | OUTPATIENT
Start: 2019-07-08

## 2019-07-08 RX ORDER — NAPROXEN SODIUM 220 MG/1
81 TABLET, FILM COATED ORAL DAILY
Status: CANCELLED | OUTPATIENT
Start: 2019-07-09

## 2019-07-08 RX ORDER — MONTELUKAST SODIUM 10 MG/1
10 TABLET ORAL NIGHTLY
Status: DISCONTINUED | OUTPATIENT
Start: 2019-07-08 | End: 2019-07-08

## 2019-07-08 RX ORDER — TRIAMCINOLONE ACETONIDE 1 MG/G
CREAM TOPICAL DAILY PRN
Status: DISCONTINUED | OUTPATIENT
Start: 2019-07-08 | End: 2019-07-26 | Stop reason: HOSPADM

## 2019-07-08 RX ORDER — AMLODIPINE BESYLATE 5 MG/1
5 TABLET ORAL DAILY
Status: CANCELLED | OUTPATIENT
Start: 2019-07-08

## 2019-07-08 RX ORDER — RISPERIDONE 0.25 MG/1
0.25 TABLET ORAL DAILY
Status: CANCELLED | OUTPATIENT
Start: 2019-07-08

## 2019-07-08 RX ORDER — RAMELTEON 8 MG/1
8 TABLET ORAL NIGHTLY PRN
Status: CANCELLED | OUTPATIENT
Start: 2019-07-08

## 2019-07-08 RX ORDER — DOXYCYCLINE HYCLATE 100 MG
100 TABLET ORAL EVERY 12 HOURS
Status: COMPLETED | OUTPATIENT
Start: 2019-07-08 | End: 2019-07-11

## 2019-07-08 RX ORDER — SODIUM CHLORIDE 0.9 % (FLUSH) 0.9 %
10 SYRINGE (ML) INJECTION
Status: CANCELLED | OUTPATIENT
Start: 2019-07-08

## 2019-07-08 RX ORDER — RISPERIDONE 0.25 MG/1
0.25 TABLET ORAL NIGHTLY
Status: CANCELLED | OUTPATIENT
Start: 2019-07-08

## 2019-07-08 RX ORDER — LORAZEPAM 0.5 MG/1
0.5 TABLET ORAL EVERY 6 HOURS PRN
Status: CANCELLED | OUTPATIENT
Start: 2019-07-08

## 2019-07-08 RX ORDER — SODIUM CHLORIDE 0.9 % (FLUSH) 0.9 %
10 SYRINGE (ML) INJECTION
Status: DISCONTINUED | OUTPATIENT
Start: 2019-07-08 | End: 2019-07-10

## 2019-07-08 RX ORDER — RISPERIDONE 0.25 MG/1
0.25 TABLET ORAL NIGHTLY
Status: DISCONTINUED | OUTPATIENT
Start: 2019-07-08 | End: 2019-07-18

## 2019-07-08 RX ORDER — CETIRIZINE HYDROCHLORIDE 5 MG/1
10 TABLET ORAL DAILY
Status: CANCELLED | OUTPATIENT
Start: 2019-07-08

## 2019-07-08 RX ORDER — ACETAMINOPHEN 325 MG/1
650 TABLET ORAL EVERY 8 HOURS PRN
Status: CANCELLED | OUTPATIENT
Start: 2019-07-08

## 2019-07-08 RX ORDER — BENZONATATE 100 MG/1
100 CAPSULE ORAL 3 TIMES DAILY PRN
Status: DISCONTINUED | OUTPATIENT
Start: 2019-07-08 | End: 2019-07-26 | Stop reason: HOSPADM

## 2019-07-08 RX ORDER — MIRTAZAPINE 15 MG/1
15 TABLET, FILM COATED ORAL NIGHTLY
Status: CANCELLED | OUTPATIENT
Start: 2019-07-08

## 2019-07-08 RX ADMIN — ESCITALOPRAM OXALATE 5 MG: 5 TABLET, FILM COATED ORAL at 09:07

## 2019-07-08 RX ADMIN — CETIRIZINE HYDROCHLORIDE 10 MG: 5 TABLET ORAL at 09:07

## 2019-07-08 RX ADMIN — LEVETIRACETAM 500 MG: 500 SOLUTION ORAL at 08:07

## 2019-07-08 RX ADMIN — DOXYCYCLINE HYCLATE 100 MG: 100 TABLET, COATED ORAL at 09:07

## 2019-07-08 RX ADMIN — FERROUS SULFATE TAB EC 325 MG (65 MG FE EQUIVALENT) 325 MG: 325 (65 FE) TABLET DELAYED RESPONSE at 09:07

## 2019-07-08 RX ADMIN — MONTELUKAST 10 MG: 10 TABLET, FILM COATED ORAL at 08:07

## 2019-07-08 RX ADMIN — AMLODIPINE BESYLATE 5 MG: 10 TABLET ORAL at 09:07

## 2019-07-08 RX ADMIN — LORAZEPAM 0.5 MG: 0.5 TABLET ORAL at 07:07

## 2019-07-08 RX ADMIN — IPRATROPIUM BROMIDE AND ALBUTEROL SULFATE 3 ML: .5; 3 SOLUTION RESPIRATORY (INHALATION) at 08:07

## 2019-07-08 RX ADMIN — LEVETIRACETAM 500 MG: 100 SOLUTION ORAL at 09:07

## 2019-07-08 RX ADMIN — CHLORTHALIDONE 12.5 MG: 25 TABLET ORAL at 09:07

## 2019-07-08 RX ADMIN — SENNOSIDES,DOCUSATE SODIUM 1 TABLET: 8.6; 5 TABLET, FILM COATED ORAL at 08:07

## 2019-07-08 RX ADMIN — RISPERIDONE 0.25 MG: 0.25 TABLET ORAL at 09:07

## 2019-07-08 RX ADMIN — ENOXAPARIN SODIUM 40 MG: 100 INJECTION SUBCUTANEOUS at 08:07

## 2019-07-08 RX ADMIN — DOXYCYCLINE HYCLATE 100 MG: 100 TABLET, COATED ORAL at 08:07

## 2019-07-08 RX ADMIN — RISPERIDONE 0.12 MG: 1 SOLUTION ORAL at 09:07

## 2019-07-08 RX ADMIN — IPRATROPIUM BROMIDE AND ALBUTEROL SULFATE 3 ML: .5; 3 SOLUTION RESPIRATORY (INHALATION) at 01:07

## 2019-07-08 RX ADMIN — ASPIRIN 81 MG CHEWABLE TABLET 81 MG: 81 TABLET CHEWABLE at 09:07

## 2019-07-08 NOTE — DISCHARGE SUMMARY
Discharge Summary  Hospital Medicine    Attending Provider on Discharge: Bk Arevalo MD  Hospital Medicine [place] [facility] [place]  Date of Admission:  7/2/2019     Date of Discharge:  7/8/19  Length of Stay:  LOS: 6 days   Code status: Full Code    Active Hospital Problems    Diagnosis  POA    *Acute pyelonephritis [N10]  Yes    Mild malnutrition [E44.1]  Yes    Chronic combined systolic and diastolic CHF (congestive heart failure) [I50.42]  Yes    Dementia without behavioral disturbance [F03.90]  Yes    Debility [R53.81]  Yes    Seizure disorder [G40.909]  Yes    Chronic anemia [D64.9]  Yes    Essential hypertension [I10]  Yes      Resolved Hospital Problems    Diagnosis Date Resolved POA    Sepsis [A41.9] 07/07/2019 Yes        HPI    84F with dementia (on Exelon patch; Dr Tejeda), HTN, HLD, CAD/NSTEMI ('14), chronic sCHF/ICMP (LVEF 25-30), seizure disorder on keppra, OA, suspected COPD, chronic anemia, h/o MDR UTIs (Ecoli in '18 and '15, e faecalis '14, pseudomonas and enterobacter '12), who presented to ED with daughter for thirst, urinary frequency, and fever (up to 100.5) x 2 days (no encephalopathy). In ED: fever 100.4, RR 27, WBC 8K with 73.5% gran.  Flu negative. Lactate 1.1. CXR clear.  Dx-ed with sepsis 2/2 UTI, given empiric CTX IV,      Hospital Course.    UTI / pyelonephritis  Sepsis  E coli sens CTX and doxycycline  CTX IV to Doxy PO to complete 14 days total of ABx (for pyelo)   BC 1/2 Coag neg staff > Likely contaminant Repeat x 4 NG     Fall with R hip pain.   XR Pelvis: No evidence of fracture or dislocation of the pelvic bones     Malnutrition mild  Suspected based on Albumin 2.6 on admission  RD recommended soft diet     Chronic combined systolic and diastolic CHF   Pt's daughter notes patient has not been seen in cardiology clinic for years;   pt may benefit from ambulatory referral to cardiology prior to discharge      Dementia without behavioral disturbance  Follows with Dr. Joy  Jill with [facility], last seen 6/24.  At baseline  Delirium precautions  Rivastigmine (discontinued)/ Lexapro / Remeron / Risperidone (tremors)     Chronic cough   concerning for silent aspiration  CXR neg  SLP > no overt s/s of aspiration or difficulties with dental soft tray. SLP provided extensive family education on SLP recommendations, SLP role, s/s and risks of aspiration, safe swallow precautions, importance of swallowing precautions,     Debility  Weakness  Pt/OT       Chronic anemia  Pt with chronic microcytic anemia  Iron 14, Sat 5, TIBC 263, ferritin 57  no iron on home meds, added PO ferrous sulfate.     Seizure disorder  Continue leviteracitam      Essential hypertension  Continue home amlodipine and Chlorthalidone      Diet: Dysphagia Soft (IDDSI Level 6): Dysphagia 3 (Mechanical Soft Chopped) starting at 07/03 1600  Dietary nutrition supplements [facility]; Boost Plus - Any flavor starting at 07/03 0745  GI PPx: not indicated  DVT PPx:  lovenox  Lines: peripheral IV       Recent Labs   Lab 07/06/19  0458 07/07/19  0835 07/08/19  0403   WBC 5.80 7.48 6.55   HGB 9.1* 9.2* 9.0*   HCT 30.6* 30.8* 29.7*    333 345     Recent Labs   Lab 07/06/19  0458 07/07/19  0835 07/08/19  0403    140 142   K 4.1 3.8 3.9    106 105   CO2 25 26 28   BUN 16 11 14   CREATININE 0.6 0.5 0.6   GLU 77 82 73   CALCIUM 8.5* 8.8 8.9     Recent Labs   Lab 07/02/19  1900   ALKPHOS 101   ALT 10   AST 20   ALBUMIN 2.6*   PROT 7.3   BILITOT 0.2      Scheduled Meds:   albuterol-ipratropium  3 mL Nebulization Q6H WAKE    amLODIPine  5 mg Oral Daily    aspirin  81 mg Oral Daily    cetirizine  10 mg Oral Daily    chlorthalidone  12.5 mg Oral Daily    doxycycline  100 mg Oral Q12H    enoxaparin  40 mg Subcutaneous Daily    escitalopram oxalate  5 mg Oral Daily    ferrous sulfate  325 mg Oral Daily    levetiracetam oral soln  500 mg Oral TID    montelukast  10 mg Oral QHS    risperidone  1 mg/ml  0.12 mg Oral Daily    risperiDONE  0.25 mg Oral QHS     Continuous Infusions:  PRN Meds:.acetaminophen, benzonatate, mirtazapine, sodium chloride 0.9%    Procedures: none    Consultants: none    Discharge Diet:cardiac diet with Normal Fluid intake of 1500 - 2000 mL per day    Activity: activity as tolerated    Discharge Condition: Good    Disposition: Skilled Nursing Facility    Tests pending at the time of discharge: none      Time spent  on the discharge of the patient including review of hospital course with the patient. reviewing discharge medications and arranging follow-up care 65 min    Discharge examination Patient was seen and examined on the date of discharge and determined to be suitable for discharge.    Discharge plan Discharge to [facility] for rehab w goal of returning home where he daughter takes care of her      Bk Arevalo MD

## 2019-07-08 NOTE — PROGRESS NOTES
[facility]  Adult Nutrition  Progress Note    SUMMARY       Recommendations    Recommendation/Intervention:     1. Continue cardiac diet + Boost Plus as tolerated with texture per SLP.   2. Encourage po intake.   3. RD following.     Goals: consume >75% of all meals  Nutrition Goal Status: goal met  Communication of RD Recs: (POC)    Reason for Assessment    Reason For Assessment: RD follow-up  Diagnosis: infection/sepsis  Relevant Medical History: severe dementia, HTN, arthritis, suspected COPD, seizures  General Information Comments: Pt resting with family member at bedside. Spoke mainly with family member. Reports good appetite and drinking 1.5 Boost/day. Per chart review, pt eating % of meals. Denies N/V/D/C. Reports normal appetite with no wt loss PTA. NFPE not warranted. Pt appears nourished. RD does not feel that pt meets malnutrition criteria at this time.  Nutrition Discharge Planning: adequate po intake    Nutrition Risk Screen    Nutrition Risk Screen: no indicators present    Nutrition/Diet History    Spiritual, Cultural Beliefs, Protestant Practices, Values that Affect Care: no  Factors Affecting Nutritional Intake: impaired cognitive status/motor control    Anthropometrics    Temp: 96.3 °F (35.7 °C)  Height: 5' (152.4 cm)  Height (inches): 60 in  Weight Method: Bed Scale  Weight: 79.8 kg (176 lb)  Weight (lb): 176 lb  Ideal Body Weight (IBW), Female: 100 lb  % Ideal Body Weight, Female (lb): 176 lb  BMI (Calculated): 34.4  BMI Grade: 30 - 34.9- obesity - grade I     Lab/Procedures/Meds    Pertinent Labs Reviewed: reviewed  Pertinent Medications Reviewed: reviewed  Pertinent Medications Comments: amlodipine, aspirin, ferrous sulfate, montelukast    Estimated/Assessed Needs    Weight Used For Calorie Calculations: 79.4 kg (175 lb 0.7 oz)  Energy Calorie Requirements (kcal): 1456 kcal/day  Energy Need Method: Kcal/kg(x 1.25)  Protein Requirements: 103 gm/day(1.3 gm/kg)  Weight  Used For Protein Calculations: 79.4 kg (175 lb 0.7 oz)  Fluid Requirements (mL): 1 mL/kcal or per MD     RDA Method (mL): 1456    Nutrition Prescription Ordered    Current Diet Order: Mechanical Soft  Oral Nutrition Supplement: Boost Plus    Evaluation of Received Nutrient/Fluid Intake    Comments: LBM 7/7  Tolerance: tolerating  % Intake of Estimated Energy Needs: 75 - 100 %  % Meal Intake: 75 - 100 %    Nutrition Risk    Level of Risk/Frequency of Follow-up: (f/u 1 x wk)     Assessment and Plan    Nutrition Problem  Increased Nutrient Needs: Protein     Related to (etiology):   Physiological demands     Signs and Symptoms (as evidenced by):   Sepsis       Intervention:  Collaboration and Referral of Nutrition Care  Commercial Beverage     Nutrition Diagnosis Status:   Continues      Monitor and Evaluation    Food and Nutrient Intake: energy intake, food and beverage intake  Food and Nutrient Adminstration: diet order  Physical Activity and Function: nutrition-related ADLs and IADLs  Anthropometric Measurements: weight, weight change, body mass index  Biochemical Data, Medical Tests and Procedures: electrolyte and renal panel, gastrointestinal profile, glucose/endocrine profile, inflammatory profile, lipid profile  Nutrition-Focused Physical Findings: overall appearance     Nutrition Follow-Up    RD Follow-up?: Yes

## 2019-07-08 NOTE — PLAN OF CARE
Planned discharge is Skilled Nursing - Plan (A) or home with family with home health - Plan (B).     07/08/19 1305   Discharge Reassessment   Assessment Type Discharge Planning Reassessment   Provided patient/caregiver education on the expected discharge date and the discharge plan Yes   Do you have any problems affording any of your prescribed medications? No   Discharge Plan A Skilled Nursing Facility   Discharge Plan B Home with family;Home Health   DME Needed Upon Discharge  none   Patient choice form signed by patient/caregiver N/A   Anticipated Discharge Disposition SNF

## 2019-07-08 NOTE — PROGRESS NOTES
Hospital Medicine  Progress note    Team: [facility] HOSP MED O Bk Arevalo MD  Admit Date: 7/2/2019  LETICIA 7/9/2019  Length of Stay:  LOS: 6 days   Code status: Full Code    Principal Problem:  Acute pyelonephritis    HPI 84F with dementia (on Exelon patch; Dr Tejeda), HTN, HLD, CAD/NSTEMI ('14), chronic sCHF/ICMP (LVEF 25-30), seizure disorder on keppra, OA, suspected COPD, chronic anemia, h/o MDR UTIs (Ecoli in '18 and '15, e faecalis '14, pseudomonas and enterobacter '12), who presented to ED with daughter for thirst, urinary frequency, and fever (up to 100.5) x 2 days (no encephalopathy). In ED: fever 100.4, RR 27, WBC 8K with 73.5% gran.  Flu negative. Lactate 1.1. CXR clear.  Dx-ed with sepsis 2/2 UTI, given empiric CTX IV,     Hospital Course.  Started on CTX / IVF. Improved overnight with WBC 7.7 > 5.8. UC >  E coli sens to CTX/ tetracyclines CTX IV to Doxy PO to complete 14 days total of ABx (for pyelo) Awaiting SNF placement as bridge to Home w HH    Interval hx:  More alert this morning w daughter feeding her lunch. Quiet night    ROS     Review of Systems  Respiratory: negative for cough, shortness of breath   Cardiovascular: negative for chest discomfort, palpitations  Gastrointestinal: negative for nausea or vomiting, abdominal pain, constipation, diarrhea      PEx     Temp:  [96.3 °F (35.7 °C)-98.5 °F (36.9 °C)]   Pulse:  [60-86]   Resp:  [16-25]   BP: (138-161)/(64-75)   SpO2:  [92 %-97 %]     Intake/Output Summary (Last 24 hours) at 7/8/2019 1305  Last data filed at 7/8/2019 0600  Gross per 24 hour   Intake 430 ml   Output --   Net 430 ml       General Appearance: no acute distress   Heart: regular rate and rhythm  Respiratory: Normal respiratory effort, no crackles   Abdomen: Soft, non-tender; bowel sounds active  Skin: intact.Skin intact  Neurologic:  No focal numbness or weakness  Mental status: Alert, oriented x 1, somnolent    Recent Labs   Lab 07/06/19  0458 07/07/19  0835 07/08/19  0403   WBC  5.80 7.48 6.55   HGB 9.1* 9.2* 9.0*   HCT 30.6* 30.8* 29.7*    333 345     Recent Labs   Lab 07/06/19  0458 07/07/19  0835 07/08/19  0403    140 142   K 4.1 3.8 3.9    106 105   CO2 25 26 28   BUN 16 11 14   CREATININE 0.6 0.5 0.6   GLU 77 82 73   CALCIUM 8.5* 8.8 8.9     Recent Labs   Lab 07/02/19  1900   ALKPHOS 101   ALT 10   AST 20   ALBUMIN 2.6*   PROT 7.3   BILITOT 0.2        CXR/ KUB   (7/2) Heart is normal size.  Hilar shadows and trachea appear normal.  No pneumothorax or pleural effusion or interstitial edema or consolidation.  The medial half of the left clavicle is absent, unchanged.  Visualized abdomen appears normal.     Scheduled Meds:   albuterol-ipratropium  3 mL Nebulization Q6H WAKE    amLODIPine  5 mg Oral Daily    aspirin  81 mg Oral Daily    cetirizine  10 mg Oral Daily    chlorthalidone  12.5 mg Oral Daily    doxycycline  100 mg Oral Q12H    enoxaparin  40 mg Subcutaneous Daily    escitalopram oxalate  5 mg Oral Daily    ferrous sulfate  325 mg Oral Daily    levetiracetam oral soln  500 mg Oral TID    montelukast  10 mg Oral QHS    risperidone 1 mg/ml  0.12 mg Oral Daily    risperiDONE  0.25 mg Oral QHS     Continuous Infusions:  As Needed:  acetaminophen, benzonatate, mirtazapine, sodium chloride 0.9%    Active Hospital Problems    Diagnosis  POA    *Acute pyelonephritis [N10]  Yes    Mild malnutrition [E44.1]  Yes    Chronic combined systolic and diastolic CHF (congestive heart failure) [I50.42]  Yes    Dementia without behavioral disturbance [F03.90]  Yes    Debility [R53.81]  Yes    Seizure disorder [G40.909]  Yes    Chronic anemia [D64.9]  Yes    Essential hypertension [I10]  Yes      Resolved Hospital Problems    Diagnosis Date Resolved POA    Sepsis [A41.9] 07/07/2019 Yes     Assessment and Plan    84 yr o woman adm 7/2 with fever likely 2/2 pyelonephritis.  Pt treated in ED with LR bolus and IV ceftriaxone.     UTI / pyelonephritis  Sepsis  MDR  E coli  CTX IV to Doxy PO to complete 14 days total of ABx (for pyelo)   BC 1/2 Coag neg staff > Likely contaminant Repeat x 4 NGTD    Fall with R hip pain.   XR Pelvis: No evidence of fracture or dislocation of the pelvic bones    Malnutrition mild  Suspected based on Albumin 2.6 on admission  RD recommended soft diet    Chronic combined systolic and diastolic CHF   Pt's daughter notes patient has not been seen in cardiology clinic for years;   pt may benefit from ambulatory referral to cardiology prior to discharge      Dementia without behavioral disturbance  Follows with Dr. Benita Melchor with [facility], last seen 6/24.  At baseline  Delirium precautions  Rivastigmine (discontinued)/ Lexapro / Remeron / Risperidone (tremors)    Chronic cough   concerning for silent aspiration  CXR neg  SLP > no overt s/s of aspiration or difficulties with dental soft tray. SLP provided extensive family education on SLP recommendations, SLP role, s/s and risks of aspiration, safe swallow precautions, importance of swallowing precautions,    Debility  Weakness  Pt/OT      Chronic anemia  Pt with chronic microcytic anemia  Iron 14, Sat 5, TIBC 263, ferritin 57  no iron on home meds, added PO ferrous sulfate.     Seizure disorder  Continue leviteracitam      Essential hypertension  Continue home amlodipine and Chlorthalidone     Diet: Dysphagia Soft (IDDSI Level 6): Dysphagia 3 (Mechanical Soft Chopped) starting at 07/03 1600  Dietary nutrition supplements [facility]; Boost Plus - Any flavor starting at 07/03 0745  GI PPx: not indicated  DVT PPx:  lovenox  Lines: peripheral IV    Goals of Care review with daughter    Discharge plan:await short stay SNF to bridge back to home with daughter Katie SNF placement greatly delayed due to insurance closed this [employer] for July 4th holiday, and she likely will not go until next Tues as will need insurance authorization Monday. Medically ready for d/c.     Time  (minutes) spent in care of the patient (Greater than 1/2 spent in direct face-to-face contact) 32     Bk Arevalo MD  [employer]

## 2019-07-08 NOTE — PLAN OF CARE
(SW) received note in [employer] from Rochelle at [facility] who reported that they are reviewing Pt.    SW will continue to follow and offer support as needed. MAYUR in communication with Pt's , Mi LIN     07/08/19 1026   Post-Acute Status   Post-Acute Authorization Placement   Post-Acute Placement Status Pending Post-Acute Clinical Review   Aparna Guevara, [facility]  [phone]

## 2019-07-08 NOTE — PLAN OF CARE
(MAYUR) spoke with Rochelle at [facility] who confirmed that Pt has been accepted and transportation can be set-up. Nurse call report to [phone].    SW called Pt's nurse, Ora, and informed her of above. Ora reported that Pt can sit up in a wheelchair and does not use any special equipment.     SW arranged Wheelchair transport via [facility]. Requested pick-up time 3:45PM. Requested pick-up time does not guarantee arrival time.      07/08/19 1447   Post-Acute Status   Post-Acute Authorization Placement   Post-Acute Placement Status Set-up Complete  ([facility])   Aparna Guevara, SW  [phone]

## 2019-07-08 NOTE — NURSING
Pt discharged to [facility] via wheelchair per tranportation.  Dr Arevalo Spoked with daughter in reference with medication. All belongings with the patient . daughter verbalized understanding.

## 2019-07-08 NOTE — PT/OT/SLP PROGRESS
Occupational Therapy   Treatment    Name: Susanna Byrnes  MRN: [id number]  Admitting Diagnosis:  Acute pyelonephritis       Recommendations:     Discharge Recommendations: nursing facility, skilled  Discharge Equipment Recommendations:  none  Barriers to discharge:  None    Assessment:     Susanna Byrnes is a 84 y.o. female with a medical diagnosis of Acute pyelonephritis.  She presents with the following performance deficits affecting function:  weakness, impaired endurance, gait instability, impaired functional mobilty, impaired self care skills, impaired balance, decreased lower extremity function, pain, impaired cognition, decreased upper extremity function, impaired cardiopulmonary response to activity.     Pt tolerated session well. She appeared lethargic but able to tolerate multiple functional tasks in today's session. Pt assisted w/ grooming tasks sitting EOB w/ no assistance required for sitting balance; needed cueing to apply lotion to LE. Pt performed toiletting tasks w/ total A for lulu-hygiene and Max A for static standing requiring cues to obtain upright standing posture. Pt demonstrated improved functional transfers requiring Mod A and RW. Pt continues to benefit from skilled OT to address the above listed impairments.     Rehab Prognosis:  Good; patient would benefit from acute skilled OT services to address these deficits and reach maximum level of function.       Plan:     Patient to be seen 3 x/week to address the above listed problems via self-care/home management, therapeutic activities, therapeutic exercises  · Plan of Care Expires: 08/03/19  · Plan of Care Reviewed with: patient    Subjective     Daughter present at bedside. Living environment and PLOF obtained.    - pt lives with her daughter and grandchildren in [place] [facility] w/ ramp entry; she bathes in a walk-in shower w/ assistance and a shower chair.    - PTA, per daughter, pt required assistance from her daughter for transfers and ADLs and able to  ambulate short household distances w/ RW. Used her w/c  for community distance.    - DME: lift chair, RW, w/c, BSC, shower chair   - daughter states she is able to continue assisting pt upon d/c.     Pain/Comfort:  · Pain Rating 1: 0/10  · Pain Rating Post-Intervention 1: 0/10    Objective:     Communicated with: RN prior to session.  Patient found HOB elevated with telemetry, SCD upon OT entry to room.    General Precautions: Standard, aspiration, fall   Orthopedic Precautions:N/A   Braces: N/A     Occupational Performance:     Bed Mobility:    · Patient completed Scooting/Bridging with moderate assistance  · Patient completed Supine to Sit with minimum assistance     Functional Mobility/Transfers:  · Patient completed Sit <> Stand Transfer with moderate assistance  with  rolling walker and cueing for hand placement    · Patient completed Toilet Transfer Step Transfer technique with moderate assistance with  rolling walker  · Functional Mobility: Pt ambulated ~8 small steps from EOB to BSC w/ Mod A and RW. She then ambulated another ~10-12 small steps from BSC to bedside chair w/ Mod A and RW. No signs of LOB noted.     Activities of Daily Living:  · Grooming: maximal assistance to apply lotion; daughter applied to pt's back. Pt given amount of lotion in hand and required Max cueing to apply to LE   · Toileting: total assistance for lulu-hygiene       [facility] 6 Click ADL: 10    Treatment & Education:  - OT POC  - Importance of OOB activity to maximize recovery   - safety w/ functional mobility; cues for hand placement to ensure safe transfers  - cues for breathing during activity  - cues in standing to obtain and maintain upright standing posture    Patient left up in chair with all lines intact, call button in reach, RN notified and daughter presentEducation:      GOALS:   Multidisciplinary Problems     Occupational Therapy Goals        Problem: Occupational Therapy Goal    Goal Priority Disciplines Outcome  Interventions   Occupational Therapy Goal     OT, PT/OT Ongoing (interventions implemented as appropriate)    Description:  Goals to be met by: 7/20/2019     Patient will increase functional independence with ADLs by performing:    UE Dressing with Minimal Assistance.  LE Dressing with Maximum Assistance.  Grooming while seated with Minimal Assistance.  Toileting from bedside commode with Maximum Assistance for hygiene and clothing management.   Toilet transfer to bedside commode with Moderate Assistance. -- Met (7/8)                       Time Tracking:     OT Date of Treatment: 07/08/19  OT Start Time: 1037  OT Stop Time: 1128  OT Total Time (min): 51 min     Time allowed for obtaining Occupational Profile from daughter and sitting on BSC: 11    Billable Minutes:Self Care/Home Management 25  Therapeutic Activity 15    Uma Felder OT  7/8/2019

## 2019-07-08 NOTE — NURSING
Hospital medicine O paged and notified daughter would like to speak to MD in reference to her medication . Patient is scheduled for transferred at 345 pm.

## 2019-07-08 NOTE — PLAN OF CARE
Problem: Adult Inpatient Plan of Care  Goal: Plan of Care Review  Outcome: Ongoing (interventions implemented as appropriate)  Pt AAOx4, VSS, No falls noted, fall precautions remain. Pain assessed, pain controlled with PRN regimen, pt comfortable, frequent rounds made for safety and patient care. Call light within reach, bed wheels locked, bed in lowest position, side rails ^x2, safety maintained. NADN, Will continue monitor.

## 2019-07-08 NOTE — PLAN OF CARE
Problem: Occupational Therapy Goal  Goal: Occupational Therapy Goal  Goals to be met by: 7/20/2019     Patient will increase functional independence with ADLs by performing:    UE Dressing with Minimal Assistance.  LE Dressing with Maximum Assistance.  Grooming while seated with Minimal Assistance.  Toileting from bedside commode with Maximum Assistance for hygiene and clothing management.   Toilet transfer to bedside commode with Moderate Assistance. -- Met (7/8)     Outcome: Ongoing (interventions implemented as appropriate)  Goals remain appropriate    Comments: Cont OT POC

## 2019-07-08 NOTE — PLAN OF CARE
Patient to be discharged to [facility].  Care deferred to [facility].  Patient to be transported via wheelchair van provided per [facility].         07/08/19 1447   Final Note   Assessment Type Final Discharge Note   Anticipated Discharge Disposition SNF   Hospital Follow Up  Appt(s) scheduled? No   Discharge plans and expectations educations in teach back method with documentation complete? Yes

## 2019-07-09 LAB
ANION GAP SERPL CALC-SCNC: 10 MMOL/L (ref 8–16)
BACTERIA BLD CULT: ABNORMAL
BACTERIA BLD CULT: NORMAL
BACTERIA BLD CULT: NORMAL
BASOPHILS # BLD AUTO: 0.02 K/UL (ref 0–0.2)
BASOPHILS NFR BLD: 0.3 % (ref 0–1.9)
BUN SERPL-MCNC: 14 MG/DL (ref 8–23)
CALCIUM SERPL-MCNC: 8.8 MG/DL (ref 8.7–10.5)
CHLORIDE SERPL-SCNC: 102 MMOL/L (ref 95–110)
CO2 SERPL-SCNC: 26 MMOL/L (ref 23–29)
CREAT SERPL-MCNC: 0.6 MG/DL (ref 0.5–1.4)
DIFFERENTIAL METHOD: ABNORMAL
EOSINOPHIL # BLD AUTO: 0.6 K/UL (ref 0–0.5)
EOSINOPHIL NFR BLD: 9.1 % (ref 0–8)
ERYTHROCYTE [DISTWIDTH] IN BLOOD BY AUTOMATED COUNT: 17.2 % (ref 11.5–14.5)
EST. GFR  (AFRICAN AMERICAN): >60 ML/MIN/1.73 M^2
EST. GFR  (NON AFRICAN AMERICAN): >60 ML/MIN/1.73 M^2
GLUCOSE SERPL-MCNC: 80 MG/DL (ref 70–110)
HCT VFR BLD AUTO: 30.3 % (ref 37–48.5)
HGB BLD-MCNC: 8.9 G/DL (ref 12–16)
IMM GRANULOCYTES # BLD AUTO: 0.02 K/UL (ref 0–0.04)
IMM GRANULOCYTES NFR BLD AUTO: 0.3 % (ref 0–0.5)
LYMPHOCYTES # BLD AUTO: 2.4 K/UL (ref 1–4.8)
LYMPHOCYTES NFR BLD: 36.1 % (ref 18–48)
MCH RBC QN AUTO: 21.6 PG (ref 27–31)
MCHC RBC AUTO-ENTMCNC: 29.4 G/DL (ref 32–36)
MCV RBC AUTO: 74 FL (ref 82–98)
MONOCYTES # BLD AUTO: 0.7 K/UL (ref 0.3–1)
MONOCYTES NFR BLD: 9.7 % (ref 4–15)
NEUTROPHILS # BLD AUTO: 3 K/UL (ref 1.8–7.7)
NEUTROPHILS NFR BLD: 44.5 % (ref 38–73)
NRBC BLD-RTO: 0 /100 WBC
PLATELET # BLD AUTO: 357 K/UL (ref 150–350)
PMV BLD AUTO: 10.3 FL (ref 9.2–12.9)
POTASSIUM SERPL-SCNC: 3.8 MMOL/L (ref 3.5–5.1)
RBC # BLD AUTO: 4.12 M/UL (ref 4–5.4)
SODIUM SERPL-SCNC: 138 MMOL/L (ref 136–145)
WBC # BLD AUTO: 6.7 K/UL (ref 3.9–12.7)

## 2019-07-09 PROCEDURE — 63600175 PHARM REV CODE 636 W HCPCS: Performed by: HOSPITALIST

## 2019-07-09 PROCEDURE — 97162 PT EVAL MOD COMPLEX 30 MIN: CPT

## 2019-07-09 PROCEDURE — 97110 THERAPEUTIC EXERCISES: CPT

## 2019-07-09 PROCEDURE — 85025 COMPLETE CBC W/AUTO DIFF WBC: CPT

## 2019-07-09 PROCEDURE — 97535 SELF CARE MNGMENT TRAINING: CPT

## 2019-07-09 PROCEDURE — 97116 GAIT TRAINING THERAPY: CPT

## 2019-07-09 PROCEDURE — 25000242 PHARM REV CODE 250 ALT 637 W/ HCPCS: Performed by: HOSPITALIST

## 2019-07-09 PROCEDURE — 97530 THERAPEUTIC ACTIVITIES: CPT

## 2019-07-09 PROCEDURE — 36415 COLL VENOUS BLD VENIPUNCTURE: CPT

## 2019-07-09 PROCEDURE — 94640 AIRWAY INHALATION TREATMENT: CPT

## 2019-07-09 PROCEDURE — 97802 MEDICAL NUTRITION INDIV IN: CPT

## 2019-07-09 PROCEDURE — 25000003 PHARM REV CODE 250: Performed by: NURSE PRACTITIONER

## 2019-07-09 PROCEDURE — 80048 BASIC METABOLIC PNL TOTAL CA: CPT

## 2019-07-09 PROCEDURE — 94761 N-INVAS EAR/PLS OXIMETRY MLT: CPT

## 2019-07-09 PROCEDURE — 97165 OT EVAL LOW COMPLEX 30 MIN: CPT

## 2019-07-09 PROCEDURE — 11000004 HC SNF PRIVATE

## 2019-07-09 PROCEDURE — 25000003 PHARM REV CODE 250: Performed by: HOSPITALIST

## 2019-07-09 RX ORDER — CHLORTHALIDONE 25 MG/1
25 TABLET ORAL DAILY
Status: DISCONTINUED | OUTPATIENT
Start: 2019-07-10 | End: 2019-07-26 | Stop reason: HOSPADM

## 2019-07-09 RX ADMIN — MONTELUKAST 10 MG: 10 TABLET, FILM COATED ORAL at 10:07

## 2019-07-09 RX ADMIN — RISPERIDONE 0.25 MG: 0.25 TABLET ORAL at 10:07

## 2019-07-09 RX ADMIN — LEVETIRACETAM 500 MG: 500 SOLUTION ORAL at 02:07

## 2019-07-09 RX ADMIN — SENNOSIDES,DOCUSATE SODIUM 1 TABLET: 8.6; 5 TABLET, FILM COATED ORAL at 09:07

## 2019-07-09 RX ADMIN — DOXYCYCLINE HYCLATE 100 MG: 100 TABLET, COATED ORAL at 08:07

## 2019-07-09 RX ADMIN — AMLODIPINE BESYLATE 5 MG: 5 TABLET ORAL at 08:07

## 2019-07-09 RX ADMIN — IPRATROPIUM BROMIDE AND ALBUTEROL SULFATE 3 ML: .5; 3 SOLUTION RESPIRATORY (INHALATION) at 07:07

## 2019-07-09 RX ADMIN — IPRATROPIUM BROMIDE AND ALBUTEROL SULFATE 3 ML: .5; 3 SOLUTION RESPIRATORY (INHALATION) at 04:07

## 2019-07-09 RX ADMIN — ENOXAPARIN SODIUM 40 MG: 100 INJECTION SUBCUTANEOUS at 04:07

## 2019-07-09 RX ADMIN — IPRATROPIUM BROMIDE AND ALBUTEROL SULFATE 3 ML: .5; 3 SOLUTION RESPIRATORY (INHALATION) at 02:07

## 2019-07-09 RX ADMIN — LEVETIRACETAM 500 MG: 500 SOLUTION ORAL at 09:07

## 2019-07-09 RX ADMIN — CETIRIZINE HYDROCHLORIDE 10 MG: 5 TABLET ORAL at 08:07

## 2019-07-09 RX ADMIN — LEVETIRACETAM 500 MG: 500 SOLUTION ORAL at 08:07

## 2019-07-09 RX ADMIN — RISPERIDONE 0.12 MG: 1 SOLUTION ORAL at 08:07

## 2019-07-09 RX ADMIN — ESCITALOPRAM OXALATE 5 MG: 5 TABLET, FILM COATED ORAL at 08:07

## 2019-07-09 RX ADMIN — DOXYCYCLINE HYCLATE 100 MG: 100 TABLET, COATED ORAL at 10:07

## 2019-07-09 RX ADMIN — ASPIRIN 81 MG CHEWABLE TABLET 81 MG: 81 TABLET CHEWABLE at 08:07

## 2019-07-09 RX ADMIN — FERROUS SULFATE TAB EC 325 MG (65 MG FE EQUIVALENT) 325 MG: 325 (65 FE) TABLET DELAYED RESPONSE at 08:07

## 2019-07-09 RX ADMIN — SENNOSIDES,DOCUSATE SODIUM 1 TABLET: 8.6; 5 TABLET, FILM COATED ORAL at 08:07

## 2019-07-09 RX ADMIN — CHLORTHALIDONE 12.5 MG: 25 TABLET ORAL at 08:07

## 2019-07-09 RX ADMIN — CHLORTHALIDONE 12.5 MG: 25 TABLET ORAL at 04:07

## 2019-07-09 NOTE — PT/OT/SLP EVAL
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    EYE SURGERY      JOINT REPLACEMENT      OPEN REDUCTION INTERNAL FIXATION-FEMUR - Periprosthetic supracondylar 6/5/15 Right 6/5/2015    Performed by Joshua Banks MD at [facility] OR 2ND [facility]    TOTAL KNEE ARTHROPLASTY  Left         General Precautions: Standard, fall, aspiration, seizure dysphagia diet  Orthopedic Precautions: N/A  Braces: N/A    Spiritual, Cultural Beliefs, Restorationism Practices, Values that Affect Care: yes(Jainism)     Patient History: Pt lives in [place] [facility] [place] [facility] with daughter and grandchildren. The home has a ramp entrance and walk in shower. Pt's daughter is home and available to help after D/C. Pt enjoys watching tv and talking to people.   Equipment Currently Used at Home: hospital bed, lift device, bedside commode, walker, rolling, shower chair    Prior level of function:  Pt received assistance from serinaer with ADLs, t/fs, and walking. Pt typically ambulates with RW for short household distances.        Pt history and PLOF were collected from pt's daughter.    Dominant hand: right    Subjective:  Communicated with RN prior to session.  Pt had her eyes closed majority of session and was resisting all movement OT was performing  Chief Complaint:  fatigue  Patient/Family stated goals: to get her up    Pain/Comfort  Pain Rating 1: (grunted implying yes. Unable to identify location/scale)    Objective:   Patient found with: (supine in bed)    Cognitive Exam:  Oriented to: Orientation was unable to be tested bc pt was not communicating  Follows Commands/attention: Inattentive  Communication: clear/fluent but chose not to speak  Memory:  No Deficits noted  Safety awareness/insight to disability: intact  Coping skills/emotional control: became angry when moved    Visual/perceptual:  Intact    Physical Exam:  Postural examination/scapula alignment:    -       No postural abnormalities identified  Skin integrity: Wound on the buttocks and Dry  Edema: Severe on LUE    Sensation: unable to be tested due to pt not responding       Upper Extremity Range of Motion:  Right Upper Extremity: not tested- pt resists all movement  Left Upper Extremity: not tested- pt resists all movement    Upper Extremity Strength:  Right Upper Extremity: not tested- pt resists all movement  Left Upper Extremity: not tested- pt resists all movement   Strength: not tested- pt resists all movement    Fine motor coordination: not tested- pt resists all movement       Gross motor coordination: not tested    Occupational Performance:    Bed Mobility:    · Patient completed Rolling/Turning to Left with  total assistance X2  · Patient completed Rolling/Turning to Right with total assistance X2  · Patient completed Scooting/Bridging with total assistance X2  · Patient completed Supine to Sit with total assistance X2  · Patient completed Sit to Supine with total assistance X2    Functional Mobility/Transfers:  · Patient completed Sit <> Stand Transfer with maximal assistance  with  assist of 2   · Functional Mobility: not performed    Activities of Daily Living:  · Bathing: total assistance to spongebath on EOB  · Upper Body Dressing: total assistance to don shirt  · Toileting: total assistance X2  for changing diaper supine in bed    [facility] 6 Click:  [facility] Total Score: 11    OT Exercises: not performed    Additional Treatment:  Pt moved to HOB Total A X2 with drawsheet  Pt educated on importance of being OOB  Family educated on positioning of pt. To maintain skin integrity  OT requested nursing to order positioning boots as well as wedge    Patient left supine with call button in reach and daughter present. [employer] camera called.    Assessment:  Susanna Byrnes is a 84 y.o. female with a medical diagnosis of sepsis/deconditioning. Pt presents with weakness, fatigue, and low endurance which limits her functional mobility, t/f ability, and ADL performance. Pt was nonverbal for majority of session and kept her eyes closed. Pt tolerated session poorly and resisted all movement. Pt performed 1 sit to stand Total A X2 and threw herself back so OT deemed it unsafe to continue. Pt's daughter present and mentioned that she is able to speak and help but it may be a bad time of the day. Pt presents with weakness, impaired endurance, impaired self care skills, impaired functional mobilty, gait instability, impaired balance, decreased upper extremity function, decreased lower extremity function, edema, impaired skin. Pt would benefit from OT to address ADL performance, functional mobility, and endurance.    Rehab identified problem list/impairments: weakness, impaired endurance, impaired self care skills, impaired functional mobilty, gait instability, impaired balance, decreased upper extremity function, decreased lower extremity function, edema, impaired skin    Rehab potential is fair    Activity tolerance: Poor    Discharge recommendations: home health OT  With physical assist    Barriers to discharge: None     Equipment recommendations: wheelchair, manual, tub bench     GOALS:   Multidisciplinary Problems     Occupational Therapy Goals        Problem: Occupational Therapy Goal    Goal Priority Disciplines Outcome  Interventions   Occupational Therapy Goal     OT, PT/OT     Description:  Goals to be met by: 7/22/19     Patient will increase functional independence with ADLs by performing:    UE Dressing with Stand-by Assistance.  LE Dressing with Moderate Assistance.  Grooming while seated at sink with Stand-by Assistance.  Toileting from toilet with Maximum Assistance for hygiene and clothing management.   Bathing from  sitting at sink with Moderate Assistance.  Sitting at edge of bed x5 minutes with Stand-by Assistance.  Rolling to Bilateral with Moderate Assistance.   Supine to sit with Moderate Assistance.  Stand pivot transfers with Moderate Assistance.  Toilet transfer to toilet with Moderate Assistance.  Upper extremity exercise program x20 reps per handout, with independence.                      PLAN: Patient to be seen 5 x/week to address the above listed problems via self-care/home management, therapeutic activities, therapeutic exercises  Plan of Care expires: 08/08/19  Plan of Care reviewed with: patient, daughter    Mouna Lujan, SOT  07/09/2019     I certify that I was present in the room directing the student in service delivery and guiding them using my skilled judgment. As the co-signing therapist I have reviewed the students documentation and am responsible for the treatment, assessment, and plan.

## 2019-07-09 NOTE — CLINICAL REVIEW
Clinical Pharmacy Chart Review Note      Admit Date: 7/8/2019   LOS: 1 day       Susanna Byrnes is a 84 y.o. female admitted to SNF for PT/OT after hospitalization for Acute pyelonephritis.    Active Hospital Problems    Diagnosis  POA    Sepsis [A41.9]  Yes      Resolved Hospital Problems   No resolved problems to display.     Review of patient's allergies indicates:   Allergen Reactions    Lisinopril Other (See Comments)     ANGIOEDEMA    Haldol [haloperidol lactate]      Patient Active Problem List    Diagnosis Date Noted    Sepsis 07/08/2019    Acute pyelonephritis 07/07/2019    Mild malnutrition 07/02/2019    Acute cystitis without hematuria 07/02/2019    Hypertensive emergency 03/10/2018    Breakthrough seizure 03/09/2018    Seizure 03/09/2018    Coronary artery disease involving native coronary artery 12/02/2015    Convulsion 06/18/2015    Benzodiazepine withdrawal 06/12/2015    Periprosthetic fracture around internal prosthetic right knee joint-s/p right distal femur ORIF 6/5/15 06/05/2015    Fall     Fall     Ischemic cardiomyopathy 08/27/2014    UTI (urinary tract infection) 04/28/2014    Chronic combined systolic and diastolic CHF (congestive heart failure) 04/26/2014    Delirium 08/05/2013    Hypoalbuminemia 08/03/2013    Anemia of other chronic disease 08/03/2013    Acute delirium 08/02/2013    Dementia without behavioral disturbance 08/02/2013    At risk for falling 02/28/2013    Debility 01/28/2013    Knee pain 01/26/2013    Total knee replacement status 01/26/2013    Deep vein thrombosis prophylaxis 01/26/2013    Osteoarthritis of knee 01/23/2013    Seizure disorder 01/22/2013    Chronic anemia 01/22/2013    Hypomagnesemia 01/22/2013    Arthritis of knee 01/21/2013    Essential hypertension 01/04/2013       Scheduled Meds:    albuterol-ipratropium  3 mL Nebulization Q6H WAKE    amLODIPine  5 mg Oral Daily    aspirin  81 mg Oral Daily    cetirizine  10 mg Oral  Daily    chlorthalidone  12.5 mg Oral Daily    doxycycline  100 mg Oral Q12H    enoxaparin  40 mg Subcutaneous Daily    escitalopram oxalate  5 mg Oral Daily    ferrous sulfate  325 mg Oral Daily    levetiracetam oral soln  500 mg Oral TID    montelukast  10 mg Oral QHS    risperidone 1 mg/ml  0.12 mg Oral Daily    risperiDONE  0.25 mg Oral QHS    senna-docusate 8.6-50 mg  1 tablet Oral BID     Continuous Infusions:   PRN Meds: acetaminophen, acetaminophen, acetaminophen, albuterol-ipratropium, benzonatate, calcium carbonate, fluticasone propionate, guaiFENesin, LORazepam, mirtazapine, ramelteon, sodium chloride 0.9%, triamcinolone acetonide 0.1%    OBJECTIVE:     Vital Signs (Last 24H)  Temp:  [98.4 °F (36.9 °C)-98.8 °F (37.1 °C)]   Pulse:  [66-80]   Resp:  [16-19]   BP: (144-161)/(68-70)   SpO2:  [93 %-97 %]     Laboratory:  CBC:   Recent Labs   Lab 07/07/19  0835 07/08/19  0403 07/09/19  0752   WBC 7.48 6.55 6.70   RBC 4.21 4.12 4.12   HGB 9.2* 9.0* 8.9*   HCT 30.8* 29.7* 30.3*    345 357*   MCV 73* 72* 74*   MCH 21.9* 21.8* 21.6*   MCHC 29.9* 30.3* 29.4*     BMP:   Recent Labs   Lab 07/07/19  0835 07/08/19  0403 07/09/19  0752   GLU 82 73 80    142 138   K 3.8 3.9 3.8    105 102   CO2 26 28 26   BUN 11 14 14   CREATININE 0.5 0.6 0.6   CALCIUM 8.8 8.9 8.8     CMP:   Recent Labs   Lab 07/02/19  1900  07/07/19  0835 07/08/19  0403 07/09/19  0752      < > 82 73 80   CALCIUM 8.7   < > 8.8 8.9 8.8   ALBUMIN 2.6*  --   --   --   --    PROT 7.3  --   --   --   --       < > 140 142 138   K 3.7   < > 3.8 3.9 3.8   CO2 26   < > 26 28 26   CL 99   < > 106 105 102   BUN 8   < > 11 14 14   CREATININE 0.6   < > 0.5 0.6 0.6   ALKPHOS 101  --   --   --   --    ALT 10  --   --   --   --    AST 20  --   --   --   --    BILITOT 0.2  --   --   --   --     < > = values in this interval not displayed.     LFTs:   Recent Labs   Lab 07/02/19  1900   ALT 10   AST 20   ALKPHOS 101   BILITOT 0.2    PROT 7.3   ALBUMIN 2.6*     Coagulation: No results for input(s): PT, INR, APTT in the last 168 hours.  Cardiac markers: No results for input(s): CKMB, TROPONINT, MYOGLOBIN in the last 168 hours.  ABGs: No results for input(s): PH, PCO2, PO2, HCO3, POCSATURATED, BE in the last 168 hours.  Microbiology Results (last 7 days)     ** No results found for the last 168 hours. **        Specimen (12h ago, onward)    None        Recent Labs   Lab 07/02/19  1738   COLORU Yellow   SPECGRAV 1.015   PHUR 7.0   PROTEINUA Negative   BACTERIA Occasional   NITRITE Positive*   LEUKOCYTESUR 3+*     Others: No results for input(s): [id number], TSH, T4FREE, LABLIPI in the last 168 hours.    Invalid input(s): A1C    ASSESSMENT/PLAN:      Debility  --PT/OT  --acetaminophen 650 mg q4-8h prn pain; Maximum dose is 3000 mg from all sources in 24 hours  --bowel regimen for constipation; hold for loose or frequent stools  --DVT prophylaxis: lovenox 40 mg daily    Acute pyelonephritis  --doxycycline 100 mg BID day 8 of 14 (estimated end 7/15/19)   Monitor CBC, CMP    Essential hypertension  --amlodipine 5 mg daily, chlorthalidone 12.5 mg daily  Goal < 130/80  Monitor BP, electrolytes     Seizure disorder  --levetiracetam 500 mg TID    Chronic cough  --benzonatate 100 mg TID prn cough, cetirizine 10 mg daily, montelukast 10 mg PO qHS   -- fluticasone 50 mcg/ actuation daily prn other, guaifenesin  mg daily prn allergies; please clarify prn indications   --albuterol-ipratropium 2.5 mg-0.5mg/3mL q6h while awake; consider weaning as tolerated      Anemia of other chronic disease/Chronic anemia  --ferrous sulfate  mg daily  Lab Results   Component Value Date    HGB 8.9 (L) 07/09/2019       Delirium/Dementia without behavioral disturbance  --risperidone 0.12 mg daily and 0.25 mg nightly (home medication)   --escitalopram 5 mg daily  --mirtazapine 15 mg qHS prn insomnia, ramelteon 8 mg qHS prn insomnia; duplicate  therapy  Monitor QTc 412 (7/2/19), BP, mental status, CBC, BMP, parkinsonian signs    Chronic combined systolic and diastolic CHF   Follow-up with cardiology outpatient (LVEF 25-30%)   Monitor volume status     Benzodiazepine withdrawal  --lorazepam 0.5 mg q6h prn anxiety (home medication); consider weaning off     Coronary artery disease involving native coronary artery  --aspirin 81 mg daily      Other:  --triamcinolone 0.1% cream daily prn itching   --calcium carbonate 500 mg BID prn indigestion   --sodium chloride 0.9% flush prn; discontinue- therapy completed      I have reviewed the medications in compliance with CMS Regulation F329 of the [place] Appendix [place].      Kate Ferguson, PharmD, BCPS  [id number]

## 2019-07-09 NOTE — PROGRESS NOTES
[facility]                                  Skilled Nursing Facility                   Progress Note   DOS 7/9/2019  Admit Date: 7/8/2019  LETICIA   Principal Problem:  UTI (urinary tract infection)   HPI obtained from patient interview and chart review     Chief Complaint: Establish Care/ Initial Visit     HPI: Ms Byrnes is an 84 female with sig pmhx of dementia, HTN, HLD, CAD/NSTEMI('14), chronic sCHF/ICMP(LVEF 25-30), seizure, OA, suspected COPD, anemia, and MDR UTIs(Ecoli in '18 and '15, e faecalis '14, pseudomonas and enterobacter '12) who presents to SNF s/p hospitalization for fever likely 2/2 pyelonephritis. Patient currently with no complaints of dysuria at this time and voiding without difficulty. All labs reviewed. No leukocytosis. Hemoglobin stable at 8.9.  Platelets stable at 357, trending biweekly. Sodium at 138. Potasium 3.8. Creatinine stable at 0.6. 24 hr blood glucose 80. Patient's current blood pressure is at 161/70, increased diuretic, continue to trend. Patient progessing well with PT/OT. Patient medically stable. Continuing to follow and treat all acute and chronic conditions.    Patient will be treated at [facility] with PT and OT to improve functional status and ability to perform ADLs.     PEx  Constitutional: Patient appears well-developed and in no distress   HENT:   Head: Normocephalic and atraumatic.   Eyes: Pupils are equal, round, and reactive to light.   Neck: Normal range of motion. Neck supple.   Cardiovascular: Normal rate, regular rhythm and normal heart sounds.    Pulmonary/Chest: Effort normal and breath sounds are clear  Abdominal: Soft. Bowel sounds are normal.   Musculoskeletal: Normal range of motion. + generalized weakness. + RUE weakness  Neurological: Alert and oriented to person, place, and time.   Skin: Skin is warm and dry.   Psychiatric: Normal mood and affect. Behavior is normal.     Temp:  [98.4 °F (36.9 °C)-98.8 °F (37.1 °C)]   Pulse:   [66-80]   Resp:  [16-19]   BP: (144-161)/(68-70)   SpO2:  [93 %-97 %]   Body mass index is 34.37 kg/m².     ROS  Constitutional: Negative for fever and malaise/fatigue.   Eyes: Negative for blurred vision, double vision and discharge.   Respiratory: Negative for cough, shortness of breath and wheezing.    Cardiovascular: Negative for chest pain, palpitations, claudication, and leg swelling.   Gastrointestinal: Negative for abdominal pain, constipation, diarrhea, nausea and vomiting.   Genitourinary: Negative for dysuria, frequency and urgency.   Musculoskeletal:  + generalized weakness. Negative for back pain and myalgias.   Skin: Negative for itching and rash.  Neurological: Negative for dizziness, speech change, seizures, and headaches.   Psychiatric/Behavioral: Negative for depression. The patient is not nervous/anxious.      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    Past Surgical History: Patient has a past surgical history that includes Total knee arthroplasty (Left); Eye surgery; and Joint replacement.    Social History: Patient reports that she quit smoking about 7 years ago. Her smoking use included cigarettes. She has quit using smokeless tobacco. She reports that she does not drink alcohol or use drugs.    Family History: family history includes Dementia in her brother; Seizures in her brother and sister.    Allergies: Patient is allergic to lisinopril and haldol [haloperidol lactate].      Assessment and Plan:    UTI / pyelonephritis  Sepsis  -E coli sens CTX and doxycycline  -CTX IV to Doxy PO to complete 14 days total of ABx (for pyelo)   -Continue doxycycline     Debility  -Continue with PT/OT for gait training and strengthening and restoration of ADL's   -Encourage mobility, OOB in chair, and  early ambulation as appropriate  -Fall precautions   -Monitor for bowel and bladder dysfunction  -Monitor for and prevent skin breakdown and pressure ulcers  -Continue DVT prophylaxis with lovenox    Malnutrition mild  -7/9 Initiated nutrition consult      Chronic combined systolic and diastolic CHF  -continue chlorthalidone  -7/9 Initiated ambulatory referral to cardiology      Dementia without behavioral disturbance  -Follows with Dr. Benita Melchor with [facility], last seen 6/24.  -Delirium precautions  -Continue Lexapro / Remeron / Risperidone     Chronic anemia  -Pt with chronic microcytic anemia  -continue ferrous sulfate.     Seizure disorder  -Continue leviteracitam      Essential hypertension  -continue amlodipine  -7/9 Increased chlorthalidone to 25 mg daily        No future appointments.    I certify that SNF services are required to be given on an inpatient basis because Susanna Byrnes needs for skilled nursing care and/or skilled rehabilitation are required on a daily basis and such services can only practically be provided in a skilled nursing facility setting and are for an ongoing condition for which she received inpatient care in the hospital.     Time (minutes) spent in the care of the patient (Greater than 1/2 spent in non direct face-to-face contact) 70 mins.   36 of 70 minutes spent on documentation and counseling patient on clinical condition and therapies provided regarding UTI, debility, heart failure, and htn. The remainder of the time was spent in direct patient care.     Serafin Gavin NP

## 2019-07-09 NOTE — PLAN OF CARE
Problem: Occupational Therapy Goal  Goal: Occupational Therapy Goal  Goals to be met by: 7/22/19     Patient will increase functional independence with ADLs by performing:    UE Dressing with Stand-by Assistance.  LE Dressing with Moderate Assistance.  Grooming while seated at sink with Stand-by Assistance.  Toileting from toilet with Maximum Assistance for hygiene and clothing management.   Bathing from  sitting at sink with Moderate Assistance.  Sitting at edge of bed x5 minutes with Stand-by Assistance.  Rolling to Bilateral with Moderate Assistance.   Supine to sit with Moderate Assistance.  Stand pivot transfers with Moderate Assistance.  Toilet transfer to toilet with Moderate Assistance.  Upper extremity exercise program x20 reps per handout, with independence.    Evaluation complete.    KINJAL Tatum    I certify that I was present in the room directing the student in service delivery and guiding them using my skilled judgment. As the co-signing therapist I have reviewed the students documentation and am responsible for the treatment, assessment, and plan.

## 2019-07-09 NOTE — PLAN OF CARE
Problem: Adult Inpatient Plan of Care  Goal: Plan of Care Review  Outcome: Ongoing (interventions implemented as appropriate)  Care plan  Reviewed with daughter and Patient. Intervention documented on MAR and flow sheet No c/o pain. Assist with turning and reposition q2h. No acute distress noted. Call light in reach

## 2019-07-09 NOTE — CONSULTS
[facility] - Skilled Nursing Care  Adult Nutrition  Consult Note    SUMMARY   Recommendations    Recommendation/Intervention: Continue dysphagia soft diet level 6, Boost plus TID chocolate, Assist with set up , RD following  Goals: PO to meet 85% of EEN with ONS by next RD visit  Nutrition Goal Status: new  Communication of RD Recs: other (comment)(POC)    Reason for Assessment    Reason For Assessment: consult  Diagnosis: (Sepsis, Debility)  Relevant Medical History: acute polynephrtis, HTNm arthritis, suspected COPD, seizures, cystitis, NSTEMI, HLD, Demenia  Interdisciplinary Rounds: did not attend  General Information Comments: pt snacking at bedside, no family present. lives with daughter, wants chocolate boost plus on trays, PO 75% pt denies wt loss, no indication of wt loss in chart review, Obese, Mild edema, drinks boost at home 1-2 servings per day, pt states she is alone during the day, eats two meals per day  Nutrition Discharge Planning: DC on ONS of choice bid, Soft diet     Nutrition Risk Screen    Nutrition Risk Screen: dysphagia or difficulty swallowing    Nutrition/Diet History  Patient Reported Diet/Restrictions/Preferences: soft  Typical Food/Fluid Intake: coffee, milk, oatmeal, some eggs  Food Preferences: no pork no spaghetti, soft food  Spiritual, Cultural Beliefs, Voodoo Practices, Values that Affect Care: no  Supplemental Drinks or Food Habits: Boost Plus  Food Allergies: NKFA  Factors Affecting Nutritional Intake: impaired cognitive status/motor control, difficulty/impaired swallowing    Anthropometrics    Temp: 98.7 °F (37.1 °C)  Height: 5' (152.4 cm)  Height (inches): 60 in  Weight: 79.8 kg (175 lb 14.8 oz)  Weight (lb): 175.93 lb  Ideal Body Weight (IBW), Female: 100 lb  % Ideal Body Weight, Female (lb): 175.93 lb  BMI (Calculated): 34.4  BMI Grade: 30 - 34.9- obesity - grade I  Usual Body Weight (UBW), k.5 kg  % Usual Body Weight: 100.59  % Weight Change From Usual Weight: 0.38  %     Lab/Procedures/Meds    Pertinent Labs Reviewed: reviewed  Pertinent Labs Comments: Hg 8.9 Hct, 30.3  Pertinent Medications Reviewed: reviewed  Pertinent Medications Comments: ASA, Abx, ferrous sulfate, senna-docusate,     Estimated/Assessed Needs  Weight Used For Calorie Calculations: 79.8 kg (175 lb 14.8 oz)  Energy Calorie Requirements (kcal): 1461  Energy Need Method: [place]-St Jeor(x 1.25(PAL))  Protein Requirements:   Weight Used For Protein Calculations: 79.8 kg (175 lb 14.8 oz)(1.2-1.3 gm /kg)  Fluid Requirements (mL): per MD  Estimated Fluid Requirement Method: RDA Method  RDA Method (mL): 1461  CHO Requirement: -    Nutrition Prescription Ordered    Current Diet Order: Dysphagia soft level 6  Oral Nutrition Supplement: Boost plus TID    Evaluation of Received Nutrient/Fluid Intake  Energy Calories Required: meeting needs  Protein Required: meeting needs  Fluid Required: meeting needs  Tolerance: tolerating  % Intake of Estimated Energy Needs: 75 - 100 %  % Meal Intake: 75 - 100 %    Nutrition Risk    Level of Risk/Frequency of Follow-up: low     Assessment and Plan  Increased nutrient needs(calories and protein) related to hypermetabolism as evidenced by sepsis.  New     Monitor and Evaluation  Food and Nutrient Intake: food and beverage intake  Food and Nutrient Adminstration: diet order  Physical Activity and Function: nutrition-related ADLs and IADLs  Anthropometric Measurements: weight change  Biochemical Data, Medical Tests and Procedures: glucose/endocrine profile, gastrointestinal profile, inflammatory profile, electrolyte and renal panel  Nutrition-Focused Physical Findings: overall appearance     Malnutrition Assessment 7/9/19     Skin (Micronutrient): dry  Eyes (Micronutrient): conjunctiva dull  Teeth (Micronutrient): broken dentition(some missing)   Micronutrient Evaluation: suspected deficiency  Micronutrient Evaluation Comments: B vitamins, FE   Fluid Accumulation (Malnutrition):  mild   Orbital Region (Subcutaneous Fat Loss): mild depletion  Upper Arm Region (Subcutaneous Fat Loss): well nourished  Thoracic and Lumbar Region: well nourished   Islam Region (Muscle Loss): moderate depletion  Clavicle Bone Region (Muscle Loss): well nourished  Scapular Bone Region (Muscle Loss): well nourished   Edema (Fluid Accumulation): 2-->mild             Nutrition Follow-Up    RD Follow-up?: Yes

## 2019-07-09 NOTE — PLAN OF CARE
Problem: Adult Inpatient Plan of Care  Goal: Plan of Care Review  Outcome: Revised  Repositions max assist, barrier to buttocks, afebrile. Monitored for pain and safety. Safety camera in use. Denies pain

## 2019-07-09 NOTE — PLAN OF CARE
Problem: Adult Inpatient Plan of Care  Goal: Plan of Care Review  Outcome: Ongoing (interventions implemented as appropriate)   Increased nutrient needs(calories and protein) related to hypermetabolism as evidenced by sepsis.   New   Recommendation/Intervention: Continue dysphagia soft diet level 6, Boost plus TID chocolate, Assist with set up , RD  following      Goals: PO to meet 85% of EEN with ONS by next RD visit  Nutrition Goal Status: new

## 2019-07-09 NOTE — PLAN OF CARE
Problem: Physical Therapy Goal  Goal: Physical Therapy Goal  Goals to be met by: 2019    Patient will increase functional independence with mobility by performin. Supine to sit with MInimal Assistance  2. Sit to supine with MInimal Assistance  3. Rolling to Left and Right with Minimal Assistance.  4. Sit to stand transfer with Minimal Assistance  5. Bed to chair transfer with Minimal Assistance using Rolling Walker  6. Gait  x 30 feet with Minimal Assistance using Rolling Walker.   7. Wheelchair propulsion x100 feet with Stand-by Assistance using bilateral uppper extremities     Outcome: Ongoing (interventions implemented as appropriate)  PT eval completed. Pt will begin PT POC.    Nitin Julio, PIPER  2019

## 2019-07-09 NOTE — PT/OT/SLP EVAL
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 Jr[facility] MD at [facility] OR [address] [place] [address]    EYE SURGERY      JOINT REPLACEMENT      OPEN REDUCTION INTERNAL FIXATION-FEMUR - Periprosthetic supracondylar 6/5/15 Right 6/5/2015    Performed by Joshua Banks MD at [facility] OR [address] [place] [address]    TOTAL KNEE ARTHROPLASTY  Left         General Precautions: Standard, fall, aspiration, seizure  Orthopedic Precautions: N/A   Braces: N/A    Spiritual, Cultural Beliefs, Catholic Practices, Values that Affect Care: no    Patient History:  Lives With: child(kimberly), adult  Living Arrangements: house  Home Accessibility: wheelchair accessible  Home Layout: Able to live on 1st floor  Stair Railings at Home: none  Transportation Anticipated: family or friend will provide  Living Environment Comment: Pt lives with daughter in [address] with ramp entrance. Pt has walk-in shower and uses shower chair.  Equipment Currently Used at Home: hospital bed, lift device, bedside commode, walker, rolling, shower chair  DME owned (not currently used): none    Previous Level of Function:  Ambulation Skills: needs device and assist  Transfer Skills: needs device and assist  ADL Skills: needs device and assist  Work/Leisure Activity: needs device and assist   Pt required physical assistance from daughter PTA. Pt used  RW for household mobility.    Subjective:  Communicated with patient prior to session.  Pt agreeable to session    Chief Complaint: none reported  Patient goals: return home    Pain/Comfort  Pain Rating 1: (None reported)    Objective:  Patient found supine in bed.      Cognitive Exam:  Oriented to: Person  Follows Commands/attention: Follows one-step commands  Communication: dysarthria  Safety awareness/insight to disability: intact    Physical Exam:  Postural examination/scapula alignment: -       Rounded shoulders    Skin integrity: Visible skin intact  Edema: None noted    Sensation:   -       Impaired  light/touch BLE    Upper Extremity Range of Motion:  Right Upper Extremity: WFL except shoulder flexion AROM ~90 deg  Left Upper Extremity: WFL except shoulder flexion AROM ~90 deg    Upper Extremity Strength:  Right Upper Extremity: grossly 3-/5  Left Upper Extremity: grossly 3-/5    Lower Extremity Range of Motion:  Right Lower Extremity: WFL  Left Lower Extremity: WFL    Lower Extremity Strength:  Right Lower Extremity: grossly 3+/5  Left Lower Extremity: grossly 3+/5     Fine motor coordination:  -       Impaired: unable to perform  Left hand thumb/finger opposition skills and Right hand thumb/finger opposition skills    Gross motor coordination: WFL      AM-PAC 6 CLICK MOBILITY  Total Score:11    Bed Mobility:  Sit>Supine: Not performed  Supine>Sit: MaxA for BLE and trunk  Bed mobility performed in bed  Pt cued for technique and required increased time.  Pt cued to use side rail to assist, walk legs to EOB but pt unable to follow cues    Transfers:  Sit<>Stand: to/from w/c (x2 trials) c/ RW and Mod-MaxA x2 persons; to/from EOB (x1 trial) c/ RW and MaxA  Stand Pivot Transfer: bed to w/c c/ RW and MaxA x2 persons  Pt required constant cueing for transfer techniques for hand and foot placement and scooting.    Gait:  Amb x2 trials (14 ft, 8 ft) c/ RW and x1 person MaxA and x1 person CGA for safety  Decreased  step length and shuffling gait noted.  Pt cued for upright posture    Therex:  Seated, BLE 2x10:  -GS  -AP  -LAQ    Additional Treatment:  Pt educated on role of PT and POC.    Patient left up in chair with call button in reach.    Assessment:  Susanna Byrnes is a 84 y.o. female with a medical diagnosis of UTI (urinary tract infection). Pt александр session well. PTA pt required assistance from daughter for functional mobility and now requires Mod-MaxA for functional mobility due to identified impairments (stated below). Pt will benefit from skilled PT and begin POC.    Rehab identified problem list/impairments: weakness, impaired endurance, impaired sensation, impaired self care skills, impaired functional mobilty, gait instability, impaired balance, impaired cognition, decreased upper extremity function, decreased lower extremity function    Rehab potential is good.    Activity tolerance: Good    Discharge recommendations: home health PT     Barriers to discharge: None    Equipment recommendations: tub bench, wheelchair, manual     GOALS:   Multidisciplinary Problems     Physical Therapy Goals        Problem: Physical Therapy Goal    Goal Priority Disciplines Outcome Goal Variances Interventions   Physical Therapy Goal     PT, PT/OT Ongoing (interventions implemented as appropriate)     Description:  Goals to be met by: 2019    Patient will increase functional independence with mobility by performin. Supine to sit with MInimal Assistance  2. Sit to supine with MInimal Assistance  3. Rolling to Left and Right with Minimal Assistance.  4. Sit to stand transfer with Minimal Assistance  5. Bed to chair transfer with Minimal Assistance using Rolling Walker  6. Gait  x 30 feet with Minimal Assistance using Rolling Walker.   7. Wheelchair propulsion x100 feet with Stand-by Assistance using bilateral uppper extremities                      PLAN:    Patient to be seen 5 x/week  to address the above listed problems  via gait training, therapeutic activities, therapeutic exercises  Plan of Care Expires: 08/08/19    Nitin Julio, SPT 7/9/2019

## 2019-07-10 PROCEDURE — 94761 N-INVAS EAR/PLS OXIMETRY MLT: CPT

## 2019-07-10 PROCEDURE — 97116 GAIT TRAINING THERAPY: CPT

## 2019-07-10 PROCEDURE — 11000004 HC SNF PRIVATE

## 2019-07-10 PROCEDURE — 97535 SELF CARE MNGMENT TRAINING: CPT

## 2019-07-10 PROCEDURE — 25000003 PHARM REV CODE 250: Performed by: HOSPITALIST

## 2019-07-10 PROCEDURE — 25000242 PHARM REV CODE 250 ALT 637 W/ HCPCS: Performed by: HOSPITALIST

## 2019-07-10 PROCEDURE — 97530 THERAPEUTIC ACTIVITIES: CPT

## 2019-07-10 PROCEDURE — 63600175 PHARM REV CODE 636 W HCPCS: Performed by: HOSPITALIST

## 2019-07-10 PROCEDURE — 25000003 PHARM REV CODE 250: Performed by: NURSE PRACTITIONER

## 2019-07-10 PROCEDURE — 94640 AIRWAY INHALATION TREATMENT: CPT

## 2019-07-10 PROCEDURE — 97110 THERAPEUTIC EXERCISES: CPT

## 2019-07-10 PROCEDURE — 99900035 HC TECH TIME PER 15 MIN (STAT)

## 2019-07-10 RX ADMIN — IPRATROPIUM BROMIDE AND ALBUTEROL SULFATE 3 ML: .5; 3 SOLUTION RESPIRATORY (INHALATION) at 07:07

## 2019-07-10 RX ADMIN — IPRATROPIUM BROMIDE AND ALBUTEROL SULFATE 3 ML: .5; 3 SOLUTION RESPIRATORY (INHALATION) at 04:07

## 2019-07-10 RX ADMIN — SENNOSIDES,DOCUSATE SODIUM 1 TABLET: 8.6; 5 TABLET, FILM COATED ORAL at 09:07

## 2019-07-10 RX ADMIN — LEVETIRACETAM 500 MG: 500 SOLUTION ORAL at 10:07

## 2019-07-10 RX ADMIN — DOXYCYCLINE HYCLATE 100 MG: 100 TABLET, COATED ORAL at 10:07

## 2019-07-10 RX ADMIN — ESCITALOPRAM OXALATE 5 MG: 5 TABLET, FILM COATED ORAL at 09:07

## 2019-07-10 RX ADMIN — ASPIRIN 81 MG CHEWABLE TABLET 81 MG: 81 TABLET CHEWABLE at 09:07

## 2019-07-10 RX ADMIN — DOXYCYCLINE HYCLATE 100 MG: 100 TABLET, COATED ORAL at 09:07

## 2019-07-10 RX ADMIN — RAMELTEON 8 MG: 8 TABLET, FILM COATED ORAL at 10:07

## 2019-07-10 RX ADMIN — RISPERIDONE 0.25 MG: 0.25 TABLET ORAL at 10:07

## 2019-07-10 RX ADMIN — LORAZEPAM 0.5 MG: 0.5 TABLET ORAL at 05:07

## 2019-07-10 RX ADMIN — LEVETIRACETAM 500 MG: 500 SOLUTION ORAL at 03:07

## 2019-07-10 RX ADMIN — CETIRIZINE HYDROCHLORIDE 10 MG: 5 TABLET ORAL at 09:07

## 2019-07-10 RX ADMIN — ENOXAPARIN SODIUM 40 MG: 100 INJECTION SUBCUTANEOUS at 05:07

## 2019-07-10 RX ADMIN — CHLORTHALIDONE 25 MG: 25 TABLET ORAL at 09:07

## 2019-07-10 RX ADMIN — MONTELUKAST 10 MG: 10 TABLET, FILM COATED ORAL at 10:07

## 2019-07-10 RX ADMIN — CALCIUM CARBONATE (ANTACID) CHEW TAB 500 MG 500 MG: 500 CHEW TAB at 05:07

## 2019-07-10 RX ADMIN — SENNOSIDES,DOCUSATE SODIUM 1 TABLET: 8.6; 5 TABLET, FILM COATED ORAL at 10:07

## 2019-07-10 RX ADMIN — AMLODIPINE BESYLATE 5 MG: 5 TABLET ORAL at 09:07

## 2019-07-10 RX ADMIN — FERROUS SULFATE TAB EC 325 MG (65 MG FE EQUIVALENT) 325 MG: 325 (65 FE) TABLET DELAYED RESPONSE at 09:07

## 2019-07-10 NOTE — PLAN OF CARE
Problem: Fall Injury Risk  Goal: Absence of Fall and Fall-Related Injury    Intervention: Promote Injury-Free Environment     07/10/19 1748   Optimize [place] and Functional Mobility   Environmental Safety Modification assistive device/personal items within reach;clutter free environment maintained;mobility aid in reach;room organization consistent   Optimize Balance and Safe Activity   Safety Promotion/Fall Prevention assistive device/personal item within reach;Fall Risk reviewed with patient/family;in recliner, wheels locked;nonskid shoes/socks when out of bed

## 2019-07-10 NOTE — PLAN OF CARE
Problem: Occupational Therapy Goal  Goal: Occupational Therapy Goal  Goals to be met by: 7/22/19     Patient will increase functional independence with ADLs by performing:    UE Dressing with Stand-by Assistance.  LE Dressing with Moderate Assistance.  Grooming while seated at sink with Stand-by Assistance.  Toileting from toilet with Maximum Assistance for hygiene and clothing management.   Bathing from  sitting at sink with Moderate Assistance.  Sitting at edge of bed x5 minutes with Stand-by Assistance.  Rolling to Bilateral with Moderate Assistance.   Supine to sit with Moderate Assistance.  Stand pivot transfers with Moderate Assistance.  Toilet transfer to toilet with Moderate Assistance.  Upper extremity exercise program x20 reps per handout, with independence.     Outcome: Ongoing (interventions implemented as appropriate)  Miguel Tomas OTR/COTY      7/10/2019

## 2019-07-10 NOTE — PLAN OF CARE
Problem: Physical Therapy Goal  Goal: Physical Therapy Goal  Goals to be met by: 2019    Patient will increase functional independence with mobility by performin. Supine to sit with MInimal Assistance  2. Sit to supine with MInimal Assistance  3. Rolling to Left and Right with Minimal Assistance.  4. Sit to stand transfer with Minimal Assistance  5. Bed to chair transfer with Minimal Assistance using Rolling Walker  6. Gait  x 30 feet with Minimal Assistance using Rolling Walker.   7. Wheelchair propulsion x100 feet with Stand-by Assistance using bilateral uppper extremities     Outcome: Ongoing (interventions implemented as appropriate)  LTGs remain appropriate. Pt will continue PT POC.    Nitin Julio, PIPER  7/10/2019

## 2019-07-10 NOTE — PT/OT/SLP PROGRESS
Occupational Therapy  Treatment    Susanna Byrnes   MRN: [id number]   Admitting Diagnosis: UTI (urinary tract infection)    OT Date of Treatment: 07/10/19       Billable Minutes:  Self Care/Home Management 50    General Precautions: Standard, fall, aspiration  Orthopedic Precautions: N/A  Braces: N/A    Spiritual, Cultural Beliefs, Mormonism Practices, Values that Affect Care: yes(Christianity)    Subjective:  Communicated with nurse prior to session.      Pain/Comfort  Pain Rating 1: (no pain reported)  Pain Rating 2: (same)    Objective:  Patient found with: (no lines and supine in bed)    Occupational Performance:    Bed Mobility:    · Patient completed Rolling/Turning to Left with  maximal assistance and total assistance  · Patient completed Rolling/Turning to Right with maximal assistance and total assistance  · Patient completed Scooting/Bridging with maximal assistance and total assistance  · Patient completed Supine to Sit with total assistance     Functional Mobility/Transfers:  · Patient completed Sit <> Stand Transfer with maximal assistance  with  rolling walker   · Patient completed Bed <> Chair Transfer using Step Transfer technique with maximal assistance with rolling walker    Activities of Daily Living:  · Grooming: stand by assistance with Pt washing face, hands and performing oral care while seated sinkside.  · Upper Body Dressing: moderate assistance donning gown as a robe.  · Lower Body Dressing: total assistance donning pants and socks sitting EOB  · Toileting: total assistance with (A) needed with all aspects.    AMPA 6 Click:  AMPAC Total Score: 13    Additional Treatment:  Pt edu on Plan of care,  safety when performing functional transfers, self care tasks and functional standing activities.  - White board updated  - Self care tasks completed-- as noted above     Patient left up in chair with call button in reach and nurse present    ASSESSMENT:  Susanna Byrnes is a 84 y.o. female with a medical  diagnosis of UTI (urinary tract infection) .  She is making slow progress but requires V/Cs to properly and safely perform activities to completion.  She continues to present with deficits affecting (I)ce with functional transfers, functional standing balance and self care tasks with standing component.   OT continues to be recommended to further her functional (I)ce and safety. Goals remain appropriate and continued OT is recommended.        Rehab identified problem list/impairments: weakness, impaired endurance, impaired self care skills, impaired functional mobilty, gait instability, impaired balance, decreased upper extremity function, decreased lower extremity function, edema, impaired skin    Rehab potential is good    Activity tolerance: Fair    Discharge recommendations: home health OT     Barriers to discharge: None     Equipment recommendations: wheelchair, manual, tub bench     GOALS:   Multidisciplinary Problems     Occupational Therapy Goals        Problem: Occupational Therapy Goal    Goal Priority Disciplines Outcome Interventions   Occupational Therapy Goal     OT, PT/OT Ongoing (interventions implemented as appropriate)    Description:  Goals to be met by: 7/22/19     Patient will increase functional independence with ADLs by performing:    UE Dressing with Stand-by Assistance.  LE Dressing with Moderate Assistance.  Grooming while seated at sink with Stand-by Assistance.  Toileting from toilet with Maximum Assistance for hygiene and clothing management.   Bathing from  sitting at sink with Moderate Assistance.  Sitting at edge of bed x5 minutes with Stand-by Assistance.  Rolling to Bilateral with Moderate Assistance.   Supine to sit with Moderate Assistance.  Stand pivot transfers with Moderate Assistance.  Toilet transfer to toilet with Moderate Assistance.  Upper extremity exercise program x20 reps per handout, with independence.                      Plan:  Patient to be seen 5 x/week to address  the above listed problems via self-care/home management, therapeutic activities, therapeutic exercises  Plan of Care expires: 08/08/19  Plan of Care reviewed with: patient, daughter    Miguel PATRICIA MALAIKA Tomas/COTY  07/10/2019

## 2019-07-10 NOTE — PT/OT/SLP PROGRESS
Physical Therapy  Treatment    Susanna Byrnes   MRN: [id number]   Admitting Diagnosis: UTI (urinary tract infection)    PT Received On: 07/10/19          Billable Minutes:  Gait Training 13, Therapeutic Activity 15, Therapeutic Exercise 22 and Total Time 50    Treatment Type: Treatment  PT/PTA: PT     PTA Visit Number: 0       General Precautions: Standard, fall, aspiration, seizure  Orthopedic Precautions: N/A   Braces: N/A    Spiritual, Cultural Beliefs, Baptist Practices, Values that Affect Care: no    Subjective:  Communicated with patient prior to session.  Pt agreeable to session    Pain/Comfort  Pain Rating 1: (Pain reported but not numerically rated)  Location - Side 1: Left  Location - Orientation 1: generalized  Location 1: thigh  Pain Addressed 1: Reposition    Objective:  Patient found seated in w/c in therapy gym       AM-PAC 6 CLICK MOBILITY  Total Score:11    Bed Mobility:  Sit>Supine: not performed  Supine>Sit: not performed    Transfers:  Sit<>Stand: to/from w/c (x1 trial) c/ RW and MaxA x2 persons; to/from w/c multiple failed attempts due to pt's posterior lean and actively trying to sit.  Stand Pivot Transfer: not performed  Pt required constant cueing for transfer techniques for hand and foot placement and scooting.    Gait:  Amb 21 ft c/ RW and x1 person MaxA and x1 person CGA for safety.   Decreased step length and shuffling gait pattern noted.  Pt cued for upright posture and to increase step length    Therex:  Seated, BLE 2x10:  -AP  -LAQ  -HF (AAROM for RLE as needed)  -GS  Pt required max encouragement to remain on task     Additional Treatment:  LBE seated in w/c x7 min with active assist     Patient left up in chair with call button in reach.    Assessment:  Susanna Byrnes is a 84 y.o. female with a medical diagnosis of UTI (urinary tract infection).  Pt александр session well. Pt increased ambulation distance in today's session.  Pt requires constant cueing for functional mobility but does not  follow instructions due to impaired cognition. Pt remains MaxA for functional mobility. Upon d/c, pt will require significant family assistance due to impaired cognition and low functional level. Pt will continue to benefit from skilled PT.    Rehab identified problem list/impairments: weakness, impaired endurance, impaired sensation, impaired self care skills, impaired functional mobilty, gait instability, impaired balance, impaired cognition, decreased upper extremity function, decreased lower extremity function    Rehab potential is good.    Activity tolerance: Good    Discharge recommendations: home health PT     Barriers to discharge: None    Equipment recommendations: tub bench, wheelchair, manual     GOALS:   Multidisciplinary Problems     Physical Therapy Goals        Problem: Physical Therapy Goal    Goal Priority Disciplines Outcome Goal Variances Interventions   Physical Therapy Goal     PT, PT/OT Ongoing (interventions implemented as appropriate)     Description:  Goals to be met by: 2019    Patient will increase functional independence with mobility by performin. Supine to sit with MInimal Assistance  2. Sit to supine with MInimal Assistance  3. Rolling to Left and Right with Minimal Assistance.  4. Sit to stand transfer with Minimal Assistance  5. Bed to chair transfer with Minimal Assistance using Rolling Walker  6. Gait  x 30 feet with Minimal Assistance using Rolling Walker.   7. Wheelchair propulsion x100 feet with Stand-by Assistance using bilateral uppper extremities                      PLAN:    Patient to be seen 5 x/week  to address the above listed problems via gait training, therapeutic activities, therapeutic exercises  Plan of Care expires: 19  Plan of Care reviewed with: patient    Nitin Julio, [facility]  07/10/2019

## 2019-07-10 NOTE — PLAN OF CARE
Problem: Skin Injury Risk Increased  Goal: Skin Health and Integrity  Outcome: Ongoing (interventions implemented as appropriate)  Skin remains intact. Moisture barrier applied as preventive measure to lulu area with incontinent episodes.Repositioned often.

## 2019-07-10 NOTE — NURSING
Pt refused liquid Keppra and Risperdal liquid.  Made several attempt to administer medication. Pt refused. Charge nurse informed of pt refusal.

## 2019-07-11 LAB
ANION GAP SERPL CALC-SCNC: 11 MMOL/L (ref 8–16)
BASOPHILS # BLD AUTO: 0.04 K/UL (ref 0–0.2)
BASOPHILS NFR BLD: 0.6 % (ref 0–1.9)
BUN SERPL-MCNC: 18 MG/DL (ref 8–23)
CALCIUM SERPL-MCNC: 9 MG/DL (ref 8.7–10.5)
CHLORIDE SERPL-SCNC: 101 MMOL/L (ref 95–110)
CO2 SERPL-SCNC: 27 MMOL/L (ref 23–29)
CREAT SERPL-MCNC: 0.6 MG/DL (ref 0.5–1.4)
DIFFERENTIAL METHOD: ABNORMAL
EOSINOPHIL # BLD AUTO: 0.6 K/UL (ref 0–0.5)
EOSINOPHIL NFR BLD: 8.7 % (ref 0–8)
ERYTHROCYTE [DISTWIDTH] IN BLOOD BY AUTOMATED COUNT: 17.5 % (ref 11.5–14.5)
EST. GFR  (AFRICAN AMERICAN): >60 ML/MIN/1.73 M^2
EST. GFR  (NON AFRICAN AMERICAN): >60 ML/MIN/1.73 M^2
GLUCOSE SERPL-MCNC: 92 MG/DL (ref 70–110)
HCT VFR BLD AUTO: 30.3 % (ref 37–48.5)
HGB BLD-MCNC: 9 G/DL (ref 12–16)
IMM GRANULOCYTES # BLD AUTO: 0.04 K/UL (ref 0–0.04)
IMM GRANULOCYTES NFR BLD AUTO: 0.6 % (ref 0–0.5)
LYMPHOCYTES # BLD AUTO: 2.4 K/UL (ref 1–4.8)
LYMPHOCYTES NFR BLD: 35.7 % (ref 18–48)
MAGNESIUM SERPL-MCNC: 1.8 MG/DL (ref 1.6–2.6)
MCH RBC QN AUTO: 21.5 PG (ref 27–31)
MCHC RBC AUTO-ENTMCNC: 29.7 G/DL (ref 32–36)
MCV RBC AUTO: 73 FL (ref 82–98)
MONOCYTES # BLD AUTO: 0.7 K/UL (ref 0.3–1)
MONOCYTES NFR BLD: 10 % (ref 4–15)
NEUTROPHILS # BLD AUTO: 2.9 K/UL (ref 1.8–7.7)
NEUTROPHILS NFR BLD: 44.4 % (ref 38–73)
NRBC BLD-RTO: 0 /100 WBC
PHOSPHATE SERPL-MCNC: 4.4 MG/DL (ref 2.7–4.5)
PLATELET # BLD AUTO: 351 K/UL (ref 150–350)
PMV BLD AUTO: 10.4 FL (ref 9.2–12.9)
POTASSIUM SERPL-SCNC: 3.6 MMOL/L (ref 3.5–5.1)
RBC # BLD AUTO: 4.18 M/UL (ref 4–5.4)
SODIUM SERPL-SCNC: 139 MMOL/L (ref 136–145)
WBC # BLD AUTO: 6.58 K/UL (ref 3.9–12.7)

## 2019-07-11 PROCEDURE — 99900035 HC TECH TIME PER 15 MIN (STAT)

## 2019-07-11 PROCEDURE — 97110 THERAPEUTIC EXERCISES: CPT

## 2019-07-11 PROCEDURE — 36415 COLL VENOUS BLD VENIPUNCTURE: CPT

## 2019-07-11 PROCEDURE — 63600175 PHARM REV CODE 636 W HCPCS: Performed by: HOSPITALIST

## 2019-07-11 PROCEDURE — 11000004 HC SNF PRIVATE

## 2019-07-11 PROCEDURE — 83735 ASSAY OF MAGNESIUM: CPT

## 2019-07-11 PROCEDURE — 97116 GAIT TRAINING THERAPY: CPT

## 2019-07-11 PROCEDURE — 25000003 PHARM REV CODE 250: Performed by: NURSE PRACTITIONER

## 2019-07-11 PROCEDURE — 85025 COMPLETE CBC W/AUTO DIFF WBC: CPT

## 2019-07-11 PROCEDURE — 80048 BASIC METABOLIC PNL TOTAL CA: CPT

## 2019-07-11 PROCEDURE — 25000003 PHARM REV CODE 250: Performed by: HOSPITALIST

## 2019-07-11 PROCEDURE — 94761 N-INVAS EAR/PLS OXIMETRY MLT: CPT

## 2019-07-11 PROCEDURE — 97535 SELF CARE MNGMENT TRAINING: CPT

## 2019-07-11 PROCEDURE — 97530 THERAPEUTIC ACTIVITIES: CPT

## 2019-07-11 PROCEDURE — 94640 AIRWAY INHALATION TREATMENT: CPT

## 2019-07-11 PROCEDURE — 25000242 PHARM REV CODE 250 ALT 637 W/ HCPCS: Performed by: HOSPITALIST

## 2019-07-11 PROCEDURE — 84100 ASSAY OF PHOSPHORUS: CPT

## 2019-07-11 RX ORDER — POTASSIUM CHLORIDE 20 MEQ/1
60 TABLET, EXTENDED RELEASE ORAL ONCE
Status: COMPLETED | OUTPATIENT
Start: 2019-07-11 | End: 2019-07-11

## 2019-07-11 RX ORDER — LEVETIRACETAM 100 MG/ML
750 SOLUTION ORAL 2 TIMES DAILY
Status: DISCONTINUED | OUTPATIENT
Start: 2019-07-11 | End: 2019-07-11

## 2019-07-11 RX ORDER — LEVETIRACETAM 100 MG/ML
750 SOLUTION ORAL
Status: DISCONTINUED | OUTPATIENT
Start: 2019-07-12 | End: 2019-07-24

## 2019-07-11 RX ORDER — LEVETIRACETAM 100 MG/ML
750 SOLUTION ORAL NIGHTLY
Status: DISCONTINUED | OUTPATIENT
Start: 2019-07-11 | End: 2019-07-25

## 2019-07-11 RX ORDER — CETIRIZINE HYDROCHLORIDE 5 MG/1
10 TABLET ORAL DAILY PRN
Status: DISCONTINUED | OUTPATIENT
Start: 2019-07-11 | End: 2019-07-12

## 2019-07-11 RX ADMIN — IPRATROPIUM BROMIDE AND ALBUTEROL SULFATE 3 ML: .5; 3 SOLUTION RESPIRATORY (INHALATION) at 02:07

## 2019-07-11 RX ADMIN — ASPIRIN 81 MG CHEWABLE TABLET 81 MG: 81 TABLET CHEWABLE at 09:07

## 2019-07-11 RX ADMIN — DOXYCYCLINE HYCLATE 100 MG: 100 TABLET, COATED ORAL at 09:07

## 2019-07-11 RX ADMIN — SENNOSIDES,DOCUSATE SODIUM 1 TABLET: 8.6; 5 TABLET, FILM COATED ORAL at 09:07

## 2019-07-11 RX ADMIN — CETIRIZINE HYDROCHLORIDE 10 MG: 5 TABLET ORAL at 09:07

## 2019-07-11 RX ADMIN — FERROUS SULFATE TAB EC 325 MG (65 MG FE EQUIVALENT) 325 MG: 325 (65 FE) TABLET DELAYED RESPONSE at 09:07

## 2019-07-11 RX ADMIN — CHLORTHALIDONE 25 MG: 25 TABLET ORAL at 09:07

## 2019-07-11 RX ADMIN — AMLODIPINE BESYLATE 5 MG: 5 TABLET ORAL at 09:07

## 2019-07-11 RX ADMIN — POTASSIUM CHLORIDE 60 MEQ: 1500 TABLET, EXTENDED RELEASE ORAL at 09:07

## 2019-07-11 RX ADMIN — IPRATROPIUM BROMIDE AND ALBUTEROL SULFATE 3 ML: .5; 3 SOLUTION RESPIRATORY (INHALATION) at 06:07

## 2019-07-11 RX ADMIN — ENOXAPARIN SODIUM 40 MG: 100 INJECTION SUBCUTANEOUS at 05:07

## 2019-07-11 RX ADMIN — LEVETIRACETAM 500 MG: 500 SOLUTION ORAL at 09:07

## 2019-07-11 RX ADMIN — IPRATROPIUM BROMIDE AND ALBUTEROL SULFATE 3 ML: .5; 3 SOLUTION RESPIRATORY (INHALATION) at 08:07

## 2019-07-11 RX ADMIN — RISPERIDONE 0.12 MG: 1 SOLUTION ORAL at 09:07

## 2019-07-11 RX ADMIN — ESCITALOPRAM OXALATE 5 MG: 5 TABLET, FILM COATED ORAL at 09:07

## 2019-07-11 NOTE — PT/OT/SLP PROGRESS
Physical Therapy  Treatment    Susanna Byrnes   MRN: [id number]   Admitting Diagnosis: UTI (urinary tract infection)    PT Received On: 07/11/19          Billable Minutes:  Gait Training 23, Therapeutic Activity 10, Therapeutic Exercise 20 and Total Time 53    Treatment Type: Treatment  PT/PTA: PT     PTA Visit Number: 0       General Precautions: Standard, fall, aspiration, seizure  Orthopedic Precautions: N/A   Braces: N/A    Spiritual, Cultural Beliefs, Restoration Practices, Values that Affect Care: no    Subjective:  Communicated with patient prior to session.  Pt agreeable to session    Pain/Comfort  Pain Rating 1: 0/10  Location - Side 1: Right  Location - Orientation 1: generalized  Location 1: hand  Pain Rating Post-Intervention 1: (Pt reported pain during sit>stand but not numerically rated)    Objective:  Patient found seated in w/c       AM-PAC 6 CLICK MOBILITY  Total Score:11    Bed Mobility:  Sit>Supine: not performed  Supine>Sit: not performed    Transfers:  Sit<>Stand: to/from w/c (x2 trials) c/ RW and MaxA x3 persons; to/from w/c in // bars and MaxA x3 persons  Stand Pivot Transfer: not performed  Pt required constant cueing for transfer techniques including hand/foot placement and weight shift forward.    Gait:  Amb 20 ft c/ RW and MaxA x2 persons for posterior lean and assist with moving RW forward  Amb 18 ft c/ Rollator and MaxA x2 persons for posterior lean  Decreased step length and shuffling gait pattern noted  Pt cued for upright posture    Therex:  Seated, BLE 2x10:  -AP  -GS  -LAQ (AAROM for RLE)  -HF (AAROM for BLE)  Pt required max encouragement to remain on task    Balance:  Attempted standing balance in // bars:  -pt able to perform sit to stand with MaxA x3 persons but unable to remain in standing despite max encouragement and constant cueing         Patient left up in chair with call button in reach and family present.    Assessment:  Susanna Byrnes is a 84 y.o. female with a medical  diagnosis of UTI (urinary tract infection).  Pt limited throughout session by fatigue and weakness. Pt is limited by impaired cognition and expresses fear of falling. She remains MaxA x2-3 persons for functional mobility. Pt will continue to benefit from skilled PT.    Rehab identified problem list/impairments: weakness, impaired endurance, impaired sensation, impaired self care skills, impaired functional mobilty, gait instability, impaired balance, impaired cognition, decreased upper extremity function, decreased lower extremity function    Rehab potential is good.    Activity tolerance: Fair    Discharge recommendations: home health PT     Barriers to discharge: None    Equipment recommendations: tub bench, wheelchair, manual     GOALS:   Multidisciplinary Problems     Physical Therapy Goals        Problem: Physical Therapy Goal    Goal Priority Disciplines Outcome Goal Variances Interventions   Physical Therapy Goal     PT, PT/OT Ongoing (interventions implemented as appropriate)     Description:  Goals to be met by: 2019    Patient will increase functional independence with mobility by performin. Supine to sit with MInimal Assistance  2. Sit to supine with MInimal Assistance  3. Rolling to Left and Right with Minimal Assistance.  4. Sit to stand transfer with Minimal Assistance  5. Bed to chair transfer with Minimal Assistance using Rolling Walker  6. Gait  x 30 feet with Minimal Assistance using Rolling Walker.   7. Wheelchair propulsion x100 feet with Stand-by Assistance using bilateral uppper extremities                      PLAN:    Patient to be seen 5 x/week  to address the above listed problems via gait training, therapeutic activities, therapeutic exercises  Plan of Care expires: 19  Plan of Care reviewed with: patient    Nitin Sumanth, [facility]  2019

## 2019-07-11 NOTE — PROGRESS NOTES
[facility]                                  Skilled Nursing Facility                   Progress Note   DOS 7/11/2019  Admit Date: 7/8/2019  LETICIA   Principal Problem:  UTI (urinary tract infection)   HPI obtained from patient interview and chart review     Chief Complaint: Revaluation of medical treatment and therapy status: Lab review     HPI: Ms Byrnes is an 84 female with sig pmhx of dementia, HTN, HLD, CAD/NSTEMI('14), chronic sCHF/ICMP(LVEF 25-30), seizure, OA, suspected COPD, anemia, and MDR UTIs(Ecoli in '18 and '15, e faecalis '14, pseudomonas and enterobacter '12) who presents to SNF s/p hospitalization for fever likely 2/2 pyelonephritis. Patient currently with no complaints of dysuria at this time and voiding without difficulty. All labs reviewed. No leukocytosis. Hemoglobin stable at 8.9.  Platelets stable at 357, trending biweekly. Sodium at 138. Potasium 3.8. Creatinine stable at 0.6. 24 hr blood glucose 80. Patient's current blood pressure is at 161/70, increased diuretic, continue to trend. Patient progessing well with PT/OT. Patient medically stable. Continuing to follow and treat all acute and chronic conditions.    Interval Hx: All labs reviewed. No leukocytosis. Hemoglobin stable at 9.  Platelets decreased to 351, continue to trend. Sodium at 139. Potasium 3.6, Initiated 60 meq K x 1 now.. Creatinine stable at 0.6. 24 hr blood glucose 92. Patient's current blood pressure is at 120/97. Patient progessing well with PT/OT. Patient medically stable. Continuing to follow and treat all acute and chronic conditions.    PEx  Constitutional: Patient appears well-developed and in no distress   HENT:   Head: Normocephalic and atraumatic.   Eyes: Pupils are equal, round, and reactive to light.   Neck: Normal range of motion. Neck supple.   Cardiovascular: Normal rate, regular rhythm and normal heart sounds.    Pulmonary/Chest: Effort normal and breath sounds are  clear  Abdominal: Soft. Bowel sounds are normal.   Musculoskeletal: Normal range of motion. + generalized weakness. + RUE weakness  Neurological: Alert and oriented to person, place, and time.   Skin: Skin is warm and dry.   Psychiatric: Normal mood and affect. Behavior is normal.     Temp:  [97.7 °F (36.5 °C)-98 °F (36.7 °C)]   Pulse:  [65-69]   Resp:  [17-20]   BP: (120-131)/(57-63)   SpO2:  [95 %-96 %]   Body mass index is 39.18 kg/m².     ROS  Constitutional: Negative for fever and malaise/fatigue.   Eyes: Negative for blurred vision, double vision and discharge.   Respiratory: Negative for cough, shortness of breath and wheezing.    Cardiovascular: Negative for chest pain, palpitations, claudication, and leg swelling.   Gastrointestinal: Negative for abdominal pain, constipation, diarrhea, nausea and vomiting.   Genitourinary: Negative for dysuria, frequency and urgency.   Musculoskeletal:  + generalized weakness. Negative for back pain and myalgias.   Skin: Negative for itching and rash.  Neurological: Negative for dizziness, speech change, seizures, and headaches.   Psychiatric/Behavioral: Negative for depression. The patient is not nervous/anxious.      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    Past Surgical History: Patient has a past surgical history that includes Total knee arthroplasty (Left); Eye surgery; and Joint replacement.    Social History: Patient reports that she quit smoking about 7 years ago. Her smoking use included cigarettes. She has quit using smokeless tobacco. She reports that she does not drink alcohol or use drugs.    Family History: family history includes Dementia in her brother; Seizures in her brother and sister.    Allergies: Patient is allergic to lisinopril and haldol  [haloperidol lactate].      Assessment and Plan:  Hypokalemia   -7/11 Initiated 60 meq K x 1 now.    Thrombocytosis   -7/11 Platelets decreased to 351, continue to trend.   UTI / pyelonephritis    Essential hypertension  -continue amlodipine  -7/9 Increased chlorthalidone to 25 mg daily   -7/11 Bp at 120/57 and stable, trending    Chronic anemia  -Pt with chronic microcytic anemia  -continue ferrous sulfate.  -7/11 Hgb at 9 and stable, trending    Malnutrition mild  -7/9 Initiated nutrition consult   -7/11 Initiated Dysphagia soft diet and Boost TID with meals on 7/9 per nutritionist recs    CONTINUED    Sepsis  -E coli sens CTX and doxycycline  -CTX IV to Doxy PO to complete 14 days total of ABx (for pyelo)   -Continue doxycycline     Debility  -Continue with PT/OT for gait training and strengthening and restoration of ADL's   -Encourage mobility, OOB in chair, and early ambulation as appropriate  -Fall precautions   -Monitor for bowel and bladder dysfunction  -Monitor for and prevent skin breakdown and pressure ulcers  -Continue DVT prophylaxis with lovenox     Chronic combined systolic and diastolic CHF  -continue chlorthalidone  -7/9 Initiated ambulatory referral to cardiology      Dementia without behavioral disturbance  -Follows with Dr. Benita Melchor with [facility], last seen 6/24.  -Delirium precautions  -Continue Lexapro / Remeron / Risperidone     Seizure disorder  -Continue leviteracitam         No future appointments.    Serafin Gavin NP

## 2019-07-11 NOTE — PLAN OF CARE
Problem: Physical Therapy Goal  Goal: Physical Therapy Goal  Goals to be met by: 2019    Patient will increase functional independence with mobility by performin. Supine to sit with MInimal Assistance  2. Sit to supine with MInimal Assistance  3. Rolling to Left and Right with Minimal Assistance.  4. Sit to stand transfer with Minimal Assistance  5. Bed to chair transfer with Minimal Assistance using Rolling Walker  6. Gait  x 30 feet with Minimal Assistance using Rolling Walker.   7. Wheelchair propulsion x100 feet with Stand-by Assistance using bilateral uppper extremities     Outcome: Ongoing (interventions implemented as appropriate)  LTGs remain appropriate. Pt will continue PT POC.    Nitin Julio, PIPER  2019

## 2019-07-11 NOTE — PLAN OF CARE
Problem: Occupational Therapy Goal  Goal: Occupational Therapy Goal  Goals to be met by: 7/22/19     Patient will increase functional independence with ADLs by performing:    UE Dressing with Stand-by Assistance.  LE Dressing with Moderate Assistance.  Grooming while seated at sink with Stand-by Assistance.  Toileting from toilet with Maximum Assistance for hygiene and clothing management.   Bathing from  sitting at sink with Moderate Assistance.  Sitting at edge of bed x5 minutes with Stand-by Assistance.  Rolling to Bilateral with Moderate Assistance.   Supine to sit with Moderate Assistance.  Stand pivot transfers with Moderate Assistance.  Toilet transfer to toilet with Moderate Assistance.  Upper extremity exercise program x20 reps per handout, with independence.     Outcome: Ongoing (interventions implemented as appropriate)  MALAIKA Curtis/COTY      7/11/2019

## 2019-07-11 NOTE — PT/OT/SLP PROGRESS
Occupational Therapy  Treatment    Susanna Byrnes   MRN: [id number]   Admitting Diagnosis: UTI (urinary tract infection)    OT Date of Treatment: 07/11/19       Billable Minutes:  Self Care/Home Management 45 and Therapeutic Exercise 10    General Precautions: Standard, fall, aspiration  Orthopedic Precautions: N/A  Braces: N/A    Spiritual, Cultural Beliefs, Episcopal Practices, Values that Affect Care: yes(Jainism)    Subjective:  Communicated with nurse prior to session.      Pain/Comfort  Pain Rating 1: 0/10  Pain Rating Post-Intervention 1: 0/10    Objective:  Patient found with: (no lines)    Occupational Performance:    Bed Mobility:    · Patient completed Rolling/Turning to Left with  total assistance  · Patient completed Scooting/Bridging with total assistance  · Patient completed Supine to Sit with total assistance     Functional Mobility/Transfers:  · Patient completed Sit <> Stand Transfer with moderate assistance  with  rolling walker   · Patient completed Bed <> Chair Transfer using Step Transfer technique with moderate assistance with rolling walker    Activities of Daily Living:  · Feeding:  stand by assistance after set up.   · Grooming: minimum assistance with Pt wable to wash face, hands and perform oral care.  · Lower Body Dressing: total assistance with (A) all aspects of donning pants and socks.  · Toileting: total assistance with (A) required with all aspects of toileting.    [facility] 6 Click:  AMPA Total Score: 13    OT Exercises: Pt performed dowel exercises with 1 pound dowel performing shld Flex/ Extn,  chest presses, and biceps curls.  Max (A) needed to complete exercises and brief breaks provided between sets.    Additional Treatment:  Pt and daughter edu on Plan of care,  safety when performing functional transfers, self care tasks and functional standing activities.  - White board updated  - Self care tasks completed-- as noted above       Patient left up in chair with call button in reach  and PT present    ASSESSMENT:  Susanna Byrnes is a 84 y.o. female with a medical diagnosis of UTI (urinary tract infection) . She is making progress but requires V/Cs to properly and safely perform activities to completion.  She continues to present with deficits affecting (I)ce with functional transfers, functional standing balance and self care tasks with standing component.   OT continues to be recommended to further her functional (I)ce and safety. Goals remain appropriate and continued OT is recommended.        Rehab identified problem list/impairments: weakness, impaired endurance, impaired self care skills, impaired functional mobilty, gait instability, impaired balance, decreased upper extremity function, decreased lower extremity function, edema, impaired skin    Rehab potential is good    Activity tolerance: Good    Discharge recommendations: home health OT     Barriers to discharge: None     Equipment recommendations: wheelchair, manual, tub bench     GOALS:   Multidisciplinary Problems     Occupational Therapy Goals        Problem: Occupational Therapy Goal    Goal Priority Disciplines Outcome Interventions   Occupational Therapy Goal     OT, PT/OT Ongoing (interventions implemented as appropriate)    Description:  Goals to be met by: 7/22/19     Patient will increase functional independence with ADLs by performing:    UE Dressing with Stand-by Assistance.  LE Dressing with Moderate Assistance.  Grooming while seated at sink with Stand-by Assistance.  Toileting from toilet with Maximum Assistance for hygiene and clothing management.   Bathing from  sitting at sink with Moderate Assistance.  Sitting at edge of bed x5 minutes with Stand-by Assistance.  Rolling to Bilateral with Moderate Assistance.   Supine to sit with Moderate Assistance.  Stand pivot transfers with Moderate Assistance.  Toilet transfer to toilet with Moderate Assistance.  Upper extremity exercise program x20 reps per handout, with  independence.                      Plan:  Patient to be seen 5 x/week to address the above listed problems via self-care/home management, therapeutic activities, therapeutic exercises  Plan of Care expires: 08/08/19  Plan of Care reviewed with: patient, daughter    Miguel GOMEZ Thom, OTR/COTY  07/11/2019

## 2019-07-12 PROCEDURE — 97535 SELF CARE MNGMENT TRAINING: CPT

## 2019-07-12 PROCEDURE — 97110 THERAPEUTIC EXERCISES: CPT

## 2019-07-12 PROCEDURE — 63600175 PHARM REV CODE 636 W HCPCS: Performed by: HOSPITALIST

## 2019-07-12 PROCEDURE — 25000242 PHARM REV CODE 250 ALT 637 W/ HCPCS: Performed by: HOSPITALIST

## 2019-07-12 PROCEDURE — 25000003 PHARM REV CODE 250: Performed by: HOSPITALIST

## 2019-07-12 PROCEDURE — 97116 GAIT TRAINING THERAPY: CPT

## 2019-07-12 PROCEDURE — 94761 N-INVAS EAR/PLS OXIMETRY MLT: CPT

## 2019-07-12 PROCEDURE — 25000003 PHARM REV CODE 250: Performed by: NURSE PRACTITIONER

## 2019-07-12 PROCEDURE — 94640 AIRWAY INHALATION TREATMENT: CPT

## 2019-07-12 PROCEDURE — 25000242 PHARM REV CODE 250 ALT 637 W/ HCPCS: Performed by: INTERNAL MEDICINE

## 2019-07-12 PROCEDURE — 25000003 PHARM REV CODE 250: Performed by: INTERNAL MEDICINE

## 2019-07-12 PROCEDURE — 97530 THERAPEUTIC ACTIVITIES: CPT

## 2019-07-12 PROCEDURE — 11000004 HC SNF PRIVATE

## 2019-07-12 RX ORDER — CETIRIZINE HYDROCHLORIDE 5 MG/1
10 TABLET ORAL DAILY
Status: COMPLETED | OUTPATIENT
Start: 2019-07-12 | End: 2019-07-25

## 2019-07-12 RX ORDER — AMOXICILLIN 250 MG
2 CAPSULE ORAL 2 TIMES DAILY
Status: DISCONTINUED | OUTPATIENT
Start: 2019-07-12 | End: 2019-07-26 | Stop reason: HOSPADM

## 2019-07-12 RX ORDER — POLYETHYLENE GLYCOL 3350 17 G/17G
17 POWDER, FOR SOLUTION ORAL 2 TIMES DAILY
Status: DISPENSED | OUTPATIENT
Start: 2019-07-12 | End: 2019-07-19

## 2019-07-12 RX ORDER — FLUTICASONE PROPIONATE 50 MCG
2 SPRAY, SUSPENSION (ML) NASAL DAILY
Status: DISPENSED | OUTPATIENT
Start: 2019-07-12 | End: 2019-07-26

## 2019-07-12 RX ADMIN — IPRATROPIUM BROMIDE AND ALBUTEROL SULFATE 3 ML: .5; 3 SOLUTION RESPIRATORY (INHALATION) at 07:07

## 2019-07-12 RX ADMIN — ESCITALOPRAM OXALATE 5 MG: 5 TABLET, FILM COATED ORAL at 09:07

## 2019-07-12 RX ADMIN — IPRATROPIUM BROMIDE AND ALBUTEROL SULFATE 3 ML: .5; 3 SOLUTION RESPIRATORY (INHALATION) at 01:07

## 2019-07-12 RX ADMIN — RISPERIDONE 0.25 MG: 0.25 TABLET ORAL at 08:07

## 2019-07-12 RX ADMIN — ENOXAPARIN SODIUM 40 MG: 100 INJECTION SUBCUTANEOUS at 05:07

## 2019-07-12 RX ADMIN — AMLODIPINE BESYLATE 5 MG: 5 TABLET ORAL at 09:07

## 2019-07-12 RX ADMIN — ASPIRIN 81 MG CHEWABLE TABLET 81 MG: 81 TABLET CHEWABLE at 09:07

## 2019-07-12 RX ADMIN — IPRATROPIUM BROMIDE AND ALBUTEROL SULFATE 3 ML: .5; 3 SOLUTION RESPIRATORY (INHALATION) at 08:07

## 2019-07-12 RX ADMIN — LEVETIRACETAM 750 MG: 100 SOLUTION ORAL at 08:07

## 2019-07-12 RX ADMIN — RISPERIDONE 0.12 MG: 1 SOLUTION ORAL at 09:07

## 2019-07-12 RX ADMIN — CHLORTHALIDONE 25 MG: 25 TABLET ORAL at 09:07

## 2019-07-12 RX ADMIN — MONTELUKAST 10 MG: 10 TABLET, FILM COATED ORAL at 08:07

## 2019-07-12 RX ADMIN — FLUTICASONE PROPIONATE 100 MCG: 50 SPRAY, METERED NASAL at 12:07

## 2019-07-12 RX ADMIN — CETIRIZINE HYDROCHLORIDE 10 MG: 5 TABLET ORAL at 12:07

## 2019-07-12 RX ADMIN — POLYETHYLENE GLYCOL 3350 17 G: 17 POWDER, FOR SOLUTION ORAL at 12:07

## 2019-07-12 RX ADMIN — SENNOSIDES,DOCUSATE SODIUM 1 TABLET: 8.6; 5 TABLET, FILM COATED ORAL at 09:07

## 2019-07-12 NOTE — PT/OT/SLP PROGRESS
Occupational Therapy  Treatment    Susanna Byrnes   MRN: [id number]   Admitting Diagnosis: UTI (urinary tract infection)    OT Date of Treatment: 07/12/19       Billable Minutes:55  Self Care/Home Management 35 and Therapeutic Activity 20    General Precautions: Standard, fall, aspiration  Orthopedic Precautions: N/A  Braces: N/A    Spiritual, Cultural Beliefs, Sikhism Practices, Values that Affect Care: yes(Judaism)    Subjective:  Communicated with nsg prior to session.  I am doing well    Pain/Comfort  Pain Rating 1: 0/10    Objective:   Pt. supine on arrival with teller sitter camera    Occupational Performance:    Bed Mobility:    · Patient completed Rolling/Turning to Left with  maximal assistance  · Patient completed Rolling/Turning to Right with maximal assistance  · Patient completed Scooting/Bridging with maximal assistance  · Patient completed Supine to Sit with maximal assistance     Functional Mobility/Transfers:  · Patient completed Sit <> Stand Transfer with moderate assistance and of 2 persons  with  rolling walker   · Patient completed Bed <> Chair Transfer using Squat Pivot technique with moderate assistance and of 2 persons with hand-held assist with cues and (A) for bal      Activities of Daily Living:  · Lower Body Dressing: total assistance and of 2 persons one to (A) with standing balance and one to (A) with pants management   · Toileting: total assistance for all asepcts with diaper care and cleaning Pt. incot    [facility] 6 Click:  AMPAC Total Score: 13      Additional Treatment:  Pt. Also with ISHAAN MCKNIGHT on this day x 20 reps with limited ROM with shd flex, elbow flex/ext, wrist flex/ext and digit flex/ext supination/pronation and internal/ext rotation     Patient left up in chair with in gym awaiting PT present    ASSESSMENT:  Susanna Byrnes is a 84 y.o. female with a medical diagnosis of UTI (urinary tract infection) Pt. participated well with session on this day.Pt. Continues to require  increase (A) and time for ADL and t/fs management on this day. Pt. Also noted to be slight confused at times. Pt demos physical deficits with balance  functional mobility, UB strength, endurance  level of functional indep with daily tasks and activities and selfcare skills .Pt. Will continue to benefit from continued OT to progress towards goals      Rehab identified problem list/impairments: weakness, impaired endurance, impaired self care skills, impaired functional mobilty, gait instability, impaired balance, decreased upper extremity function, decreased lower extremity function, edema, impaired skin    Rehab potential is fair    Activity tolerance: Fair    Discharge recommendations: home health OT     Barriers to discharge: None     Equipment recommendations: wheelchair, manual, tub bench     GOALS:   Multidisciplinary Problems     Occupational Therapy Goals        Problem: Occupational Therapy Goal    Goal Priority Disciplines Outcome Interventions   Occupational Therapy Goal     OT, PT/OT Ongoing (interventions implemented as appropriate)    Description:  Goals to be met by: 7/22/19     Patient will increase functional independence with ADLs by performing:    UE Dressing with Stand-by Assistance.  LE Dressing with Moderate Assistance.  Grooming while seated at sink with Stand-by Assistance.  Toileting from toilet with Maximum Assistance for hygiene and clothing management.   Bathing from  sitting at sink with Moderate Assistance.  Sitting at edge of bed x5 minutes with Stand-by Assistance.  Rolling to Bilateral with Moderate Assistance.   Supine to sit with Moderate Assistance.  Stand pivot transfers with Moderate Assistance.  Toilet transfer to toilet with Moderate Assistance.  Upper extremity exercise program x20 reps per handout, with independence.                  Plan:  Patient to be seen 5 x/week to address the above listed problems via self-care/home management, therapeutic activities, therapeutic  exercises  Plan of Care expires: 08/08/19  Plan of Care reviewed with: patient  The SHAUN and ANGELICA have collaborated and discussed the patient's status, treatment plan and progress toward established goals.   ANGELICA Guerrier/COTY 7/12/2019

## 2019-07-12 NOTE — PLAN OF CARE
Problem: Occupational Therapy Goal  Goal: Occupational Therapy Goal  Goals to be met by: 7/22/19     Patient will increase functional independence with ADLs by performing:    UE Dressing with Stand-by Assistance.  LE Dressing with Moderate Assistance.  Grooming while seated at sink with Stand-by Assistance.  Toileting from toilet with Maximum Assistance for hygiene and clothing management.   Bathing from  sitting at sink with Moderate Assistance.  Sitting at edge of bed x5 minutes with Stand-by Assistance.  Rolling to Bilateral with Moderate Assistance.   Supine to sit with Moderate Assistance.  Stand pivot transfers with Moderate Assistance.  Toilet transfer to toilet with Moderate Assistance.  Upper extremity exercise program x20 reps per handout, with independence.     Outcome: Ongoing (interventions implemented as appropriate)  .

## 2019-07-12 NOTE — PT/OT/SLP PROGRESS
Physical Therapy  Treatment    Susanna Byrnes   MRN: [id number]   Admitting Diagnosis: UTI (urinary tract infection)    PT Received On: 07/12/19        Billable Minutes:  Gait Training 20, Therapeutic Activity 10, Therapeutic Exercise 10 and Total Time 40    Treatment Type: Treatment  PT/PTA: PT     PTA Visit Number: 0       General Precautions: Standard, fall, aspiration, seizure  Orthopedic Precautions: N/A   Braces: N/A    Spiritual, Cultural Beliefs, Buddhist Practices, Values that Affect Care: no    Subjective:  Communicated with patient prior to session.  Pt agreeable to session.     Pain/Comfort  Pain Rating 1: (did not rate)  Location - Side 1: Bilateral  Location - Orientation 1: generalized  Location 1: thigh  Pain Addressed 1: Reposition, Distraction    Objective:  Patient found sitting in w/c.       AM-PAC 6 CLICK MOBILITY  Total Score:11    Bed Mobility:  Not performed    Transfers:  Sit<>Stand: to/from w/c (multiple trials) w/ RW and varying Mod/MaxA(x2) to rise  Cueing for forward scoot/lean and for hand/foot placement  Pt w/ improved function w/ BUE on RW in order to promote anterior trunk lean    Gait:  Amb 2 trials (12ft and 15ft) w/ RW and Mod/Naun(x2) for stability and RW management  Encouragement t/o to continue each trial due to pt attempting to sit multiple times t/o each trial  Posterior lean and FFP noted  Very small shuffling step gait pattern     Therex:  Seated BLE therex 2x10 reps (HF, LAQ, KF, AP) w/ AAROM and cueing for technique  (B) shoulder ROM ~2min each    Balance:  Attempted standing ring tree activity but pt unable to rise from chair after gait trials    Patient left up in chair with call button in reach.    Assessment:  Susanna Byrnes is a 84 y.o. female with a medical diagnosis of UTI (urinary tract infection).  Pt remains very limited by impaired cognition. She is unable to follow cues consistently and requires constant encouragement to complete functional tasks. She will  continue PT POC.    Rehab identified problem list/impairments: weakness, impaired endurance, impaired sensation, impaired self care skills, impaired functional mobilty, gait instability, impaired balance, impaired cognition, decreased upper extremity function, decreased lower extremity function    Rehab potential is fair.    Activity tolerance: Good    Discharge recommendations: home health PT     Barriers to discharge: None    Equipment recommendations: tub bench, wheelchair, manual     GOALS:   Multidisciplinary Problems     Physical Therapy Goals        Problem: Physical Therapy Goal    Goal Priority Disciplines Outcome Goal Variances Interventions   Physical Therapy Goal     PT, PT/OT Ongoing (interventions implemented as appropriate)     Description:  Goals to be met by: 2019    Patient will increase functional independence with mobility by performin. Supine to sit with MInimal Assistance  2. Sit to supine with MInimal Assistance  3. Rolling to Left and Right with Minimal Assistance.  4. Sit to stand transfer with Minimal Assistance  5. Bed to chair transfer with Minimal Assistance using Rolling Walker  6. Gait  x 30 feet with Minimal Assistance using Rolling Walker.   7. Wheelchair propulsion x100 feet with Stand-by Assistance using bilateral uppper extremities                      PLAN:    Patient to be seen 5 x/week  to address the above listed problems via gait training, therapeutic activities, therapeutic exercises  Plan of Care expires: 19  Plan of Care reviewed with: patient    Renetta MANE Malick, PT  2019

## 2019-07-12 NOTE — PROGRESS NOTES
"Patient refused all night meds.  Patient stated " Im not taking no medicine and yall cant make me go to sleep, Im staying awake for when the train comes to pick me up".   Reoriented patient to unit, to which she told me I was lying and this was no hospital.    "

## 2019-07-12 NOTE — PLAN OF CARE
Problem: Physical Therapy Goal  Goal: Physical Therapy Goal  Goals to be met by: 2019    Patient will increase functional independence with mobility by performin. Supine to sit with MInimal Assistance  2. Sit to supine with MInimal Assistance  3. Rolling to Left and Right with Minimal Assistance.  4. Sit to stand transfer with Minimal Assistance  5. Bed to chair transfer with Minimal Assistance using Rolling Walker  6. Gait  x 30 feet with Minimal Assistance using Rolling Walker.   7. Wheelchair propulsion x100 feet with Stand-by Assistance using bilateral uppper extremities     Outcome: Ongoing (interventions implemented as appropriate)  LTGs remain appropriate. Pt will continue PT POC.    Renetta Teresa, PT  2019

## 2019-07-13 PROCEDURE — 25000242 PHARM REV CODE 250 ALT 637 W/ HCPCS: Performed by: HOSPITALIST

## 2019-07-13 PROCEDURE — 94761 N-INVAS EAR/PLS OXIMETRY MLT: CPT

## 2019-07-13 PROCEDURE — 25000003 PHARM REV CODE 250: Performed by: HOSPITALIST

## 2019-07-13 PROCEDURE — 25000003 PHARM REV CODE 250: Performed by: INTERNAL MEDICINE

## 2019-07-13 PROCEDURE — 97110 THERAPEUTIC EXERCISES: CPT

## 2019-07-13 PROCEDURE — 63600175 PHARM REV CODE 636 W HCPCS: Performed by: HOSPITALIST

## 2019-07-13 PROCEDURE — 97535 SELF CARE MNGMENT TRAINING: CPT

## 2019-07-13 PROCEDURE — 94640 AIRWAY INHALATION TREATMENT: CPT

## 2019-07-13 PROCEDURE — 11000004 HC SNF PRIVATE

## 2019-07-13 PROCEDURE — 25000003 PHARM REV CODE 250: Performed by: NURSE PRACTITIONER

## 2019-07-13 RX ADMIN — SENNOSIDES,DOCUSATE SODIUM 2 TABLET: 8.6; 5 TABLET, FILM COATED ORAL at 09:07

## 2019-07-13 RX ADMIN — POLYETHYLENE GLYCOL 3350 17 G: 17 POWDER, FOR SOLUTION ORAL at 09:07

## 2019-07-13 RX ADMIN — ESCITALOPRAM OXALATE 5 MG: 5 TABLET, FILM COATED ORAL at 09:07

## 2019-07-13 RX ADMIN — ASPIRIN 81 MG CHEWABLE TABLET 81 MG: 81 TABLET CHEWABLE at 09:07

## 2019-07-13 RX ADMIN — AMLODIPINE BESYLATE 5 MG: 5 TABLET ORAL at 09:07

## 2019-07-13 RX ADMIN — MONTELUKAST 10 MG: 10 TABLET, FILM COATED ORAL at 08:07

## 2019-07-13 RX ADMIN — LEVETIRACETAM 750 MG: 100 SOLUTION ORAL at 05:07

## 2019-07-13 RX ADMIN — IPRATROPIUM BROMIDE AND ALBUTEROL SULFATE 3 ML: .5; 3 SOLUTION RESPIRATORY (INHALATION) at 07:07

## 2019-07-13 RX ADMIN — CHLORTHALIDONE 25 MG: 25 TABLET ORAL at 09:07

## 2019-07-13 RX ADMIN — IPRATROPIUM BROMIDE AND ALBUTEROL SULFATE 3 ML: .5; 3 SOLUTION RESPIRATORY (INHALATION) at 02:07

## 2019-07-13 RX ADMIN — ENOXAPARIN SODIUM 40 MG: 100 INJECTION SUBCUTANEOUS at 04:07

## 2019-07-13 RX ADMIN — RISPERIDONE 0.12 MG: 1 SOLUTION ORAL at 10:07

## 2019-07-13 RX ADMIN — RISPERIDONE 0.25 MG: 0.25 TABLET ORAL at 08:07

## 2019-07-13 RX ADMIN — CETIRIZINE HYDROCHLORIDE 10 MG: 5 TABLET ORAL at 09:07

## 2019-07-13 RX ADMIN — LEVETIRACETAM 750 MG: 100 SOLUTION ORAL at 08:07

## 2019-07-13 RX ADMIN — FLUTICASONE PROPIONATE 100 MCG: 50 SPRAY, METERED NASAL at 09:07

## 2019-07-13 NOTE — PT/OT/SLP PROGRESS
Occupational Therapy  Treatment    Susanna Byrnes   MRN: [id number]   Admitting Diagnosis: UTI (urinary tract infection)    OT Date of Treatment: 07/13/19       Billable Minutes:  Self Care/Home Management 35 and Therapeutic Exercise 10    General Precautions: Standard, fall, aspiration  Orthopedic Precautions: N/A  Braces: N/A    Spiritual, Cultural Beliefs, Congregation Practices, Values that Affect Care: yes(Religion)    Subjective:  Communicated with nurse prior to session.      Pain/Comfort  Pain Rating 1: 0/10  Pain Rating Post-Intervention 1: 0/10    Objective:  Patient found with: (with no lines and supine in bed.)    Occupational Performance:    Bed Mobility:    · Patient completed Rolling/Turning to Right with total assistance  · Patient completed Scooting/Bridging with total assistance  · Patient completed Supine to Sit with total assistance     Functional Mobility/Transfers:  · Patient completed Sit <> Stand Transfer with moderate assistance  with  rolling walker   · Patient completed Bed <> Chair Transfer using Step Transfer technique with moderate assistance with rolling walker    Activities of Daily Living:  · Grooming: moderate assistance with Pt washing face and hands but needing (A) to comb hair and to perform oral care.  · Upper Body Dressing: maximal assistance and total assistance donning /doffing pullover shirt.  · Lower Body Dressing: total assistance with Pt donning socks and pants.  · Toileting: total assistance with (A) required to perform all aspects of toileting.    [facility] 6 Click:  [facility] Total Score: 13    OT Exercises:  .Pt performed dowel exercises with 1 pound dowel 1 set 10 reps performing shld Flex/ Extn, Horizontal Ab/Adduction, chest presses, and biceps curls.  Max (A) needed to complete exercises and brief breaks provided between sets.    Additional Treatment:  Pt and her daughter edu on Plan of care,  safety when performing functional transfers, self care tasks and functional bed  mobility.  - White board updated  - Self care tasks completed-- as noted above     Patient left up in chair with call button in reach, nurse notified and daughter present    ASSESSMENT:  Susanna Byrnes is a 84 y.o. female with a medical diagnosis of UTI (urinary tract infection) .  She is making progress but requires V/Cs to properly and safely perform activities to completion.  She continues to present with deficits affecting (I)ce with functional transfers, functional standing balance and self care tasks with standing component.   OT continues to be recommended to further her functional (I)ce and safety. Goals remain appropriate and continued OT is recommended.    Rehab identified problem list/impairments: weakness, impaired endurance, impaired self care skills, impaired functional mobilty, gait instability, impaired balance, decreased upper extremity function, decreased lower extremity function, edema, impaired skin    Rehab potential is good    Activity tolerance: Fair    Discharge recommendations: home health OT     Barriers to discharge: None     Equipment recommendations: wheelchair, manual, tub bench     GOALS:   Multidisciplinary Problems     Occupational Therapy Goals        Problem: Occupational Therapy Goal    Goal Priority Disciplines Outcome Interventions   Occupational Therapy Goal     OT, PT/OT Ongoing (interventions implemented as appropriate)    Description:  Goals to be met by: 7/22/19     Patient will increase functional independence with ADLs by performing:    UE Dressing with Stand-by Assistance. REVISED UBD performed with Min (A)  LE Dressing with Moderate Assistance.  Grooming while seated at sink with Stand-by Assistance. REVISED Grooming while seated with Min (A)  Toileting from toilet with Maximum Assistance for hygiene and clothing management.   Bathing from  sitting at sink with Moderate Assistance.  Sitting at edge of bed x5 minutes with Stand-by Assistance.  Rolling to Bilateral with  Moderate Assistance.   Supine to sit with Moderate Assistance.  Stand pivot transfers with Moderate Assistance.  Toilet transfer to toilet with Moderate Assistance.  Upper extremity exercise program x20 reps per handout, with independence.                       Plan:  Patient to be seen 5 x/week to address the above listed problems via self-care/home management, therapeutic activities, therapeutic exercises  Plan of Care expires: 08/08/19  Plan of Care reviewed with: patient    Miguel Tomas OTR/COTY  07/13/2019

## 2019-07-13 NOTE — PLAN OF CARE
Problem: Occupational Therapy Goal  Goal: Occupational Therapy Goal  Goals to be met by: 7/22/19     Patient will increase functional independence with ADLs by performing:    UE Dressing with Stand-by Assistance. REVISED UBD performed with Min (A)  LE Dressing with Moderate Assistance.  Grooming while seated at sink with Stand-by Assistance. REVISED Grooming while seated with Min (A)  Toileting from toilet with Maximum Assistance for hygiene and clothing management.   Bathing from  sitting at sink with Moderate Assistance.  Sitting at edge of bed x5 minutes with Stand-by Assistance.  Rolling to Bilateral with Moderate Assistance.   Supine to sit with Moderate Assistance.  Stand pivot transfers with Moderate Assistance.  Toilet transfer to toilet with Moderate Assistance.  Upper extremity exercise program x20 reps per handout, with independence.     Outcome: Ongoing (interventions implemented as appropriate)  MALAIKA Curtis/COTY      7/13/2019

## 2019-07-13 NOTE — PLAN OF CARE
Problem: Adult Inpatient Plan of Care  Goal: Plan of Care Review  Outcome: Ongoing (interventions implemented as appropriate)  Ms Byrnes is sitting up in w/c in room 302. [employer] camera is in progress. Pt stated that she was doing okay, and she did not need anything at this time.  Safety measures maintained. Call light is within reach. Will continue with plan of care.

## 2019-07-14 PROCEDURE — 94761 N-INVAS EAR/PLS OXIMETRY MLT: CPT

## 2019-07-14 PROCEDURE — 94640 AIRWAY INHALATION TREATMENT: CPT

## 2019-07-14 PROCEDURE — 25000003 PHARM REV CODE 250: Performed by: HOSPITALIST

## 2019-07-14 PROCEDURE — 25000242 PHARM REV CODE 250 ALT 637 W/ HCPCS: Performed by: HOSPITALIST

## 2019-07-14 PROCEDURE — 25000003 PHARM REV CODE 250: Performed by: NURSE PRACTITIONER

## 2019-07-14 PROCEDURE — 63600175 PHARM REV CODE 636 W HCPCS: Performed by: HOSPITALIST

## 2019-07-14 PROCEDURE — 11000004 HC SNF PRIVATE

## 2019-07-14 PROCEDURE — 25000003 PHARM REV CODE 250: Performed by: INTERNAL MEDICINE

## 2019-07-14 RX ADMIN — RISPERIDONE 0.12 MG: 1 SOLUTION ORAL at 09:07

## 2019-07-14 RX ADMIN — IPRATROPIUM BROMIDE AND ALBUTEROL SULFATE 3 ML: .5; 3 SOLUTION RESPIRATORY (INHALATION) at 07:07

## 2019-07-14 RX ADMIN — MONTELUKAST 10 MG: 10 TABLET, FILM COATED ORAL at 08:07

## 2019-07-14 RX ADMIN — AMLODIPINE BESYLATE 5 MG: 5 TABLET ORAL at 09:07

## 2019-07-14 RX ADMIN — IPRATROPIUM BROMIDE AND ALBUTEROL SULFATE 3 ML: .5; 3 SOLUTION RESPIRATORY (INHALATION) at 05:07

## 2019-07-14 RX ADMIN — LEVETIRACETAM 750 MG: 100 SOLUTION ORAL at 08:07

## 2019-07-14 RX ADMIN — IPRATROPIUM BROMIDE AND ALBUTEROL SULFATE 3 ML: .5; 3 SOLUTION RESPIRATORY (INHALATION) at 08:07

## 2019-07-14 RX ADMIN — CHLORTHALIDONE 25 MG: 25 TABLET ORAL at 09:07

## 2019-07-14 RX ADMIN — IPRATROPIUM BROMIDE AND ALBUTEROL SULFATE 3 ML: .5; 3 SOLUTION RESPIRATORY (INHALATION) at 01:07

## 2019-07-14 RX ADMIN — CETIRIZINE HYDROCHLORIDE 10 MG: 5 TABLET ORAL at 09:07

## 2019-07-14 RX ADMIN — ENOXAPARIN SODIUM 40 MG: 100 INJECTION SUBCUTANEOUS at 04:07

## 2019-07-14 RX ADMIN — FLUTICASONE PROPIONATE 100 MCG: 50 SPRAY, METERED NASAL at 08:07

## 2019-07-14 RX ADMIN — RISPERIDONE 0.25 MG: 0.25 TABLET ORAL at 08:07

## 2019-07-14 RX ADMIN — ASPIRIN 81 MG CHEWABLE TABLET 81 MG: 81 TABLET CHEWABLE at 09:07

## 2019-07-14 RX ADMIN — LEVETIRACETAM 750 MG: 100 SOLUTION ORAL at 05:07

## 2019-07-14 RX ADMIN — ESCITALOPRAM OXALATE 5 MG: 5 TABLET, FILM COATED ORAL at 09:07

## 2019-07-14 NOTE — PLAN OF CARE
"Problem: Adult Inpatient Plan of Care  Goal: Plan of Care Review  Outcome: Ongoing (interventions implemented as appropriate)  Ms Byrnes remains awake and alert. Pt is sitting up in bed. She was reoriented to time and situation by this nurse. Pt denies pain or discomfort. Ms Byrnes stated. "I feel good". Safety measures maintained. Call light is within reach, and daughter remains at the bedside. They were instructed to use the call buttong to signal staff for assistance. Will continue with plan of care.       "

## 2019-07-14 NOTE — PROGRESS NOTES
Patients family member called nurse into room, Family member upset because patient is wheezing and she believes more should be done for her. Patient coughing and wheezing can be heard. Patient slept throughout night and just woke up. Respiratory was called for a prn treatment.   Charge nurse made aware.

## 2019-07-15 LAB
ANION GAP SERPL CALC-SCNC: 7 MMOL/L (ref 8–16)
BASOPHILS # BLD AUTO: 0.03 K/UL (ref 0–0.2)
BASOPHILS NFR BLD: 0.4 % (ref 0–1.9)
BUN SERPL-MCNC: 14 MG/DL (ref 8–23)
CALCIUM SERPL-MCNC: 8.9 MG/DL (ref 8.7–10.5)
CHLORIDE SERPL-SCNC: 104 MMOL/L (ref 95–110)
CO2 SERPL-SCNC: 29 MMOL/L (ref 23–29)
CREAT SERPL-MCNC: 0.6 MG/DL (ref 0.5–1.4)
DIFFERENTIAL METHOD: ABNORMAL
EOSINOPHIL # BLD AUTO: 0.6 K/UL (ref 0–0.5)
EOSINOPHIL NFR BLD: 7.8 % (ref 0–8)
ERYTHROCYTE [DISTWIDTH] IN BLOOD BY AUTOMATED COUNT: 17.6 % (ref 11.5–14.5)
EST. GFR  (AFRICAN AMERICAN): >60 ML/MIN/1.73 M^2
EST. GFR  (NON AFRICAN AMERICAN): >60 ML/MIN/1.73 M^2
GLUCOSE SERPL-MCNC: 81 MG/DL (ref 70–110)
HCT VFR BLD AUTO: 30.6 % (ref 37–48.5)
HGB BLD-MCNC: 9 G/DL (ref 12–16)
IMM GRANULOCYTES # BLD AUTO: 0.03 K/UL (ref 0–0.04)
IMM GRANULOCYTES NFR BLD AUTO: 0.4 % (ref 0–0.5)
LYMPHOCYTES # BLD AUTO: 2.9 K/UL (ref 1–4.8)
LYMPHOCYTES NFR BLD: 36.6 % (ref 18–48)
MAGNESIUM SERPL-MCNC: 1.8 MG/DL (ref 1.6–2.6)
MCH RBC QN AUTO: 21.8 PG (ref 27–31)
MCHC RBC AUTO-ENTMCNC: 29.4 G/DL (ref 32–36)
MCV RBC AUTO: 74 FL (ref 82–98)
MONOCYTES # BLD AUTO: 0.9 K/UL (ref 0.3–1)
MONOCYTES NFR BLD: 10.7 % (ref 4–15)
NEUTROPHILS # BLD AUTO: 3.5 K/UL (ref 1.8–7.7)
NEUTROPHILS NFR BLD: 44.1 % (ref 38–73)
NRBC BLD-RTO: 0 /100 WBC
PHOSPHATE SERPL-MCNC: 3.4 MG/DL (ref 2.7–4.5)
PLATELET # BLD AUTO: 339 K/UL (ref 150–350)
PMV BLD AUTO: 10.5 FL (ref 9.2–12.9)
POTASSIUM SERPL-SCNC: 3.3 MMOL/L (ref 3.5–5.1)
RBC # BLD AUTO: 4.12 M/UL (ref 4–5.4)
SODIUM SERPL-SCNC: 140 MMOL/L (ref 136–145)
WBC # BLD AUTO: 8.03 K/UL (ref 3.9–12.7)

## 2019-07-15 PROCEDURE — 27100171 HC OXYGEN HIGH FLOW UP TO 24 HOURS

## 2019-07-15 PROCEDURE — 11000004 HC SNF PRIVATE

## 2019-07-15 PROCEDURE — 80048 BASIC METABOLIC PNL TOTAL CA: CPT

## 2019-07-15 PROCEDURE — 36415 COLL VENOUS BLD VENIPUNCTURE: CPT

## 2019-07-15 PROCEDURE — 97530 THERAPEUTIC ACTIVITIES: CPT

## 2019-07-15 PROCEDURE — 97110 THERAPEUTIC EXERCISES: CPT

## 2019-07-15 PROCEDURE — 84100 ASSAY OF PHOSPHORUS: CPT

## 2019-07-15 PROCEDURE — 85025 COMPLETE CBC W/AUTO DIFF WBC: CPT

## 2019-07-15 PROCEDURE — 97116 GAIT TRAINING THERAPY: CPT

## 2019-07-15 PROCEDURE — 25000003 PHARM REV CODE 250: Performed by: INTERNAL MEDICINE

## 2019-07-15 PROCEDURE — 94640 AIRWAY INHALATION TREATMENT: CPT

## 2019-07-15 PROCEDURE — 25000242 PHARM REV CODE 250 ALT 637 W/ HCPCS: Performed by: HOSPITALIST

## 2019-07-15 PROCEDURE — 63600175 PHARM REV CODE 636 W HCPCS: Performed by: HOSPITALIST

## 2019-07-15 PROCEDURE — 25000003 PHARM REV CODE 250: Performed by: HOSPITALIST

## 2019-07-15 PROCEDURE — 83735 ASSAY OF MAGNESIUM: CPT

## 2019-07-15 PROCEDURE — 94761 N-INVAS EAR/PLS OXIMETRY MLT: CPT

## 2019-07-15 PROCEDURE — 99900058 HC 022 PAID UNDER SNF PPS

## 2019-07-15 PROCEDURE — 25000003 PHARM REV CODE 250: Performed by: NURSE PRACTITIONER

## 2019-07-15 RX ORDER — POTASSIUM CHLORIDE 20 MEQ/1
40 TABLET, EXTENDED RELEASE ORAL EVERY 4 HOURS
Status: COMPLETED | OUTPATIENT
Start: 2019-07-15 | End: 2019-07-15

## 2019-07-15 RX ADMIN — MONTELUKAST 10 MG: 10 TABLET, FILM COATED ORAL at 09:07

## 2019-07-15 RX ADMIN — RISPERIDONE 0.25 MG: 0.25 TABLET ORAL at 09:07

## 2019-07-15 RX ADMIN — CHLORTHALIDONE 25 MG: 25 TABLET ORAL at 08:07

## 2019-07-15 RX ADMIN — ASPIRIN 81 MG CHEWABLE TABLET 81 MG: 81 TABLET CHEWABLE at 08:07

## 2019-07-15 RX ADMIN — POLYETHYLENE GLYCOL 3350 17 G: 17 POWDER, FOR SOLUTION ORAL at 08:07

## 2019-07-15 RX ADMIN — CETIRIZINE HYDROCHLORIDE 10 MG: 5 TABLET ORAL at 08:07

## 2019-07-15 RX ADMIN — AMLODIPINE BESYLATE 5 MG: 5 TABLET ORAL at 08:07

## 2019-07-15 RX ADMIN — LEVETIRACETAM 750 MG: 100 SOLUTION ORAL at 05:07

## 2019-07-15 RX ADMIN — SENNOSIDES,DOCUSATE SODIUM 2 TABLET: 8.6; 5 TABLET, FILM COATED ORAL at 08:07

## 2019-07-15 RX ADMIN — FLUTICASONE PROPIONATE 100 MCG: 50 SPRAY, METERED NASAL at 08:07

## 2019-07-15 RX ADMIN — LEVETIRACETAM 750 MG: 100 SOLUTION ORAL at 09:07

## 2019-07-15 RX ADMIN — POTASSIUM CHLORIDE 40 MEQ: 1500 TABLET, EXTENDED RELEASE ORAL at 02:07

## 2019-07-15 RX ADMIN — POTASSIUM CHLORIDE 40 MEQ: 1500 TABLET, EXTENDED RELEASE ORAL at 05:07

## 2019-07-15 RX ADMIN — RISPERIDONE 0.12 MG: 1 SOLUTION ORAL at 08:07

## 2019-07-15 RX ADMIN — IPRATROPIUM BROMIDE AND ALBUTEROL SULFATE 3 ML: .5; 3 SOLUTION RESPIRATORY (INHALATION) at 07:07

## 2019-07-15 RX ADMIN — ENOXAPARIN SODIUM 40 MG: 100 INJECTION SUBCUTANEOUS at 05:07

## 2019-07-15 RX ADMIN — IPRATROPIUM BROMIDE AND ALBUTEROL SULFATE 3 ML: .5; 3 SOLUTION RESPIRATORY (INHALATION) at 05:07

## 2019-07-15 RX ADMIN — IPRATROPIUM BROMIDE AND ALBUTEROL SULFATE 3 ML: .5; 3 SOLUTION RESPIRATORY (INHALATION) at 01:07

## 2019-07-15 NOTE — PT/OT/SLP PROGRESS
"Physical Therapy  Treatment    Susanna Byrnes   MRN: [id number]   Admitting Diagnosis: UTI (urinary tract infection)    PT Received On: 07/15/19          Billable Minutes:  Gait Training 15, Therapeutic Activity 14 and Therapeutic Exercise 15    Treatment Type: Treatment  PT/PTA: PTA     PTA Visit Number: 1       General Precautions: Standard, fall, aspiration, seizure  Orthopedic Precautions: N/A   Braces: N/A    Spiritual, Cultural Beliefs, Worship Practices, Values that Affect Care: no    Subjective:  "not as good as I was yesterday" agreeable to therapy, pt appeared sleepy during session, vcs to keep eyes open, nsg Diana notified    Pain/Comfort  Pain Rating 1: 0/10  Pain Rating Post-Intervention 1: 0/10    Objective:   Patient found with: (in wc)     AM-PAC 6 CLICK MOBILITY  Total Score:11    Transfers:  Sit<>Stand: with RW mod A vcs for tech/positioning  Stand Pivot Transfer: no AD WC>BSC mod A vcs for tech/sequencing    Gait:  Amb with RW mod A x 2 ppl ( 1 SBA) for safety and wc follow, vcs for inc B step length/height ~ 20 ft and 18 ft seated rest break    Therex:  2x10 reps AP,GS,LAQ,hip flex,abd/add    Patient left up in chair with call button in reach and belongings in reach.camera called    Assessment:  Susanna Byrnes is a 84 y.o. female with a medical diagnosis of UTI (urinary tract infection).  Pt tolerated fairly well, towards end of session inc fatigue/sleepy , fairly talkative when engaged, pt would continue to benefit from skilled PT services to improve overall functional mobility, strength and endurance.  .    Rehab identified problem list/impairments: weakness, impaired endurance, impaired sensation, impaired self care skills, impaired functional mobilty, gait instability, impaired balance, impaired cognition, decreased upper extremity function, decreased lower extremity function    Rehab potential is good.    Activity tolerance: Fair    Discharge recommendations: home health PT     Barriers to " discharge: None    Equipment recommendations: tub bench, wheelchair, manual     GOALS:   Multidisciplinary Problems     Physical Therapy Goals        Problem: Physical Therapy Goal    Goal Priority Disciplines Outcome Goal Variances Interventions   Physical Therapy Goal     PT, PT/OT Ongoing (interventions implemented as appropriate)     Description:  Goals to be met by: 2019    Patient will increase functional independence with mobility by performin. Supine to sit with MInimal Assistance  2. Sit to supine with MInimal Assistance  3. Rolling to Left and Right with Minimal Assistance.  4. Sit to stand transfer with Minimal Assistance  5. Bed to chair transfer with Minimal Assistance using Rolling Walker  6. Gait  x 30 feet with Minimal Assistance using Rolling Walker.   7. Wheelchair propulsion x100 feet with Stand-by Assistance using bilateral uppper extremities                      PLAN:    Patient to be seen 5 x/week  to address the above listed problems via gait training, therapeutic activities, therapeutic exercises  Plan of Care expires: 19  Plan of Care reviewed with: patient    Kate Verma, PTA  07/15/2019

## 2019-07-15 NOTE — PLAN OF CARE
Problem: Physical Therapy Goal  Goal: Physical Therapy Goal  Goals to be met by: 2019    Patient will increase functional independence with mobility by performin. Supine to sit with MInimal Assistance  2. Sit to supine with MInimal Assistance  3. Rolling to Left and Right with Minimal Assistance.  4. Sit to stand transfer with Minimal Assistance  5. Bed to chair transfer with Minimal Assistance using Rolling Walker  6. Gait  x 30 feet with Minimal Assistance using Rolling Walker.   7. Wheelchair propulsion x100 feet with Stand-by Assistance using bilateral uppper extremities     Outcome: Ongoing (interventions implemented as appropriate)  Goals remain appropriate

## 2019-07-15 NOTE — PLAN OF CARE
Problem: Adult Inpatient Plan of Care  Goal: Plan of Care Review  Outcome: Ongoing (interventions implemented as appropriate)  Reviewed care plan with patient.

## 2019-07-15 NOTE — PROGRESS NOTES
[facility]                                  Skilled Nursing Facility                   Progress Note   DOS 7/15/2019  Admit Date: 7/8/2019  LETICIA   Principal Problem:  UTI (urinary tract infection)   HPI obtained from patient interview and chart review     Chief Complaint: Revaluation of medical treatment and therapy status: Lab review     HPI: Ms Byrnes is an 84 female with sig pmhx of dementia, HTN, HLD, CAD/NSTEMI('14), chronic sCHF/ICMP(LVEF 25-30), seizure, OA, suspected COPD, anemia, and MDR UTIs(Ecoli in '18 and '15, e faecalis '14, pseudomonas and enterobacter '12) who presents to SNF s/p hospitalization for fever likely 2/2 pyelonephritis. Patient currently with no complaints of dysuria at this time and voiding without difficulty. All labs reviewed. No leukocytosis. Hemoglobin stable at 8.9.  Platelets stable at 357, trending biweekly. Sodium at 138. Potasium 3.8. Creatinine stable at 0.6. 24 hr blood glucose 80. Patient's current blood pressure is at 161/70, increased diuretic, continue to trend. Patient progessing well with PT/OT. Patient medically stable. Continuing to follow and treat all acute and chronic conditions.    Interval Hx: All labs reviewed. No leukocytosis and afebrile. Hemoglobin stable at 9.  Platelets decrease to WNL at 339. Sodium at 140. Potasium 3.3, Initiated 40 meq K q4h x 2 doses. Creatinine stable at 0.6. 24 hr blood glucose 81. Patient's current blood pressure is at 101/51. Patient progessing well with PT/OT. Patient medically stable. Continuing to follow and treat all acute and chronic conditions.    PEx  Constitutional: Patient appears well-developed and in no distress   HENT:   Head: Normocephalic and atraumatic.   Eyes: Pupils are equal, round, and reactive to light.   Neck: Normal range of motion. Neck supple.   Cardiovascular: Normal rate, regular rhythm and normal heart sounds.    Pulmonary/Chest: Effort normal and breath sounds are  clear  Abdominal: Soft. Bowel sounds are normal.   Musculoskeletal: Normal range of motion. + generalized weakness. + RUE weakness  Neurological: Alert and oriented to person, place, and time.   Skin: Skin is warm and dry.   Psychiatric: Normal mood and affect. Behavior is normal.     Temp:  [98.5 °F (36.9 °C)-98.6 °F (37 °C)]   Pulse:  [67-78]   Resp:  [16-20]   BP: (101-104)/(51-52)   SpO2:  [91 %-94 %]   Body mass index is 39.22 kg/m².     ROS  Constitutional: Negative for fever and malaise/fatigue.   Eyes: Negative for blurred vision, double vision and discharge.   Respiratory: Negative for cough, shortness of breath and wheezing.    Cardiovascular: Negative for chest pain, palpitations, claudication, and leg swelling.   Gastrointestinal: Negative for abdominal pain, constipation, diarrhea, nausea and vomiting.   Genitourinary: Negative for dysuria, frequency and urgency.   Musculoskeletal:  + generalized weakness. Negative for back pain and myalgias.   Skin: Negative for itching and rash.  Neurological: Negative for dizziness, speech change, seizures, and headaches.   Psychiatric/Behavioral: Negative for depression. The patient is not nervous/anxious.      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    Past Surgical History: Patient has a past surgical history that includes Total knee arthroplasty (Left); Eye surgery; and Joint replacement.    Social History: Patient reports that she quit smoking about 7 years ago. Her smoking use included cigarettes. She has quit using smokeless tobacco. She reports that she does not drink alcohol or use drugs.    Family History: family history includes Dementia in her brother; Seizures in her brother and sister.    Allergies: Patient is allergic to lisinopril and haldol  [haloperidol lactate].      Assessment and Plan:  Hypokalemia   -7/11 Initiated 60 meq K x 1 now.  -7/15 Initiated 40 meq K q4h x 2 doses.     Thrombocytosis   -7/11 Platelets decreased to 351, continue to trend.   -7/15 Platelets decrease to WNL at 339, continue to trend     Sepsis  UTI / pyelonephritis  -E coli sens CTX and doxycycline  -CTX IV to Doxy PO to complete 14 days total of ABx (for pyelo)   -Continue doxycycline  -7/15 remains afebrile     CONTINUED    Essential hypertension  -continue amlodipine  -7/9 Increased chlorthalidone to 25 mg daily   -7/11 Bp at 120/57 and stable, trending    Chronic anemia  -Pt with chronic microcytic anemia  -continue ferrous sulfate.  -7/11 Hgb at 9 and stable, trending    Malnutrition mild  -7/9 Initiated nutrition consult   -7/11 Initiated Dysphagia soft diet and Boost TID with meals on 7/9 per nutritionist recs     Debility  -Continue with PT/OT for gait training and strengthening and restoration of ADL's   -Encourage mobility, OOB in chair, and early ambulation as appropriate  -Fall precautions   -Monitor for bowel and bladder dysfunction  -Monitor for and prevent skin breakdown and pressure ulcers  -Continue DVT prophylaxis with lovenox     Chronic combined systolic and diastolic CHF  -continue chlorthalidone  -7/9 Initiated ambulatory referral to cardiology      Dementia without behavioral disturbance  -Follows with Dr. Benita Melchor with [facility], last seen 6/24.  -Delirium precautions  -Continue Lexapro / Remeron / Risperidone     Seizure disorder  -Continue leviteracitam         No future appointments.    Serafin Gavin NP

## 2019-07-15 NOTE — PLAN OF CARE
Problem: Occupational Therapy Goal  Goal: Occupational Therapy Goal  Goals to be met by: 7/22/19     Patient will increase functional independence with ADLs by performing:    UE Dressing with Stand-by Assistance. REVISED UBD performed with Min (A)  LE Dressing with Moderate Assistance.  Grooming while seated at sink with Stand-by Assistance. REVISED Grooming while seated with Min (A)  Toileting from toilet with Maximum Assistance for hygiene and clothing management.   Bathing from  sitting at sink with Moderate Assistance.  Sitting at edge of bed x5 minutes with Stand-by Assistance.  Rolling to Bilateral with Moderate Assistance.   Supine to sit with Moderate Assistance.  Stand pivot transfers with Moderate Assistance.  Toilet transfer to toilet with Moderate Assistance.  Upper extremity exercise program x20 reps per handout, with independence.      Outcome: Outcome(s) achieved Date Met: 07/15/19  .

## 2019-07-15 NOTE — NURSING
Ms Byrnes was incontinent of bladder at this time. She received total assistance with cleaning and applying adult brief. Ms Byrnes was repositioned in bed for comfort. [employer] camera is in progress, and call light is within reach.

## 2019-07-15 NOTE — PT/OT/SLP PROGRESS
Occupational Therapy  Treatment    Susanna Byrnes   MRN: [id number]   Admitting Diagnosis: UTI (urinary tract infection)    OT Date of Treatment: 07/15/19       Billable Minutes:  Therapeutic Activity 45    General Precautions: Standard, fall, aspiration  Orthopedic Precautions: N/A  Braces: N/A    Spiritual, Cultural Beliefs, Baptism Practices, Values that Affect Care: yes([place])    Subjective:  Communicated with nsg prior to session.  Pt. Agreeable to tx    Pain/Comfort  Pain Rating 1: 0/10  Pain Rating Post-Intervention 1: 0/10    Objective:   Pt. Seated in w/c with teller sitter    Occupational Performance:    Bed Mobility:    · Not tested     Functional Mobility/Transfers:  · Patient completed Sit <> Stand Transfer with moderate assistance  with  rolling walker  and cues for safety and hand placement x several trials       Activities of Daily Living:  · Upper Body Dressing: maximal assistance to claire gown around back  · Lower Body Dressing: total assistance to claire pants over hips insace with RW for bal     AMPAC 6 Click:  AMPA Total Score: 13    Additional Treatment:  Pt. With stading act on this day. Pt. With standing with increment of 1 min x 2 times before complaints of dizziness Pt. BP taken  143/70 and then 170/73 Pt. Nurse notified Pt. /61 which nurse reported as normal once taken on L forearm  Pt. Also with ISHAAN MCKNIGHT on this day x 20 reps with limited ROM with shd flex, elbow flex/ext, wrist flex/ext and digit flex/ext supination/pronation and internal/ext rot ation with 1# dumb bell    Patient left up in chair with all lines intact and call button in reach teller sitter notified    ASSESSMENT:  Susanna Byrnes is a 84 y.o. female with a medical diagnosis of UTI (urinary tract infection) Pt. participated well with session on this day. Pt.with cues at times to keep eyes open during session on this day. PT. With reports of dizziness during session BP was taken.  Pt demos physical deficits with  balance  functional mobility, UB strength, endurance  level of functional indep with daily tasks and activities and selfcare skills .Pt. Will continue to benefit from continued OT to progress towards goals  .    Rehab identified problem list/impairments: weakness, impaired endurance, impaired self care skills, impaired functional mobilty, gait instability, impaired balance, decreased upper extremity function, decreased lower extremity function, edema, impaired skin    Rehab potential is fair    Activity tolerance: Fair    Discharge recommendations: home health OT     Barriers to discharge: None     Equipment recommendations: wheelchair, manual, tub bench     GOALS:   Multidisciplinary Problems     Occupational Therapy Goals     Not on file          Multidisciplinary Problems (Resolved)        Problem: Occupational Therapy Goal    Goal Priority Disciplines Outcome Interventions   Occupational Therapy Goal   (Resolved)     OT, PT/OT Outcome(s) achieved    Description:  Goals to be met by: 7/22/19     Patient will increase functional independence with ADLs by performing:    UE Dressing with Stand-by Assistance. REVISED UBD performed with Min (A)  LE Dressing with Moderate Assistance.  Grooming while seated at sink with Stand-by Assistance. REVISED Grooming while seated with Min (A)  Toileting from toilet with Maximum Assistance for hygiene and clothing management.   Bathing from  sitting at sink with Moderate Assistance.  Sitting at edge of bed x5 minutes with Stand-by Assistance.  Rolling to Bilateral with Moderate Assistance.   Supine to sit with Moderate Assistance.  Stand pivot transfers with Moderate Assistance.  Toilet transfer to toilet with Moderate Assistance.  Upper extremity exercise program x20 reps per handout, with independence.                   Plan:  Patient to be seen 5 x/week to address the above listed problems via self-care/home management, therapeutic activities, therapeutic exercises  Plan of  Care expires: 08/08/19  Plan of Care reviewed with: patient    Mallory GAGANDEEP Mendez, ANGELICA/COTY  07/15/2019

## 2019-07-16 PROCEDURE — 97530 THERAPEUTIC ACTIVITIES: CPT

## 2019-07-16 PROCEDURE — 11000004 HC SNF PRIVATE

## 2019-07-16 PROCEDURE — 25000003 PHARM REV CODE 250: Performed by: HOSPITALIST

## 2019-07-16 PROCEDURE — 97803 MED NUTRITION INDIV SUBSEQ: CPT

## 2019-07-16 PROCEDURE — 97110 THERAPEUTIC EXERCISES: CPT

## 2019-07-16 PROCEDURE — 94640 AIRWAY INHALATION TREATMENT: CPT

## 2019-07-16 PROCEDURE — 25000242 PHARM REV CODE 250 ALT 637 W/ HCPCS: Performed by: HOSPITALIST

## 2019-07-16 PROCEDURE — 25000003 PHARM REV CODE 250: Performed by: INTERNAL MEDICINE

## 2019-07-16 PROCEDURE — 97116 GAIT TRAINING THERAPY: CPT

## 2019-07-16 PROCEDURE — 97535 SELF CARE MNGMENT TRAINING: CPT

## 2019-07-16 PROCEDURE — 25000003 PHARM REV CODE 250: Performed by: NURSE PRACTITIONER

## 2019-07-16 PROCEDURE — 63600175 PHARM REV CODE 636 W HCPCS: Performed by: HOSPITALIST

## 2019-07-16 PROCEDURE — 94761 N-INVAS EAR/PLS OXIMETRY MLT: CPT

## 2019-07-16 PROCEDURE — 99900035 HC TECH TIME PER 15 MIN (STAT)

## 2019-07-16 RX ADMIN — LEVETIRACETAM 750 MG: 100 SOLUTION ORAL at 05:07

## 2019-07-16 RX ADMIN — CETIRIZINE HYDROCHLORIDE 10 MG: 5 TABLET ORAL at 08:07

## 2019-07-16 RX ADMIN — RISPERIDONE 0.25 MG: 0.25 TABLET ORAL at 08:07

## 2019-07-16 RX ADMIN — MONTELUKAST 10 MG: 10 TABLET, FILM COATED ORAL at 08:07

## 2019-07-16 RX ADMIN — ASPIRIN 81 MG CHEWABLE TABLET 81 MG: 81 TABLET CHEWABLE at 08:07

## 2019-07-16 RX ADMIN — POLYETHYLENE GLYCOL 3350 17 G: 17 POWDER, FOR SOLUTION ORAL at 08:07

## 2019-07-16 RX ADMIN — IPRATROPIUM BROMIDE AND ALBUTEROL SULFATE 3 ML: .5; 3 SOLUTION RESPIRATORY (INHALATION) at 07:07

## 2019-07-16 RX ADMIN — AMLODIPINE BESYLATE 5 MG: 5 TABLET ORAL at 08:07

## 2019-07-16 RX ADMIN — IPRATROPIUM BROMIDE AND ALBUTEROL SULFATE 3 ML: .5; 3 SOLUTION RESPIRATORY (INHALATION) at 03:07

## 2019-07-16 RX ADMIN — RISPERIDONE 0.12 MG: 1 SOLUTION ORAL at 08:07

## 2019-07-16 RX ADMIN — CHLORTHALIDONE 25 MG: 25 TABLET ORAL at 08:07

## 2019-07-16 RX ADMIN — ESCITALOPRAM OXALATE 5 MG: 5 TABLET, FILM COATED ORAL at 08:07

## 2019-07-16 RX ADMIN — FLUTICASONE PROPIONATE 100 MCG: 50 SPRAY, METERED NASAL at 08:07

## 2019-07-16 RX ADMIN — LEVETIRACETAM 750 MG: 100 SOLUTION ORAL at 08:07

## 2019-07-16 RX ADMIN — ENOXAPARIN SODIUM 40 MG: 100 INJECTION SUBCUTANEOUS at 04:07

## 2019-07-16 RX ADMIN — SENNOSIDES,DOCUSATE SODIUM 2 TABLET: 8.6; 5 TABLET, FILM COATED ORAL at 08:07

## 2019-07-16 NOTE — PT/OT/SLP PROGRESS
Occupational Therapy  Treatment    Susanna Byrnes   MRN: [id number]   Admitting Diagnosis: UTI (urinary tract infection)    OT Date of Treatment: 07/16/19       Billable Minutes: 47  Self Care/Home Management 37 and Therapeutic Activity 10    General Precautions: Standard, fall, aspiration  Orthopedic Precautions: N/A  Braces: N/A    Spiritual, Cultural Beliefs, Voodoo Practices, Values that Affect Care: yes(Hoahaoism)    Subjective:  Communicated with nsg prior to session.  I am doing well today    Pain/Comfort  Pain Rating 1: 0/10  Pain Rating Post-Intervention 1: 0/10    Objective:   Pt. Supine on arrival with teller sitter    Occupational Performance:    Bed Mobility:    · Patient completed Rolling/Turning to Left with  maximal assistance x 2  · Patient completed Rolling/Turning to Right with maximal assistance x 2  · Patient completed Scooting/Bridging with Total assistance  · Patient completed Supine to Sit with maximal assistance     Functional Mobility/Transfers:  · Patient completed Sit <> Stand Transfer with minimum assistance and moderate assistance  with  rolling walker from EOB to w/c with cues for safety and hand placement   · Patient completed Bed <> Chair Transfer using Stand Pivot technique with minimum assistance with rolling walker      Activities of Daily Living:  · Bathing: maximal assistance with bathing aspects   · Upper Body Dressing: moderate assistance to claire pull over shits and adjust  · Lower Body Dressing: total assistance to claire pants EOB level and to manage over hips instace wthi RW for bal   · Toileting: total assistance for cleaing and diaper management     [facility] 6 Click:  [facility] Total Score: 13    Additional Treatment:  Pt. With BE reaching activity on this day. Pt. With grasping and picking up objets and placement into slots to work on FM coordination and ROM due to limitations to (A) in ADL preparation  task    Patient left up in chair with all lines intact and call button in reach  and teller sitter present     ASSESSMENT:  Susanna Byrnes is a 84 y.o. female with a medical diagnosis of UTI (urinary tract infection) Pt. participated well with session on this day.Pt demos physical deficits with balance  functional mobility, UB strength, endurance  level of functional indep with daily tasks and activities and selfcare skills .Pt. Will continue to benefit from continued OT to progress towards goals      Rehab identified problem list/impairments: weakness, impaired endurance, impaired self care skills, impaired functional mobilty, gait instability, impaired balance, decreased upper extremity function, decreased lower extremity function, edema, impaired skin    Rehab potential is fair    Activity tolerance: Fair    Discharge recommendations: home health OT     Barriers to discharge: None     Equipment recommendations: wheelchair, manual, tub bench     GOALS:   Multidisciplinary Problems     Occupational Therapy Goals     Not on file          Multidisciplinary Problems (Resolved)        Problem: Occupational Therapy Goal    Goal Priority Disciplines Outcome Interventions   Occupational Therapy Goal   (Resolved)     OT, PT/OT Outcome(s) achieved    Description:  Goals to be met by: 7/22/19     Patient will increase functional independence with ADLs by performing:    UE Dressing with Stand-by Assistance. REVISED UBD performed with Min (A)  LE Dressing with Moderate Assistance.  Grooming while seated at sink with Stand-by Assistance. REVISED Grooming while seated with Min (A)  Toileting from toilet with Maximum Assistance for hygiene and clothing management.   Bathing from  sitting at sink with Moderate Assistance.  Sitting at edge of bed x5 minutes with Stand-by Assistance.  Rolling to Bilateral with Moderate Assistance.   Supine to sit with Moderate Assistance.  Stand pivot transfers with Moderate Assistance.  Toilet transfer to toilet with Moderate Assistance.  Upper extremity exercise program x20  reps per handout, with independence.                       Plan:  Patient to be seen 5 x/week to address the above listed problems via self-care/home management, therapeutic activities, therapeutic exercises  Plan of Care expires: 08/08/19  Plan of Care reviewed with: patient    RIVAS Guerrier  07/16/2019

## 2019-07-16 NOTE — PLAN OF CARE
Problem: Occupational Therapy Goal  Goal: Occupational Therapy Goal  Goals to be met by: 7/22/19     Patient will increase functional independence with ADLs by performing:    UE Dressing with Stand-by Assistance. REVISED UBD performed with Min (A)  LE Dressing with Moderate Assistance.  Grooming while seated at sink with Stand-by Assistance. REVISED Grooming while seated with Min (A)  Toileting from toilet with Maximum Assistance for hygiene and clothing management.   Bathing from  sitting at sink with Moderate Assistance.  Sitting at edge of bed x5 minutes with Stand-by Assistance.  Rolling to Bilateral with Moderate Assistance.   Supine to sit with Moderate Assistance.  Stand pivot transfers with Moderate Assistance.  Toilet transfer to toilet with Moderate Assistance.  Upper extremity exercise program x20 reps per handout, with independence.      Outcome: Ongoing (interventions implemented as appropriate)  .

## 2019-07-16 NOTE — PT/OT/SLP PROGRESS
"Physical Therapy  Treatment    Susanna Byrnes   MRN: [id number]   Admitting Diagnosis: UTI (urinary tract infection)    PT Received On: 07/16/19          Billable Minutes:  Gait Training 15, Therapeutic Activity 11 and Therapeutic Exercise 20    Treatment Type: Treatment  PT/PTA: PTA     PTA Visit Number: 2       General Precautions: Standard, fall, aspiration, seizure  Orthopedic Precautions: N/A   Braces: N/A    Spiritual, Cultural Beliefs, [employer]itan Practices, Values that Affect Care: no    Subjective:  "sure thing"      Pain/Comfort  Pain Rating 1: 0/10  Pain Rating Post-Intervention 1: 0/10    Objective:  Patient found in wc     AM-PAC 6 CLICK MOBILITY  Total Score:11    Transfers:  Sit<>Stand: with RW mod A vc/tcs for tech/positioning  Stand Pivot Transfer: nustep>wc with no AD mod A vc/tcs for tech    Gait:  Amb with RW mod A wc follow ~ 20 ft and 32 ft seated rest break, encouragement     Therex:  2x10 reps AP,LAQ,hip flex    Additional Treatment:  Recumbent cross  x 10 min L-1    Patient left up in chair with call button in reach, nsg notified, camera present and camera called, belongings in reach.    Assessment:  Susanna Byrnes is a 84 y.o. female with a medical diagnosis of UTI (urinary tract infection).  Pt tolerated well, pt would continue to benefit from skilled PT services to improve overall functional mobility, strength and endurance.  .    Rehab identified problem list/impairments: weakness, impaired endurance, impaired sensation, impaired self care skills, impaired functional mobilty, gait instability, impaired balance, impaired cognition, decreased upper extremity function, decreased lower extremity function    Rehab potential is good.    Activity tolerance: Fair    Discharge recommendations: home health PT     Barriers to discharge: None    Equipment recommendations: tub bench, wheelchair, manual     GOALS:   Multidisciplinary Problems     Physical Therapy Goals        Problem: Physical " Therapy Goal    Goal Priority Disciplines Outcome Goal Variances Interventions   Physical Therapy Goal     PT, PT/OT Ongoing (interventions implemented as appropriate)     Description:  Goals to be met by: 2019    Patient will increase functional independence with mobility by performin. Supine to sit with MInimal Assistance  2. Sit to supine with MInimal Assistance  3. Rolling to Left and Right with Minimal Assistance.  4. Sit to stand transfer with Minimal Assistance  5. Bed to chair transfer with Minimal Assistance using Rolling Walker  6. Gait  x 30 feet with Minimal Assistance using Rolling Walker.   7. Wheelchair propulsion x100 feet with Stand-by Assistance using bilateral uppper extremities                      PLAN:    Patient to be seen 5 x/week  to address the above listed problems via gait training, therapeutic activities, therapeutic exercises  Plan of Care expires: 19  Plan of Care reviewed with: patient    Kate Steeleemily, PTA  2019

## 2019-07-16 NOTE — PROGRESS NOTES
[facility] - Skilled Nursing Care  Adult Nutrition  Progress Note    SUMMARY   Recommendations    Recommendation/Intervention: Continue dysphagia soft level 6 diet, no pork no spaghetti, boost plus TID  Goals: PO to meet 85% of EEN with ONS by next RD visit  Nutrition Goal Status: progressing towards goal  Communication of RD Recs: other (comment)(POC)    Reason for Assessment    Reason For Assessment: RD follow-up  Diagnosis: (Sepsis, Debility)  Relevant Medical History: acute polynephrtis, HTNm arthritis, suspected COPD, seizures, cystitis, NSTEMI, HLD, Demenia  Interdisciplinary Rounds: did not attend  General Information Comments: Assist with set up , sometimes feeds self, today she was confused enough to think she had eaten lunch when it had just arrived  Nutrition Discharge Planning: DC on ONS of choice bid, Soft diet     Nutrition Risk Screen    Nutrition Risk Screen: dysphagia or difficulty swallowing    Nutrition/Diet History    Patient Reported Diet/Restrictions/Preferences: soft  Typical Food/Fluid Intake: coffee, milk, oatmeal, some eggs  Food Preferences: no pork no spaghetti, soft food  Spiritual, Cultural Beliefs, Buddhism Practices, Values that Affect Care: no  Supplemental Drinks or Food Habits: Boost Plus  Food Allergies: NKFA  Factors Affecting Nutritional Intake: impaired cognitive status/motor control, difficulty/impaired swallowing    Anthropometrics    Temp: 98.2 °F (36.8 °C)  Height: 5' (152.4 cm)  Height (inches): 60 in  Weight Method: Bed Scale  Weight: 91.1 kg (200 lb 13.4 oz)  Weight (lb): 200.84 lb  Ideal Body Weight (IBW), Female: 100 lb  % Ideal Body Weight, Female (lb): 175.93 lb  BMI (Calculated): 34.4  BMI Grade: 30 - 34.9- obesity - grade I  Usual Body Weight (UBW), k.5 kg  % Usual Body Weight: 100.59  % Weight Change From Usual Weight: 0.38 %       Lab/Procedures/Meds    Pertinent Labs Reviewed: reviewed  Pertinent Labs Comments: K 3.3  Pertinent Medications Reviewed:  reviewed  Pertinent Medications Comments: KCl, senna         Estimated/Assessed Needs    Weight Used For Calorie Calculations: 79.8 kg (175 lb 14.8 oz)  Energy Calorie Requirements (kcal): 1461  Energy Need Method: [place]-St Jeor(x 1.25(PAL))  Protein Requirements:   Weight Used For Protein Calculations: 79.8 kg (175 lb 14.8 oz)(1.2-1.3 gm /kg)  Fluid Requirements (mL): per MD  Estimated Fluid Requirement Method: RDA Method  RDA Method (mL): 1461  CHO Requirement: -      Nutrition Prescription Ordered    Current Diet Order: Dysphagia soft level 6, assist with meals  Nutrition Order Comments: PO 75%  Oral Nutrition Supplement: Boost plus TID    Evaluation of Received Nutrient/Fluid Intake    Energy Calories Required: meeting needs  Protein Required: meeting needs  Fluid Required: meeting needs  Tolerance: tolerating  % Intake of Estimated Energy Needs: 75 - 100 %  % Meal Intake: 75 - 100 %    Nutrition Risk    Level of Risk/Frequency of Follow-up: low     Assessment and Plan   Mild Malnutrition related to reduced po intake and swallowing difficulty with hx of 2 meals per day per diet hx and swallowing difficulty.need for assist with meals.  New  Increased nutrient needs(calories and protein) related to hypermetabolism as evidenced by sepsis.  Ongoing    Monitor and Evaluation    Food and Nutrient Intake: food and beverage intake  Food and Nutrient Adminstration: diet order  Physical Activity and Function: nutrition-related ADLs and IADLs  Anthropometric Measurements: weight change  Biochemical Data, Medical Tests and Procedures: glucose/endocrine profile, gastrointestinal profile, inflammatory profile, electrolyte and renal panel  Nutrition-Focused Physical Findings: overall appearance     Malnutrition Assessment  7/9/19     Skin (Micronutrient): dry  Eyes (Micronutrient): conjunctiva dull  Teeth (Micronutrient): broken dentition(some missing)   Micronutrient Evaluation: suspected deficiency  Micronutrient  Evaluation Comments: B vitamins, FE   Fluid Accumulation (Malnutrition): mild   Orbital Region (Subcutaneous Fat Loss): mild depletion  Upper Arm Region (Subcutaneous Fat Loss): well nourished  Thoracic and Lumbar Region: well nourished   [place] Region (Muscle Loss): moderate depletion  Clavicle Bone Region (Muscle Loss): well nourished  Scapular Bone Region (Muscle Loss): well nourished   Edema (Fluid Accumulation): 2-->mild             Nutrition Follow-Up    RD Follow-up?: Yes

## 2019-07-16 NOTE — PLAN OF CARE
Problem: Adult Inpatient Plan of Care  Goal: Plan of Care Review  Outcome: Ongoing (interventions implemented as appropriate)     07/15/19 2327   Plan of Care Review   Plan of Care Reviewed With patient       Problem: Fall Injury Risk  Goal: Absence of Fall and Fall-Related Injury  Outcome: Ongoing (interventions implemented as appropriate)  Intervention: Identify and Manage Contributors to Fall Injury Risk     07/15/19 2327   Manage Acute Allergic Reaction   Medication Review/Management medications reviewed   Identify and Manage Contributors to Fall Injury Risk   Self-Care Promotion BADL personal routines maintained;BADL personal objects within reach;safe use of adaptive equipment encouraged     Intervention: Promote Injury-Free Environment     07/15/19 2327   Optimize [place] and Functional Mobility   Environmental Safety Modification assistive device/personal items within reach;clutter free environment maintained;lighting adjusted;mobility aid in reach;room organization consistent   Optimize Balance and Safe Activity   Safety Promotion/Fall Prevention assistive device/personal item within reach;commode/urinal/bedpan at bedside;Fall Risk signage in place;Fall Risk reviewed with patient/family;lighting adjusted;instructed to call staff for mobility;upper side rails raised x 2, lower siderails raised x 1 (Peds only);/camera at bedside;nonskid shoes/socks when out of bed

## 2019-07-17 LAB
ALBUMIN SERPL BCP-MCNC: 2.5 G/DL (ref 3.5–5.2)
ALP SERPL-CCNC: 106 U/L (ref 55–135)
ALT SERPL W/O P-5'-P-CCNC: 11 U/L (ref 10–44)
ANION GAP SERPL CALC-SCNC: 6 MMOL/L (ref 8–16)
AST SERPL-CCNC: 17 U/L (ref 10–40)
BASOPHILS # BLD AUTO: 0.02 K/UL (ref 0–0.2)
BASOPHILS NFR BLD: 0.3 % (ref 0–1.9)
BILIRUB SERPL-MCNC: 0.2 MG/DL (ref 0.1–1)
BUN SERPL-MCNC: 17 MG/DL (ref 8–23)
CALCIUM SERPL-MCNC: 9 MG/DL (ref 8.7–10.5)
CHLORIDE SERPL-SCNC: 103 MMOL/L (ref 95–110)
CO2 SERPL-SCNC: 28 MMOL/L (ref 23–29)
CREAT SERPL-MCNC: 0.6 MG/DL (ref 0.5–1.4)
DIFFERENTIAL METHOD: ABNORMAL
EOSINOPHIL # BLD AUTO: 0.8 K/UL (ref 0–0.5)
EOSINOPHIL NFR BLD: 11.4 % (ref 0–8)
ERYTHROCYTE [DISTWIDTH] IN BLOOD BY AUTOMATED COUNT: 18.1 % (ref 11.5–14.5)
EST. GFR  (AFRICAN AMERICAN): >60 ML/MIN/1.73 M^2
EST. GFR  (NON AFRICAN AMERICAN): >60 ML/MIN/1.73 M^2
GLUCOSE SERPL-MCNC: 85 MG/DL (ref 70–110)
HCT VFR BLD AUTO: 31.4 % (ref 37–48.5)
HGB BLD-MCNC: 9.4 G/DL (ref 12–16)
IMM GRANULOCYTES # BLD AUTO: 0.02 K/UL (ref 0–0.04)
IMM GRANULOCYTES NFR BLD AUTO: 0.3 % (ref 0–0.5)
LYMPHOCYTES # BLD AUTO: 2 K/UL (ref 1–4.8)
LYMPHOCYTES NFR BLD: 28.5 % (ref 18–48)
MAGNESIUM SERPL-MCNC: 1.8 MG/DL (ref 1.6–2.6)
MCH RBC QN AUTO: 21.9 PG (ref 27–31)
MCHC RBC AUTO-ENTMCNC: 29.9 G/DL (ref 32–36)
MCV RBC AUTO: 73 FL (ref 82–98)
MONOCYTES # BLD AUTO: 0.7 K/UL (ref 0.3–1)
MONOCYTES NFR BLD: 10.1 % (ref 4–15)
NEUTROPHILS # BLD AUTO: 3.4 K/UL (ref 1.8–7.7)
NEUTROPHILS NFR BLD: 49.4 % (ref 38–73)
NRBC BLD-RTO: 0 /100 WBC
PHOSPHATE SERPL-MCNC: 3.6 MG/DL (ref 2.7–4.5)
PLATELET # BLD AUTO: 313 K/UL (ref 150–350)
PMV BLD AUTO: 10.5 FL (ref 9.2–12.9)
POTASSIUM SERPL-SCNC: 3.3 MMOL/L (ref 3.5–5.1)
PROT SERPL-MCNC: 7.1 G/DL (ref 6–8.4)
RBC # BLD AUTO: 4.29 M/UL (ref 4–5.4)
SODIUM SERPL-SCNC: 137 MMOL/L (ref 136–145)
WBC # BLD AUTO: 6.85 K/UL (ref 3.9–12.7)

## 2019-07-17 PROCEDURE — 25000003 PHARM REV CODE 250: Performed by: HOSPITALIST

## 2019-07-17 PROCEDURE — 84100 ASSAY OF PHOSPHORUS: CPT

## 2019-07-17 PROCEDURE — 63600175 PHARM REV CODE 636 W HCPCS: Performed by: HOSPITALIST

## 2019-07-17 PROCEDURE — 85025 COMPLETE CBC W/AUTO DIFF WBC: CPT

## 2019-07-17 PROCEDURE — 11000004 HC SNF PRIVATE

## 2019-07-17 PROCEDURE — 80053 COMPREHEN METABOLIC PANEL: CPT

## 2019-07-17 PROCEDURE — 97116 GAIT TRAINING THERAPY: CPT

## 2019-07-17 PROCEDURE — 25000003 PHARM REV CODE 250: Performed by: INTERNAL MEDICINE

## 2019-07-17 PROCEDURE — 97535 SELF CARE MNGMENT TRAINING: CPT

## 2019-07-17 PROCEDURE — 97530 THERAPEUTIC ACTIVITIES: CPT

## 2019-07-17 PROCEDURE — 97110 THERAPEUTIC EXERCISES: CPT

## 2019-07-17 PROCEDURE — 25000003 PHARM REV CODE 250: Performed by: NURSE PRACTITIONER

## 2019-07-17 PROCEDURE — 83735 ASSAY OF MAGNESIUM: CPT

## 2019-07-17 PROCEDURE — 94761 N-INVAS EAR/PLS OXIMETRY MLT: CPT

## 2019-07-17 PROCEDURE — 36415 COLL VENOUS BLD VENIPUNCTURE: CPT

## 2019-07-17 PROCEDURE — 94640 AIRWAY INHALATION TREATMENT: CPT

## 2019-07-17 PROCEDURE — 25000242 PHARM REV CODE 250 ALT 637 W/ HCPCS: Performed by: HOSPITALIST

## 2019-07-17 RX ADMIN — ENOXAPARIN SODIUM 40 MG: 100 INJECTION SUBCUTANEOUS at 05:07

## 2019-07-17 RX ADMIN — IPRATROPIUM BROMIDE AND ALBUTEROL SULFATE 3 ML: .5; 3 SOLUTION RESPIRATORY (INHALATION) at 07:07

## 2019-07-17 RX ADMIN — RISPERIDONE 0.25 MG: 0.25 TABLET ORAL at 08:07

## 2019-07-17 RX ADMIN — BENZONATATE 100 MG: 100 CAPSULE ORAL at 02:07

## 2019-07-17 RX ADMIN — RISPERIDONE 0.12 MG: 1 SOLUTION ORAL at 09:07

## 2019-07-17 RX ADMIN — ASPIRIN 81 MG CHEWABLE TABLET 81 MG: 81 TABLET CHEWABLE at 08:07

## 2019-07-17 RX ADMIN — MONTELUKAST 10 MG: 10 TABLET, FILM COATED ORAL at 08:07

## 2019-07-17 RX ADMIN — LEVETIRACETAM 750 MG: 100 SOLUTION ORAL at 08:07

## 2019-07-17 RX ADMIN — LEVETIRACETAM 750 MG: 100 SOLUTION ORAL at 06:07

## 2019-07-17 RX ADMIN — LORAZEPAM 0.5 MG: 0.5 TABLET ORAL at 08:07

## 2019-07-17 RX ADMIN — AMLODIPINE BESYLATE 5 MG: 5 TABLET ORAL at 08:07

## 2019-07-17 RX ADMIN — IPRATROPIUM BROMIDE AND ALBUTEROL SULFATE 3 ML: .5; 3 SOLUTION RESPIRATORY (INHALATION) at 08:07

## 2019-07-17 RX ADMIN — IPRATROPIUM BROMIDE AND ALBUTEROL SULFATE 3 ML: .5; 3 SOLUTION RESPIRATORY (INHALATION) at 03:07

## 2019-07-17 RX ADMIN — IPRATROPIUM BROMIDE AND ALBUTEROL SULFATE 3 ML: .5; 3 SOLUTION RESPIRATORY (INHALATION) at 01:07

## 2019-07-17 RX ADMIN — CHLORTHALIDONE 25 MG: 25 TABLET ORAL at 08:07

## 2019-07-17 RX ADMIN — GUAIFENESIN 600 MG: 600 TABLET, EXTENDED RELEASE ORAL at 08:07

## 2019-07-17 RX ADMIN — CETIRIZINE HYDROCHLORIDE 10 MG: 5 TABLET ORAL at 08:07

## 2019-07-17 RX ADMIN — ESCITALOPRAM OXALATE 5 MG: 5 TABLET, FILM COATED ORAL at 08:07

## 2019-07-17 NOTE — PLAN OF CARE
Problem: Adult Inpatient Plan of Care  Goal: Plan of Care Review     07/17/19 1513   Plan of Care Review   Plan of Care Reviewed With patient       Problem: Skin Injury Risk Increased  Goal: Skin Health and Integrity  Outcome: Ongoing (interventions implemented as appropriate)  Intervention: Optimize Skin Protection     07/17/19 1513   Prevent Additional Skin Injury   Head of Bed (HOB) HOB at 30 degrees   Pressure Reduction Devices heel offloading device utilized   Pressure Reduction Techniques frequent weight shift encouraged   Monitor and Manage Hypervolemia   Skin Protection incontinence pads utilized         Problem: Fall Injury Risk  Goal: Absence of Fall and Fall-Related Injury  Outcome: Ongoing (interventions implemented as appropriate)  Intervention: Identify and Manage Contributors to Fall Injury Risk     07/17/19 1513   Manage Acute Allergic Reaction   Medication Review/Management medications reviewed   Identify and Manage Contributors to Fall Injury Risk   Self-Care Promotion independence encouraged         Comments: Patient monitored every 1 to 2 hours for pain and safety.  Safety maintained.  Patient turned every 2 hours to prevent skin breakdown,  Patient instructed to call for assistance.Call  Light and persoanl items in reach.

## 2019-07-17 NOTE — PLAN OF CARE
Problem: Adult Inpatient Plan of Care  Goal: Plan of Care Review  Outcome: Ongoing (interventions implemented as appropriate)       07/15/19 2327   Plan of Care Review   Plan of Care Reviewed With patient         Problem: Fall Injury Risk  Goal: Absence of Fall and Fall-Related Injury  Outcome: Ongoing (interventions implemented as appropriate)  Intervention: Identify and Manage Contributors to Fall Injury Risk       07/15/19 2327   Manage Acute Allergic Reaction   Medication Review/Management medications reviewed   Identify and Manage Contributors to Fall Injury Risk   Self-Care Promotion BADL personal routines maintained;BADL personal objects within reach;safe use of adaptive equipment encouraged      Intervention: Promote Injury-Free Environment       07/15/19 2327   Optimize [place] and Functional Mobility   Environmental Safety Modification assistive device/personal items within reach;clutter free environment maintained;lighting adjusted;mobility aid in reach;room organization consistent   Optimize Balance and Safe Activity   Safety Promotion/Fall Prevention assistive device/personal item within reach;commode/urinal/bedpan at bedside;Fall Risk signage in place;Fall Risk reviewed with patient/family;lighting adjusted;instructed to call staff for mobility;upper side rails raised x 2, lower siderails raised x 1 (Peds only);/camera at bedside;nonskid shoes/socks when out of bed

## 2019-07-17 NOTE — PROGRESS NOTES
Patient has mildly labored breathing with shortness of breath with minimal exertion.  Breath sounds with wheezing and coarse wet crackles.  Non productive cough noted.  Serafin NP notified. Call light in reach.

## 2019-07-17 NOTE — PROGRESS NOTES
Pt with audible wheezing and crackles with a persist dry cough. Respiratory called for a PRN treatment.

## 2019-07-17 NOTE — PT/OT/SLP PROGRESS
Occupational Therapy  Treatment    Susanna Byrnes   MRN: [id number]   Admitting Diagnosis: UTI (urinary tract infection)    OT Date of Treatment: 07/17/19       Billable Minutes:48  Self Care/Home Management 35 and Therapeutic Activity 13    General Precautions: Standard, fall, aspiration  Orthopedic Precautions: N/A  Braces: N/A    Spiritual, Cultural Beliefs, Church Practices, Values that Affect Care: yes(Roman Catholic)    Subjective:  Communicated with nsg prior to session.  I am doing well today    Pain/Comfort  Pain Rating 1: 0/10  Pain Rating Post-Intervention 1: 0/10    Objective:   Pt. Supine with teller sitter camera    Occupational Performance:    Bed Mobility:    · Patient completed Rolling/Turning to Left with  maximal assistance and with side rail  · Patient completed Rolling/Turning to Right with maximal assistance and with side rail  · Patient completed Scooting/Bridging with maximal assistance  · Patient completed Supine to Sit with maximal assistance     Functional Mobility/Transfers:  · Patient completed Sit <> Stand Transfer with moderate assistance  with  rolling walker   · Patient completed Bed <> Chair Transfer using Stand Pivot technique with minimum assistance with rolling walker      Activities of Daily Living:  · Grooming: contact guard assistance with hand over hand (A)  · Upper Body Dressing: maximal assistance to doff/claire pull over shirt  · Lower Body Dressing: total assistance to claire pants seated and to manage over hips instance with RW  · Toileting: total assistance for diaper care and cleaning assistance for all aspects supine Pt incot of BM and urine     AMPAC 6 Click:  AMPAC Total Score: 13      Additional Treatment:  Pt. With AAROM BUE on this day x 15 reps. Pt. Very limited with movement. Pt. With shd elbow, wrist digit flex ext and also table slides. To incrase BUE ROM and basic selfcare management skills    Patient left up in chair with all lines intact and call button in  reach    ASSESSMENT:  Susanna Byrnes is a 84 y.o. female with a medical diagnosis of UTI (urinary tract infection) Pt. participated well with session on this day. Pt. Noted with raspy breathing on this day . Pt. Nurse notified and stated taht NP and respiatory had also been notified and RT on way to give Pt. Breathing tx. Pt demos physical deficits with balance  functional mobility, UB strength, endurance  level of functional indep with daily tasks and activities and selfcare skills .Pt. Will continue to benefit from continued OT to progress towards goals  .    Rehab identified problem list/impairments: weakness, impaired endurance, impaired self care skills, impaired functional mobilty, gait instability, impaired balance, decreased upper extremity function, decreased lower extremity function, edema, impaired skin    Rehab potential is fair    Activity tolerance: Fair    Discharge recommendations: home health OT     Barriers to discharge: None     Equipment recommendations: wheelchair, manual, tub bench     GOALS:   Multidisciplinary Problems     Occupational Therapy Goals        Problem: Occupational Therapy Goal    Goal Priority Disciplines Outcome Interventions   Occupational Therapy Goal     OT, PT/OT Ongoing (interventions implemented as appropriate)    Description:  Goals to be met by: 7/22/19     Patient will increase functional independence with ADLs by performing:    UE Dressing with Stand-by Assistance. REVISED UBD performed with Min (A)  LE Dressing with Moderate Assistance.  Grooming while seated at sink with Stand-by Assistance. REVISED Grooming while seated with Min (A)  Toileting from toilet with Maximum Assistance for hygiene and clothing management.   Bathing from  sitting at sink with Moderate Assistance.  Sitting at edge of bed x5 minutes with Stand-by Assistance.  Rolling to Bilateral with Moderate Assistance.   Supine to sit with Moderate Assistance.  Stand pivot transfers with Moderate  Assistance.  Toilet transfer to toilet with Moderate Assistance.  Upper extremity exercise program x20 reps per handout, with independence.                        Plan:  Patient to be seen 5 x/week to address the above listed problems via self-care/home management, therapeutic activities, therapeutic exercises  Plan of Care expires: 08/08/19  Plan of Care reviewed with: patient    RIVAS Guerrier  07/17/2019

## 2019-07-17 NOTE — PT/OT/SLP PROGRESS
"Physical Therapy  Treatment    Susanna Byrnes   MRN: [id number]   Admitting Diagnosis: UTI (urinary tract infection)    PT Received On: 07/17/19        Billable Minutes:  Gait Training 16, Therapeutic Activity 8 and Therapeutic Exercise 22    Treatment Type: Treatment  PT/PTA: PTA     PTA Visit Number: 3       General Precautions: Standard, fall, aspiration, seizure  Orthopedic Precautions: N/A   Braces: N/A    Spiritual, Cultural Beliefs, Episcopal Practices, Values that Affect Care: no    Subjective:  "I'm alright" pt with audible wheezing and few coughing episode during session, Nsg Kate notified/aware    Pain/Comfort  Pain Rating 1: 0/10  Pain Rating Post-Intervention 1: 0/10    Objective:   Patient found with: (in wc)     AM-PAC 6 CLICK MOBILITY  Total Score:11    Transfers:  Sit<>Stand: with RW mod A vc/tcs for tech    Gait:  Amb with RW mod A ~ 18 ft and 20ft seated rest break A to advance RW, vcs for inc B step length and erect posture    Therex:  2x10 reps PA,GS,LAQ,hip flex A/AA as needed    Additional Treatment:  Mini elliptical x 10 min occ rest breaks and A to initiate    Patient left up in chair with call button in reach, camera called present and belongings in reach.    Assessment:  Susanna Byrnes is a 84 y.o. female with a medical diagnosis of UTI (urinary tract infection).  Pt tolerated fairly well, fatigue easily and quickly, pt would continue to benefit from skilled PT services to improve overall functional mobility, strength and endurance.  .    Rehab identified problem list/impairments: weakness, impaired endurance, impaired sensation, impaired self care skills, impaired functional mobilty, gait instability, impaired balance, impaired cognition, decreased upper extremity function, decreased lower extremity function    Rehab potential is good.    Activity tolerance: Fair    Discharge recommendations: home health PT     Barriers to discharge: None    Equipment recommendations: tub bench, " wheelchair, manual     GOALS:   Multidisciplinary Problems     Physical Therapy Goals        Problem: Physical Therapy Goal    Goal Priority Disciplines Outcome Goal Variances Interventions   Physical Therapy Goal     PT, PT/OT Ongoing (interventions implemented as appropriate)     Description:  Goals to be met by: 2019    Patient will increase functional independence with mobility by performin. Supine to sit with MInimal Assistance  2. Sit to supine with MInimal Assistance  3. Rolling to Left and Right with Minimal Assistance.  4. Sit to stand transfer with Minimal Assistance  5. Bed to chair transfer with Minimal Assistance using Rolling Walker  6. Gait  x 30 feet with Minimal Assistance using Rolling Walker.   7. Wheelchair propulsion x100 feet with Stand-by Assistance using bilateral uppper extremities                      PLAN:    Patient to be seen 5 x/week  to address the above listed problems via gait training, therapeutic activities, therapeutic exercises  Plan of Care expires: 19  Plan of Care reviewed with: patient    Kate Verma, PTA  2019

## 2019-07-18 PROBLEM — R06.2 EXPIRATORY WHEEZING: Status: ACTIVE | Noted: 2019-07-18

## 2019-07-18 LAB
ANION GAP SERPL CALC-SCNC: 8 MMOL/L (ref 8–16)
BASOPHILS # BLD AUTO: 0.02 K/UL (ref 0–0.2)
BASOPHILS NFR BLD: 0.3 % (ref 0–1.9)
BUN SERPL-MCNC: 14 MG/DL (ref 8–23)
CALCIUM SERPL-MCNC: 9.3 MG/DL (ref 8.7–10.5)
CHLORIDE SERPL-SCNC: 102 MMOL/L (ref 95–110)
CO2 SERPL-SCNC: 28 MMOL/L (ref 23–29)
CREAT SERPL-MCNC: 0.6 MG/DL (ref 0.5–1.4)
DIFFERENTIAL METHOD: ABNORMAL
EOSINOPHIL # BLD AUTO: 0.8 K/UL (ref 0–0.5)
EOSINOPHIL NFR BLD: 12.5 % (ref 0–8)
ERYTHROCYTE [DISTWIDTH] IN BLOOD BY AUTOMATED COUNT: 18.1 % (ref 11.5–14.5)
EST. GFR  (AFRICAN AMERICAN): >60 ML/MIN/1.73 M^2
EST. GFR  (NON AFRICAN AMERICAN): >60 ML/MIN/1.73 M^2
GLUCOSE SERPL-MCNC: 90 MG/DL (ref 70–110)
HCT VFR BLD AUTO: 30.8 % (ref 37–48.5)
HGB BLD-MCNC: 9.1 G/DL (ref 12–16)
IMM GRANULOCYTES # BLD AUTO: 0.02 K/UL (ref 0–0.04)
IMM GRANULOCYTES NFR BLD AUTO: 0.3 % (ref 0–0.5)
LYMPHOCYTES # BLD AUTO: 2 K/UL (ref 1–4.8)
LYMPHOCYTES NFR BLD: 29.3 % (ref 18–48)
MAGNESIUM SERPL-MCNC: 1.8 MG/DL (ref 1.6–2.6)
MCH RBC QN AUTO: 21.8 PG (ref 27–31)
MCHC RBC AUTO-ENTMCNC: 29.5 G/DL (ref 32–36)
MCV RBC AUTO: 74 FL (ref 82–98)
MONOCYTES # BLD AUTO: 0.6 K/UL (ref 0.3–1)
MONOCYTES NFR BLD: 9.6 % (ref 4–15)
NEUTROPHILS # BLD AUTO: 3.2 K/UL (ref 1.8–7.7)
NEUTROPHILS NFR BLD: 48 % (ref 38–73)
NRBC BLD-RTO: 0 /100 WBC
PHOSPHATE SERPL-MCNC: 4.2 MG/DL (ref 2.7–4.5)
PLATELET # BLD AUTO: 300 K/UL (ref 150–350)
PMV BLD AUTO: 10.4 FL (ref 9.2–12.9)
POTASSIUM SERPL-SCNC: 3.4 MMOL/L (ref 3.5–5.1)
RBC # BLD AUTO: 4.18 M/UL (ref 4–5.4)
SODIUM SERPL-SCNC: 138 MMOL/L (ref 136–145)
WBC # BLD AUTO: 6.65 K/UL (ref 3.9–12.7)

## 2019-07-18 PROCEDURE — 94640 AIRWAY INHALATION TREATMENT: CPT

## 2019-07-18 PROCEDURE — 97110 THERAPEUTIC EXERCISES: CPT

## 2019-07-18 PROCEDURE — 94761 N-INVAS EAR/PLS OXIMETRY MLT: CPT

## 2019-07-18 PROCEDURE — 25000003 PHARM REV CODE 250: Performed by: HOSPITALIST

## 2019-07-18 PROCEDURE — 94667 MNPJ CHEST WALL 1ST: CPT

## 2019-07-18 PROCEDURE — 11000004 HC SNF PRIVATE

## 2019-07-18 PROCEDURE — 25000003 PHARM REV CODE 250: Performed by: NURSE PRACTITIONER

## 2019-07-18 PROCEDURE — 85025 COMPLETE CBC W/AUTO DIFF WBC: CPT

## 2019-07-18 PROCEDURE — 97530 THERAPEUTIC ACTIVITIES: CPT

## 2019-07-18 PROCEDURE — 36415 COLL VENOUS BLD VENIPUNCTURE: CPT

## 2019-07-18 PROCEDURE — 84100 ASSAY OF PHOSPHORUS: CPT

## 2019-07-18 PROCEDURE — 25000242 PHARM REV CODE 250 ALT 637 W/ HCPCS: Performed by: HOSPITALIST

## 2019-07-18 PROCEDURE — 63600175 PHARM REV CODE 636 W HCPCS: Performed by: HOSPITALIST

## 2019-07-18 PROCEDURE — 25000003 PHARM REV CODE 250: Performed by: INTERNAL MEDICINE

## 2019-07-18 PROCEDURE — 80048 BASIC METABOLIC PNL TOTAL CA: CPT

## 2019-07-18 PROCEDURE — 25000242 PHARM REV CODE 250 ALT 637 W/ HCPCS: Performed by: NURSE PRACTITIONER

## 2019-07-18 PROCEDURE — 83735 ASSAY OF MAGNESIUM: CPT

## 2019-07-18 RX ORDER — IPRATROPIUM BROMIDE AND ALBUTEROL SULFATE 2.5; .5 MG/3ML; MG/3ML
3 SOLUTION RESPIRATORY (INHALATION) EVERY 6 HOURS
Status: DISCONTINUED | OUTPATIENT
Start: 2019-07-18 | End: 2019-07-26 | Stop reason: HOSPADM

## 2019-07-18 RX ORDER — FUROSEMIDE 20 MG/1
20 TABLET ORAL DAILY
Status: COMPLETED | OUTPATIENT
Start: 2019-07-18 | End: 2019-07-20

## 2019-07-18 RX ORDER — GUAIFENESIN 600 MG/1
600 TABLET, EXTENDED RELEASE ORAL 2 TIMES DAILY
Status: DISCONTINUED | OUTPATIENT
Start: 2019-07-18 | End: 2019-07-22

## 2019-07-18 RX ORDER — POTASSIUM CHLORIDE 20 MEQ/1
40 TABLET, EXTENDED RELEASE ORAL EVERY 4 HOURS
Status: COMPLETED | OUTPATIENT
Start: 2019-07-18 | End: 2019-07-18

## 2019-07-18 RX ORDER — LANOLIN ALCOHOL/MO/W.PET/CERES
400 CREAM (GRAM) TOPICAL DAILY
Status: DISCONTINUED | OUTPATIENT
Start: 2019-07-18 | End: 2019-07-26 | Stop reason: HOSPADM

## 2019-07-18 RX ORDER — RISPERIDONE 1 MG/ML
0.12 SOLUTION ORAL NIGHTLY
Status: DISCONTINUED | OUTPATIENT
Start: 2019-07-19 | End: 2019-07-19

## 2019-07-18 RX ADMIN — ASPIRIN 81 MG CHEWABLE TABLET 81 MG: 81 TABLET CHEWABLE at 09:07

## 2019-07-18 RX ADMIN — IPRATROPIUM BROMIDE AND ALBUTEROL SULFATE 3 ML: .5; 3 SOLUTION RESPIRATORY (INHALATION) at 03:07

## 2019-07-18 RX ADMIN — POTASSIUM CHLORIDE 40 MEQ: 1500 TABLET, EXTENDED RELEASE ORAL at 01:07

## 2019-07-18 RX ADMIN — POTASSIUM CHLORIDE 40 MEQ: 1500 TABLET, EXTENDED RELEASE ORAL at 05:07

## 2019-07-18 RX ADMIN — GUAIFENESIN 600 MG: 600 TABLET, EXTENDED RELEASE ORAL at 05:07

## 2019-07-18 RX ADMIN — FUROSEMIDE 20 MG: 20 TABLET ORAL at 01:07

## 2019-07-18 RX ADMIN — Medication 400 MG: at 03:07

## 2019-07-18 RX ADMIN — ESCITALOPRAM OXALATE 5 MG: 5 TABLET, FILM COATED ORAL at 09:07

## 2019-07-18 RX ADMIN — LEVETIRACETAM 750 MG: 100 SOLUTION ORAL at 09:07

## 2019-07-18 RX ADMIN — POTASSIUM CHLORIDE 40 MEQ: 1500 TABLET, EXTENDED RELEASE ORAL at 12:07

## 2019-07-18 RX ADMIN — RISPERIDONE 0.12 MG: 1 SOLUTION ORAL at 09:07

## 2019-07-18 RX ADMIN — ENOXAPARIN SODIUM 40 MG: 100 INJECTION SUBCUTANEOUS at 04:07

## 2019-07-18 RX ADMIN — CETIRIZINE HYDROCHLORIDE 10 MG: 5 TABLET ORAL at 09:07

## 2019-07-18 RX ADMIN — FLUTICASONE PROPIONATE 100 MCG: 50 SPRAY, METERED NASAL at 09:07

## 2019-07-18 RX ADMIN — LEVETIRACETAM 750 MG: 100 SOLUTION ORAL at 06:07

## 2019-07-18 RX ADMIN — IPRATROPIUM BROMIDE AND ALBUTEROL SULFATE 3 ML: .5; 3 SOLUTION RESPIRATORY (INHALATION) at 07:07

## 2019-07-18 RX ADMIN — CHLORTHALIDONE 25 MG: 25 TABLET ORAL at 09:07

## 2019-07-18 RX ADMIN — POLYETHYLENE GLYCOL 3350 17 G: 17 POWDER, FOR SOLUTION ORAL at 09:07

## 2019-07-18 RX ADMIN — AMLODIPINE BESYLATE 5 MG: 5 TABLET ORAL at 09:07

## 2019-07-18 RX ADMIN — IPRATROPIUM BROMIDE AND ALBUTEROL SULFATE 3 ML: .5; 3 SOLUTION RESPIRATORY (INHALATION) at 01:07

## 2019-07-18 RX ADMIN — SENNOSIDES,DOCUSATE SODIUM 2 TABLET: 8.6; 5 TABLET, FILM COATED ORAL at 09:07

## 2019-07-18 RX ADMIN — MONTELUKAST 10 MG: 10 TABLET, FILM COATED ORAL at 09:07

## 2019-07-18 NOTE — PLAN OF CARE
Problem: Fall Injury Risk  Goal: Absence of Fall and Fall-Related Injury    Intervention: Promote Injury-Free Environment     07/18/19 0532   Optimize [place] and Functional Mobility   Environmental Safety Modification assistive device/personal items within reach;clutter free environment maintained;mobility aid in reach;room organization consistent   Optimize Balance and Safe Activity   Safety Promotion/Fall Prevention assistive device/personal item within reach;Fall Risk reviewed with patient/family;nonskid shoes/socks when out of bed;medications reviewed;lighting adjusted

## 2019-07-18 NOTE — PT/OT/SLP PROGRESS
Occupational Therapy  Treatment    Susanna Byrnes   MRN: [id number]   Admitting Diagnosis: UTI (urinary tract infection)    OT Date of Treatment: 07/18/19       Billable Minutes:48  Therapeutic Activity 48    General Precautions: Standard, fall, aspiration  Orthopedic Precautions: N/A  Braces: N/A    Spiritual, Cultural Beliefs, Evangelical Practices, Values that Affect Care: yes(Caodaism)    Subjective:  Communicated with nsg prior to session.  I am doing well today    Pain/Comfort  Pain Rating 1: 0/10  Pain Rating Post-Intervention 1: 0/10    Objective:   Pt. Spine with teller sitter and PCT present     Occupational Performance:    Bed Mobility:    · Patient completed Scooting/Bridging with maximal assistance  · Patient completed Supine to Sit with maximal assistance     Functional Mobility/Transfers:  · Patient completed Sit <> Stand Transfer with minimum assistance and moderate assistance  with  rolling walker with cues for safety  · Patient completed Bed <> Chair Transfer using Stand Pivot technique with minimum assistance and moderate assistance with rolling walker      Activities of Daily Living:  · Lower Body Dressing: total assistance for all asepcts to claire pants seated EOB and to manage over hips instance with RW to manage over hips instance     AMPA 6 Click:  AMPAC Total Score: 13    Additional Treatment:  Pt. With AAROM on this day with table slides with forward/backwards and side to side motion with task.   Pt. Also with AAROM with shd flex,elbow flex/ext and wrist flex/ext and internal/external rotation x 15 reps to increase ROM for selfrcare related task    Patient left up in chair with in gym with PT present    ASSESSMENT:  Susanna Byrnes is a 84 y.o. female with a medical diagnosis of UTI (urinary tract infection) Pt. participated well with session on this day. Pt. continues to demo mild drowsiness during session cues to keep eyes open at times and increase and time (A) with selfcare skills  Pt demos  physical deficits with balance  functional mobility, UB strength, endurance  level of functional indep with daily tasks and activities and selfcare skills .Pt. Will continue to benefit from continued OT to progress towards goals      Rehab identified problem list/impairments: weakness, impaired endurance, impaired self care skills, impaired functional mobilty, gait instability, impaired balance, decreased upper extremity function, decreased lower extremity function, edema, impaired skin    Rehab potential is fair    Activity tolerance: Fair    Discharge recommendations: home health OT     Barriers to discharge: None     Equipment recommendations: wheelchair, manual, tub bench     GOALS:   Multidisciplinary Problems     Occupational Therapy Goals        Problem: Occupational Therapy Goal    Goal Priority Disciplines Outcome Interventions   Occupational Therapy Goal     OT, PT/OT Ongoing (interventions implemented as appropriate)    Description:  Goals to be met by: 7/22/19     Patient will increase functional independence with ADLs by performing:    UE Dressing with Stand-by Assistance. REVISED UBD performed with Min (A)  LE Dressing with Moderate Assistance.  Grooming while seated at sink with Stand-by Assistance. REVISED Grooming while seated with Min (A)  Toileting from toilet with Maximum Assistance for hygiene and clothing management.   Bathing from  sitting at sink with Moderate Assistance.  Sitting at edge of bed x5 minutes with Stand-by Assistance.  Rolling to Bilateral with Moderate Assistance.   Supine to sit with Moderate Assistance.  Stand pivot transfers with Moderate Assistance.  Toilet transfer to toilet with Moderate Assistance.  Upper extremity exercise program x20 reps per handout, with independence.                        Plan:  Patient to be seen 5 x/week to address the above listed problems via self-care/home management, therapeutic activities, therapeutic exercises  Plan of Care expires:  08/08/19  Plan of Care reviewed with: patient    ANGELICA Guerrier/COTY  07/18/2019

## 2019-07-18 NOTE — PLAN OF CARE
Problem: Occupational Therapy Goal  Goal: Occupational Therapy Goal  Goals to be met by: 7/22/19     Patient will increase functional independence with ADLs by performing:    UE Dressing with Stand-by Assistance. REVISED UBD performed with Min (A)  LE Dressing with Moderate Assistance.  Grooming while seated at sink with Stand-by Assistance. REVISED Grooming while seated with Min (A)  Toileting from toilet with Maximum Assistance for hygiene and clothing management.   Bathing from  sitting at sink with Moderate Assistance.  Sitting at edge of bed x5 minutes with Stand-by Assistance.  Rolling to Bilateral with Moderate Assistance.   Supine to sit with Moderate Assistance.  Stand pivot transfers with Moderate Assistance.  Toilet transfer to toilet with Moderate Assistance.  Upper extremity exercise program x20 reps per handout, with independence.       Outcome: Ongoing (interventions implemented as appropriate)  .    Comments: .

## 2019-07-18 NOTE — NURSING
Secure chat notified JUANJOSE Hollingsworth of pt with increased exp wheezing not improved by resp tx

## 2019-07-18 NOTE — PT/OT/SLP PROGRESS
Physical Therapy  Treatment    Susanna Byrnes   MRN: [id number]   Admitting Diagnosis: UTI (urinary tract infection)               Billable Minutes:  Therapeutic Exercise 47 and Total Time 47    Treatment Type: Treatment  PT/PTA: PT     PTA Visit Number: 0       General Precautions: Standard, fall, aspiration, seizure  Orthopedic Precautions: N/A   Braces: N/A    Spiritual, Cultural Beliefs, Sikhism Practices, Values that Affect Care: no    Subjective:  Pt agreeable to therapy session.        Objective:  Patient found upright in wheelchair at therapy gym.       AM-PAC 6 CLICK MOBILITY  Total Score:11      Transfers:  Sit<>Stand: ModAx2, c/rolling walker, x3 unsuccessful attempts, pt communicates understanding of task, but unable to follow commands  Stand Pivot Transfer: unsuccessful attempt c/rolling walker, pt returned to sitting immediately after standing     Scooting: TotalAx2     Therex:  Sitting, marches, LAQ, ankle pumps (15 reps), required Max encouragement to continue task, dose off  Bean bag toss, (2x10 reps) used LUE  Balloon toss, sitting, 5 min  Hitting balloon with 1lb bar, 5 min, difficulty grasping bar     Patient left up in chair with caregiver present.    Assessment:  Susanna Byrnes is a 84 y.o. female with a medical diagnosis of UTI (urinary tract infection).  Pt had difficulty staying focused and required max encouragement to engage and redirect to activity. Will verbalize she understands task, but unable to follow simple commands and/or does not make attempt to complete tasks, thus requiring significant assistance with functional mobility activities, such as scooting in wheelchair and transfers. Pt's daughter reports pt is below her cognitive baseline. Pt will continue to benefit from skilled physical therapy to maximize level of function and reduce burden of care.    Rehab identified problem list/impairments: weakness, impaired endurance, impaired sensation, impaired self care skills, impaired  functional mobilty, gait instability, impaired balance, impaired cognition, decreased upper extremity function, decreased lower extremity function    Rehab potential is poor.    Activity tolerance: Poor    Discharge recommendations: home health PT     Barriers to discharge: None    Equipment recommendations: tub bench, wheelchair, manual     GOALS:   Multidisciplinary Problems     Physical Therapy Goals        Problem: Physical Therapy Goal    Goal Priority Disciplines Outcome Goal Variances Interventions   Physical Therapy Goal     PT, PT/OT Ongoing (interventions implemented as appropriate)     Description:  Goals to be met by: 2019    Patient will increase functional independence with mobility by performin. Supine to sit with MInimal Assistance  2. Sit to supine with MInimal Assistance  3. Rolling to Left and Right with Minimal Assistance.  4. Sit to stand transfer with Minimal Assistance  5. Bed to chair transfer with Minimal Assistance using Rolling Walker  6. Gait  x 30 feet with Minimal Assistance using Rolling Walker.   7. Wheelchair propulsion x100 feet with Stand-by Assistance using bilateral uppper extremities                      PLAN:    Patient to be seen 5 x/week  to address the above listed problems via gait training, therapeutic activities, therapeutic exercises  Plan of Care expires: 19  Plan of Care reviewed with: patient    Panterabrad Flo, [facility]  2019

## 2019-07-18 NOTE — PLAN OF CARE
Problem: Adult Inpatient Plan of Care  Goal: Plan of Care Review  Outcome: Revised  Repositions max assist, no new skin breakdowns noted. Afebrile. Monitored for pain and safety. Safety camera in place. Denies pain.

## 2019-07-18 NOTE — H&P
Hospital Medicine  History and Physical Exam    Admit Date: 7/8/2019  LETICIA TBD  Principal Problem:  UTI (urinary tract infection)   Primary care Physician: Ignacia River MD  Code status: Full Code    History obtained from medical records    HPI:   Ms. Byrnes is an 85 yo female with Dementia, HTN, Arthritis, Suspected COPD (pt unable to complete PFT 2/2 dementia), seizures, previous NSTEMI (4/14) with reduced EF (25-30%) who presented to the ED after two days of fever at home and a fall while ambulating to the bathroom on 7/2. Pt's daughter reports that her mother has had increased thirst, increased urinary frequency, and fever for the past 2 days with a Tmax at home of 100.4 that was improved with tylenol. She was t reated with ceftriaxone for sepsis due to UTI and associated encephalopathy. Stay was unremarkable. Patient denies any symptoms.     Patient transferred to [facility] with PT and OT to improve functional status and ability to perform ADLs.     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    Past Surgical History: Patient has a past surgical history that includes Total knee arthroplasty (Left); Eye surgery; and Joint replacement.    Social History: Patient reports that she quit smoking about 7 years ago. Her smoking use included cigarettes. She has quit using smokeless tobacco. She reports that she does not drink alcohol or use drugs.    Family History: family history includes Dementia in her brother; Seizures in her brother and sister.    Medications: reviewed     Allergies: Patient is allergic to lisinopril and haldol [haloperidol lactate].    ROS  Constitutional: no fever or chills  Respiratory: no cough or shortness of breath  Cardiovascular: no chest pain or palpitations  Gastrointestinal: no  nausea or vomiting, no abdominal pain or change in bowel habits  Genitourinary: no hematuria or dysuria  Integument/Breast: no rash or pruritis  Hematologic/Lymphatic: no easy bruising or lymphadenopathy  Musculoskeletal: no arthralgias or myalgias  Neurological: no seizures or tremors  Behavioral/Psych: no depression or anxiety    PEx  Temp:  [97.3 °F (36.3 °C)-98.8 °F (37.1 °C)]   Pulse:  [61-79]   Resp:  [16-24]   BP: (137-147)/(61-67)   SpO2:  [92 %-96 %]   Body mass index is 39.22 kg/m².     General appearance: no distress  Mental status: Alert and oriented x 3  HEENT:  conjunctivae/corneas clear, PERRL  Neck: supple, thyroid not enlarged  Pulm:   normal respiratory effort, CTA B, no c/w/r  Card: RRR, no murmur  Abd: soft, NT, ND, BS present; no masses, no organomegaly  Ext: no edema  Pulses: 2+, symmetric  Skin: color, texture, turgor normal. No rashes or lesions  Neuro: CN II-XII grossly intact, no focal numbness or weakness, normal strength and tone     Results for YULISSA GARCIA (MRN [id number]) as of 7/17/2019 22:13   Ref. Range 7/8/2019 04:03 7/9/2019 07:52 7/11/2019 05:45   WBC Latest Ref Range: 3.90 - 12.70 K/uL 6.55 6.70 6.58   RBC Latest Ref Range: 4.00 - 5.40 M/uL 4.12 4.12 4.18   Hemoglobin Latest Ref Range: 12.0 - 16.0 g/dL 9.0 (L) 8.9 (L) 9.0 (L)   Hematocrit Latest Ref Range: 37.0 - 48.5 % 29.7 (L) 30.3 (L) 30.3 (L)   MCV Latest Ref Range: 82 - 98 fL 72 (L) 74 (L) 73 (L)   MCH Latest Ref Range: 27.0 - 31.0 pg 21.8 (L) 21.6 (L) 21.5 (L)   MCHC Latest Ref Range: 32.0 - 36.0 g/dL 30.3 (L) 29.4 (L) 29.7 (L)   RDW Latest Ref Range: 11.5 - 14.5 % 17.6 (H) 17.2 (H) 17.5 (H)   Platelets Latest Ref Range: 150 - 350 K/uL 345 357 (H) 351 (H)   MPV Latest Ref Range: 9.2 - 12.9 fL 10.0 10.3 10.4   Gran% Latest Ref Range: 38.0 - 73.0 % 37.7 (L) 44.5 44.4   Gran # (ANC) Latest Ref Range: 1.8 - 7.7 K/uL 2.5 3.0 2.9   Lymph% Latest Ref Range: 18.0 - 48.0 % 43.4 36.1 35.7   Lymph # Latest Ref Range: 1.0 - 4.8 K/uL  2.8 2.4 2.4   Mono% Latest Ref Range: 4.0 - 15.0 % 9.3 9.7 10.0   Mono # Latest Ref Range: 0.3 - 1.0 K/uL 0.6 0.7 0.7   Eosinophil% Latest Ref Range: 0.0 - 8.0 % 8.5 (H) 9.1 (H) 8.7 (H)   Eos # Latest Ref Range: 0.0 - 0.5 K/uL 0.6 (H) 0.6 (H) 0.6 (H)   Basophil% Latest Ref Range: 0.0 - 1.9 % 0.6 0.3 0.6   Baso # Latest Ref Range: 0.00 - 0.20 K/uL 0.04 0.02 0.04   nRBC Latest Ref Range: 0 /100 WBC 0 0 0   Differential Method Unknown Automated Automated Automated   Immature Grans (Abs) Latest Ref Range: 0.00 - 0.04 K/uL 0.03 0.02 0.04   Immature Granulocytes Latest Ref Range: 0.0 - 0.5 % 0.5 0.3 0.6 (H)   Sodium Latest Ref Range: 136 - 145 mmol/L 142 138 139   Potassium Latest Ref Range: 3.5 - 5.1 mmol/L 3.9 3.8 3.6   Chloride Latest Ref Range: 95 - 110 mmol/L 105 102 101   CO2 Latest Ref Range: 23 - 29 mmol/L 28 26 27   Anion Gap Latest Ref Range: 8 - 16 mmol/L 9 10 11   BUN, Bld Latest Ref Range: 8 - 23 mg/dL 14 14 18   Creatinine Latest Ref Range: 0.5 - 1.4 mg/dL 0.6 0.6 0.6   eGFR if non African American Latest Ref Range: >60 mL/min/1.73 m^2 >60.0 >60.0 >60.0   eGFR if African American Latest Ref Range: >60 mL/min/1.73 m^2 >60.0 >60.0 >60.0   Glucose Latest Ref Range: 70 - 110 mg/dL 73 80 92   Calcium Latest Ref Range: 8.7 - 10.5 mg/dL 8.9 8.8 9.0   Phosphorus Latest Ref Range: 2.7 - 4.5 mg/dL   4.4   Magnesium Latest Ref Range: 1.6 - 2.6 mg/dL   1.8       Assessment and Plan:    Hypokalemia   -7/11 Initiated 60 meq K x 1 now.  -7/15 Initiated 40 meq K q4h x 2 doses.      Thrombocytosis   -7/11 Platelets decreased to 351, continue to trend.   -7/15 Platelets decrease to WNL at 339, continue to trend      Sepsis  UTI / pyelonephritis  -E coli sens CTX and doxycycline  -CTX IV to Doxy PO to complete 14 days total of ABx (for pyelo)   -Continue doxycycline  -7/15 remains afebrile      CONTINUED     Essential hypertension  -continue amlodipine  -7/9 Increased chlorthalidone to 25 mg daily   -7/11 Bp at 120/57 and  stable, trending     Chronic anemia  -Pt with chronic microcytic anemia  -continue ferrous sulfate.  -7/11 Hgb at 9 and stable, trending     Malnutrition mild  -7/9 Initiated nutrition consult   -7/11 Initiated Dysphagia soft diet and Boost TID with meals on 7/9 per nutritionist abner     Debility  -Continue with PT/OT for gait training and strengthening and restoration of ADL's   -Encourage mobility, OOB in chair, and early ambulation as appropriate  -Fall precautions   -Monitor for bowel and bladder dysfunction  -Monitor for and prevent skin breakdown and pressure ulcers  -Continue DVT prophylaxis with lovenox     Chronic combined systolic and diastolic CHF  -continue chlorthalidone  -7/9 Initiated ambulatory referral to cardiology      Dementia without behavioral disturbance  -Follows with Dr. Benita Melchor with [facility], last seen 6/24.  -Delirium precautions  -Continue Lexapro / Remeron / Risperidone     Seizure disorder  -Continue leviteracitam          No future appointments    Continue the following medications for treatment of the indicated conditions:  ·  Indication Medication Dose Route  Frequency       albuterol-ipratropium  3 mL Nebulization Q6H WAKE    amLODIPine  5 mg Oral Daily    aspirin  81 mg Oral Daily    cetirizine  10 mg Oral Daily    chlorthalidone  25 mg Oral Daily    enoxaparin  40 mg Subcutaneous Daily    escitalopram oxalate  5 mg Oral Daily    fluticasone propionate  2 spray Each Nare Daily    GI cocktail (mylanta 30 mL, lidocaine 2 % viscous 10 mL, dicyclomine 10 mL) 50 mL   Oral Once    levetiracetam oral soln  750 mg Oral Before breakfast    levetiracetam oral soln  750 mg Oral QHS    montelukast  10 mg Oral QHS    polyethylene glycol  17 g Oral BID    risperidone 1 mg/ml  0.12 mg Oral Daily    risperiDONE  0.25 mg Oral QHS    senna-docusate 8.6-50 mg  2 tablet Oral BID           No future appointments.        I certify that SNF services are required to  be given on an inpatient basis because Susanna Byrnes needs for skilled nursing care and/or skilled rehabilitation are required on a daily basis and such services can only practically be provided in a skilled nursing facility setting and are for an ongoing condition for which she received inpatient care in the hospital.     Time (minutes) spent in care of the patient (Greater than 1/2 spent in direct face-to-face contact) 40    Venecia Whelan MD

## 2019-07-18 NOTE — PLAN OF CARE
Problem: Physical Therapy Goal  Goal: Physical Therapy Goal  Goals to be met by: 2019    Patient will increase functional independence with mobility by performin. Supine to sit with MInimal Assistance  2. Sit to supine with MInimal Assistance  3. Rolling to Left and Right with Minimal Assistance.  4. Sit to stand transfer with Minimal Assistance  5. Bed to chair transfer with Minimal Assistance using Rolling Walker  6. Gait  x 30 feet with Minimal Assistance using Rolling Walker.   7. Wheelchair propulsion x100 feet with Stand-by Assistance using bilateral uppper extremities     Outcome: Ongoing (interventions implemented as appropriate)  Pt on going with goals.

## 2019-07-18 NOTE — PROGRESS NOTES
[facility]                                  Skilled Nursing Facility                   Progress Note   DOS 7/18/2019  Admit Date: 7/8/2019  LETICIA   Principal Problem:  UTI (urinary tract infection)   HPI obtained from patient interview and chart review     Chief Complaint: Nurse reporting the patient is having expiratory wheezing and Revaluation of medical treatment and therapy status: Lab review     HPI: Ms Byrnes is an 84 female with sig pmhx of dementia, HTN, HLD, CAD/NSTEMI('14), chronic sCHF/ICMP(LVEF 25-30), seizure, OA, suspected COPD, anemia, and MDR UTIs(Ecoli in '18 and '15, e faecalis '14, pseudomonas and enterobacter '12) who presents to SNF s/p hospitalization for fever likely 2/2 pyelonephritis. Patient currently with no complaints of dysuria at this time and voiding without difficulty. All labs reviewed. No leukocytosis. Hemoglobin stable at 8.9.  Platelets stable at 357, trending biweekly. Sodium at 138. Potasium 3.8. Creatinine stable at 0.6. 24 hr blood glucose 80. Patient's current blood pressure is at 161/70, increased diuretic, continue to trend. Patient progessing well with PT/OT. Patient medically stable. Continuing to follow and treat all acute and chronic conditions.    Interval Hx: Patient found to have expiratory wheezing, Initiated CXR, initiated lasix 20 mg daily, initiated CPT treatments q.6 hours round-the-clock per respiratory, and increased O2 neb treatments to q.6 hours around the clock. Upon assessment, patient found to be a little more sleepy than usual and patient with a new cough, decreased risperidone to 0.12 mg qpm and increased mucinex t 600 mg bid. All labs reviewed. No leukocytosis. Hemoglobin stable at 9.1.  Platelets stable at 300. Sodium at 138. Potasium 3.4, initiated 40 mEq potassium q.4 hours x3. Creatinine stable at 0.6. 24 hr blood glucose 90. Patient's current blood pressure is at 121/56, discontinued amlodipine. Patient's daughter  inquiring about the patient getting assistance with meals, Initiated nursing order to ensure that patient is being assisted with meals. Lengthy discussion with daughter r/t to the patient's care at main [facility] and treatment plan here at SNF. Patient progessing well with PT/OT. Patient medically stable. Continuing to follow and treat all acute and chronic conditions.    PEx  Constitutional: Patient appears well-developed and in no distress + slightly lethargic   HENT:   Head: Normocephalic and atraumatic.   Eyes: Pupils are equal, round, and reactive to light.   Neck: Normal range of motion. Neck supple.   Cardiovascular: Normal rate, regular rhythm and normal heart sounds.    Pulmonary/Chest: Effort normal and breath sounds are clear + cough  Abdominal: Soft. Bowel sounds are normal.   Musculoskeletal: Normal range of motion. + generalized weakness. + RUE weakness  Neurological: Alert and oriented to person, place, and time.   Skin: Skin is warm and dry.   Psychiatric: Normal mood and affect. Behavior is normal.     Temp:  [97.3 °F (36.3 °C)-98.2 °F (36.8 °C)]   Pulse:  [61-81]   Resp:  [16-28]   BP: (121-147)/(56-67)   SpO2:  [90 %-96 %]   Body mass index is 39.22 kg/m².     ROS  Constitutional: Negative for fever and malaise/fatigue.   Eyes: Negative for blurred vision, double vision and discharge.   Respiratory: Negative for cough, shortness of breath and wheezing.    Cardiovascular: Negative for chest pain, palpitations, claudication, and leg swelling.   Gastrointestinal: Negative for abdominal pain, constipation, diarrhea, nausea and vomiting.   Genitourinary: Negative for dysuria, frequency and urgency.   Musculoskeletal:  + generalized weakness. Negative for back pain and myalgias.   Skin: Negative for itching and rash.  Neurological: Negative for dizziness, speech change, seizures, and headaches.   Psychiatric/Behavioral: Negative for depression. The patient is not nervous/anxious.      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    Past Surgical History: Patient has a past surgical history that includes Total knee arthroplasty (Left); Eye surgery; and Joint replacement.    Social History: Patient reports that she quit smoking about 7 years ago. Her smoking use included cigarettes. She has quit using smokeless tobacco. She reports that she does not drink alcohol or use drugs.    Family History: family history includes Dementia in her brother; Seizures in her brother and sister.    Allergies: Patient is allergic to lisinopril and haldol [haloperidol lactate].      Assessment and Plan:  Expiratory wheezing  -7/18 Initiated CXR, initiated lasix 20 mg daily, initiated CPT treatments q.6 hours round-the-clock per respiratory, and increased O2 neb treatments to q.6 hours around the clock.     Lethargy  -7/18 Decreased risperidone to 0.12 mg qpm and increased mucinex 600 mg bid.    Cough  -7/18 Initiated CPT treatments q.6 hours round-the-clock per respiratory, increased O2 neb treatments to q.6 hours around the clock, and Increased mucinex t 600 mg bid.    Hypokalemia   -7/11 Initiated 60 meq K x 1 now.  -7/15 Initiated 40 meq K q4h x 2 doses.  -7/18 Initiated 40 mEq potassium q.4 hours x3.     Essential hypertension  -continue amlodipine  -7/9 Increased chlorthalidone to 25 mg daily   -7/11 Bp at 120/57 and stable, trending  -7/18 Discontinued amlodipine.    Malnutrition mild  -7/9 Initiated nutrition consult   -7/11 Initiated Dysphagia soft diet and Boost TID with meals on 7/9 per nutritionist recs  -7/18 Initiated nursing order to ensure that patient is being assisted with meal    CONTINUED    Thrombocytosis   -7/11 Platelets decreased to 351, continue to trend.   -7/15 Platelets decrease to WNL at 339, continue to trend      Sepsis  UTI / pyelonephritis  -E coli sens CTX and doxycycline  -CTX IV to Doxy PO to complete 14 days total of ABx (for pyelo)   -Continue doxycycline  -7/15 remains afebrile     Chronic anemia  -Pt with chronic microcytic anemia  -continue ferrous sulfate.  -7/11 Hgb at 9 and stable, trending     Debility  -Continue with PT/OT for gait training and strengthening and restoration of ADL's   -Encourage mobility, OOB in chair, and early ambulation as appropriate  -Fall precautions   -Monitor for bowel and bladder dysfunction  -Monitor for and prevent skin breakdown and pressure ulcers  -Continue DVT prophylaxis with lovenox     Chronic combined systolic and diastolic CHF  -continue chlorthalidone  -7/9 Initiated ambulatory referral to cardiology      Dementia without behavioral disturbance  -Follows with Dr. Benita Melchor with [facility], last seen 6/24.  -Delirium precautions  -Continue Lexapro / Remeron / Risperidone     Seizure disorder  -Continue leviteracitam       67 minutes spent in the care of the patient (Greater than 1/2 spent in non direct face-to-face contact)   40 of 67 minutes spent on documentation and counseling patient on clinical condition and therapies provided regarding new expiratory wheeze, new cough, new lethargy, persistent hypokalemia, and Lengthy discussion with daughter r/t to the patient's care at [facility] and treatment plan here at [facility]. The remainder of the time was spent in direct patient care.     No future appointments.    Serafin Gavin NP

## 2019-07-19 PROCEDURE — 97110 THERAPEUTIC EXERCISES: CPT

## 2019-07-19 PROCEDURE — 25000242 PHARM REV CODE 250 ALT 637 W/ HCPCS: Performed by: NURSE PRACTITIONER

## 2019-07-19 PROCEDURE — 25000003 PHARM REV CODE 250: Performed by: NURSE PRACTITIONER

## 2019-07-19 PROCEDURE — 63600175 PHARM REV CODE 636 W HCPCS: Performed by: HOSPITALIST

## 2019-07-19 PROCEDURE — 97530 THERAPEUTIC ACTIVITIES: CPT

## 2019-07-19 PROCEDURE — 94640 AIRWAY INHALATION TREATMENT: CPT

## 2019-07-19 PROCEDURE — 94668 MNPJ CHEST WALL SBSQ: CPT

## 2019-07-19 PROCEDURE — 25000003 PHARM REV CODE 250: Performed by: HOSPITALIST

## 2019-07-19 PROCEDURE — 11000004 HC SNF PRIVATE

## 2019-07-19 PROCEDURE — 94761 N-INVAS EAR/PLS OXIMETRY MLT: CPT

## 2019-07-19 PROCEDURE — 97116 GAIT TRAINING THERAPY: CPT

## 2019-07-19 PROCEDURE — 25000003 PHARM REV CODE 250: Performed by: INTERNAL MEDICINE

## 2019-07-19 RX ORDER — IPRATROPIUM BROMIDE AND ALBUTEROL SULFATE 2.5; .5 MG/3ML; MG/3ML
3 SOLUTION RESPIRATORY (INHALATION) EVERY 4 HOURS PRN
Status: DISCONTINUED | OUTPATIENT
Start: 2019-07-19 | End: 2019-07-19

## 2019-07-19 RX ORDER — IPRATROPIUM BROMIDE AND ALBUTEROL SULFATE 2.5; .5 MG/3ML; MG/3ML
SOLUTION RESPIRATORY (INHALATION)
Status: DISPENSED
Start: 2019-07-19 | End: 2019-07-19

## 2019-07-19 RX ORDER — IPRATROPIUM BROMIDE AND ALBUTEROL SULFATE 2.5; .5 MG/3ML; MG/3ML
3 SOLUTION RESPIRATORY (INHALATION) EVERY 4 HOURS PRN
Status: DISCONTINUED | OUTPATIENT
Start: 2019-07-19 | End: 2019-07-26 | Stop reason: HOSPADM

## 2019-07-19 RX ORDER — POLYETHYLENE GLYCOL 3350 17 G/17G
17 POWDER, FOR SOLUTION ORAL 2 TIMES DAILY
Status: DISCONTINUED | OUTPATIENT
Start: 2019-07-19 | End: 2019-07-26 | Stop reason: HOSPADM

## 2019-07-19 RX ORDER — PREDNISONE 5 MG/1
5 TABLET ORAL DAILY
Status: COMPLETED | OUTPATIENT
Start: 2019-07-20 | End: 2019-07-22

## 2019-07-19 RX ORDER — PREDNISONE 5 MG/1
5 TABLET ORAL DAILY
Status: DISCONTINUED | OUTPATIENT
Start: 2019-07-20 | End: 2019-07-19

## 2019-07-19 RX ADMIN — IPRATROPIUM BROMIDE AND ALBUTEROL SULFATE 3 ML: .5; 3 SOLUTION RESPIRATORY (INHALATION) at 01:07

## 2019-07-19 RX ADMIN — GUAIFENESIN 600 MG: 600 TABLET, EXTENDED RELEASE ORAL at 08:07

## 2019-07-19 RX ADMIN — IPRATROPIUM BROMIDE AND ALBUTEROL SULFATE 3 ML: .5; 3 SOLUTION RESPIRATORY (INHALATION) at 07:07

## 2019-07-19 RX ADMIN — ASPIRIN 81 MG CHEWABLE TABLET 81 MG: 81 TABLET CHEWABLE at 08:07

## 2019-07-19 RX ADMIN — IPRATROPIUM BROMIDE AND ALBUTEROL SULFATE 3 ML: .5; 3 SOLUTION RESPIRATORY (INHALATION) at 06:07

## 2019-07-19 RX ADMIN — FUROSEMIDE 20 MG: 20 TABLET ORAL at 08:07

## 2019-07-19 RX ADMIN — FLUTICASONE PROPIONATE 100 MCG: 50 SPRAY, METERED NASAL at 08:07

## 2019-07-19 RX ADMIN — ESCITALOPRAM OXALATE 5 MG: 5 TABLET, FILM COATED ORAL at 08:07

## 2019-07-19 RX ADMIN — ENOXAPARIN SODIUM 40 MG: 100 INJECTION SUBCUTANEOUS at 06:07

## 2019-07-19 RX ADMIN — Medication 400 MG: at 08:07

## 2019-07-19 RX ADMIN — LEVETIRACETAM 750 MG: 100 SOLUTION ORAL at 10:07

## 2019-07-19 RX ADMIN — IPRATROPIUM BROMIDE AND ALBUTEROL SULFATE 3 ML: .5; 3 SOLUTION RESPIRATORY (INHALATION) at 10:07

## 2019-07-19 RX ADMIN — LEVETIRACETAM 750 MG: 100 SOLUTION ORAL at 06:07

## 2019-07-19 RX ADMIN — CETIRIZINE HYDROCHLORIDE 10 MG: 5 TABLET ORAL at 08:07

## 2019-07-19 RX ADMIN — MONTELUKAST 10 MG: 10 TABLET, FILM COATED ORAL at 10:07

## 2019-07-19 RX ADMIN — CHLORTHALIDONE 25 MG: 25 TABLET ORAL at 08:07

## 2019-07-19 RX ADMIN — SENNOSIDES,DOCUSATE SODIUM 2 TABLET: 8.6; 5 TABLET, FILM COATED ORAL at 08:07

## 2019-07-19 RX ADMIN — GUAIFENESIN 600 MG: 600 TABLET, EXTENDED RELEASE ORAL at 10:07

## 2019-07-19 NOTE — PT/OT/SLP PROGRESS
"Physical Therapy  Treatment    Susanna Byrnes   MRN: [id number]   Admitting Diagnosis: UTI (urinary tract infection)    PT Received On: 07/19/19          Billable Minutes:  Gait Training 15, Therapeutic Activity 10 and Therapeutic Exercise 20    Treatment Type: Treatment  PT/PTA: PTA     PTA Visit Number: 1       General Precautions: Standard, fall, aspiration, seizure  Orthopedic Precautions: N/A   Braces: N/A    Spiritual, Cultural Beliefs, Hindu Practices, Values that Affect Care: no    Subjective:  "I'm alright" Pt agreeable to therapy      Pain/Comfort  Pain Rating 1: 0/10  Pain Rating Post-Intervention 1: 0/10    Objective:  Patient found in wc with O2 at 1L       AM-PAC 6 CLICK MOBILITY  Total Score:11    Transfers:  Sit<>Stand: Min/Mod A x2 RW to lean forward, verbal cues for hand and foot placement and to encourage forward lean    Gait:  Amb 2 trials RW Mod A to maintain weight forward 16' total, pt needs constant encouragement      Therex:  2 x 10  LAQ, HF, AP, GS  Pt needs constant redirection to task    Additional Treatment:  Balloon toss, sitting, 10 minutes, pt needs constant redirection to task, break at 10 minutes    Patient left up in chair with call button in reach.    Assessment:  Susanna Byrnes is a 84 y.o. female with a medical diagnosis of UTI (urinary tract infection).  Pt tolerated treatment well. Pt will continue to improve with skilled physical therapy services for trfs gait and therex.  .    Rehab identified problem list/impairments: weakness, impaired endurance, impaired sensation, impaired self care skills, impaired functional mobilty, gait instability, impaired balance, impaired cognition, decreased upper extremity function, decreased lower extremity function    Rehab potential is good.    Activity tolerance: Fair    Discharge recommendations: home health PT     Barriers to discharge: None    Equipment recommendations: tub bench, wheelchair, manual     GOALS:   Multidisciplinary " Problems     Physical Therapy Goals        Problem: Physical Therapy Goal    Goal Priority Disciplines Outcome Goal Variances Interventions   Physical Therapy Goal     PT, PT/OT Ongoing (interventions implemented as appropriate)     Description:  Goals to be met by: 2019    Patient will increase functional independence with mobility by performin. Supine to sit with MInimal Assistance  2. Sit to supine with MInimal Assistance  3. Rolling to Left and Right with Minimal Assistance.  4. Sit to stand transfer with Minimal Assistance  5. Bed to chair transfer with Minimal Assistance using Rolling Walker  6. Gait  x 30 feet with Minimal Assistance using Rolling Walker.   7. Wheelchair propulsion x100 feet with Stand-by Assistance using bilateral uppper extremities                      PLAN:    Patient to be seen 5 x/week  to address the above listed problems via gait training, therapeutic activities, therapeutic exercises  Plan of Care expires: 19  Plan of Care reviewed with: patient    Lupe Jacksonond, PTA  2019

## 2019-07-19 NOTE — PLAN OF CARE
Problem: Adult Inpatient Plan of Care  Goal: Plan of Care Review  Outcome: Ongoing (interventions implemented as appropriate)     07/19/19 0512   Plan of Care Review   Plan of Care Reviewed With patient       Problem: Fall Injury Risk  Goal: Absence of Fall and Fall-Related Injury    Intervention: Promote Injury-Free Environment     07/19/19 0512   Optimize [place] and Functional Mobility   Environmental Safety Modification assistive device/personal items within reach   Optimize Balance and Safe Activity   Safety Promotion/Fall Prevention lighting adjusted;medications reviewed;Fall Risk signage in place;/camera at bedside

## 2019-07-20 PROCEDURE — 97110 THERAPEUTIC EXERCISES: CPT

## 2019-07-20 PROCEDURE — 97116 GAIT TRAINING THERAPY: CPT

## 2019-07-20 PROCEDURE — 25000003 PHARM REV CODE 250: Performed by: HOSPITALIST

## 2019-07-20 PROCEDURE — 94640 AIRWAY INHALATION TREATMENT: CPT

## 2019-07-20 PROCEDURE — 25000003 PHARM REV CODE 250: Performed by: NURSE PRACTITIONER

## 2019-07-20 PROCEDURE — 11000004 HC SNF PRIVATE

## 2019-07-20 PROCEDURE — 94668 MNPJ CHEST WALL SBSQ: CPT

## 2019-07-20 PROCEDURE — 25000242 PHARM REV CODE 250 ALT 637 W/ HCPCS: Performed by: NURSE PRACTITIONER

## 2019-07-20 PROCEDURE — 97530 THERAPEUTIC ACTIVITIES: CPT

## 2019-07-20 PROCEDURE — 63600175 PHARM REV CODE 636 W HCPCS: Performed by: HOSPITALIST

## 2019-07-20 PROCEDURE — 63600175 PHARM REV CODE 636 W HCPCS: Performed by: NURSE PRACTITIONER

## 2019-07-20 PROCEDURE — 94761 N-INVAS EAR/PLS OXIMETRY MLT: CPT

## 2019-07-20 PROCEDURE — 25000003 PHARM REV CODE 250: Performed by: INTERNAL MEDICINE

## 2019-07-20 RX ADMIN — ASPIRIN 81 MG CHEWABLE TABLET 81 MG: 81 TABLET CHEWABLE at 08:07

## 2019-07-20 RX ADMIN — ENOXAPARIN SODIUM 40 MG: 100 INJECTION SUBCUTANEOUS at 05:07

## 2019-07-20 RX ADMIN — IPRATROPIUM BROMIDE AND ALBUTEROL SULFATE 3 ML: .5; 3 SOLUTION RESPIRATORY (INHALATION) at 06:07

## 2019-07-20 RX ADMIN — GUAIFENESIN 600 MG: 600 TABLET, EXTENDED RELEASE ORAL at 08:07

## 2019-07-20 RX ADMIN — SENNOSIDES,DOCUSATE SODIUM 2 TABLET: 8.6; 5 TABLET, FILM COATED ORAL at 08:07

## 2019-07-20 RX ADMIN — LEVETIRACETAM 750 MG: 100 SOLUTION ORAL at 08:07

## 2019-07-20 RX ADMIN — LEVETIRACETAM 750 MG: 100 SOLUTION ORAL at 06:07

## 2019-07-20 RX ADMIN — IPRATROPIUM BROMIDE AND ALBUTEROL SULFATE 3 ML: .5; 3 SOLUTION RESPIRATORY (INHALATION) at 01:07

## 2019-07-20 RX ADMIN — Medication 400 MG: at 08:07

## 2019-07-20 RX ADMIN — CHLORTHALIDONE 25 MG: 25 TABLET ORAL at 08:07

## 2019-07-20 RX ADMIN — FUROSEMIDE 20 MG: 20 TABLET ORAL at 08:07

## 2019-07-20 RX ADMIN — PREDNISONE 5 MG: 5 TABLET ORAL at 08:07

## 2019-07-20 RX ADMIN — ESCITALOPRAM OXALATE 5 MG: 5 TABLET, FILM COATED ORAL at 08:07

## 2019-07-20 RX ADMIN — POLYETHYLENE GLYCOL 3350 17 G: 17 POWDER, FOR SOLUTION ORAL at 08:07

## 2019-07-20 RX ADMIN — CETIRIZINE HYDROCHLORIDE 10 MG: 5 TABLET ORAL at 08:07

## 2019-07-20 RX ADMIN — FLUTICASONE PROPIONATE 100 MCG: 50 SPRAY, METERED NASAL at 08:07

## 2019-07-20 NOTE — PLAN OF CARE
Problem: Physical Therapy Goal  Goal: Physical Therapy Goal  Goals to be met by: 2019    Patient will increase functional independence with mobility by performin. Supine to sit with MInimal Assistance  2. Sit to supine with MInimal Assistance  3. Rolling to Left and Right with Minimal Assistance.  4. Sit to stand transfer with Minimal Assistance  5. Bed to chair transfer with Minimal Assistance using Rolling Walker  6. Gait  x 30 feet with Minimal Assistance using Rolling Walker.   7. Wheelchair propulsion x100 feet with Stand-by Assistance using bilateral uppper extremities     Goals remain appropriate. Continue with PT POC as indicated.

## 2019-07-20 NOTE — PT/OT/SLP PROGRESS
Physical Therapy  Treatment    Susanna Byrnes   MRN: [id number]   Admitting Diagnosis: UTI (urinary tract infection)    PT Received On: 07/20/19  Total Time (min): (--)       Billable Minutes:  Gait Training 12, Therapeutic Activity 15 and Therapeutic Exercise 20    Treatment Type: Treatment  PT/PTA: PTA     PTA Visit Number: 2       General Precautions: Standard, fall, aspiration, seizure  Orthopedic Precautions: N/A   Braces: N/A    Spiritual, Cultural Beliefs, Adventist Practices, Values that Affect Care: no    Subjective:  Communicated with nursing prior to session.  Pt agreed to work with therapy.     Pain/Comfort  Pain Rating 1: 0/10  Pain Rating Post-Intervention 1: 0/10    Objective:  Patient found seated bedside chair.         AM-PAC 6 CLICK MOBILITY  Total Score:11    Bed Mobility:  Sit>Supine: not performed  Supine>Sit: not performed    Transfers:  Sit<>Stand: to/from bedside chair w/o AD and Min/Mod Ax2; to/from w/c x3 trials w/ RW and Min/Mod Ax2  Stand Pivot Transfer: bedside chair<>w/c w/o AD and Min/Mod Ax2  Cueing for hand and foot placement.     Gait:  Amb x2 trials 18ft and 12ft w/ RW and Mod A. Pt requires encouragement t/o activity.      Therex:  BLE Therex 2x10 reps:    -AP   -LAQ   -Hip Flexion    -GS  Pt requires constant redirection to task.     Additional Treatment:  -UBE x6 min w/ frequent rest breaks and constant redirection to task.     Patient left up in chair with call button in reach, nursing notified and RT present.    Assessment:  Susanna Byrnes is a 84 y.o. female with a medical diagnosis of UTI (urinary tract infection).  Pt continues to require min/mod A w/ transfers and mod A w/ gait. Pt requires constant redirection to task throughout treatment session. Pt will continue to benefit from PT services at this time. Continue with PT POC as indicated.    Rehab identified problem list/impairments: weakness, impaired endurance, impaired sensation, impaired self care skills, impaired  functional mobilty, gait instability, impaired balance, impaired cognition, decreased upper extremity function, decreased lower extremity function    Rehab potential is good.    Activity tolerance: Fair    Discharge recommendations: home health PT     Barriers to discharge: None    Equipment recommendations: tub bench, wheelchair, manual     GOALS:   Multidisciplinary Problems     Physical Therapy Goals        Problem: Physical Therapy Goal    Goal Priority Disciplines Outcome Goal Variances Interventions   Physical Therapy Goal     PT, PT/OT Ongoing (interventions implemented as appropriate)     Description:  Goals to be met by: 2019    Patient will increase functional independence with mobility by performin. Supine to sit with MInimal Assistance  2. Sit to supine with MInimal Assistance  3. Rolling to Left and Right with Minimal Assistance.  4. Sit to stand transfer with Minimal Assistance  5. Bed to chair transfer with Minimal Assistance using Rolling Walker  6. Gait  x 30 feet with Minimal Assistance using Rolling Walker.   7. Wheelchair propulsion x100 feet with Stand-by Assistance using bilateral uppper extremities                      PLAN:    Patient to be seen 5 x/week  to address the above listed problems via gait training, therapeutic activities, therapeutic exercises  Plan of Care expires: 19  Plan of Care reviewed with: patient    Leticia Deborah, PTA  2019

## 2019-07-21 PROCEDURE — 94761 N-INVAS EAR/PLS OXIMETRY MLT: CPT

## 2019-07-21 PROCEDURE — 94668 MNPJ CHEST WALL SBSQ: CPT

## 2019-07-21 PROCEDURE — 94640 AIRWAY INHALATION TREATMENT: CPT

## 2019-07-21 PROCEDURE — 99900058 HC 022 PAID UNDER SNF PPS

## 2019-07-21 PROCEDURE — 63600175 PHARM REV CODE 636 W HCPCS: Performed by: HOSPITALIST

## 2019-07-21 PROCEDURE — 25000003 PHARM REV CODE 250: Performed by: INTERNAL MEDICINE

## 2019-07-21 PROCEDURE — 25000003 PHARM REV CODE 250: Performed by: NURSE PRACTITIONER

## 2019-07-21 PROCEDURE — 25000003 PHARM REV CODE 250: Performed by: HOSPITALIST

## 2019-07-21 PROCEDURE — 11000004 HC SNF PRIVATE

## 2019-07-21 PROCEDURE — 63600175 PHARM REV CODE 636 W HCPCS: Performed by: NURSE PRACTITIONER

## 2019-07-21 PROCEDURE — 25000242 PHARM REV CODE 250 ALT 637 W/ HCPCS: Performed by: NURSE PRACTITIONER

## 2019-07-21 RX ADMIN — GUAIFENESIN 600 MG: 600 TABLET, EXTENDED RELEASE ORAL at 09:07

## 2019-07-21 RX ADMIN — CETIRIZINE HYDROCHLORIDE 10 MG: 5 TABLET ORAL at 08:07

## 2019-07-21 RX ADMIN — IPRATROPIUM BROMIDE AND ALBUTEROL SULFATE 3 ML: .5; 3 SOLUTION RESPIRATORY (INHALATION) at 12:07

## 2019-07-21 RX ADMIN — IPRATROPIUM BROMIDE AND ALBUTEROL SULFATE 3 ML: .5; 3 SOLUTION RESPIRATORY (INHALATION) at 07:07

## 2019-07-21 RX ADMIN — GUAIFENESIN 600 MG: 600 TABLET, EXTENDED RELEASE ORAL at 08:07

## 2019-07-21 RX ADMIN — SENNOSIDES,DOCUSATE SODIUM 2 TABLET: 8.6; 5 TABLET, FILM COATED ORAL at 08:07

## 2019-07-21 RX ADMIN — Medication 400 MG: at 08:07

## 2019-07-21 RX ADMIN — IPRATROPIUM BROMIDE AND ALBUTEROL SULFATE 3 ML: .5; 3 SOLUTION RESPIRATORY (INHALATION) at 02:07

## 2019-07-21 RX ADMIN — LEVETIRACETAM 750 MG: 100 SOLUTION ORAL at 09:07

## 2019-07-21 RX ADMIN — ESCITALOPRAM OXALATE 5 MG: 5 TABLET, FILM COATED ORAL at 08:07

## 2019-07-21 RX ADMIN — ASPIRIN 81 MG CHEWABLE TABLET 81 MG: 81 TABLET CHEWABLE at 08:07

## 2019-07-21 RX ADMIN — CHLORTHALIDONE 25 MG: 25 TABLET ORAL at 08:07

## 2019-07-21 RX ADMIN — PREDNISONE 5 MG: 5 TABLET ORAL at 08:07

## 2019-07-21 RX ADMIN — MONTELUKAST 10 MG: 10 TABLET, FILM COATED ORAL at 09:07

## 2019-07-21 RX ADMIN — IPRATROPIUM BROMIDE AND ALBUTEROL SULFATE 3 ML: .5; 3 SOLUTION RESPIRATORY (INHALATION) at 06:07

## 2019-07-21 RX ADMIN — LEVETIRACETAM 750 MG: 100 SOLUTION ORAL at 05:07

## 2019-07-21 RX ADMIN — SENNOSIDES,DOCUSATE SODIUM 2 TABLET: 8.6; 5 TABLET, FILM COATED ORAL at 09:07

## 2019-07-21 RX ADMIN — FLUTICASONE PROPIONATE 100 MCG: 50 SPRAY, METERED NASAL at 08:07

## 2019-07-21 RX ADMIN — ENOXAPARIN SODIUM 40 MG: 100 INJECTION SUBCUTANEOUS at 04:07

## 2019-07-21 NOTE — PLAN OF CARE
Problem: Adult Inpatient Plan of Care  Goal: Plan of Care Review  Outcome: Ongoing (interventions implemented as appropriate)     07/21/19 0526   Plan of Care Review   Plan of Care Reviewed With patient       Problem: Fall Injury Risk  Goal: Absence of Fall and Fall-Related Injury    Intervention: Promote Injury-Free Environment     07/21/19 0526   Optimize [place] and Functional Mobility   Environmental Safety Modification assistive device/personal items within reach   Optimize Balance and Safe Activity   Safety Promotion/Fall Prevention Fall Risk reviewed with patient/family;Fall Risk signage in place;/camera at bedside;lighting adjusted;medications reviewed

## 2019-07-22 LAB
ANION GAP SERPL CALC-SCNC: 9 MMOL/L (ref 8–16)
BASOPHILS # BLD AUTO: 0.02 K/UL (ref 0–0.2)
BASOPHILS NFR BLD: 0.3 % (ref 0–1.9)
BUN SERPL-MCNC: 14 MG/DL (ref 8–23)
CALCIUM SERPL-MCNC: 9.2 MG/DL (ref 8.7–10.5)
CHLORIDE SERPL-SCNC: 103 MMOL/L (ref 95–110)
CO2 SERPL-SCNC: 27 MMOL/L (ref 23–29)
CREAT SERPL-MCNC: 0.6 MG/DL (ref 0.5–1.4)
DIFFERENTIAL METHOD: ABNORMAL
EOSINOPHIL # BLD AUTO: 0.5 K/UL (ref 0–0.5)
EOSINOPHIL NFR BLD: 6.8 % (ref 0–8)
ERYTHROCYTE [DISTWIDTH] IN BLOOD BY AUTOMATED COUNT: 17.5 % (ref 11.5–14.5)
EST. GFR  (AFRICAN AMERICAN): >60 ML/MIN/1.73 M^2
EST. GFR  (NON AFRICAN AMERICAN): >60 ML/MIN/1.73 M^2
GLUCOSE SERPL-MCNC: 73 MG/DL (ref 70–110)
HCT VFR BLD AUTO: 29.7 % (ref 37–48.5)
HGB BLD-MCNC: 9 G/DL (ref 12–16)
IMM GRANULOCYTES # BLD AUTO: 0.03 K/UL (ref 0–0.04)
IMM GRANULOCYTES NFR BLD AUTO: 0.4 % (ref 0–0.5)
LYMPHOCYTES # BLD AUTO: 2.6 K/UL (ref 1–4.8)
LYMPHOCYTES NFR BLD: 33.2 % (ref 18–48)
MAGNESIUM SERPL-MCNC: 1.9 MG/DL (ref 1.6–2.6)
MCH RBC QN AUTO: 22.1 PG (ref 27–31)
MCHC RBC AUTO-ENTMCNC: 30.3 G/DL (ref 32–36)
MCV RBC AUTO: 73 FL (ref 82–98)
MONOCYTES # BLD AUTO: 0.8 K/UL (ref 0.3–1)
MONOCYTES NFR BLD: 9.8 % (ref 4–15)
NEUTROPHILS # BLD AUTO: 3.8 K/UL (ref 1.8–7.7)
NEUTROPHILS NFR BLD: 49.5 % (ref 38–73)
NRBC BLD-RTO: 0 /100 WBC
PHOSPHATE SERPL-MCNC: 3 MG/DL (ref 2.7–4.5)
PLATELET # BLD AUTO: 297 K/UL (ref 150–350)
PMV BLD AUTO: 11 FL (ref 9.2–12.9)
POTASSIUM SERPL-SCNC: 3.7 MMOL/L (ref 3.5–5.1)
RBC # BLD AUTO: 4.07 M/UL (ref 4–5.4)
SODIUM SERPL-SCNC: 139 MMOL/L (ref 136–145)
WBC # BLD AUTO: 7.67 K/UL (ref 3.9–12.7)

## 2019-07-22 PROCEDURE — 36415 COLL VENOUS BLD VENIPUNCTURE: CPT

## 2019-07-22 PROCEDURE — 94640 AIRWAY INHALATION TREATMENT: CPT

## 2019-07-22 PROCEDURE — 25000003 PHARM REV CODE 250: Performed by: HOSPITALIST

## 2019-07-22 PROCEDURE — 84100 ASSAY OF PHOSPHORUS: CPT

## 2019-07-22 PROCEDURE — 97116 GAIT TRAINING THERAPY: CPT

## 2019-07-22 PROCEDURE — 11000004 HC SNF PRIVATE

## 2019-07-22 PROCEDURE — 85025 COMPLETE CBC W/AUTO DIFF WBC: CPT

## 2019-07-22 PROCEDURE — 94668 MNPJ CHEST WALL SBSQ: CPT

## 2019-07-22 PROCEDURE — 63600175 PHARM REV CODE 636 W HCPCS: Performed by: NURSE PRACTITIONER

## 2019-07-22 PROCEDURE — 97110 THERAPEUTIC EXERCISES: CPT

## 2019-07-22 PROCEDURE — 94761 N-INVAS EAR/PLS OXIMETRY MLT: CPT

## 2019-07-22 PROCEDURE — 80048 BASIC METABOLIC PNL TOTAL CA: CPT

## 2019-07-22 PROCEDURE — 97530 THERAPEUTIC ACTIVITIES: CPT

## 2019-07-22 PROCEDURE — 63600175 PHARM REV CODE 636 W HCPCS: Performed by: HOSPITALIST

## 2019-07-22 PROCEDURE — 25000242 PHARM REV CODE 250 ALT 637 W/ HCPCS: Performed by: NURSE PRACTITIONER

## 2019-07-22 PROCEDURE — 25000003 PHARM REV CODE 250: Performed by: INTERNAL MEDICINE

## 2019-07-22 PROCEDURE — 25000003 PHARM REV CODE 250: Performed by: NURSE PRACTITIONER

## 2019-07-22 PROCEDURE — 83735 ASSAY OF MAGNESIUM: CPT

## 2019-07-22 RX ORDER — POTASSIUM CHLORIDE 20 MEQ/1
40 TABLET, EXTENDED RELEASE ORAL ONCE
Status: COMPLETED | OUTPATIENT
Start: 2019-07-22 | End: 2019-07-22

## 2019-07-22 RX ORDER — POTASSIUM CHLORIDE 20 MEQ/1
20 TABLET, EXTENDED RELEASE ORAL DAILY
Status: DISCONTINUED | OUTPATIENT
Start: 2019-07-23 | End: 2019-07-26 | Stop reason: HOSPADM

## 2019-07-22 RX ADMIN — ESCITALOPRAM OXALATE 5 MG: 5 TABLET, FILM COATED ORAL at 08:07

## 2019-07-22 RX ADMIN — FLUTICASONE PROPIONATE 100 MCG: 50 SPRAY, METERED NASAL at 09:07

## 2019-07-22 RX ADMIN — IPRATROPIUM BROMIDE AND ALBUTEROL SULFATE 3 ML: .5; 3 SOLUTION RESPIRATORY (INHALATION) at 06:07

## 2019-07-22 RX ADMIN — ENOXAPARIN SODIUM 40 MG: 100 INJECTION SUBCUTANEOUS at 05:07

## 2019-07-22 RX ADMIN — CETIRIZINE HYDROCHLORIDE 10 MG: 5 TABLET ORAL at 08:07

## 2019-07-22 RX ADMIN — Medication 400 MG: at 08:07

## 2019-07-22 RX ADMIN — GUAIFENESIN 600 MG: 600 TABLET, EXTENDED RELEASE ORAL at 08:07

## 2019-07-22 RX ADMIN — PREDNISONE 5 MG: 5 TABLET ORAL at 08:07

## 2019-07-22 RX ADMIN — CHLORTHALIDONE 25 MG: 25 TABLET ORAL at 08:07

## 2019-07-22 RX ADMIN — POTASSIUM CHLORIDE 40 MEQ: 1500 TABLET, EXTENDED RELEASE ORAL at 11:07

## 2019-07-22 RX ADMIN — IPRATROPIUM BROMIDE AND ALBUTEROL SULFATE 3 ML: .5; 3 SOLUTION RESPIRATORY (INHALATION) at 12:07

## 2019-07-22 RX ADMIN — ASPIRIN 81 MG CHEWABLE TABLET 81 MG: 81 TABLET CHEWABLE at 08:07

## 2019-07-22 RX ADMIN — LEVETIRACETAM 750 MG: 100 SOLUTION ORAL at 09:07

## 2019-07-22 RX ADMIN — POLYETHYLENE GLYCOL 3350 17 G: 17 POWDER, FOR SOLUTION ORAL at 08:07

## 2019-07-22 RX ADMIN — SENNOSIDES,DOCUSATE SODIUM 2 TABLET: 8.6; 5 TABLET, FILM COATED ORAL at 08:07

## 2019-07-22 RX ADMIN — LEVETIRACETAM 750 MG: 100 SOLUTION ORAL at 05:07

## 2019-07-22 RX ADMIN — BENZONATATE 100 MG: 100 CAPSULE ORAL at 05:07

## 2019-07-22 NOTE — PLAN OF CARE
Problem: Occupational Therapy Goal  Goal: Occupational Therapy Goal  Goals to be met by: 7/22/19     Patient will increase functional independence with ADLs by performing:    UE Dressing with Stand-by Assistance. REVISED UBD performed with Min (A)  LE Dressing with Moderate Assistance.  Grooming while seated at sink with Stand-by Assistance. REVISED Grooming while seated with Min (A)  Toileting from toilet with Maximum Assistance for hygiene and clothing management.   Bathing from  sitting at sink with Moderate Assistance.  Sitting at edge of bed x5 minutes with Stand-by Assistance.  Rolling to Bilateral with Moderate Assistance.   Supine to sit with Moderate Assistance.  Stand pivot transfers with Moderate Assistance.  Toilet transfer to toilet with Moderate Assistance.  Upper extremity exercise program x20 reps per handout, with independence.       Outcome: Ongoing (interventions implemented as appropriate)   Patient limited today by increased fatigue/lethargy. Patient would benefit from continued skilled OT services to address functional deficits observed.  Goals remain appropriate, continue POC,

## 2019-07-22 NOTE — PT/OT/SLP PROGRESS
"Physical Therapy  Treatment    Susanna Byrnes   MRN: [id number]   Admitting Diagnosis: UTI (urinary tract infection)    PT Received On: 07/22/19        Billable Minutes:  Gait Training 10, Therapeutic Activity 15 and Therapeutic Exercise 15    Treatment Type: Treatment  PT/PTA: PTA     PTA Visit Number: 3       General Precautions: Standard, fall, aspiration, seizure  Orthopedic Precautions: N/A   Braces: N/A    Spiritual, Cultural Beliefs, Nondenominational Practices, Values that Affect Care: no    Subjective:  "I guess" during session pt is very sleepy, max vcs for opening eyes throughout, /67 HR 61 02 sats 92 % nsg Yani notified    Pain/Comfort  Pain Rating 1: 0/10  Pain Rating Post-Intervention 1: 0/10    Objective:   Patient found with: (in bed)     AM-PAC 6 CLICK MOBILITY  Total Score:11    Bed Mobility:  Supine>Sit: mod/max. Inc time HOB elev and rail max vcs for tech    Transfers:  Sit<>Stand: mod/max with RW  vcs for tech/positioning  Stand Pivot Transfer: with RW mod/max EOB>WC and once at grab bar    Gait:  Amb with RW mod A ~ 18 ft deferred further trials 2* to fatigue/sleepy    Therex:  2x10 reps A/AA to stay engaged AP,GS LAQ, hip flex    Balance:  Attempted to changed wet diaper in standing with grab bar pt unable to maintain wanting to sit    Patient left up in chair with with OT notified of needing to be changed.    Assessment:  Susanna Byrnes is a 84 y.o. female with a medical diagnosis of UTI (urinary tract infection).  Pt with limited session, inc fatigue/sleepy today, nsg notified pt would continue to benefit from skilled PT services to improve overall functional mobility, strength and endurance.  .    Rehab identified problem list/impairments: weakness, impaired endurance, impaired sensation, impaired self care skills, impaired functional mobilty, gait instability, impaired balance, impaired cognition, decreased upper extremity function, decreased lower extremity function    Rehab potential is " good.    Activity tolerance: Fair    Discharge recommendations: home health PT     Barriers to discharge: None    Equipment recommendations: tub bench, wheelchair, manual     GOALS:   Multidisciplinary Problems     Physical Therapy Goals        Problem: Physical Therapy Goal    Goal Priority Disciplines Outcome Goal Variances Interventions   Physical Therapy Goal     PT, PT/OT Ongoing (interventions implemented as appropriate)     Description:  Goals to be met by: 2019    Patient will increase functional independence with mobility by performin. Supine to sit with MInimal Assistance  2. Sit to supine with MInimal Assistance  3. Rolling to Left and Right with Minimal Assistance.  4. Sit to stand transfer with Minimal Assistance  5. Bed to chair transfer with Minimal Assistance using Rolling Walker  6. Gait  x 30 feet with Minimal Assistance using Rolling Walker.   7. Wheelchair propulsion x100 feet with Stand-by Assistance using bilateral uppper extremities                      PLAN:    Patient to be seen 5 x/week  to address the above listed problems via gait training, therapeutic activities, therapeutic exercises  Plan of Care expires: 19  Plan of Care reviewed with: patient    Kate Steeleemily, PTA  2019

## 2019-07-22 NOTE — PROGRESS NOTES
[facility]                                  Skilled Nursing Facility                   Progress Note   DOS 7/22/2019  Admit Date: 7/8/2019  LETICIA   Principal Problem:  UTI (urinary tract infection)   HPI obtained from patient interview and chart review     Chief Complaint: Patient reporting lethargy, Reevaluation of BP, Reevaluation of respiratory status, and Revaluation of medical treatment and therapy status: Lab review     HPI: Ms Byrnes is an 84 female with sig pmhx of dementia, HTN, HLD, CAD/NSTEMI('14), chronic sCHF/ICMP(LVEF 25-30), seizure, OA, suspected COPD, anemia, and MDR UTIs(Ecoli in '18 and '15, e faecalis '14, pseudomonas and enterobacter '12) who presents to SNF s/p hospitalization for fever likely 2/2 pyelonephritis. Patient currently with no complaints of dysuria at this time and voiding without difficulty. All labs reviewed. No leukocytosis. Hemoglobin stable at 8.9.  Platelets stable at 357, trending biweekly. Sodium at 138. Potasium 3.8. Creatinine stable at 0.6. 24 hr blood glucose 80. Patient's current blood pressure is at 161/70, increased diuretic, continue to trend. Patient progessing well with PT/OT. Patient medically stable. Continuing to follow and treat all acute and chronic conditions.    Interval Hx: Patients respiratory status is improved with decreased work of breathing and decrease wheezing, continued non productive cough, and sating 95% on RA. Initiated dextromethorphan-guaifenesin  mg per 12 hr tablet 1 tablet q12h and discontinued guaifenesin. Patient reporting that she is sleep today but she appears more alert today than 7/19, Discontinue lorazepam. All labs reviewed. No leukocytosis. Hemoglobin stable at 9.  Platelets stable at 297. Sodium at 139. Potasium 3.7, Initiated K 40 meq po x 1. Creatinine stable at 0.6. 24 hr blood glucose 73. Patient's current blood pressure is at 138/64. Patient progessing well with PT/OT. Patient medically  stable. Continuing to follow and treat all acute and chronic conditions.    PEx  Constitutional: Patient appears well-developed and in no distress + slightly lethargic   HENT:   Head: Normocephalic and atraumatic.   Eyes: Pupils are equal, round, and reactive to light.   Neck: Normal range of motion. Neck supple.   Cardiovascular: Normal rate, regular rhythm and normal heart sounds.    Pulmonary/Chest: Effort normal and + decreased breath sounds, + nonproductive cough, + mild upper airway crackle  Abdominal: Soft. Bowel sounds are normal.   Musculoskeletal: Normal range of motion. + generalized weakness. + RUE weakness  Neurological: Alert and oriented to person, place, and time.   Skin: Skin is warm and dry.   Psychiatric: Normal mood and affect. Behavior is normal.     Temp:  [98.4 °F (36.9 °C)-98.5 °F (36.9 °C)]   Pulse:  [58-64]   Resp:  [16-24]   BP: (138-143)/(58-64)   SpO2:  [91 %-96 %]   Body mass index is 39.57 kg/m².     ROS  Constitutional: Negative for fever. + malaise/fatigue.   Eyes: Negative for blurred vision, double vision and discharge.   Respiratory: Negative for cough, shortness of breath and wheezing.    Cardiovascular: Negative for chest pain, palpitations, claudication, and leg swelling.   Gastrointestinal: Negative for abdominal pain, constipation, diarrhea, nausea and vomiting.   Genitourinary: Negative for dysuria, frequency and urgency.   Musculoskeletal:  + generalized weakness. Negative for back pain and myalgias.   Skin: Negative for itching and rash.  Neurological: Negative for dizziness, speech change, seizures, and headaches.   Psychiatric/Behavioral: Negative for depression. The patient is not nervous/anxious.      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    Past Surgical  History: Patient has a past surgical history that includes Total knee arthroplasty (Left); Eye surgery; and Joint replacement.    Social History: Patient reports that she quit smoking about 7 years ago. Her smoking use included cigarettes. She has quit using smokeless tobacco. She reports that she does not drink alcohol or use drugs.    Family History: family history includes Dementia in her brother; Seizures in her brother and sister.    Allergies: Patient is allergic to lisinopril and haldol [haloperidol lactate].      Assessment and Plan:  Expiratory wheezing  -7/18 Initiated CXR, initiated lasix 20 mg daily, initiated CPT treatments q.6 hours round-the-clock per respiratory, and increased O2 neb treatments to q.6 hours around the clock.   -7/22 Improved, Initiated dextromethorphan-guaifenesin  mg per 12 hr tablet 1 tablet q12h and discontinued guaifenesin.     Lethargy  -7/18 Decreased risperidone to 0.12 mg qpm and increased mucinex 600 mg bid.  -7/22 Improved, Discontinue lorazepam.     Cough  -7/18 Initiated CPT treatments q.6 hours round-the-clock per respiratory, increased O2 neb treatments to q.6 hours around the clock, and Increased mucinex t 600 mg bid.  -7/22 Initiated dextromethorphan-guaifenesin  mg per 12 hr tablet 1 tablet q12h and discontinued guaifenesin.     Hypokalemia   -7/11 Initiated 60 meq K x 1 now.  -7/15 Initiated 40 meq K q4h x 2 doses.  -7/18 Initiated 40 mEq potassium q.4 hours x3.   -7/22 Potasium 3.7, Initiated K 40 meq po x 1 and initiated K 20 mEq daily starting on 7/23    Essential hypertension  -continue amlodipine  -7/9 Increased chlorthalidone to 25 mg daily   -7/11 Bp at 120/57 and stable, trending  -7/18 Discontinued amlodipine.  -7/22 Stable with Patient's current blood pressure is at 138/64.    CONTINUED    Malnutrition mild  -7/9 Initiated nutrition consult   -7/11 Initiated Dysphagia soft diet and Boost TID with meals on 7/9 per nutritionist recs  -7/18  Initiated nursing order to ensure that patient is being assisted with meal    Thrombocytosis   -7/11 Platelets decreased to 351, continue to trend.   -7/15 Platelets decrease to WNL at 339, continue to trend     Sepsis  UTI / pyelonephritis  -E coli sens CTX and doxycycline  -CTX IV to Doxy PO to complete 14 days total of ABx (for pyelo)   -Continue doxycycline  -7/15 remains afebrile     Chronic anemia  -Pt with chronic microcytic anemia  -continue ferrous sulfate.  -7/11 Hgb at 9 and stable, trending     Debility  -Continue with PT/OT for gait training and strengthening and restoration of ADL's   -Encourage mobility, OOB in chair, and early ambulation as appropriate  -Fall precautions   -Monitor for bowel and bladder dysfunction  -Monitor for and prevent skin breakdown and pressure ulcers  -Continue DVT prophylaxis with lovenox     Chronic combined systolic and diastolic CHF  -continue chlorthalidone  -7/9 Initiated ambulatory referral to cardiology      Dementia without behavioral disturbance  -Follows with Dr. Benita Melchor with [facility], last seen 6/24.  -Delirium precautions  -Continue Lexapro / Remeron / Risperidone     Seizure disorder  -Continue leviteracitam         No future appointments.    Serafin Gavin NP

## 2019-07-22 NOTE — PT/OT/SLP PROGRESS
Occupational Therapy  Treatment    Susanna Byrnes   MRN: [id number]   Admitting Diagnosis: UTI (urinary tract infection)    OT Date of Treatment: 07/15/19  Total Time (min): 40 min    Billable Minutes:  Therapeutic Activity 23 and Therapeutic Exercise 17    General Precautions: Standard, fall, aspiration  Orthopedic Precautions: N/A  Braces: N/A    Spiritual, Cultural Beliefs, Jainism Practices, Values that Affect Care: yes(Mosque)    Subjective:  Communicated with nurse prior to session.  Patient very lethargic today, required max verbal/tactile cues to maintain alert and aroused state throughout treatment session.     Pain/Comfort  Pain Rating 1: 0/10  Pain Rating Post-Intervention 1: 0/10    Objective:  Patient found seated w/c in therapy gym.  Patient just finished PT session.    Occupational Performance:    Bed Mobility:    N/T    Functional Mobility/Transfers:  · Patient completed Sit <> Stand Transfer with moderate assistance with  no assistive device and bilateral hand-held assist   · Patient completed Wheelchair>Toilet Transfer Stand Pivot technique with moderate assistance with hand-held assist and grab bars    Activities of Daily Living:  · Lower Body Dressing: total assistance to doff pants and saturated brief  · Toileting: Patient reported need to have BM toward end of therapy session. Patient's daughter present (patient;s caregiver) and reports that patient requires extensive time for toileting. Patient left seated on toilet with daughter present (per her request). Patient's daughter instructed to call for nursing assist when patient is ready to transfer toilet>w/c. Daughter verbalizes understanding and patient's nurse Yani notified.     [facility] 6 Click:  AMPA Total Score: 13    OT Exercises: Patient participated in various UE exercises to increase ROM, strength, and endurance needed to perform ADL's and all functional mobility tasks.  Patient performed x 10 reps BUE AAROM shoulder flex/ext (limited  to ~100 degree), elbow flex/ext, shoulder horizontal abd/add.   Patient performed seated table top slides x15 reps forward/backward and laterally left/right.  Patient participated inx 25 seated balloon tap task using LUE to reach in all planes and across midline to improve coordination, trunk/core stability,UE ROM, and endurance for self care task performance.    Patient required increased time, max verbal/tactile cues, and moderate assistance to complete exercises and to maintain alert state during all activity today.    Additional Treatment:  ADL's and functional transfers noted as above.  Patient and daughter educated on safety and proper technique with functional transfers (w/c>toilet). Discussed patient's current functional deficits/level of assistance patient requires as of today with daughter. Daughter very involved in patient's care, present for majority of OT session. Daughter verbalized understanding of education provided.     Patient left seated on toilet WITH DAUGHTER PRESENT with call button in reach and nurse  notified. Daughter aware to call for nursing assist to when patient is finished toileting.     ASSESSMENT:  Susanna Byrnes is a 84 y.o. female with a medical diagnosis of UTI (urinary tract infection). Patient limited today by increased fatigue/lethargy. Patient would benefit from continued skilled OT services to address functional deficits noted and to improve ADL performance, functional mobility, and overall strength and endurance      Rehab identified problem list/impairments: weakness, impaired endurance, impaired self care skills, impaired functional mobilty, gait instability, impaired balance, decreased upper extremity function, decreased lower extremity function, edema, impaired skin    Rehab potential is good    Activity tolerance: Fair    Discharge recommendations: home health OT     Barriers to discharge: None     Equipment recommendations: wheelchair, manual, tub bench     GOALS:    Multidisciplinary Problems     Occupational Therapy Goals        Problem: Occupational Therapy Goal    Goal Priority Disciplines Outcome Interventions   Occupational Therapy Goal     OT, PT/OT Ongoing (interventions implemented as appropriate)    Description:  Goals to be met by: 7/22/19     Patient will increase functional independence with ADLs by performing:    UE Dressing with Stand-by Assistance. REVISED UBD performed with Min (A)  LE Dressing with Moderate Assistance.  Grooming while seated at sink with Stand-by Assistance. REVISED Grooming while seated with Min (A)  Toileting from toilet with Maximum Assistance for hygiene and clothing management.   Bathing from  sitting at sink with Moderate Assistance.  Sitting at edge of bed x5 minutes with Stand-by Assistance.  Rolling to Bilateral with Moderate Assistance.   Supine to sit with Moderate Assistance.  Stand pivot transfers with Moderate Assistance.  Toilet transfer to toilet with Moderate Assistance.  Upper extremity exercise program x20 reps per handout, with independence.                        Plan:  Patient to be seen 5 x/week to address the above listed problems via self-care/home management, therapeutic activities, therapeutic exercises  Plan of Care expires: 08/08/19  Plan of Care reviewed with: patient    NGHIA Burns  07/22/2019     The SHAUN and ANGELICA have collaborated and discussed the patient's status, treatment plan and progress toward established goals.   NGHIA Burns 7/22/2019

## 2019-07-23 PROCEDURE — 25000242 PHARM REV CODE 250 ALT 637 W/ HCPCS: Performed by: NURSE PRACTITIONER

## 2019-07-23 PROCEDURE — 97110 THERAPEUTIC EXERCISES: CPT

## 2019-07-23 PROCEDURE — 11000004 HC SNF PRIVATE

## 2019-07-23 PROCEDURE — 25000003 PHARM REV CODE 250: Performed by: NURSE PRACTITIONER

## 2019-07-23 PROCEDURE — 94668 MNPJ CHEST WALL SBSQ: CPT

## 2019-07-23 PROCEDURE — 94761 N-INVAS EAR/PLS OXIMETRY MLT: CPT

## 2019-07-23 PROCEDURE — 25000003 PHARM REV CODE 250: Performed by: HOSPITALIST

## 2019-07-23 PROCEDURE — 97803 MED NUTRITION INDIV SUBSEQ: CPT

## 2019-07-23 PROCEDURE — 63600175 PHARM REV CODE 636 W HCPCS: Performed by: HOSPITALIST

## 2019-07-23 PROCEDURE — 97116 GAIT TRAINING THERAPY: CPT

## 2019-07-23 PROCEDURE — 97530 THERAPEUTIC ACTIVITIES: CPT

## 2019-07-23 PROCEDURE — 94640 AIRWAY INHALATION TREATMENT: CPT

## 2019-07-23 PROCEDURE — 25000003 PHARM REV CODE 250: Performed by: INTERNAL MEDICINE

## 2019-07-23 RX ADMIN — ASPIRIN 81 MG CHEWABLE TABLET 81 MG: 81 TABLET CHEWABLE at 08:07

## 2019-07-23 RX ADMIN — POLYETHYLENE GLYCOL 3350 17 G: 17 POWDER, FOR SOLUTION ORAL at 08:07

## 2019-07-23 RX ADMIN — ESCITALOPRAM OXALATE 5 MG: 5 TABLET, FILM COATED ORAL at 08:07

## 2019-07-23 RX ADMIN — LEVETIRACETAM 750 MG: 100 SOLUTION ORAL at 06:07

## 2019-07-23 RX ADMIN — POTASSIUM CHLORIDE 20 MEQ: 1500 TABLET, EXTENDED RELEASE ORAL at 08:07

## 2019-07-23 RX ADMIN — CETIRIZINE HYDROCHLORIDE 10 MG: 5 TABLET ORAL at 08:07

## 2019-07-23 RX ADMIN — Medication 400 MG: at 08:07

## 2019-07-23 RX ADMIN — TRIAMCINOLONE ACETONIDE: 1 CREAM TOPICAL at 08:07

## 2019-07-23 RX ADMIN — MONTELUKAST 10 MG: 10 TABLET, FILM COATED ORAL at 09:07

## 2019-07-23 RX ADMIN — IPRATROPIUM BROMIDE AND ALBUTEROL SULFATE 3 ML: .5; 3 SOLUTION RESPIRATORY (INHALATION) at 01:07

## 2019-07-23 RX ADMIN — IPRATROPIUM BROMIDE AND ALBUTEROL SULFATE 3 ML: .5; 3 SOLUTION RESPIRATORY (INHALATION) at 06:07

## 2019-07-23 RX ADMIN — GUAIFENESIN AND DEXTROMETHORPHAN HYDROBROMIDE 1 TABLET: 600; 30 TABLET, EXTENDED RELEASE ORAL at 09:07

## 2019-07-23 RX ADMIN — SENNOSIDES,DOCUSATE SODIUM 2 TABLET: 8.6; 5 TABLET, FILM COATED ORAL at 08:07

## 2019-07-23 RX ADMIN — IPRATROPIUM BROMIDE AND ALBUTEROL SULFATE 3 ML: .5; 3 SOLUTION RESPIRATORY (INHALATION) at 07:07

## 2019-07-23 RX ADMIN — FLUTICASONE PROPIONATE 100 MCG: 50 SPRAY, METERED NASAL at 08:07

## 2019-07-23 RX ADMIN — ENOXAPARIN SODIUM 40 MG: 100 INJECTION SUBCUTANEOUS at 04:07

## 2019-07-23 RX ADMIN — CHLORTHALIDONE 25 MG: 25 TABLET ORAL at 08:07

## 2019-07-23 RX ADMIN — GUAIFENESIN AND DEXTROMETHORPHAN HYDROBROMIDE 1 TABLET: 600; 30 TABLET, EXTENDED RELEASE ORAL at 08:07

## 2019-07-23 RX ADMIN — SENNOSIDES,DOCUSATE SODIUM 2 TABLET: 8.6; 5 TABLET, FILM COATED ORAL at 09:07

## 2019-07-23 RX ADMIN — LEVETIRACETAM 750 MG: 100 SOLUTION ORAL at 09:07

## 2019-07-23 NOTE — PT/OT/SLP PROGRESS
Occupational Therapy  Treatment    Susanna Byrnes   MRN: [id number]   Admitting Diagnosis: UTI (urinary tract infection)    OT Date of Treatment: 07/23/19  Total Time (min): 26 min    Billable Minutes:  Therapeutic Activity 13 and Therapeutic Exercise 13    General Precautions: Standard, fall, aspiration  Orthopedic Precautions: N/A  Braces: N/A    Spiritual, Cultural Beliefs, Hoahaoism Practices, Values that Affect Care: yes(Jehovah's witness)    Subjective:  Communicated with PT prior to session. Per PT, patient very sleepy again today. Vitals taken /79, SPO2 94% on RA, HR 63 bpm. Patient's nurse notified and aware.    Pain/Comfort:  Pain Rating 1: 0/10   Pain Rating Post Intervention 1: 0/10     Objective:  Patient found with: Patient found seated in w/c in therapy gym. Patient just finished PT session.    Occupational Performance:    Bed Mobility:    · N/T    Functional Mobility/Transfers:  · Patient completed Sit <> Stand Transfer with maximal assistance and of 2 persons  with  no assistive device   · Patient completed w/c>bedside chair Transfer using Stand Pivot technique with maximal assistance and of 2 persons with no assistive device    Activities of Daily Living:  · Grooming: set up assistance to wash face with warm washcloth seated in w/c. Max verbal cues required for patient to complete task secondary to falling asleep during task.      [facility] 6 Click:  AMPAC Total Score: 13    OT Exercises:   Patient tolerated UE exercises to increase ROM, strength, and endurance needed to perform ADL's and all functional mobility tasks.  Patient performed x 10 reps BUE AAROM shoulder flex/ext (very limited), elbow flex/ext, shoulder horizontal abd/add(limited), forearm pronation/supination.  Patient required increased time to perform and engage in therex due to decreased alertness.     Additional Treatment:  Functional transfers, ADL's, and therex as stated above.   Patient participated in seated (in w/c) activity reaching  forward on table top placing 7 pegs into holes with incorporation of BUE to facilitate AROM, strength, fine motor coordination, and trunk facilitation/core activation to increase functional independence during grooming and bathing tasks. MAX verbal/tactile cues required due to decreased alertness and to keep eyes open during task.     Patient left up in chair with call button in reach, nurse notified and daughter present    ASSESSMENT:  Susanna Byrnes is a 84 y.o. female with a medical diagnosis of UTI (urinary tract infection) . Patient limited during OT session today secondary to increased lethargy with difficulty engaging in therapy tasks. Treatment team notified.  Patient will benefit from continued skilled OT services to address functional deficits.    Rehab identified problem list/impairments: weakness, impaired endurance, impaired self care skills, impaired functional mobilty, gait instability, impaired balance, decreased upper extremity function, decreased lower extremity function, edema, impaired skin    Rehab potential is good    Activity tolerance: Fair    Discharge recommendations: home health OT     Barriers to discharge: None     Equipment recommendations: wheelchair, manual, tub bench     GOALS:   Multidisciplinary Problems     Occupational Therapy Goals        Problem: Occupational Therapy Goal    Goal Priority Disciplines Outcome Interventions   Occupational Therapy Goal     OT, PT/OT Ongoing (interventions implemented as appropriate)    Description:  Goals to be met by: 7/22/19     Patient will increase functional independence with ADLs by performing:    UE Dressing with Stand-by Assistance. REVISED UBD performed with Min (A)  LE Dressing with Moderate Assistance.  Grooming while seated at sink with Stand-by Assistance. REVISED Grooming while seated with Min (A)  Toileting from toilet with Maximum Assistance for hygiene and clothing management.   Bathing from  sitting at sink with Moderate  Assistance.  Sitting at edge of bed x5 minutes with Stand-by Assistance.  Rolling to Bilateral with Moderate Assistance.   Supine to sit with Moderate Assistance.  Stand pivot transfers with Moderate Assistance.  Toilet transfer to toilet with Moderate Assistance.  Upper extremity exercise program x20 reps per handout, with independence.                        Plan:  Patient to be seen 5 x/week to address the above listed problems via self-care/home management, therapeutic activities, therapeutic exercises  Plan of Care expires: 08/08/19  Plan of Care reviewed with: patient    NGHIA Burns  07/23/2019

## 2019-07-23 NOTE — PROGRESS NOTES
[facility] - Skilled Nursing Care  Adult Nutrition  Progress Note    SUMMARY   Recommendations  Recommendation/Intervention: Continue dysphagia diet level 6, boost plus bid, RD following  Goals: PO to meet 85% of EEN with ONS by next RD visit  Nutrition Goal Status: goal met    Reason for Assessment  Reason For Assessment: RD follow-up  Diagnosis: (Sepsis, Debility)  Relevant Medical History: acute polynephrtis, HTNm arthritis, suspected COPD, seizures, cystitis, NSTEMI, HLD, Demenia  Interdisciplinary Rounds: attended  General Information Comments: likes the boost plus   Nutrition Discharge Planning: DC on ONS of choice bid, Soft diet     Nutrition Risk Screen    Nutrition Risk Screen: dysphagia or difficulty swallowing    Nutrition/Diet History    Patient Reported Diet/Restrictions/Preferences: soft  Typical Food/Fluid Intake: coffee, milk, oatmeal, some eggs  Food Preferences: no pork no spaghetti, soft food  Spiritual, Cultural Beliefs, Buddhism Practices, Values that Affect Care: no  Supplemental Drinks or Food Habits: Boost Plus  Food Allergies: NKFA  Factors Affecting Nutritional Intake: impaired cognitive status/motor control, difficulty/impaired swallowing    Anthropometrics    Temp: 98.2 °F (36.8 °C)  Height: 5' (152.4 cm)  Height (inches): 60 in  Weight Method: Standard Scale  Weight: 91.9 kg (202 lb 9.6 oz)  Weight (lb): 202.6 lb  Ideal Body Weight (IBW), Female: 100 lb  % Ideal Body Weight, Female (lb): 175.93 lb  BMI (Calculated): 34.4  BMI Grade: 30 - 34.9- obesity - grade I  Usual Body Weight (UBW), k.5 kg  Weight Change Amount: (question admit wt now that wt has been stable here)  % Usual Body Weight: 100.59  % Weight Change From Usual Weight: 0.38 %       Lab/Procedures/Meds  Pertinent Labs Reviewed: reviewed  Pertinent Medications Reviewed: reviewed  Pertinent Medications Comments: Mg       Estimated/Assessed Needs    Weight Used For Calorie Calculations: 79.8 kg (175 lb 14.8 oz)  Energy  Calorie Requirements (kcal): 1461  Energy Need Method: [place]-St Jeor(x 1.25(PAL))  Protein Requirements:   Weight Used For Protein Calculations: 79.8 kg (175 lb 14.8 oz)(1.2-1.3 gm /kg)  Fluid Requirements (mL): per MD  Estimated Fluid Requirement Method: RDA Method  RDA Method (mL): 1461  CHO Requirement: -      Nutrition Prescription Ordered    Current Diet Order: Dysphagia soft, levil 6  Nutrition Order Comments: PO %  Oral Nutrition Supplement: boost plus bid    Evaluation of Received Nutrient/Fluid Intake    Energy Calories Required: meeting needs  Protein Required: meeting needs  Fluid Required: meeting needs  Comments: [employer]  Tolerance: tolerating  % Intake of Estimated Energy Needs: 75 - 100 %  % Meal Intake: 75 - 100 %    Nutrition Risk    Level of Risk/Frequency of Follow-up: low     Assessment and Plan  Mild Malnutrition related to reduced po intake and swallowing difficulty with hx of 2 meals per day per diet hx and swallowing difficulty.need for assist with meals.  Ongoing  Increased nutrient needs(calories and protein) related to hypermetabolism as evidenced by sepsis.  Ongoing      Monitor and Evaluation    Food and Nutrient Intake: food and beverage intake  Food and Nutrient Adminstration: diet order  Physical Activity and Function: nutrition-related ADLs and IADLs  Anthropometric Measurements: weight change  Biochemical Data, Medical Tests and Procedures: glucose/endocrine profile, gastrointestinal profile, inflammatory profile, electrolyte and renal panel  Nutrition-Focused Physical Findings: overall appearance     Malnutrition Assessment 7/9/19     Skin (Micronutrient): dry  Eyes (Micronutrient): conjunctiva dull  Teeth (Micronutrient): broken dentition(some missing)   Micronutrient Evaluation: suspected deficiency  Micronutrient Evaluation Comments: B vitamins, FE   Fluid Accumulation (Malnutrition): mild   Orbital Region (Subcutaneous Fat Loss): mild depletion  Upper Arm  Region (Subcutaneous Fat Loss): well nourished  Thoracic and Lumbar Region: well nourished   [place] Region (Muscle Loss): moderate depletion  Clavicle Bone Region (Muscle Loss): well nourished  Scapular Bone Region (Muscle Loss): well nourished   Edema (Fluid Accumulation): 2-->mild             Nutrition Follow-Up    RD Follow-up?: Yes

## 2019-07-23 NOTE — PLAN OF CARE
Problem: Occupational Therapy Goal  Goal: Occupational Therapy Goal  Goals to be met by: 7/22/19     Patient will increase functional independence with ADLs by performing:    UE Dressing with Stand-by Assistance. REVISED UBD performed with Min (A)  LE Dressing with Moderate Assistance.  Grooming while seated at sink with Stand-by Assistance. REVISED Grooming while seated with Min (A)  Toileting from toilet with Maximum Assistance for hygiene and clothing management.   Bathing from  sitting at sink with Moderate Assistance.  Sitting at edge of bed x5 minutes with Stand-by Assistance.  Rolling to Bilateral with Moderate Assistance.   Supine to sit with Moderate Assistance.  Stand pivot transfers with Moderate Assistance.  Toilet transfer to toilet with Moderate Assistance.  Upper extremity exercise program x20 reps per handout, with independence.       Outcome: Ongoing (interventions implemented as appropriate)  Patient limited during OT session today secondary to increased lethargy with difficulty engaging in therapy tasks. Treatment team notified.  Patient will benefit from continued skilled OT services to address functional deficits.

## 2019-07-24 PROCEDURE — 11000004 HC SNF PRIVATE

## 2019-07-24 PROCEDURE — 97110 THERAPEUTIC EXERCISES: CPT

## 2019-07-24 PROCEDURE — 25000003 PHARM REV CODE 250: Performed by: HOSPITALIST

## 2019-07-24 PROCEDURE — 94640 AIRWAY INHALATION TREATMENT: CPT

## 2019-07-24 PROCEDURE — 25000003 PHARM REV CODE 250: Performed by: NURSE PRACTITIONER

## 2019-07-24 PROCEDURE — 94761 N-INVAS EAR/PLS OXIMETRY MLT: CPT

## 2019-07-24 PROCEDURE — 97530 THERAPEUTIC ACTIVITIES: CPT

## 2019-07-24 PROCEDURE — 25000242 PHARM REV CODE 250 ALT 637 W/ HCPCS: Performed by: NURSE PRACTITIONER

## 2019-07-24 PROCEDURE — 25000003 PHARM REV CODE 250: Performed by: INTERNAL MEDICINE

## 2019-07-24 PROCEDURE — 97116 GAIT TRAINING THERAPY: CPT

## 2019-07-24 PROCEDURE — 63600175 PHARM REV CODE 636 W HCPCS: Performed by: HOSPITALIST

## 2019-07-24 PROCEDURE — 94668 MNPJ CHEST WALL SBSQ: CPT

## 2019-07-24 RX ORDER — MODAFINIL 100 MG/1
100 TABLET ORAL
Status: DISCONTINUED | OUTPATIENT
Start: 2019-07-24 | End: 2019-07-26 | Stop reason: HOSPADM

## 2019-07-24 RX ORDER — LEVETIRACETAM 100 MG/ML
500 SOLUTION ORAL
Status: DISCONTINUED | OUTPATIENT
Start: 2019-07-25 | End: 2019-07-25

## 2019-07-24 RX ADMIN — CHLORTHALIDONE 25 MG: 25 TABLET ORAL at 08:07

## 2019-07-24 RX ADMIN — CETIRIZINE HYDROCHLORIDE 10 MG: 5 TABLET ORAL at 08:07

## 2019-07-24 RX ADMIN — POLYETHYLENE GLYCOL 3350 17 G: 17 POWDER, FOR SOLUTION ORAL at 08:07

## 2019-07-24 RX ADMIN — SENNOSIDES,DOCUSATE SODIUM 2 TABLET: 8.6; 5 TABLET, FILM COATED ORAL at 08:07

## 2019-07-24 RX ADMIN — LEVETIRACETAM 750 MG: 100 SOLUTION ORAL at 08:07

## 2019-07-24 RX ADMIN — IPRATROPIUM BROMIDE AND ALBUTEROL SULFATE 3 ML: .5; 3 SOLUTION RESPIRATORY (INHALATION) at 01:07

## 2019-07-24 RX ADMIN — ENOXAPARIN SODIUM 40 MG: 100 INJECTION SUBCUTANEOUS at 05:07

## 2019-07-24 RX ADMIN — Medication 400 MG: at 08:07

## 2019-07-24 RX ADMIN — MONTELUKAST 10 MG: 10 TABLET, FILM COATED ORAL at 08:07

## 2019-07-24 RX ADMIN — ASPIRIN 81 MG CHEWABLE TABLET 81 MG: 81 TABLET CHEWABLE at 08:07

## 2019-07-24 RX ADMIN — IPRATROPIUM BROMIDE AND ALBUTEROL SULFATE 3 ML: .5; 3 SOLUTION RESPIRATORY (INHALATION) at 12:07

## 2019-07-24 RX ADMIN — FLUTICASONE PROPIONATE 100 MCG: 50 SPRAY, METERED NASAL at 08:07

## 2019-07-24 RX ADMIN — ESCITALOPRAM OXALATE 5 MG: 5 TABLET, FILM COATED ORAL at 08:07

## 2019-07-24 RX ADMIN — GUAIFENESIN AND DEXTROMETHORPHAN HYDROBROMIDE 1 TABLET: 600; 30 TABLET, EXTENDED RELEASE ORAL at 08:07

## 2019-07-24 RX ADMIN — POTASSIUM CHLORIDE 20 MEQ: 1500 TABLET, EXTENDED RELEASE ORAL at 08:07

## 2019-07-24 RX ADMIN — IPRATROPIUM BROMIDE AND ALBUTEROL SULFATE 3 ML: .5; 3 SOLUTION RESPIRATORY (INHALATION) at 06:07

## 2019-07-24 RX ADMIN — LEVETIRACETAM 750 MG: 100 SOLUTION ORAL at 06:07

## 2019-07-24 NOTE — PROGRESS NOTES
[facility]                                  Skilled Nursing Facility                   Progress Note   DOS 7/24/2019  Admit Date: 7/8/2019  LETICIA   Principal Problem:  UTI (urinary tract infection)   HPI obtained from patient interview and chart review     Chief Complaint: Family is request to see me about patient's persistent lethargy    HPI: Ms Byrnes is an 84 female with sig pmhx of dementia, HTN, HLD, CAD/NSTEMI('14), chronic sCHF/ICMP(LVEF 25-30), seizure, OA, suspected COPD, anemia, and MDR UTIs(Ecoli in '18 and '15, e faecalis '14, pseudomonas and enterobacter '12) who presents to SNF s/p hospitalization for fever likely 2/2 pyelonephritis. Patient currently with no complaints of dysuria at this time and voiding without difficulty. All labs reviewed. No leukocytosis. Hemoglobin stable at 8.9.  Platelets stable at 357, trending biweekly. Sodium at 138. Potasium 3.8. Creatinine stable at 0.6. 24 hr blood glucose 80. Patient's current blood pressure is at 161/70, increased diuretic, continue to trend. Patient progessing well with PT/OT. Patient medically stable. Continuing to follow and treat all acute and chronic conditions.    Interval Hx: Patient slightly drowsy this am, but eating breakfast and able to communicate at baseline. Patient without cough and crackles this am, will continue to monitor and continue treatment plan. Lengthy discussion with daughter r/t patient's treatment of seizure and explanation of why Keppra is dosed q12h and its importance for discharge, along with discussion about leaving early to be home with family. Initiated coordination of care with Pt/Ot and  to see if patient is ready for an early discharge. Initiating modafinil 100 mg before breakfast daily with hopes to improve wakefulness. Decreasing Keppra a.m. dose to 500 mg daily to contribute with improvement in wakefulness, will monitor closely.     PEx  Constitutional: Patient appears  well-developed and in no distress + slightly drowsy   HENT:   Head: Normocephalic and atraumatic.   Eyes: Pupils are equal, round, and reactive to light.   Neck: Normal range of motion. Neck supple.   Cardiovascular: Normal rate, regular rhythm and normal heart sounds.    Pulmonary/Chest: Effort normal and + decreased breath sounds, - nonproductive cough, - mild upper airway crackle  Abdominal: Soft. Bowel sounds are normal.   Musculoskeletal: Normal range of motion. + generalized weakness. + RUE weakness  Neurological: Alert and oriented to person, place, and time.   Skin: Skin is warm and dry.   Psychiatric: Normal mood and affect. Behavior is normal.     Temp:  [97.8 °F (36.6 °C)-98.1 °F (36.7 °C)]   Pulse:  [62-78]   Resp:  [17-24]   BP: (124-126)/(66-78)   SpO2:  [94 %-96 %]   Body mass index is 39.57 kg/m².     ROS  Constitutional: Negative for fever. + malaise/fatigue.   Eyes: Negative for blurred vision, double vision and discharge.   Respiratory: Negative for cough, shortness of breath and wheezing.    Cardiovascular: Negative for chest pain, palpitations, claudication, and leg swelling.   Gastrointestinal: Negative for abdominal pain, constipation, diarrhea, nausea and vomiting.   Genitourinary: Negative for dysuria, frequency and urgency.   Musculoskeletal:  + generalized weakness. Negative for back pain and myalgias.   Skin: Negative for itching and rash.  Neurological: Negative for dizziness, speech change, seizures, and headaches.   Psychiatric/Behavioral: Negative for depression. The patient is not nervous/anxious.      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    Past Surgical History: Patient has a past surgical history that includes Total knee arthroplasty (Left); Eye surgery; and Joint  replacement.    Social History: Patient reports that she quit smoking about 7 years ago. Her smoking use included cigarettes. She has quit using smokeless tobacco. She reports that she does not drink alcohol or use drugs.    Family History: family history includes Dementia in her brother; Seizures in her brother and sister.    Allergies: Patient is allergic to lisinopril and haldol [haloperidol lactate].      Assessment and Plan:  Discharge barriers  -7/24 Lengthy discussion with daughter r/t patient's treatment of seizure and explanation of why Keppra is dosed q12h and its importance for discharge, along with discussion about leaving early to be home with family. Initiated coordination of care with Pt/Ot and  to see if patient is ready for an early discharge.     Seizure disorder  -Continue leviteracitam   -7/24 Lengthy discussion with daughter r/t patient's treatment of seizure and explanation of why Keppra is dosed q12h along with discussion about leaving early to be home with family. Decreasing Keppra a.m. dose to 500 mg daily, will monitor closely.    Expiratory wheezing  -7/18 Initiated CXR, initiated lasix 20 mg daily, initiated CPT treatments q.6 hours round-the-clock per respiratory, and increased O2 neb treatments to q.6 hours around the clock.   -7/22 Improved, Initiated dextromethorphan-guaifenesin  mg per 12 hr tablet 1 tablet q12h and discontinued guaifenesin.  -7/24 Patient without cough and upper airway sounds this am, will continue to monitor and continue treatment plan.     Lethargy  -7/18 Decreased risperidone to 0.12 mg qpm and increased mucinex 600 mg bid.  -7/22 Improved, Discontinue lorazepam.   -7/24 Initiating modafinil 100 mg before breakfast daily with hopes to improve wakefulness. Decreasing Keppra a.m. dose to 500 mg daily to contribute with improvement in wakefulness, will monitor closely.     Cough  -7/18 Initiated CPT treatments q.6 hours round-the-clock per  respiratory, increased O2 neb treatments to q.6 hours around the clock, and Increased mucinex t 600 mg bid.  -7/22 Initiated dextromethorphan-guaifenesin  mg per 12 hr tablet 1 tablet q12h and discontinued guaifenesin.   -7/24 Patient without cough and upper airway sounds this am, will continue to monitor and continue treatment plan.     CONTINUED    Hypokalemia   -7/11 Initiated 60 meq K x 1 now.  -7/15 Initiated 40 meq K q4h x 2 doses.  -7/18 Initiated 40 mEq potassium q.4 hours x3.   -7/22 Potasium 3.7, Initiated K 40 meq po x 1 and initiated K 20 mEq daily starting on 7/23    Essential hypertension  -continue amlodipine  -7/9 Increased chlorthalidone to 25 mg daily   -7/11 Bp at 120/57 and stable, trending  -7/18 Discontinued amlodipine.  -7/22 Stable with Patient's current blood pressure is at 138/64.    Sepsis  UTI / pyelonephritis  -E coli sens CTX and doxycycline  -CTX IV to Doxy PO to complete 14 days total of ABx (for pyelo)   -Continue doxycycline  -7/15 remains afebrile     Malnutrition mild  -7/9 Initiated nutrition consult   -7/11 Initiated Dysphagia soft diet and Boost TID with meals on 7/9 per nutritionist recs  -7/18 Initiated nursing order to ensure that patient is being assisted with meal    Thrombocytosis   -7/11 Platelets decreased to 351, continue to trend.   -7/15 Platelets decrease to WNL at 339, continue to trend     Chronic anemia  -Pt with chronic microcytic anemia  -continue ferrous sulfate.  -7/11 Hgb at 9 and stable, trending     Debility  -Continue with PT/OT for gait training and strengthening and restoration of ADL's   -Encourage mobility, OOB in chair, and early ambulation as appropriate  -Fall precautions   -Monitor for bowel and bladder dysfunction  -Monitor for and prevent skin breakdown and pressure ulcers  -Continue DVT prophylaxis with lovenox     Chronic combined systolic and diastolic CHF  -continue chlorthalidone  -7/9 Initiated ambulatory referral to cardiology       Dementia without behavioral disturbance  -Follows with Dr. Benita Melchor with [facility], last seen 6/24.  -Delirium precautions  -Continue Lexapro / Remeron / Risperidone     No future appointments.    66 minutes spent in the care of the patient (Greater than 1/2 spent in non direct face-to-face contact)   36 of 66 minutes spent on documentation and counseling patient on clinical condition and therapies provided regarding discharge barriers, seizure disorder, lengthy disscussion with family related to treatment plan and discharge plan, communication with PT/OT and  relating to discharge plan and treatment plan. The remainder of the time was spent in direct patient care.     Serafin Gavin NP

## 2019-07-24 NOTE — PLAN OF CARE
Problem: Physical Therapy Goal  Goal: Physical Therapy Goal  Goals to be met by: 2019    Patient will increase functional independence with mobility by performin. Supine to sit with MInimal Assistance  2. Sit to supine with MInimal Assistance  3. Rolling to Left and Right with Minimal Assistance.  4. Sit to stand transfer with Minimal Assistance  5. Bed to chair transfer with Minimal Assistance using Rolling Walker  6. Gait  x 30 feet with Minimal Assistance using Rolling Walker.   7. Wheelchair propulsion x100 feet with Stand-by Assistance using bilateral uppper extremities     Outcome: Ongoing (interventions implemented as appropriate)  LTGs remain appropriate. Pt will continue PT POC.    Renetta Teresa, PT  2019         63 yo F w/ pmh glaucoma, gastritis, mitral valve prolapse, h/o cholecystectomy and L partial oophrectomy p/w RLQ abd pain radiating to back x 10 days, worse last few days. Reports intermittent watery stools. Denies n/v, fever, chest pain, sob, cough, vag dc, hematuria/dysuria. Eating less 2/2 pain. No recent travel. Took one dose amoxicillin yesterday (has standing dose for dental procedure prophylaxis).

## 2019-07-24 NOTE — PT/OT/SLP PROGRESS
Physical Therapy  Treatment    Susanna Byrnes   MRN: [id number]   Admitting Diagnosis: UTI (urinary tract infection)    PT Received On: 07/24/19        Billable Minutes:  Gait Training 15, Therapeutic Activity 15, Therapeutic Exercise 9 and Total Time 39    Treatment Type: Treatment  PT/PTA: PT     PTA Visit Number: 0       General Precautions: Standard, fall, aspiration, seizure  Orthopedic Precautions: N/A   Braces: N/A    Spiritual, Cultural Beliefs, Jainism Practices, Values that Affect Care: no    Subjective:  Communicated with patient and her daughter prior to session.  Pt required encouragement/arousal to initiate movement but able to participate on this date.     Pain/Comfort  Pain Rating 1: 0/10    Objective:  Patient found supine in bed w/ daughter present t/o session.       AM-PAC 6 CLICK MOBILITY  Total Score:11    Bed Mobility:  Supine>Sit: on bed w/ MaxA for trunk and BLE  HOB elevated  inc'd time and cueing/encouragement provided    Transfers:  Sit<>Stand:    To/from EOB w/ RW and ModA to rise   To/from w/c (3 trials) w/ RW and ModA to rise  Pt places BUE on RW in order to promote forward lean, cueing for foot placement  Encouragement to rise    Gait:  Amb 3 trials (16ft, 25ft, and 13ft) w/ RW and ModA for stability, CGA of 2nd person for safety  Cueing for upright posture and to keep eyes open t/o all trials  Short shuffling gait pattern noted  Encouragement provided to continue each trial w/ close w/c follow for safety     Therex:  Seated BLE therex 2x10 reps (HF, LAQ, AP) w/ AAROM as needed t/o  Encouragement provided for pt to complete    Balance:  Static stand w/ RW and Min/CGA for safety, 2min  Cueing/encouragement t/o    Patient left up in chair with OT present.    Assessment:  Susanna Byrnes is a 84 y.o. female with a medical diagnosis of UTI (urinary tract infection).  Pt had improved tolerance to therapy on this date. She was able to inc gait distance and complete 3 separate gait trials. She  does however continue to require max encouragement to initiate and complete functional tasks/activities. She will continue PT POC but due to dementia will require 24hour care upon D/C home.    Rehab identified problem list/impairments: weakness, impaired endurance, impaired sensation, impaired self care skills, impaired functional mobilty, gait instability, impaired balance, impaired cognition, decreased upper extremity function, decreased lower extremity function    Rehab potential is fair.    Activity tolerance: Fair    Discharge recommendations: home health PT     Barriers to discharge: None    Equipment recommendations: tub bench, wheelchair, manual     GOALS:   Multidisciplinary Problems     Physical Therapy Goals        Problem: Physical Therapy Goal    Goal Priority Disciplines Outcome Goal Variances Interventions   Physical Therapy Goal     PT, PT/OT Ongoing (interventions implemented as appropriate)     Description:  Goals to be met by: 2019    Patient will increase functional independence with mobility by performin. Supine to sit with MInimal Assistance  2. Sit to supine with MInimal Assistance  3. Rolling to Left and Right with Minimal Assistance.  4. Sit to stand transfer with Minimal Assistance  5. Bed to chair transfer with Minimal Assistance using Rolling Walker  6. Gait  x 30 feet with Minimal Assistance using Rolling Walker.   7. Wheelchair propulsion x100 feet with Stand-by Assistance using bilateral uppper extremities                      PLAN:    Patient to be seen 5 x/week  to address the above listed problems via gait training, therapeutic activities, therapeutic exercises  Plan of Care expires: 19  Plan of Care reviewed with: patient    Renetta MANE Malick, PT  2019

## 2019-07-24 NOTE — PT/OT/SLP PROGRESS
Occupational Therapy  Treatment    Susanna Byrnes   MRN: [id number]   Admitting Diagnosis: UTI (urinary tract infection)    OT Date of Treatment: 07/24/19  Total Time (min): 30 min    Billable Minutes:  Therapeutic Activity 30    General Precautions: Standard, fall, aspiration  Orthopedic Precautions: N/A  Braces: N/A    Spiritual, Cultural Beliefs, Muslim Practices, Values that Affect Care: yes(Episcopal)    Subjective:  Communicated with PT prior to session.  Patient agreeable to therapy.    Pain/Comfort  Pain Rating 1: 0/10  Pain Rating Post-Intervention 1: 0/10    Objective:   Patient found in w/c.    Occupational Performance:    Bed Mobility:    · N/T    Functional Mobility/Transfers:  · Patient completed Sit <> Stand Transfer with moderate assistance  with  rolling walker x 5 trials from w/c  · Patient completed w/c > bedside chair Transfer using Stand Pivot technique with moderate assistance   · Patient requires max cues for safety and technique with all transfers.    Activities of Daily Living:  · N/T    AMPA 6 Click:  AMPA Total Score: 13      Additional Treatment:  UE ROM table slides (forward/backward and laterally) x15 to increase ROM and trunk stability for self care related task. Max A needed for task completion facilitation of trunk.    Patient participated in x 5 functional standing activities at raised table top incorporating use of UE reaching forward, across midline within pt available ROM (very limited) promoting UE AROM, trunk stability, B weight shifting, and overall standing balance to improve safety and functional independence with all ADL's and mobility. Patient tolerated  standing balance with Min A/ RW to steady and w/c close behind (longest trial ~1 min), max verbal/tactile cues for upright posture due to forward flexed posture on RW. Patient with minimal engagement, max cues to stay alert/engaged in activity.     Patient left up in chair with daughter and nurse  present    ASSESSMENT:  Susanna Byrnes is a 84 y.o. female with a medical diagnosis of UTI (urinary tract infection). Patient with improved tolerance for therapy today.  Patient tolerated several trials of functional standing activities between rest as patient fatigues easily.  Patient does continue to require max verbal/tactile cues for alert state and task engagement. Patient will benefit from continued skilled OT.    Rehab identified problem list/impairments: weakness, impaired endurance, impaired self care skills, impaired functional mobilty, gait instability, impaired balance, decreased upper extremity function, decreased lower extremity function, edema, impaired skin    Rehab potential is good    Activity tolerance: Fair    Discharge recommendations: home health OT     Barriers to discharge: None     Equipment recommendations: wheelchair, manual, tub bench     GOALS:   Multidisciplinary Problems     Occupational Therapy Goals        Problem: Occupational Therapy Goal    Goal Priority Disciplines Outcome Interventions   Occupational Therapy Goal     OT, PT/OT Ongoing (interventions implemented as appropriate)    Description:  Goals to be met by: 7/22/19     Patient will increase functional independence with ADLs by performing:    UE Dressing with Stand-by Assistance. REVISED UBD performed with Min (A)  LE Dressing with Moderate Assistance.  Grooming while seated at sink with Stand-by Assistance. REVISED Grooming while seated with Min (A)  Toileting from toilet with Maximum Assistance for hygiene and clothing management.   Bathing from  sitting at sink with Moderate Assistance.  Sitting at edge of bed x5 minutes with Stand-by Assistance.  Rolling to Bilateral with Moderate Assistance.   Supine to sit with Moderate Assistance.  Stand pivot transfers with Moderate Assistance.  Toilet transfer to toilet with Moderate Assistance.  Upper extremity exercise program x20 reps per handout, with independence.                         Plan:  Patient to be seen 5 x/week to address the above listed problems via self-care/home management, therapeutic activities, therapeutic exercises  Plan of Care expires: 08/08/19  Plan of Care reviewed with: patient    NGHIA Burns  07/24/2019

## 2019-07-25 LAB
ANION GAP SERPL CALC-SCNC: 7 MMOL/L (ref 8–16)
BASOPHILS # BLD AUTO: 0.03 K/UL (ref 0–0.2)
BASOPHILS NFR BLD: 0.3 % (ref 0–1.9)
BUN SERPL-MCNC: 19 MG/DL (ref 8–23)
CALCIUM SERPL-MCNC: 9 MG/DL (ref 8.7–10.5)
CHLORIDE SERPL-SCNC: 102 MMOL/L (ref 95–110)
CO2 SERPL-SCNC: 29 MMOL/L (ref 23–29)
CREAT SERPL-MCNC: 0.6 MG/DL (ref 0.5–1.4)
DIFFERENTIAL METHOD: ABNORMAL
EOSINOPHIL # BLD AUTO: 1 K/UL (ref 0–0.5)
EOSINOPHIL NFR BLD: 11.1 % (ref 0–8)
ERYTHROCYTE [DISTWIDTH] IN BLOOD BY AUTOMATED COUNT: 17.8 % (ref 11.5–14.5)
EST. GFR  (AFRICAN AMERICAN): >60 ML/MIN/1.73 M^2
EST. GFR  (NON AFRICAN AMERICAN): >60 ML/MIN/1.73 M^2
GLUCOSE SERPL-MCNC: 79 MG/DL (ref 70–110)
HCT VFR BLD AUTO: 29.8 % (ref 37–48.5)
HGB BLD-MCNC: 8.9 G/DL (ref 12–16)
IMM GRANULOCYTES # BLD AUTO: 0.03 K/UL (ref 0–0.04)
IMM GRANULOCYTES NFR BLD AUTO: 0.3 % (ref 0–0.5)
LYMPHOCYTES # BLD AUTO: 3.3 K/UL (ref 1–4.8)
LYMPHOCYTES NFR BLD: 36.9 % (ref 18–48)
MAGNESIUM SERPL-MCNC: 2 MG/DL (ref 1.6–2.6)
MCH RBC QN AUTO: 22.1 PG (ref 27–31)
MCHC RBC AUTO-ENTMCNC: 29.9 G/DL (ref 32–36)
MCV RBC AUTO: 74 FL (ref 82–98)
MONOCYTES # BLD AUTO: 0.8 K/UL (ref 0.3–1)
MONOCYTES NFR BLD: 8.8 % (ref 4–15)
NEUTROPHILS # BLD AUTO: 3.8 K/UL (ref 1.8–7.7)
NEUTROPHILS NFR BLD: 42.6 % (ref 38–73)
NRBC BLD-RTO: 0 /100 WBC
PHOSPHATE SERPL-MCNC: 3.4 MG/DL (ref 2.7–4.5)
PLATELET # BLD AUTO: 254 K/UL (ref 150–350)
PMV BLD AUTO: 10.5 FL (ref 9.2–12.9)
POTASSIUM SERPL-SCNC: 3.9 MMOL/L (ref 3.5–5.1)
RBC # BLD AUTO: 4.02 M/UL (ref 4–5.4)
SODIUM SERPL-SCNC: 138 MMOL/L (ref 136–145)
WBC # BLD AUTO: 8.91 K/UL (ref 3.9–12.7)

## 2019-07-25 PROCEDURE — 94640 AIRWAY INHALATION TREATMENT: CPT

## 2019-07-25 PROCEDURE — 25000242 PHARM REV CODE 250 ALT 637 W/ HCPCS: Performed by: NURSE PRACTITIONER

## 2019-07-25 PROCEDURE — 80048 BASIC METABOLIC PNL TOTAL CA: CPT

## 2019-07-25 PROCEDURE — 25000003 PHARM REV CODE 250: Performed by: NURSE PRACTITIONER

## 2019-07-25 PROCEDURE — 85025 COMPLETE CBC W/AUTO DIFF WBC: CPT

## 2019-07-25 PROCEDURE — 25000003 PHARM REV CODE 250: Performed by: INTERNAL MEDICINE

## 2019-07-25 PROCEDURE — 83735 ASSAY OF MAGNESIUM: CPT

## 2019-07-25 PROCEDURE — 94668 MNPJ CHEST WALL SBSQ: CPT

## 2019-07-25 PROCEDURE — 11000004 HC SNF PRIVATE

## 2019-07-25 PROCEDURE — 63600175 PHARM REV CODE 636 W HCPCS: Performed by: HOSPITALIST

## 2019-07-25 PROCEDURE — 36415 COLL VENOUS BLD VENIPUNCTURE: CPT

## 2019-07-25 PROCEDURE — 94761 N-INVAS EAR/PLS OXIMETRY MLT: CPT

## 2019-07-25 PROCEDURE — 97116 GAIT TRAINING THERAPY: CPT

## 2019-07-25 PROCEDURE — 97110 THERAPEUTIC EXERCISES: CPT

## 2019-07-25 PROCEDURE — 97530 THERAPEUTIC ACTIVITIES: CPT

## 2019-07-25 PROCEDURE — 25000003 PHARM REV CODE 250: Performed by: HOSPITALIST

## 2019-07-25 PROCEDURE — 84100 ASSAY OF PHOSPHORUS: CPT

## 2019-07-25 RX ORDER — LEVETIRACETAM 100 MG/ML
750 SOLUTION ORAL DAILY
Status: DISCONTINUED | OUTPATIENT
Start: 2019-07-25 | End: 2019-07-26 | Stop reason: HOSPADM

## 2019-07-25 RX ORDER — LEVETIRACETAM 100 MG/ML
500 SOLUTION ORAL DAILY
Status: DISCONTINUED | OUTPATIENT
Start: 2019-07-26 | End: 2019-07-26 | Stop reason: HOSPADM

## 2019-07-25 RX ADMIN — SENNOSIDES,DOCUSATE SODIUM 2 TABLET: 8.6; 5 TABLET, FILM COATED ORAL at 09:07

## 2019-07-25 RX ADMIN — CHLORTHALIDONE 25 MG: 25 TABLET ORAL at 09:07

## 2019-07-25 RX ADMIN — POLYETHYLENE GLYCOL 3350 17 G: 17 POWDER, FOR SOLUTION ORAL at 08:07

## 2019-07-25 RX ADMIN — IPRATROPIUM BROMIDE AND ALBUTEROL SULFATE 3 ML: .5; 3 SOLUTION RESPIRATORY (INHALATION) at 07:07

## 2019-07-25 RX ADMIN — GUAIFENESIN AND DEXTROMETHORPHAN HYDROBROMIDE 1 TABLET: 600; 30 TABLET, EXTENDED RELEASE ORAL at 08:07

## 2019-07-25 RX ADMIN — IPRATROPIUM BROMIDE AND ALBUTEROL SULFATE 3 ML: .5; 3 SOLUTION RESPIRATORY (INHALATION) at 02:07

## 2019-07-25 RX ADMIN — ENOXAPARIN SODIUM 40 MG: 100 INJECTION SUBCUTANEOUS at 05:07

## 2019-07-25 RX ADMIN — IPRATROPIUM BROMIDE AND ALBUTEROL SULFATE 3 ML: .5; 3 SOLUTION RESPIRATORY (INHALATION) at 12:07

## 2019-07-25 RX ADMIN — POLYETHYLENE GLYCOL 3350 17 G: 17 POWDER, FOR SOLUTION ORAL at 09:07

## 2019-07-25 RX ADMIN — ESCITALOPRAM OXALATE 5 MG: 5 TABLET, FILM COATED ORAL at 09:07

## 2019-07-25 RX ADMIN — ASPIRIN 81 MG CHEWABLE TABLET 81 MG: 81 TABLET CHEWABLE at 09:07

## 2019-07-25 RX ADMIN — LEVETIRACETAM 750 MG: 500 SOLUTION ORAL at 05:07

## 2019-07-25 RX ADMIN — MONTELUKAST 10 MG: 10 TABLET, FILM COATED ORAL at 08:07

## 2019-07-25 RX ADMIN — LEVETIRACETAM 500 MG: 100 SOLUTION ORAL at 06:07

## 2019-07-25 RX ADMIN — SENNOSIDES,DOCUSATE SODIUM 2 TABLET: 8.6; 5 TABLET, FILM COATED ORAL at 08:07

## 2019-07-25 RX ADMIN — POTASSIUM CHLORIDE 20 MEQ: 1500 TABLET, EXTENDED RELEASE ORAL at 09:07

## 2019-07-25 RX ADMIN — CETIRIZINE HYDROCHLORIDE 10 MG: 5 TABLET ORAL at 09:07

## 2019-07-25 RX ADMIN — GUAIFENESIN AND DEXTROMETHORPHAN HYDROBROMIDE 1 TABLET: 600; 30 TABLET, EXTENDED RELEASE ORAL at 09:07

## 2019-07-25 RX ADMIN — FLUTICASONE PROPIONATE 100 MCG: 50 SPRAY, METERED NASAL at 09:07

## 2019-07-25 RX ADMIN — Medication 400 MG: at 09:07

## 2019-07-25 NOTE — PROGRESS NOTES
[facility]                                  Skilled Nursing Facility                   Progress Note   DOS 7/25/2019  Admit Date: 7/8/2019  LETICIA   Principal Problem:  UTI (urinary tract infection)   HPI obtained from patient interview and chart review     Chief Complaint: PT/OT reporting that the Daughter is requesting to leave SNF early and Revaluation of medical treatment, reevaluation of drowsiness, and therapy status: Lab review      HPI: Ms Byrnes is an 84 female with sig pmhx of dementia, HTN, HLD, CAD/NSTEMI('14), chronic sCHF/ICMP(LVEF 25-30), seizure, OA, suspected COPD, anemia, and MDR UTIs(Ecoli in '18 and '15, e faecalis '14, pseudomonas and enterobacter '12) who presents to SNF s/p hospitalization for fever likely 2/2 pyelonephritis. Patient currently with no complaints of dysuria at this time and voiding without difficulty. All labs reviewed. No leukocytosis. Hemoglobin stable at 8.9.  Platelets stable at 357, trending biweekly. Sodium at 138. Potasium 3.8. Creatinine stable at 0.6. 24 hr blood glucose 80. Patient's current blood pressure is at 161/70, increased diuretic, continue to trend. Patient progessing well with PT/OT. Patient medically stable. Continuing to follow and treat all acute and chronic conditions.    Interval Hx: Patient's daughter is requesting an early discharge for tomorrow. Initiated coordination of care with PT/OT and  to adjust discharge plan and assess discharge barriers. Lenghty discussions with daughter and multidisciplinary teams regarding patient safety at home. PT/OT will do family training today or tomorrow to assess readiness to discharge. Daughter is in the room stating that she is very please with our care here, but feels that the patient is at her baseline and is stating that the patient will benefit from being in her home environment at this point. PT/OT/SLP ordered for Home health and patient has all required DME per PT/OT  note. Daughter reporting that the patient is less drowsy today after adjustment to keppra dose and timing. Daughter is refusing modafinil at this time, discontinuing modafinil. All of today's labs reviewed. No leukocytosis. Hemoglobin stable at 8.9.  Platelets stable at 254. Sodium at 138. Potasium 3.9. Creatinine stable at 0.6. 24 hr blood glucose 79. Patient's current blood pressure is at 123/60. Patient progessing well with PT/OT. Patient medically stable. Continuing to follow and treat all acute and chronic conditions.      PEx  Constitutional: Patient appears well-developed and in no distress + slightly drowsy   HENT:   Head: Normocephalic and atraumatic.   Eyes: Pupils are equal, round, and reactive to light.   Neck: Normal range of motion. Neck supple.   Cardiovascular: Normal rate, regular rhythm and normal heart sounds.    Pulmonary/Chest: Effort normal and + decreased breath sounds, - nonproductive cough, - mild upper airway crackle  Abdominal: Soft. Bowel sounds are normal.   Musculoskeletal: Normal range of motion. + generalized weakness. + RUE weakness  Neurological: Alert and oriented to person, place, and time.   Skin: Skin is warm and dry.   Psychiatric: Normal mood and affect. Behavior is normal.     Temp:  [98.1 °F (36.7 °C)-98.7 °F (37.1 °C)]   Pulse:  [70-76]   Resp:  [16-38]   BP: (123-133)/(57-60)   SpO2:  [91 %-94 %]   Body mass index is 39.57 kg/m².     ROS  Constitutional: Negative for fever. + very mild malaise/fatigue.   Eyes: Negative for blurred vision, double vision and discharge.   Respiratory: Negative for cough, shortness of breath and wheezing.    Cardiovascular: Negative for chest pain, palpitations, claudication, and leg swelling.   Gastrointestinal: Negative for abdominal pain, constipation, diarrhea, nausea and vomiting.   Genitourinary: Negative for dysuria, frequency and urgency.   Musculoskeletal:  + generalized weakness. Negative for back pain and myalgias.   Skin: Negative  for itching and rash.  Neurological: Negative for dizziness, speech change, seizures, and headaches.   Psychiatric/Behavioral: Negative for depression. The patient is not nervous/anxious.      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    Past Surgical History: Patient has a past surgical history that includes Total knee arthroplasty (Left); Eye surgery; and Joint replacement.    Social History: Patient reports that she quit smoking about 7 years ago. Her smoking use included cigarettes. She has quit using smokeless tobacco. She reports that she does not drink alcohol or use drugs.    Family History: family history includes Dementia in her brother; Seizures in her brother and sister.    Allergies: Patient is allergic to lisinopril and haldol [haloperidol lactate].      Assessment and Plan:  Discharge barriers  -7/24 Lengthy discussion with daughter r/t patient's treatment of seizure and explanation of why Keppra is dosed q12h and its importance for discharge, along with discussion about leaving early to be home with family. Initiated coordination of care with Pt/Ot and  to see if patient is ready for an early discharge.  -7/25 Patient's daughter is requesting an early discharge for tomorrow. Initiated coordination of care with PT/OT and  to adjust discharge plan and assess discharge barriers. Lenghty discussions with daughter and multidisciplinary teams regarding patient safety at home. PT/OT will do family training today or tomorrow to assess readiness to discharge. Daughter is in the room stating that she is very please with our care here, but feels that the patient is at her baseline and is stating that the patient will benefit from being in her home environment at this point.  PT/OT/SLP ordered for Homehealth and patient has all required DME per PT/OT note.     Lethargy  -7/18 Decreased risperidone to 0.12 mg qpm and increased mucinex 600 mg bid.  -7/22 Improved, Discontinue lorazepam.   -7/24 Initiating modafinil 100 mg before breakfast daily with hopes to improve wakefulness. Decreasing Keppra a.m. dose to 500 mg daily to contribute with improvement in wakefulness, will monitor closely.   -7/25 Daughter reporting that the patient is less drowsy today after adjustment to keppra dose and timing. Daughter is refusing modafinil at this time, discontinuing modafinil.     CONTINUED    Seizure disorder  -Continue leviteracitam   -7/24 Lengthy discussion with daughter r/t patient's treatment of seizure and explanation of why Keppra is dosed q12h along with discussion about leaving early to be home with family. Decreasing Keppra a.m. dose to 500 mg daily, will monitor closely.    Expiratory wheezing  -7/18 Initiated CXR, initiated lasix 20 mg daily, initiated CPT treatments q.6 hours round-the-clock per respiratory, and increased O2 neb treatments to q.6 hours around the clock.   -7/22 Improved, Initiated dextromethorphan-guaifenesin  mg per 12 hr tablet 1 tablet q12h and discontinued guaifenesin.  -7/24 Patient without cough and upper airway sounds this am, will continue to monitor and continue treatment plan.     Cough  -7/18 Initiated CPT treatments q.6 hours round-the-clock per respiratory, increased O2 neb treatments to q.6 hours around the clock, and Increased mucinex t 600 mg bid.  -7/22 Initiated dextromethorphan-guaifenesin  mg per 12 hr tablet 1 tablet q12h and discontinued guaifenesin.   -7/24 Patient without cough and upper airway sounds this am, will continue to monitor and continue treatment plan.     Hypokalemia   -7/11 Initiated 60 meq K x 1 now.  -7/15 Initiated 40 meq K q4h x 2 doses.  -7/18 Initiated 40 mEq potassium q.4 hours x3.   -7/22 Potasium 3.7, Initiated K  40 meq po x 1 and initiated K 20 mEq daily starting on 7/23    Essential hypertension  -continue amlodipine  -7/9 Increased chlorthalidone to 25 mg daily   -7/11 Bp at 120/57 and stable, trending  -7/18 Discontinued amlodipine.  -7/22 Stable with Patient's current blood pressure is at 138/64.    Sepsis  UTI / pyelonephritis  -E coli sens CTX and doxycycline  -CTX IV to Doxy PO to complete 14 days total of ABx (for pyelo)   -Continue doxycycline  -7/15 remains afebrile     Malnutrition mild  -7/9 Initiated nutrition consult   -7/11 Initiated Dysphagia soft diet and Boost TID with meals on 7/9 per nutritionist recs  -7/18 Initiated nursing order to ensure that patient is being assisted with meal    Thrombocytosis   -7/11 Platelets decreased to 351, continue to trend.   -7/15 Platelets decrease to WNL at 339, continue to trend     Chronic anemia  -Pt with chronic microcytic anemia  -continue ferrous sulfate.  -7/11 Hgb at 9 and stable, trending     Debility  -Continue with PT/OT for gait training and strengthening and restoration of ADL's   -Encourage mobility, OOB in chair, and early ambulation as appropriate  -Fall precautions   -Monitor for bowel and bladder dysfunction  -Monitor for and prevent skin breakdown and pressure ulcers  -Continue DVT prophylaxis with lovenox     Chronic combined systolic and diastolic CHF  -continue chlorthalidone  -7/9 Initiated ambulatory referral to cardiology      Dementia without behavioral disturbance  -Follows with Dr. Benita Melchor with [facility], last seen 6/24.  -Delirium precautions  -Continue Lexapro / Remeron / Risperidone     No future appointments.      Serafin Gavin NP

## 2019-07-25 NOTE — PT/OT/SLP PROGRESS
Occupational Therapy  Treatment    Susanna Byrnes   MRN: [id number]   Admitting Diagnosis: UTI (urinary tract infection)    OT Date of Treatment: 07/25/19       Billable Minutes:  Therapeutic Activity 60    General Precautions: Standard, fall, aspiration  Orthopedic Precautions: N/A  Braces: N/A    Spiritual, Cultural Beliefs, Anglican Practices, Values that Affect Care: yes(Taoism)    Subjective:  Communicated with nsg prior to session.  Why do I need to to this    Pain/Comfort  Pain Rating 1: 0/10  Pain Rating Post-Intervention 1: 0/10    Objective:   Pt. Seated in w/c with teller sitter    Occupational Performance:    Bed Mobility:    · Not tested     Functional Mobility/Transfers:  · Patient completed Sit <> Stand Transfer with moderate assistance  with  rolling walker x several trials with cues for safety and hand placement     Activities of Daily Living:  · Upper Body Dressing: total A assistance to claire sweater around back  · Lower Body Dressing: total assistance to claire pants seated and to manage over hips instace    [facility] 6 Click:  [facility] Total Score: 13    Additional Treatment:  Pt. With static standing on this day x 3 min with task with varying (A) for mod to max to sustain bal. Pt. With cues and max cues for use of engagement with task on this day. Pt. Also questioning why do I have to do this  Pt. With limited engagement with in task.  Pt. Also with limited engagment with BUE use of extremities with basic management task such as folding amy for ROM aspects   Pt. Also attempted BUE ROM Pt. Not interested in performing     Spoke with Pt. Daughter on this day in regards to d/c Pt. Daughter sated that she feels comfortable with taking Pt. Home and caring for her and has all DME needs and (A) as needed   Patient left up in chair with awaiting PT in gym    ASSESSMENT:  Susanna Byrnes is a 84 y.o. female with a medical diagnosis of UTI (urinary tract infection) Pt. participated fair with session on this day.   Pt. Limited with task engagement on this day as noted above ,Pt. With cues toward task and to sustain attention and prompting to attention to task with max cues Pt demos physical deficits with balance  functional mobility, UB strength, endurance  level of functional indep with daily tasks and activities and selfcare skills .Pt. Will continue to benefit from continued OT to progress towards goals      Rehab identified problem list/impairments: weakness, impaired endurance, impaired self care skills, impaired functional mobilty, gait instability, impaired balance, decreased upper extremity function, decreased lower extremity function, edema, impaired skin    Rehab potential is fair    Activity tolerance: Fair    Discharge recommendations: home health OT     Barriers to discharge: None     Equipment recommendations: wheelchair, manual, tub bench     GOALS:   Multidisciplinary Problems     Occupational Therapy Goals        Problem: Occupational Therapy Goal    Goal Priority Disciplines Outcome Interventions   Occupational Therapy Goal     OT, PT/OT Ongoing (interventions implemented as appropriate)    Description:  Goals to be met by: 7/22/19     Patient will increase functional independence with ADLs by performing:    UE Dressing with Stand-by Assistance. REVISED UBD performed with Min (A)  LE Dressing with Moderate Assistance.  Grooming while seated at sink with Stand-by Assistance. REVISED Grooming while seated with Min (A)  Toileting from toilet with Maximum Assistance for hygiene and clothing management.   Bathing from  sitting at sink with Moderate Assistance.  Sitting at edge of bed x5 minutes with Stand-by Assistance.  Rolling to Bilateral with Moderate Assistance.   Supine to sit with Moderate Assistance.  Stand pivot transfers with Moderate Assistance.  Toilet transfer to toilet with Moderate Assistance.  Upper extremity exercise program x20 reps per handout, with independence.                         Plan:  Patient to be seen 5 x/week to address the above listed problems via self-care/home management, therapeutic activities, therapeutic exercises  Plan of Care expires: 08/08/19  Plan of Care reviewed with: patient    RIVAS Guerrier  07/25/2019

## 2019-07-25 NOTE — PLAN OF CARE
Problem: Occupational Therapy Goal  Goal: Occupational Therapy Goal  Goals to be met by: 7/22/19     Patient will increase functional independence with ADLs by performing:    UE Dressing with Stand-by Assistance. REVISED UBD performed with Min (A)  LE Dressing with Moderate Assistance.  Grooming while seated at sink with Stand-by Assistance. REVISED Grooming while seated with Min (A)  Toileting from toilet with Maximum Assistance for hygiene and clothing management.   Bathing from  sitting at sink with Moderate Assistance.  Sitting at edge of bed x5 minutes with Stand-by Assistance.  Rolling to Bilateral with Moderate Assistance.   Supine to sit with Moderate Assistance.  Stand pivot transfers with Moderate Assistance.  Toilet transfer to toilet with Moderate Assistance.  Upper extremity exercise program x20 reps per handout, with independence.       Outcome: Ongoing (interventions implemented as appropriate)  Patient will benefit from continued skilled OTservices to address functional deficits. Continue with POC.

## 2019-07-25 NOTE — PT/OT/SLP PROGRESS
Physical Therapy  Treatment    Susanna Byrnes   MRN: [id number]   Admitting Diagnosis: UTI (urinary tract infection)    PT Received On: 07/25/19          Billable Minutes:  Gait Training 23, Therapeutic Exercise 15 and Total Time 38    Treatment Type: Treatment  PT/PTA: PT     PTA Visit Number: 0       General Precautions: Standard, fall, aspiration, seizure  Orthopedic Precautions: N/A   Braces: N/A    Spiritual, Cultural Beliefs, Evangelical Practices, Values that Affect Care: no    Subjective:  Communicated with patient prior to session.  Pt agreeable to session. Inc'd alertness noted compared to previous session.    Pain/Comfort  Pain Rating 1: 0/10    Objective:  Patient found sitting in w/c w/ eyes open.       AM-PAC 6 CLICK MOBILITY  Total Score:11    Bed Mobility:  Not performed    Transfers:  Sit<>Stand: to/from w/c (3 trials) w/ RW and ModA to rise  Cueing for forward lean, hand/foot placement, and initiation of transfer  inc'd time required along w/ encouragement to rise    Gait:  Amb 3 trials (28ft, 23ft, and 14ft) w/ RW and Mod/Naun for stability  Slight posterior lean noted along w/ shuffling gait pattern  Cueing for posture and to inc step length  Encouragement provided to continue      Therex:  Seated BLE therex 2x10 reps (HF, LAQ, KF w/ red theraband, DF, PF)    Balance:  Static stand w/ RW and Min/CGA for stability      Patient left up in chair with call button in reach.    Assessment:  Susanna Byrnes is a 84 y.o. female with a medical diagnosis of UTI (urinary tract infection).  Pt александр session well w/ improved alertness. She continues to be limited by impaired cognition and will require 24hour family care upon D/C. Pt's daughter reports she is very familiar and comfortable assisting pt upon D/C. She also has additional assistance available on call if pt requires 2ppl for assistance. Pt is appropriate to D/C due to [place] available.    Rehab identified problem list/impairments: weakness, impaired  endurance, impaired sensation, impaired self care skills, impaired functional mobilty, gait instability, impaired balance, impaired cognition, decreased upper extremity function, decreased lower extremity function    Rehab potential is fair.    Activity tolerance: Fair    Discharge recommendations: home health PT     Barriers to discharge: None    Equipment recommendations: tub bench, wheelchair, manual     GOALS:   Multidisciplinary Problems     Physical Therapy Goals        Problem: Physical Therapy Goal    Goal Priority Disciplines Outcome Goal Variances Interventions   Physical Therapy Goal     PT, PT/OT Ongoing (interventions implemented as appropriate)     Description:  Goals to be met by: 2019    Patient will increase functional independence with mobility by performin. Supine to sit with MInimal Assistance  2. Sit to supine with MInimal Assistance  3. Rolling to Left and Right with Minimal Assistance.  4. Sit to stand transfer with Minimal Assistance  5. Bed to chair transfer with Minimal Assistance using Rolling Walker  6. Gait  x 30 feet with Minimal Assistance using Rolling Walker.   7. Wheelchair propulsion x100 feet with Stand-by Assistance using bilateral uppper extremities                       PLAN:    Patient to be seen 5 x/week  to address the above listed problems via gait training, therapeutic activities, therapeutic exercises  Plan of Care expires: 19  Plan of Care reviewed with: patient    Renetta M Malick, PT  2019

## 2019-07-25 NOTE — PLAN OF CARE
[facility] - Skilled Nursing Care    HOME HEALTH ORDERS  FACE TO FACE ENCOUNTER    Patient Name: Susanna Byrnes  YOB: 1935    PCP: Ignacia River MD   PCP Address: [address] [place] [address] [address] / [place] [address] [place] [address]  PCP Phone Number: [phone]  PCP Fax: [phone]    Encounter Date: 07/25/2019    Admit to Home Health    Diagnoses:  Active Hospital Problems    Diagnosis  POA    *UTI (urinary tract infection) [N39.0]  Yes    Expiratory wheezing [R06.2]  No    Sepsis [A41.9]  Yes    Acute pyelonephritis [N10]  Yes    Seizure [R56.9]  Yes    At risk for falling [Z91.81]  Not Applicable    Debility [R53.81]  Yes    Osteoarthritis of knee [M17.10]  Yes    Chronic anemia [D64.9]  Yes    Seizure disorder [G40.909]  Yes    Essential hypertension [I10]  Yes      Resolved Hospital Problems   No resolved problems to display.       No future appointments.  Follow-up Information     Ignacia River MD. Schedule an appointment as soon as possible for a visit in 1 week.    Specialty:  Family Medicine  Contact information:  [address] [place] [address]  [address]  [place] [address]  [phone]             [facility] NEUROLOGY. Schedule an appointment as soon as possible for a visit in 1 week.    Specialty:  Neurology  Contact information:  [facility] [address]  [facility] [id number] [phone]                   I have seen and examined this patient face to face today. My clinical findings that support the need for the home health skilled services and home bound status are the following:  Weakness/numbness causing balance and gait disturbance due to Infection and Weakness/Debility making it taxing to leave home.  Requiring assistive device to leave home due to unsteady gait caused by  Infection and Weakness/Debility.    Allergies:  Review of patient's allergies indicates:   Allergen Reactions    Lisinopril Other (See Comments)     ANGIOEDEMA    Haldol [haloperidol lactate]        Activities: activity as  tolerated    Nursing:   SN to complete comprehensive assessment including routine vital signs. Instruct on disease process and s/s of complications to report to MD. Review/verify medication list sent home with the patient at time of discharge  and instruct patient/caregiver as needed. Frequency may be adjusted depending on start of care date.    Notify MD if SBP > 160 or < 90; DBP > 90 or < 50; HR > 120 or < 50; Temp > 101; Other:         CONSULTS:    Physical Therapy to evaluate and treat. Evaluate for home safety and equipment needs; Establish/upgrade home exercise program. Perform / instruct on therapeutic exercises, gait training, transfer training, and Range of Motion.  Occupational Therapy to evaluate and treat. Evaluate home environment for safety and equipment needs. Perform/Instruct on transfers, ADL training, ROM, and therapeutic exercises.  Speech Therapy  to evaluate and treat for  Cognition.  Aide to provide assistance with personal care, ADLs, and vital signs.      Medications: Review discharge medications with patient and family and provide education.        I certify that this patient is confined to her home and needs intermittent skilled nursing care, physical therapy, speech therapy and occupational therapy.

## 2019-07-25 NOTE — PLAN OF CARE
Problem: Fall Injury Risk  Goal: Absence of Fall and Fall-Related Injury    Intervention: Identify and Manage Contributors to Fall Injury Risk     07/25/19 0526   Manage Acute Allergic Reaction   Medication Review/Management medications reviewed   Identify and Manage Contributors to Fall Injury Risk   Self-Care Promotion independence encouraged

## 2019-07-25 NOTE — TREATMENT PLAN
Rehab Services' DME recommendations    Susanna Byrnes  MRN: [id number]    [x]  No DME needed    [x] Home health PT, SLP and Aye Teresa, PT 7/25/2019

## 2019-07-26 VITALS
WEIGHT: 202.63 LBS | DIASTOLIC BLOOD PRESSURE: 64 MMHG | RESPIRATION RATE: 24 BRPM | TEMPERATURE: 98 F | SYSTOLIC BLOOD PRESSURE: 140 MMHG | OXYGEN SATURATION: 95 % | BODY MASS INDEX: 39.78 KG/M2 | HEART RATE: 68 BPM | HEIGHT: 60 IN

## 2019-07-26 PROCEDURE — 94640 AIRWAY INHALATION TREATMENT: CPT

## 2019-07-26 PROCEDURE — 25000003 PHARM REV CODE 250: Performed by: INTERNAL MEDICINE

## 2019-07-26 PROCEDURE — 94668 MNPJ CHEST WALL SBSQ: CPT

## 2019-07-26 PROCEDURE — 25000003 PHARM REV CODE 250: Performed by: NURSE PRACTITIONER

## 2019-07-26 PROCEDURE — 94761 N-INVAS EAR/PLS OXIMETRY MLT: CPT

## 2019-07-26 PROCEDURE — 97530 THERAPEUTIC ACTIVITIES: CPT

## 2019-07-26 PROCEDURE — 25000003 PHARM REV CODE 250: Performed by: HOSPITALIST

## 2019-07-26 PROCEDURE — 25000242 PHARM REV CODE 250 ALT 637 W/ HCPCS: Performed by: NURSE PRACTITIONER

## 2019-07-26 RX ORDER — ACETAMINOPHEN 325 MG/1
650 TABLET ORAL EVERY 8 HOURS PRN
Refills: 0 | COMMUNITY
Start: 2019-07-26

## 2019-07-26 RX ORDER — AMOXICILLIN 250 MG
2 CAPSULE ORAL 2 TIMES DAILY
COMMUNITY
Start: 2019-07-26 | End: 2019-08-05

## 2019-07-26 RX ORDER — CHLORTHALIDONE 25 MG/1
25 TABLET ORAL DAILY
Qty: 30 TABLET | Refills: 3 | Status: SHIPPED | OUTPATIENT
Start: 2019-07-26

## 2019-07-26 RX ORDER — LEVETIRACETAM 100 MG/ML
750 SOLUTION ORAL DAILY
Qty: 225 ML | Refills: 3 | Status: SHIPPED | OUTPATIENT
Start: 2019-07-26 | End: 2019-08-05

## 2019-07-26 RX ORDER — POLYETHYLENE GLYCOL 3350 17 G/17G
17 POWDER, FOR SOLUTION ORAL 2 TIMES DAILY
Refills: 0
Start: 2019-07-26

## 2019-07-26 RX ORDER — POTASSIUM CHLORIDE 20 MEQ/1
20 TABLET, EXTENDED RELEASE ORAL DAILY
Qty: 30 TABLET | Refills: 3 | Status: ON HOLD | OUTPATIENT
Start: 2019-07-26 | End: 2022-06-18 | Stop reason: SDUPTHER

## 2019-07-26 RX ORDER — LANOLIN ALCOHOL/MO/W.PET/CERES
400 CREAM (GRAM) TOPICAL DAILY
Refills: 0 | COMMUNITY
Start: 2019-07-26 | End: 2019-08-05

## 2019-07-26 RX ORDER — LEVETIRACETAM 100 MG/ML
500 SOLUTION ORAL DAILY
Qty: 150 ML | Refills: 3 | Status: SHIPPED | OUTPATIENT
Start: 2019-07-27 | End: 2019-08-05

## 2019-07-26 RX ADMIN — ASPIRIN 81 MG CHEWABLE TABLET 81 MG: 81 TABLET CHEWABLE at 09:07

## 2019-07-26 RX ADMIN — LEVETIRACETAM 500 MG: 100 SOLUTION ORAL at 06:07

## 2019-07-26 RX ADMIN — ESCITALOPRAM OXALATE 5 MG: 5 TABLET, FILM COATED ORAL at 12:07

## 2019-07-26 RX ADMIN — IPRATROPIUM BROMIDE AND ALBUTEROL SULFATE 3 ML: .5; 3 SOLUTION RESPIRATORY (INHALATION) at 12:07

## 2019-07-26 RX ADMIN — MODAFINIL 100 MG: 100 TABLET ORAL at 06:07

## 2019-07-26 RX ADMIN — SENNOSIDES,DOCUSATE SODIUM 2 TABLET: 8.6; 5 TABLET, FILM COATED ORAL at 12:07

## 2019-07-26 RX ADMIN — POLYETHYLENE GLYCOL 3350 17 G: 17 POWDER, FOR SOLUTION ORAL at 12:07

## 2019-07-26 RX ADMIN — POTASSIUM CHLORIDE 20 MEQ: 1500 TABLET, EXTENDED RELEASE ORAL at 12:07

## 2019-07-26 RX ADMIN — GUAIFENESIN AND DEXTROMETHORPHAN HYDROBROMIDE 1 TABLET: 600; 30 TABLET, EXTENDED RELEASE ORAL at 12:07

## 2019-07-26 RX ADMIN — IPRATROPIUM BROMIDE AND ALBUTEROL SULFATE 3 ML: .5; 3 SOLUTION RESPIRATORY (INHALATION) at 07:07

## 2019-07-26 RX ADMIN — Medication 400 MG: at 12:07

## 2019-07-26 RX ADMIN — IPRATROPIUM BROMIDE AND ALBUTEROL SULFATE 3 ML: .5; 3 SOLUTION RESPIRATORY (INHALATION) at 01:07

## 2019-07-26 RX ADMIN — CHLORTHALIDONE 25 MG: 25 TABLET ORAL at 12:07

## 2019-07-26 NOTE — PLAN OF CARE
Problem: Fall Injury Risk  Goal: Absence of Fall and Fall-Related Injury    Intervention: Identify and Manage Contributors to Fall Injury Risk     07/26/19 0453   Manage Acute Allergic Reaction   Medication Review/Management medications reviewed;high risk medications identified   Identify and Manage Contributors to Fall Injury Risk   Self-Care Promotion independence encouraged

## 2019-07-26 NOTE — PT/OT/SLP PROGRESS
Occupational Therapy  Treatment    Susanna Byrnes   MRN: [id number]   Admitting Diagnosis: UTI (urinary tract infection)    OT Date of Treatment: 07/26/19       Billable Minutes:  Therapeutic Activity 30    General Precautions: Standard, fall, aspiration  Orthopedic Precautions: N/A  Braces: N/A    Spiritual, Cultural Beliefs, Confucianist Practices, Values that Affect Care: yes(Worship)    Subjective:  Communicated with nsg prior to session.  I am doing well     Pain/Comfort  Pain Rating 1: 0/10  Pain Rating Post-Intervention 1: 0/10    Objective:   Pt. Seated in chair on arrival with teller sitter     Occupational Performance:    Bed Mobility:    · Not tested     Functional Mobility/Transfers:  · Patient completed Sit <> Stand Transfer with minimum assistance to moderate assistance  with  rolling walker and cues and (A) for stability and support for bal      Activities of Daily Living:  · Upper Body Dressing: total assistance to claire sweater around back    [facility] 6 Click:  [facility] Total Score: 13      Additional Treatment:  Pt. With cognitive activity with card sorting activity with matching cards by number and color  Pt. Also with BUE AAROM on this day. Lateal forwards/backward and circular movement on this day, Pt. Limited with BUE ROM. Pt. Also with complaints of minor pain in BUE wrist with ROM with flex ext elbow, wrist, shoulder and digits x 10 reps    Patient left up in chair with all lines intact and call button in reach and teller sitter camera    ASSESSMENT:  Susanna Byrnes is a 84 y.o. female with a medical diagnosis of UTI (urinary tract infection) Pt. participated well with session on this day.Pt demos physical deficits with balance  functional mobility, UB strength, endurance  level of functional indep with daily tasks and activities and selfcare skills .Pt. Will continue to benefit from continued OT to progress towards goals  .    Rehab identified problem list/impairments: weakness, impaired endurance,  impaired self care skills, impaired functional mobilty, gait instability, impaired balance, decreased upper extremity function, decreased lower extremity function, edema, impaired skin    Rehab potential is fair    Activity tolerance: Fair    Discharge recommendations: home health OT     Barriers to discharge: None     Equipment recommendations: wheelchair, manual, tub bench     GOALS:   Multidisciplinary Problems     Occupational Therapy Goals        Problem: Occupational Therapy Goal    Goal Priority Disciplines Outcome Interventions   Occupational Therapy Goal     OT, PT/OT Ongoing (interventions implemented as appropriate)    Description:  Goals to be met by: 7/22/19     Patient will increase functional independence with ADLs by performing:    UE Dressing with Stand-by Assistance. REVISED UBD performed with Min (A)  LE Dressing with Moderate Assistance.  Grooming while seated at sink with Stand-by Assistance. REVISED Grooming while seated with Min (A)  Toileting from toilet with Maximum Assistance for hygiene and clothing management.   Bathing from  sitting at sink with Moderate Assistance.  Sitting at edge of bed x5 minutes with Stand-by Assistance.  Rolling to Bilateral with Moderate Assistance.   Supine to sit with Moderate Assistance.  Stand pivot transfers with Moderate Assistance.  Toilet transfer to toilet with Moderate Assistance.  Upper extremity exercise program x20 reps per handout, with independence.                    Plan:  Patient to be seen 5 x/week to address the above listed problems via self-care/home management, therapeutic activities, therapeutic exercises  Plan of Care expires: 08/08/19  Plan of Care reviewed with: patient    RIVAS Guerrier  07/26/2019

## 2019-07-26 NOTE — HPI
Ms Byrnes is an 84 female with sig pmhx of dementia, HTN, HLD, CAD/NSTEMI('14), chronic sCHF/ICMP(LVEF 25-30), seizure, OA, suspected COPD, anemia, and MDR UTIs(Ecoli in '18 and '15, e faecalis '14, pseudomonas and enterobacter '12) who presents to SNF s/p hospitalization for fever likely 2/2 pyelonephritis. Patient currently with no complaints of dysuria at this time and voiding without difficulty.      Interval Hx: Patient is deemed safe for discharge per PT/OT communication. Family training complete and Patient's daughter and family is ready and available to assist patient at home. All discharge barriers were addressed and discharge is being adjustment to today. Daughter continues to state that she is very please with our care here, but feels that the patient is at her baseline and is stating that the patient will benefit from being in her home environment at this point. Patient progressed well with PT and OT. Patient had no significant events during their stay at SNF. Home health was ordered. DME was ordered if needed. Follow up appointment to be made by patient within one week. All prescriptions and discharge instructions were ordered to be given to patient prior to discharge.  Vitals:    07/26/19 0703   BP: 140/64   Pulse: 76   Resp: 24   Temp: 98.7     PEx  Constitutional: Patient appears well-developed and in no distress + slightly drowsy   HENT:   Head: Normocephalic and atraumatic.   Eyes: Pupils are equal, round, and reactive to light.   Neck: Normal range of motion. Neck supple.   Cardiovascular: Normal rate, regular rhythm and normal heart sounds.    Pulmonary/Chest: Effort normal and + decreased breath sounds, - nonproductive cough, - mild upper airway crackle  Abdominal: Soft. Bowel sounds are normal.   Musculoskeletal: Normal range of motion. + generalized weakness. + RUE weakness  Neurological: Alert and oriented to person, place, and time.   Skin: Skin is warm and dry.   Psychiatric: Normal mood  and affect. Behavior is normal.

## 2019-07-26 NOTE — DISCHARGE SUMMARY
[facility] - [facility]  Discharge Summary      Patient Name: Susanna Brynes  MRN: [id number]  Admission Date: 7/8/2019  Hospital Length of Stay: 18 days  Discharge Date and Time:  07/26/2019 4:40 PM  Attending Physician: Venecia Whelan MD   Discharging Provider: Serafin Gavin NP  Primary Care Provider: Ignacia River MD      HPI:   Ms Byrnes is an 84 female with sig pmhx of dementia, HTN, HLD, CAD/NSTEMI('14), chronic sCHF/ICMP(LVEF 25-30), seizure, OA, suspected COPD, anemia, and MDR UTIs(Ecoli in '18 and '15, e faecalis '14, pseudomonas and enterobacter '12) who presents to SNF s/p hospitalization for fever likely 2/2 pyelonephritis. Patient currently with no complaints of dysuria at this time and voiding without difficulty.      Interval Hx:  Patient is deemed safe for discharge per PT/OT communication. Family training complete and Patient's daughter and family is ready and available to assist patient at home. All discharge barriers were addressed and discharge is being adjustment to today. Daughter continues to state that she is very please with our care here, but feels that the patient is at her baseline and is stating that the patient will benefit from being in her home environment at this point.  Patient progressed well with PT and OT. Patient had no significant events during their stay at SNF. Home health was ordered. DME was ordered if needed. Follow up appointment to be made by patient within one week. All prescriptions and discharge instructions were ordered to be given to patient prior to discharge.  Vitals:    07/26/19 0703   BP: 140/64   Pulse: 76   Resp: 24   Temp: 98.7     PEx  Constitutional: Patient appears well-developed and in no distress + slightly drowsy   HENT:   Head: Normocephalic and atraumatic.   Eyes: Pupils are equal, round, and reactive to light.   Neck: Normal range of motion. Neck supple.   Cardiovascular: Normal rate, regular rhythm and normal heart  sounds.    Pulmonary/Chest: Effort normal and + decreased breath sounds, - nonproductive cough, - mild upper airway crackle  Abdominal: Soft. Bowel sounds are normal.   Musculoskeletal: Normal range of motion. + generalized weakness. + RUE weakness  Neurological: Alert and oriented to person, place, and time.   Skin: Skin is warm and dry.   Psychiatric: Normal mood and affect. Behavior is normal.      * No surgery found *      Hospital Course:   No notes on file     Consults:   Consults (From admission, onward)        Status Ordering Provider     Inpatient consult to Registered Dietitian/Nutritionist  Once     Provider:  (Not yet assigned)    Completed VICTORIANO MONTGOMERY     Inpatient consult to [facility]  Once     Provider:  (Not yet assigned)    Completed MARIA DE JESUS BANGURA          No new Assessment & Plan notes have been filed under this hospital service since the last note was generated.  Service: Hospital Medicine    Final Active Diagnoses:    Diagnosis Date Noted POA    PRINCIPAL PROBLEM:  UTI (urinary tract infection) [N39.0] 04/28/2014 Yes    Expiratory wheezing [R06.2] 07/18/2019 No    Sepsis [A41.9] 07/08/2019 Yes    Acute pyelonephritis [N10] 07/07/2019 Yes    Seizure [R56.9] 03/09/2018 Yes    At risk for falling [Z91.81] 02/28/2013 Not Applicable    Debility [R53.81] 01/28/2013 Yes    Osteoarthritis of knee [M17.10] 01/23/2013 Yes    Chronic anemia [D64.9] 01/22/2013 Yes    Seizure disorder [G40.909] 01/22/2013 Yes    Essential hypertension [I10] 01/04/2013 Yes      Problems Resolved During this Admission:       Discharged Condition: good    Disposition: Home or Self Care    Follow Up:  Follow-up Information     Ignacia River MD. Schedule an appointment as soon as possible for a visit in 1 week.    Specialty:  Family Medicine  Contact information:  [address]  [address]  [place] [address] [phone]             [facility] NEUROLOGY. Schedule an appointment as soon as possible for a  visit in 1 week.    Specialty:  Neurology  Contact information:  [address]  [facility] [address]  [phone]           [employer]    Specialty:  DME Provider  Why:  Home health agency secured through [employer]. Joana will call pt/family to schedule a home health assessment.  Contact information:  [address]  [address]  [place] [address]  [phone]                 Patient Instructions:      Ambulatory Referral to Cardiology   Referral Priority: Routine Referral Type: Consultation   Referral Reason: Specialty Services Required   Requested Specialty: Cardiology   Number of Visits Requested: 1     Ambulatory referral to Neurology Epilepsy   Referral Priority: Routine Referral Type: Consultation   Referral Reason: Specialty Services Required   Requested Specialty: Neurology   Number of Visits Requested: 1     No driving until:   Order Comments: Until cleared by Primary Care MD     Notify your health care provider if you experience any of the following:  temperature >100.4     Notify your health care provider if you experience any of the following:  persistent nausea and vomiting or diarrhea     Notify your health care provider if you experience any of the following:  severe uncontrolled pain     Notify your health care provider if you experience any of the following:  redness, tenderness, or signs of infection (pain, swelling, redness, odor or green/yellow discharge around incision site)     Notify your health care provider if you experience any of the following:  difficulty breathing or increased cough     Notify your health care provider if you experience any of the following:  severe persistent headache     Notify your health care provider if you experience any of the following:  persistent dizziness, light-headedness, or visual disturbances     Notify your health care provider if you experience any of the following:  increased confusion or weakness     Activity as tolerated        Significant Diagnostic Studies: Labs: All labs within the past 24 hours have been reviewed    Pending Diagnostic Studies:     None         Medications:  Reconciled Home Medications:      Medication List      START taking these medications    acetaminophen 325 MG tablet  Commonly known as:  TYLENOL  Take 2 tablets (650 mg total) by mouth every 8 (eight) hours as needed for Pain.  Replaces:  TYLENOL ARTHRITIS PAIN 650 MG Tbsr     dextromethorphan-guaifenesin  mg  mg per 12 hr tablet  Commonly known as:  MUCINEX DM  Take 1 tablet by mouth 2 (two) times daily. for 10 days     magnesium oxide 400 mg (241.3 mg magnesium) tablet  Commonly known as:  MAG-OX  Take 1 tablet (400 mg total) by mouth once daily.     polyethylene glycol 17 gram Pwpk  Commonly known as:  GLYCOLAX  Take 17 g by mouth 2 (two) times daily.     potassium chloride SA 20 MEQ tablet  Commonly known as:  K-DUR,KLOR-CON  Take 1 tablet (20 mEq total) by mouth once daily.     senna-docusate 8.6-50 mg 8.6-50 mg per tablet  Commonly known as:  PERICOLACE  Take 2 tablets by mouth 2 (two) times daily.        CHANGE how you take these medications    chlorthalidone 25 MG Tab  Commonly known as:  HYGROTEN  Take 1 tablet (25 mg total) by mouth once daily.  What changed:  how much to take     * levetiracetam oral soln 500 mg/5 mL (5 mL) Soln  Take 7.5 mLs (750 mg total) by mouth once daily.  What changed:  You were already taking a medication with the same name, and this prescription was added. Make sure you understand how and when to take each.     * levetiracetam oral soln 500 mg/5 mL (5 mL) Soln  Take 5 mLs (500 mg total) by mouth once daily.  Start taking on:  7/27/2019  What changed:  when to take this         * This list has 2 medication(s) that are the same as other medications prescribed for you. Read the directions carefully, and ask your doctor or other care provider to review them with you.            CONTINUE taking these medications     albuterol 90 mcg/actuation inhaler  Commonly known as:  PROVENTIL/VENTOLIN HFA  Inhale 1-2 puffs into the lungs every 6 (six) hours as needed for Wheezing. Rescue     albuterol-ipratropium 2.5 mg-0.5 mg/3 mL nebulizer solution  Commonly known as:  DUO-NEB  Take 3 mLs by nebulization every 6 (six) hours as needed (allergies, congestion). Rescue     aspirin 81 MG Chew  Take 1 tablet (81 mg total) by mouth once daily.     cetirizine 10 MG tablet  Commonly known as:  ZYRTEC  Take 10 mg by mouth once daily.     escitalopram oxalate 5 MG Tab  Commonly known as:  LEXAPRO  Take 5 mg by mouth once daily.     fluticasone propionate 50 mcg/actuation nasal spray  Commonly known as:  FLONASE  Spray 2 puffs into each nare daily as needed for allergies     montelukast 10 mg tablet  Commonly known as:  SINGULAIR  TAKE 1 TABLET BY MOUTH EVERY EVENING     triamcinolone acetonide 0.1% 0.1 % cream  Commonly known as:  KENALOG  Apply topically to affected area as needed for skin rash        STOP taking these medications    amLODIPine 5 MG tablet  Commonly known as:  NORVASC     cefdinir 125 mg/5 mL suspension  Commonly known as:  OMNICEF     guaiFENesin 600 mg 12 hr tablet  Commonly known as:  MUCINEX     LORazepam 1 MG tablet  Commonly known as:  ATIVAN     mirtazapine 15 MG tablet  Commonly known as:  REMERON     NON FORMULARY MEDICATION     risperiDONE 0.25 MG Tab  Commonly known as:  RISPERDAL     TYLENOL ARTHRITIS PAIN 650 MG Tbsr  Generic drug:  acetaminophen  Replaced by:  acetaminophen 325 MG tablet            Indwelling Lines/Drains at time of discharge:   Lines/Drains/Airways     Pressure Ulcer                 Pressure Injury 07/08/19 1705 Buttocks 17 days                Time spent on the discharge of patient: 37 minutes  Patient was seen and examined on the date of discharge and determined to be suitable for discharge.         Serafin Gavin NP  Department of Hospital Medicine  [facility] - [facility]

## 2019-07-30 PROCEDURE — G0180 MD CERTIFICATION HHA PATIENT: HCPCS | Mod: ,,, | Performed by: INTERNAL MEDICINE

## 2019-07-30 PROCEDURE — G0180 PR HOME HEALTH MD CERTIFICATION: ICD-10-PCS | Mod: ,,, | Performed by: INTERNAL MEDICINE

## 2019-08-05 ENCOUNTER — OFFICE VISIT (OUTPATIENT)
Dept: CARDIOLOGY | Facility: CLINIC | Age: 84
End: 2019-08-05
Payer: MEDICARE

## 2019-08-05 VITALS
SYSTOLIC BLOOD PRESSURE: 148 MMHG | HEART RATE: 77 BPM | BODY MASS INDEX: 38.67 KG/M2 | WEIGHT: 210.13 LBS | HEIGHT: 62 IN | DIASTOLIC BLOOD PRESSURE: 65 MMHG

## 2019-08-05 DIAGNOSIS — I25.10 CORONARY ARTERY DISEASE INVOLVING NATIVE CORONARY ARTERY OF NATIVE HEART WITHOUT ANGINA PECTORIS: ICD-10-CM

## 2019-08-05 DIAGNOSIS — R53.81 DEBILITY: ICD-10-CM

## 2019-08-05 DIAGNOSIS — I50.42 CHRONIC COMBINED SYSTOLIC AND DIASTOLIC CHF (CONGESTIVE HEART FAILURE): ICD-10-CM

## 2019-08-05 DIAGNOSIS — I25.5 ISCHEMIC CARDIOMYOPATHY: ICD-10-CM

## 2019-08-05 DIAGNOSIS — J42 CHRONIC BRONCHITIS, UNSPECIFIED CHRONIC BRONCHITIS TYPE: Primary | ICD-10-CM

## 2019-08-05 PROCEDURE — 3078F PR MOST RECENT DIASTOLIC BLOOD PRESSURE < 80 MM HG: ICD-10-PCS | Mod: CPTII,S$GLB,, | Performed by: INTERNAL MEDICINE

## 2019-08-05 PROCEDURE — 3077F SYST BP >= 140 MM HG: CPT | Mod: CPTII,S$GLB,, | Performed by: INTERNAL MEDICINE

## 2019-08-05 PROCEDURE — 3077F PR MOST RECENT SYSTOLIC BLOOD PRESSURE >= 140 MM HG: ICD-10-PCS | Mod: CPTII,S$GLB,, | Performed by: INTERNAL MEDICINE

## 2019-08-05 PROCEDURE — 99499 UNLISTED E&M SERVICE: CPT | Mod: S$GLB,,, | Performed by: INTERNAL MEDICINE

## 2019-08-05 PROCEDURE — 99204 PR OFFICE/OUTPT VISIT, NEW, LEVL IV, 45-59 MIN: ICD-10-PCS | Mod: S$GLB,,, | Performed by: INTERNAL MEDICINE

## 2019-08-05 PROCEDURE — 99999 PR PBB SHADOW E&M-EST. PATIENT-LVL IV: CPT | Mod: PBBFAC,,, | Performed by: INTERNAL MEDICINE

## 2019-08-05 PROCEDURE — 99499 RISK ADDL DX/OHS AUDIT: ICD-10-PCS | Mod: S$GLB,,, | Performed by: INTERNAL MEDICINE

## 2019-08-05 PROCEDURE — 3078F DIAST BP <80 MM HG: CPT | Mod: CPTII,S$GLB,, | Performed by: INTERNAL MEDICINE

## 2019-08-05 PROCEDURE — 99999 PR PBB SHADOW E&M-EST. PATIENT-LVL IV: ICD-10-PCS | Mod: PBBFAC,,, | Performed by: INTERNAL MEDICINE

## 2019-08-05 PROCEDURE — 99204 OFFICE O/P NEW MOD 45 MIN: CPT | Mod: S$GLB,,, | Performed by: INTERNAL MEDICINE

## 2019-08-05 RX ORDER — BUDESONIDE AND FORMOTEROL FUMARATE DIHYDRATE 160; 4.5 UG/1; UG/1
2 AEROSOL RESPIRATORY (INHALATION) EVERY 12 HOURS
COMMUNITY
End: 2021-08-09 | Stop reason: SDUPTHER

## 2019-08-05 RX ORDER — LEVETIRACETAM 100 MG/ML
SOLUTION ORAL
COMMUNITY
Start: 2019-07-26

## 2019-08-05 NOTE — PROGRESS NOTES
Subjective:    Patient ID:  Susanna Byrnes is a 84 y.o. female who presents for evaluation of Essential hypertension; Chronic combined systolic and diastolic CHF (congestive hear; and Cardiomyopathy      HPI The patient is a 84 year old female last seen in Cardiology by Dr Aguirre in 2015. She has a history of dementia, CAD post NSTEMI 2014, systolic and diastolic heart failure, COPD,recurrent URIs was recently admitted with urosepsis and transferred to SNF.  Her daughter is concerned with her wheezing.  She has been under the care of Dr River  PCP and Dr Aayush Orozco Pulmonologist at [facility]. Her daughter would like for her to see a pulmonologist at [facility]; BNP 7/2/19 was 62      FINDINGS:  Heart size normal.  Tiny metallic density noted overlying the the right upper lung field probably artifact.  No significant airspace consolidation or pleural effusion identified.  Medial aspect of the left clavicle is missing similar to the previous study      Impression       See above      Electronically signed by: Arvin Mcdonnell MD  Date: 07/18/2019       Lab Results   Component Value Date     07/25/2019    K 3.9 07/25/2019     07/25/2019    CO2 29 07/25/2019    BUN 19 07/25/2019    CREATININE 0.6 07/25/2019    GLU 79 07/25/2019    HGBA1C 5.6 04/27/2014    MG 2.0 07/25/2019    AST 17 07/17/2019    ALT 11 07/17/2019    ALBUMIN 2.5 (L) 07/17/2019    PROT 7.1 07/17/2019    BILITOT 0.2 07/17/2019    WBC 8.91 07/25/2019    HGB 8.9 (L) 07/25/2019    HCT 29.8 (L) 07/25/2019    HCT 30 (L) 07/02/2019    MCV 74 (L) 07/25/2019     07/25/2019    INR 1.2 06/05/2015    TSH 2.044 03/09/2018         Lab Results   Component Value Date    CHOL 116 (L) 04/27/2014    HDL 32 (L) 04/27/2014    TRIG 96 04/27/2014       Lab Results   Component Value Date    LDLCALC 64.8 04/27/2014       Past Medical History:   Diagnosis Date    Anemia     Arthritis     Dementia     Hypertension     Periprosthetic fracture around internal prosthetic  right knee joint-s/p right distal femur ORIF 6/5/15 6/5/2015    Seizure disorder     Seizures        Current Outpatient Medications:     acetaminophen (TYLENOL) 325 MG tablet, Take 2 tablets (650 mg total) by mouth every 8 (eight) hours as needed for Pain., Disp: , Rfl: 0    albuterol 90 mcg/actuation inhaler, Inhale 1-2 puffs into the lungs every 6 (six) hours as needed for Wheezing. Rescue, Disp: 1 Inhaler, Rfl: 0    albuterol-ipratropium (DUO-NEB) 2.5 mg-0.5 mg/3 mL nebulizer solution, Take 3 mLs by nebulization every 6 (six) hours as needed (allergies, congestion). Rescue, Disp: , Rfl:     aspirin 81 MG Chew, Take 1 tablet (81 mg total) by mouth once daily., Disp: 30 tablet, Rfl: 1    budesonide-formoterol 160-4.5 mcg (SYMBICORT) 160-4.5 mcg/actuation HFAA, Inhale 2 puffs into the lungs every 12 (twelve) hours. Controller, Disp: , Rfl:     cetirizine (ZYRTEC) 10 MG tablet, Take 10 mg by mouth once daily., Disp: , Rfl:     chlorthalidone (HYGROTEN) 25 MG Tab, Take 1 tablet (25 mg total) by mouth once daily., Disp: 30 tablet, Rfl: 3    escitalopram oxalate (LEXAPRO) 5 MG Tab, Take 5 mg by mouth once daily., Disp: , Rfl:     fluticasone (FLONASE) 50 mcg/actuation nasal spray, Spray 2 puffs into each nare daily as needed for allergies, Disp: , Rfl: 5    levETIRAcetam (KEPPRA) 100 mg/mL Soln, , Disp: , Rfl:     montelukast (SINGULAIR) 10 mg tablet, TAKE 1 TABLET BY MOUTH EVERY EVENING, Disp: 90 tablet, Rfl: 11    polyethylene glycol (GLYCOLAX) 17 gram PwPk, Take 17 g by mouth 2 (two) times daily., Disp: , Rfl: 0    potassium chloride SA (K-DUR,KLOR-CON) 20 MEQ tablet, Take 1 tablet (20 mEq total) by mouth once daily., Disp: 30 tablet, Rfl: 3    triamcinolone acetonide 0.1% (KENALOG) 0.1 % cream, Apply topically to affected area as needed for skin rash, Disp: , Rfl:           Review of Systems   Constitution: Negative for decreased appetite, diaphoresis, fever, malaise/fatigue, weight gain and weight  "loss.   HENT: Negative for congestion, ear discharge, ear pain and nosebleeds.    Eyes: Negative for blurred vision, double vision and visual disturbance.   Cardiovascular: Negative for chest pain, claudication, cyanosis, dyspnea on exertion, irregular heartbeat, leg swelling, near-syncope, orthopnea, palpitations, paroxysmal nocturnal dyspnea and syncope.   Respiratory: Positive for cough. Negative for hemoptysis, shortness of breath, sleep disturbances due to breathing, snoring, sputum production and wheezing.    Endocrine: Negative for polydipsia, polyphagia and polyuria.   Hematologic/Lymphatic: Negative for adenopathy and bleeding problem. Does not bruise/bleed easily.   Skin: Negative for color change, nail changes, poor wound healing and rash.   Musculoskeletal: Negative for muscle cramps and muscle weakness.   Gastrointestinal: Negative for abdominal pain, anorexia, change in bowel habit, hematochezia, nausea and vomiting.   Genitourinary: Negative for dysuria, frequency and hematuria.   Neurological: Positive for excessive daytime sleepiness and weakness. Negative for brief paralysis, difficulty with concentration, dizziness, focal weakness, headaches, light-headedness, seizures and vertigo.   Psychiatric/Behavioral: Positive for memory loss. Negative for altered mental status and depression.   Allergic/Immunologic: Negative for persistent infections.        Objective:BP (!) 148/65 (BP Location: Left arm, Patient Position: Sitting, BP Method: X-Large (Automatic))   Pulse 77   Ht 5' 2" (1.575 m)   Wt 95.3 kg (210 lb 1.6 oz)   BMI 38.43 kg/m²             Physical Exam   Constitutional: She is oriented to person, place, and time. She appears well-developed and well-nourished.   HENT:   Head: Normocephalic.   Right Ear: External ear normal.   Left Ear: External ear normal.   Nose: Nose normal.   Inspection of lips, teeth and gums normal   Eyes: Pupils are equal, round, and reactive to light. Conjunctivae " and EOM are normal. No scleral icterus.   Neck: Normal range of motion. No JVD present. No tracheal deviation present. No thyromegaly present.   Cardiovascular: Normal rate, regular rhythm, normal heart sounds and intact distal pulses. Exam reveals no gallop and no friction rub.   No murmur heard.  Pulmonary/Chest: Effort normal and breath sounds normal. No respiratory distress. She has no wheezes. She has no rales.         She exhibits no tenderness.   Abdominal: Soft. Bowel sounds are normal. She exhibits no distension. There is no hepatosplenomegaly. There is no tenderness. There is no guarding.   Musculoskeletal: Normal range of motion. She exhibits no edema or tenderness.   Lymphadenopathy:   Palpation of lymph nodes of neck and groin normal   Neurological: She is oriented to person, place, and time. No cranial nerve deficit. She exhibits normal muscle tone. Coordination normal.   Skin: Skin is warm and dry. No rash noted. No erythema. No pallor.   Palpation of skin normal   Psychiatric: She has a normal mood and affect. Her behavior is normal. Judgment and thought content normal.         Assessment:       1. Chronic bronchitis, unspecified chronic bronchitis type    2. Coronary artery disease involving native coronary artery of native heart without angina pectoris    3. Debility    4. Ischemic cardiomyopathy    5. Chronic combined systolic and diastolic CHF (congestive heart failure)         Plan:       Susanna was seen today for essential hypertension, chronic combined systolic and diastolic chf (congestive hear and cardiomyopathy.    Diagnoses and all orders for this visit:    Chronic bronchitis, unspecified chronic bronchitis type  -     Ambulatory consult to Pulmonology    Coronary artery disease involving native coronary artery of native heart without angina pectoris    Debility    Ischemic cardiomyopathy    Chronic combined systolic and diastolic CHF (congestive heart failure)    Other orders  -      levETIRAcetam (KEPPRA) 100 mg/mL Soln  -     budesonide-formoterol 160-4.5 mcg (SYMBICORT) 160-4.5 mcg/actuation HFAA; Inhale 2 puffs into the lungs every 12 (twelve) hours. Controller

## 2019-08-06 NOTE — PT/OT/SLP PROGRESS
Occupational Therapy  Treatment    Susanna Byrnes   MRN: [id number]   Admitting Diagnosis: UTI (urinary tract infection)    OT Date of Treatment: 07/19/19       Billable Minutes:  Self Care/Home Management 45    General Precautions: Standard, fall, aspiration  Orthopedic Precautions: N/A  Braces: N/A    Spiritual, Cultural Beliefs, Mosque Practices, Values that Affect Care: yes(Church)    Subjective:  Communicated with nursing prior to session.  Pt required increased time for processing and carry through responding to verbal instruction for unfamiliar task this day.      Objective:   Pt was seen this day for functional transfer training, ADL retraining for tasks of UB dressing with min A; toileting with mod A for clothing management and functional transfers.  Tub <> w/c functional transfers using ttb required mod A and extended time.     Pt participated in standing balance/ tolerance activities to maximize her ability to transition to performing ADL's from RW level.    Patient left up in chair with call button in reach    ASSESSMENT:  Susanna Byrnes is a 84 y.o. female with a medical diagnosis of UTI (urinary tract infection) who presents with below noted impairments making it unsafe and not feasible for her to return to home at this time. She demonstrates need for comprehensive skilled OT program focusing on above and she is currently a high fall risk. OT is coordinating appropriate A with ADL's with nursing team to maximize her functional gains.    Rehab identified problem list/impairments: weakness, impaired endurance, impaired self care skills, impaired functional mobilty, gait instability, impaired balance, decreased upper extremity function, decreased lower extremity function, edema, impaired skin    Rehab potential is good    Activity tolerance: Good    Discharge recommendations: home health OT     Barriers to discharge: None     Equipment recommendations: wheelchair, manual, tub bench     GOALS:    Multidisciplinary Problems     Occupational Therapy Goals        Problem: Occupational Therapy Goal    Goal Priority Disciplines Outcome Interventions   Occupational Therapy Goal     OT, PT/OT Ongoing (interventions implemented as appropriate)    Description:  Goals to be met by: 7/22/19     Patient will increase functional independence with ADLs by performing:    UE Dressing with Stand-by Assistance. REVISED UBD performed with Min (A)  LE Dressing with Moderate Assistance.  Grooming while seated at sink with Stand-by Assistance. REVISED Grooming while seated with Min (A)  Toileting from toilet with Maximum Assistance for hygiene and clothing management.   Bathing from  sitting at sink with Moderate Assistance.  Sitting at edge of bed x5 minutes with Stand-by Assistance.  Rolling to Bilateral with Moderate Assistance.   Supine to sit with Moderate Assistance.  Stand pivot transfers with Moderate Assistance.  Toilet transfer to toilet with Moderate Assistance.  Upper extremity exercise program x20 reps per handout, with independence.                        Plan:  Patient to be seen 5 x/week to address the above listed problems via self-care/home management, therapeutic activities, therapeutic exercises  Plan of Care expires: 08/08/19  Plan of Care reviewed with: patient    Deborah Hidalgo OT  07/19/2019

## 2019-08-07 NOTE — PROGRESS NOTES
Patient, Susanna Byrnes (MRN #[id number]), presented with a recorded BMI of 38.43 kg/m^2 and a documented comorbidity(s):  - Atrial Fibrillation  to which the severe obesity is a contributing factor. This is consistent with the definition of severe obesity (BMI 35.0-39.9) with comorbidity (ICD-10 E66.01, Z68.35). The patient's severe obesity was monitored, evaluated, addressed and/or treated. This addendum to the medical record is made on 08/07/2019.

## 2019-08-21 ENCOUNTER — EXTERNAL HOME HEALTH (OUTPATIENT)
Dept: HOME HEALTH SERVICES | Facility: HOSPITAL | Age: 84
End: 2019-08-21
Payer: MEDICARE

## 2019-08-30 ENCOUNTER — OFFICE VISIT (OUTPATIENT)
Dept: PULMONOLOGY | Facility: CLINIC | Age: 84
End: 2019-08-30
Payer: MEDICARE

## 2019-08-30 VITALS
WEIGHT: 210 LBS | HEIGHT: 62 IN | HEART RATE: 80 BPM | DIASTOLIC BLOOD PRESSURE: 72 MMHG | BODY MASS INDEX: 38.64 KG/M2 | OXYGEN SATURATION: 90 % | SYSTOLIC BLOOD PRESSURE: 144 MMHG

## 2019-08-30 DIAGNOSIS — R06.2 EXPIRATORY WHEEZING: ICD-10-CM

## 2019-08-30 PROCEDURE — 99999 PR PBB SHADOW E&M-EST. PATIENT-LVL III: ICD-10-PCS | Mod: PBBFAC,GC,, | Performed by: INTERNAL MEDICINE

## 2019-08-30 PROCEDURE — 99203 OFFICE O/P NEW LOW 30 MIN: CPT | Mod: GC,S$GLB,, | Performed by: INTERNAL MEDICINE

## 2019-08-30 PROCEDURE — 3077F SYST BP >= 140 MM HG: CPT | Mod: CPTII,GC,S$GLB, | Performed by: INTERNAL MEDICINE

## 2019-08-30 PROCEDURE — 3078F PR MOST RECENT DIASTOLIC BLOOD PRESSURE < 80 MM HG: ICD-10-PCS | Mod: CPTII,GC,S$GLB, | Performed by: INTERNAL MEDICINE

## 2019-08-30 PROCEDURE — 99999 PR PBB SHADOW E&M-EST. PATIENT-LVL III: CPT | Mod: PBBFAC,GC,, | Performed by: INTERNAL MEDICINE

## 2019-08-30 PROCEDURE — 3077F PR MOST RECENT SYSTOLIC BLOOD PRESSURE >= 140 MM HG: ICD-10-PCS | Mod: CPTII,GC,S$GLB, | Performed by: INTERNAL MEDICINE

## 2019-08-30 PROCEDURE — 3078F DIAST BP <80 MM HG: CPT | Mod: CPTII,GC,S$GLB, | Performed by: INTERNAL MEDICINE

## 2019-08-30 PROCEDURE — 99203 PR OFFICE/OUTPT VISIT, NEW, LEVL III, 30-44 MIN: ICD-10-PCS | Mod: GC,S$GLB,, | Performed by: INTERNAL MEDICINE

## 2019-08-30 RX ORDER — AMLODIPINE BESYLATE 5 MG/1
5 TABLET ORAL DAILY
COMMUNITY

## 2019-08-30 NOTE — PROGRESS NOTES
History & Physical  [facility]        SUBJECTIVE:     Chief Complaint:       History of Present Illness:  Susanna Byrnes is a 84 y.o. female who presents to [facility] for initial visit. She has a PMhx of HTN, CAD,combined systolic and diastolic dysfunction (echo 4/2014 w/ EF 25-30%), pulmonary HTN, Seizure disorder, tobacco abuse, and questionable COPD (no PFTs). She was sent for evaluation for dyspnea. Her daughter is present and relays most of the history as patient has dementia. Patient denies shortness of breath. Her daughter states she needs assistance to get around. She requires assistance with all ADLS. She has occasional swelling. Her daughter notices wheezing and coughing whenever she eats. She states it gets better with breathing treatment. She eats a regular diet. Hard foods are more difficult to handle and cause more choking. Her daughter gives her about 3 treatments. The last time she had prednisone was 1 month a go.       Pertinent sochx: 1pk/d approx 30years; denies etoh; denies drugs; lives with Daughter  (Katie Byrnes). Worked in the [employer], housekeeping.     Review of patient's allergies indicates:   Allergen Reactions    Donepezil Anaphylaxis    Exelon [rivastigmine] Anaphylaxis    Lisinopril Other (See Comments)     ANGIOEDEMA    Memantine Anaphylaxis    Haldol [haloperidol lactate]        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-s/p right distal femur ORIF 6/5/15 6/5/2015    Seizure disorder     Seizures      Past Surgical History:   Procedure Laterality Date    ARTHROPLASTY, KNEE, TOTAL Right 1/21/2013    [facility] OR [address] [place] [address]    EYE SURGERY      JOINT REPLACEMENT      OPEN REDUCTION INTERNAL FIXATION-FEMUR - Periprosthetic supracondylar 6/5/15 Right 6/5/2015    Performed by Joshua Banks MD at [facility]  "OR 2ND FLR    TOTAL KNEE ARTHROPLASTY  Left     Family History   Problem Relation Age of Onset    Seizures Sister     Dementia Brother     Seizures Brother      Social History     Socioeconomic History    Marital status:      Spouse name: Not on file    Number of children: Not on file    Years of education: Not on file    Highest education level: Not on file   Occupational History    Not on file   Social Needs    Financial resource strain: Not on file    Food insecurity:     Worry: Not on file     Inability: Not on file    Transportation needs:     Medical: Not on file     Non-medical: Not on file   Tobacco Use    Smoking status: Former Smoker     Types: Cigarettes     Last attempt to quit: 2011     Years since quittin.9    Smokeless tobacco: Former User   Substance and Sexual Activity    Alcohol use: No    Drug use: No    Sexual activity: Never   Lifestyle    Physical activity:     Days per week: Not on file     Minutes per session: Not on file    Stress: Not on file   Relationships    Social connections:     Talks on phone: Not on file     Gets together: Not on file     Attends Protestant service: Not on file     Active member of club or organization: Not on file     Attends meetings of clubs or organizations: Not on file     Relationship status: Not on file   Other Topics Concern    Not on file   Social History Narrative    Not on file       Review of Systems:  CV: no syncope  ENT: no sore throat  Resp: per hpi  Eyes: no eye pain  Gastrointestinal: no nausea or vomiting  Integument/Breast: no rash  Musculoskeletal: no arthralgias  Neurological: no headaches  Behavioral/Psych: no confusion or depression  Heme: no bleeding      OBJECTIVE:     Vital Signs  Vitals:    19 1305   BP: (!) 144/72   BP Location: Left arm   Patient Position: Sitting   Pulse: 80   SpO2: (!) 90%   Weight: 95.3 kg (210 lb)   Height: 5' 2" (1.575 m)     Body mass index is 38.41 kg/m².    Physical " Exam:  General: no distress; in wheelchair  Eyes:  conjunctivae/corneas clear  Nose: no discharge  Neck: trachea midline with no masses appreciated  Lungs:  normal respiratory effort, mild expiratory wheezes, no rales  Heart: regular rate and rhythm and no murmur  Abdomen: non-distended  Extremities: no cyanosis, no edema, no clubbing  Skin: No rashes or lesions. good skin turgor  Neurologic: oriented to person; not place, time or situation    Laboratory:  Lab Results   Component Value Date    WBC 8.91 07/25/2019    HGB 8.9 (L) 07/25/2019    HCT 29.8 (L) 07/25/2019    MCV 74 (L) 07/25/2019     07/25/2019           ASSESSMENT/PLAN:     Problem Noted   Expiratory Wheezing 7/18/2019   -questionable hx of COPD (no PFTs to review and previous attempts to obtain PFTs have been unsuccessful at [facility])  -chronic cough seems to be triggered by eating and likely chronically aspirates  -she also has Systolic and diastolic HF w/ uncontrolled BP which could be contributing  -no way to tell if she truly has COPD as she is unable to perform PFTs    Plan  Ok to continue PRN nebs and LABA/ICS      Follow up as needed    Murali Suarez MD  Pulm/CC Fellow PGY 6

## 2019-09-06 NOTE — PT/OT/SLP PROGRESS
"Physical Therapy  Treatment    Susanna Byrnes   MRN: [id number]   Admitting Diagnosis: UTI (urinary tract infection)    PT Received On: 07/23/19        Billable Minutes:  Gait Training 10 and Therapeutic Exercise 18    Treatment Type: Treatment  PT/PTA: PTA     PTA Visit Number: 4       General Precautions: Standard, fall, aspiration, seizure  Orthopedic Precautions: N/A   Braces: N/A    Spiritual, Cultural Beliefs, Buddhism Practices, Values that Affect Care: no    Subjective:  "I'm alright" max vcs to keep eyes open, pt very sleepy again today, nsg and NP notified /79, 02 sats 94 %, HR 63bpm    Pain/Comfort  Pain Rating 1: 0/10  Pain Rating Post-Intervention 1: 0/10    Objective:  Patient found in      AM-PAC 6 CLICK MOBILITY  Total Score:11    Transfers:  Sit<>Stand: with RW max/mod x 2 ppl    Gait:  Amb with RW max A ~ 5 ft and 2 ft limited by dec alertness    Therex:  2x10 reps A/AA AP,LAQ,hip flex,abd/add    Patient left up in chair with with OT.    Assessment:  Susanna Byrnes is a 84 y.o. female with a medical diagnosis of UTI (urinary tract infection).  Pt with limited session 2* to dec alertness, nsg/NP notified, requested that we try AM session, originally fmly ask that we see her in PM because she sleeps late, will try 10 AM tomorrow,  pt would continue to benefit from skilled PT services to improve overall functional mobility, strength and endurance.  .    Rehab identified problem list/impairments: weakness, impaired endurance, impaired sensation, impaired self care skills, impaired functional mobilty, gait instability, impaired balance, impaired cognition, decreased upper extremity function, decreased lower extremity function    Rehab potential is good.    Activity tolerance: Fair    Discharge recommendations: home health PT     Barriers to discharge: None    Equipment recommendations: tub bench, wheelchair, manual     GOALS:   Multidisciplinary Problems     Physical Therapy Goals        Problem: " Physical Therapy Goal    Goal Priority Disciplines Outcome Goal Variances Interventions   Physical Therapy Goal     PT, PT/OT Ongoing (interventions implemented as appropriate)     Description:  Goals to be met by: 2019    Patient will increase functional independence with mobility by performin. Supine to sit with MInimal Assistance  2. Sit to supine with MInimal Assistance  3. Rolling to Left and Right with Minimal Assistance.  4. Sit to stand transfer with Minimal Assistance  5. Bed to chair transfer with Minimal Assistance using Rolling Walker  6. Gait  x 30 feet with Minimal Assistance using Rolling Walker.   7. Wheelchair propulsion x100 feet with Stand-by Assistance using bilateral uppper extremities                      PLAN:    Patient to be seen 5 x/week  to address the above listed problems via gait training, therapeutic activities, therapeutic exercises  Plan of Care expires: 19  Plan of Care reviewed with: patient    Kate Verma, PTA  2019   No

## 2020-03-08 ENCOUNTER — OFFICE VISIT (OUTPATIENT)
Dept: URGENT CARE | Facility: CLINIC | Age: 85
End: 2020-03-08
Payer: MEDICARE

## 2020-03-08 VITALS
OXYGEN SATURATION: 94 % | RESPIRATION RATE: 18 BRPM | BODY MASS INDEX: 38.28 KG/M2 | HEART RATE: 75 BPM | WEIGHT: 208 LBS | SYSTOLIC BLOOD PRESSURE: 141 MMHG | DIASTOLIC BLOOD PRESSURE: 68 MMHG | HEIGHT: 62 IN | TEMPERATURE: 98 F

## 2020-03-08 DIAGNOSIS — J44.1 COPD WITH ACUTE EXACERBATION: Primary | ICD-10-CM

## 2020-03-08 DIAGNOSIS — R05.8 PRODUCTIVE COUGH: ICD-10-CM

## 2020-03-08 LAB
CTP QC/QA: YES
FLUAV AG NPH QL: NEGATIVE
FLUBV AG NPH QL: NEGATIVE

## 2020-03-08 PROCEDURE — 71046 XR CHEST PA AND LATERAL: ICD-10-PCS | Mod: S$GLB,,, | Performed by: RADIOLOGY

## 2020-03-08 PROCEDURE — 87804 POCT INFLUENZA A/B: ICD-10-PCS | Mod: 59,QW,S$GLB, | Performed by: NURSE PRACTITIONER

## 2020-03-08 PROCEDURE — 99214 PR OFFICE/OUTPT VISIT, EST, LEVL IV, 30-39 MIN: ICD-10-PCS | Mod: 25,S$GLB,, | Performed by: NURSE PRACTITIONER

## 2020-03-08 PROCEDURE — 87804 INFLUENZA ASSAY W/OPTIC: CPT | Mod: QW,S$GLB,, | Performed by: NURSE PRACTITIONER

## 2020-03-08 PROCEDURE — 99214 OFFICE O/P EST MOD 30 MIN: CPT | Mod: 25,S$GLB,, | Performed by: NURSE PRACTITIONER

## 2020-03-08 PROCEDURE — 71046 X-RAY EXAM CHEST 2 VIEWS: CPT | Mod: S$GLB,,, | Performed by: RADIOLOGY

## 2020-03-08 RX ORDER — DOXYCYCLINE 100 MG/1
100 CAPSULE ORAL 2 TIMES DAILY
Qty: 20 CAPSULE | Refills: 0 | Status: SHIPPED | OUTPATIENT
Start: 2020-03-08 | End: 2020-03-18

## 2020-03-08 RX ORDER — PREDNISONE 20 MG/1
40 TABLET ORAL DAILY
Qty: 10 TABLET | Refills: 0 | Status: SHIPPED | OUTPATIENT
Start: 2020-03-08 | End: 2020-03-13

## 2020-03-08 NOTE — PROGRESS NOTES
"Subjective:       Patient ID: Susanna Byrnes is a 85 y.o. female.    Vitals:  height is 5' 2" (1.575 m) and weight is 94.3 kg (208 lb). Her temperature is 98.2 °F (36.8 °C). Her blood pressure is 141/68 (abnormal) and her pulse is 75. Her respiration is 18 and oxygen saturation is 94% (abnormal).     Chief Complaint: Cough    Patient presents with home with her daughter, who is also her main caregiver/nurse.  Daughter reports that she has a hsitory of COPD but that for the last week her cough has been persistent.  This morning she had an axillary temp of 99.2F measured, which is not her norm.  She also noted that for the first time this week she had a creamy productive cough.  Denies fever.  Daughter states that she is giving liquid Mucinex and Benadryl for cough and occasional clear runny nose.  Denies shortness of breath.  Denies recent travel.  In wheelchair from home.  Not currently on oxygen.  Daughter states that she runs 90-94% on room air.  Daughter also uses percussion for cough at home.  Daughter states that her urine is clear and not a foul odor.      Cough   This is a new problem. The current episode started 1 to 4 weeks ago. The problem has been unchanged. The problem occurs constantly. The cough is non-productive. Pertinent negatives include no chest pain, chills, ear congestion, ear pain, eye redness, fever, headaches, heartburn, hemoptysis, myalgias, nasal congestion, postnasal drip, rash, rhinorrhea, sore throat, shortness of breath, sweats, weight loss or wheezing. Nothing aggravates the symptoms.       Constitution: Negative for chills, sweating, fatigue and fever.   HENT: Negative for ear pain, congestion, postnasal drip, sinus pain, sinus pressure, sore throat and voice change.    Neck: Negative for painful lymph nodes.   Cardiovascular: Negative for chest pain.   Eyes: Negative for eye redness.   Respiratory: Positive for cough. Negative for chest tightness, sputum production, bloody sputum, " COPD, shortness of breath, stridor, wheezing and asthma.    Gastrointestinal: Negative for nausea, vomiting and heartburn.   Musculoskeletal: Negative for muscle ache.   Skin: Negative for rash.   Allergic/Immunologic: Negative for seasonal allergies and asthma.   Neurological: Negative for headaches.   Hematologic/Lymphatic: Negative for swollen lymph nodes.       Objective:      Physical Exam   Constitutional: She is oriented to person, place, and time. She appears well-developed and well-nourished. She is cooperative.  Non-toxic appearance. She does not have a sickly appearance. She does not appear ill. No distress.   Wheelchair from home.   HENT:   Head: Normocephalic and atraumatic.   Right Ear: Hearing, tympanic membrane, external ear and ear canal normal.   Left Ear: Hearing, tympanic membrane, external ear and ear canal normal.   Nose: Nose normal. No mucosal edema, rhinorrhea or nasal deformity. No epistaxis. Right sinus exhibits no maxillary sinus tenderness and no frontal sinus tenderness. Left sinus exhibits no maxillary sinus tenderness and no frontal sinus tenderness.   Mouth/Throat: Uvula is midline, oropharynx is clear and moist and mucous membranes are normal. No trismus in the jaw. Normal dentition. No uvula swelling. No oropharyngeal exudate, posterior oropharyngeal edema or posterior oropharyngeal erythema.   Eyes: Conjunctivae and lids are normal. No scleral icterus.   Neck: Trachea normal, full passive range of motion without pain and phonation normal. Neck supple. No neck rigidity. No edema and no erythema present.   Cardiovascular: Normal rate, regular rhythm, normal heart sounds, intact distal pulses and normal pulses.   Pulmonary/Chest: Effort normal. No respiratory distress. She has no decreased breath sounds. She has no rhonchi. She has rales in the right lower field and the left lower field.   Bilateral crackles with a wet sounding cough throughout exam.   Abdominal: Normal appearance.    Musculoskeletal: Normal range of motion. She exhibits no edema or deformity.   Neurological: She is alert and oriented to person, place, and time. She exhibits normal muscle tone. Coordination normal.   Skin: Skin is warm, dry, intact, not diaphoretic and not pale.   Psychiatric: She has a normal mood and affect. Her speech is normal and behavior is normal. Judgment and thought content normal. Cognition and memory are normal.   Nursing note and vitals reviewed.        Assessment:       1. COPD with acute exacerbation    2. Productive cough        Plan:       Labs reviewed.  Xray reviewed.  COPD with acute exacerbation  -     doxycycline (MONODOX) 100 MG capsule; Take 1 capsule (100 mg total) by mouth 2 (two) times daily. for 10 days  Dispense: 20 capsule; Refill: 0  -     predniSONE (DELTASONE) 20 MG tablet; Take 2 tablets (40 mg total) by mouth once daily. for 5 days  Dispense: 10 tablet; Refill: 0    Productive cough  -     X-Ray Chest PA And Lateral; Future; Expected date: 03/08/2020  -     POCT Influenza A/B      Patient Instructions   Okay to continue with Mucinex.  Follow up this week with PCP for recheck.      Please drink plenty of fluids.  Please get plenty of rest.  Please return here or go to the Emergency Department for any concerns or worsening of condition.  If you were prescribed antibiotics, please take them to completion.  If you were given wait & see antibiotics, please wait 5-7 days before taking them, and only take them if your symptoms have worsened or not improved.  If you do begin taking the antibiotics, please take them to completion.  If you were prescribed a narcotic medication, do not drive or operate heavy equipment or machinery while taking these medications.  If you were given a steroid shot in the clinic and have also been given a prescription for a steroid such as Prednisone or a Medrol Dose Pack, please begin taking them tomorrow.  If you do not have Hypertension or any history of  palpitations, it is ok to take over the counter Sudafed or Mucinex D or Allegra-D or Claritin-D or Zyrtec-D.  If you do take one of the above, it is ok to combine that with plain over the counter Mucinex or Allegra or Claritin or Zyrtec.  If for example you are taking Zyrtec -D, you can combine that with Mucinex, but not Mucinex-D.  If you are taking Mucinex-D, you can combine that with plain Allegra or Claritin or Zyrtec.   If you do have Hypertension or palpitations, it is safe to take Coricidin HBP for relief of sinus symptoms.  If not allergic, please take over the counter Tylenol (Acetaminophen) and/or Motrin (Ibuprofen) as directed for control of pain and/or fever.  Please follow up with your primary care doctor or specialist as needed.    If you  smoke, please stop smoking.  COPD Flare    You have had a flare-up of your COPD.  COPD, or chronic obstructive pulmonary disease, is a common lung disease. It causes your airways to become irritated and narrower. This makes it harder for you to breathe. Emphysema and chronic bronchitis are both types of COPD. This is a chronic condition, which means you always have it. Sometimes it gets worse. When this happens, it is called a flare-up.  Symptoms of COPD  People with COPD may have symptoms most of the time. In a flare-up, your symptoms get worse. These symptoms may mean you are having a flare-up:  · Shortness of breath, shallow or rapid breathing, or wheezing that gets worse  · Lung infection  · Cough that gets worse  · More mucus, thicker mucus or mucus of a different color  · Tiredness, decreased energy, or trouble doing your usual activities  · Fever  · Chest tightness  · Your symptoms dont get better even when you use your usual medicines, inhalers, and nebulizer  · Trouble talking  · You feel confused  Causes of flare-ups  Unfortunately, a flare-up can happen even though you did everything right, and you followed your doctors instructions. Some causes of  flare-ups are:  · Smoking or secondhand smoke  · Colds, the flu, or respiratory infections  · Air pollution  · Sudden change in the weather  · Dust, irritating chemicals, or strong fumes  · Not taking your medicines as prescribed  Home care  Here are some things you can do at home to treat a flare-up:  · Try not to panic. This makes it harder to breathe, and keeps you from doing the right things.  · Dont smoke or be around others who are smoking.  · Try to drink more fluids than usual during a flare-up, unless your doctor has told you not to because of heart and kidney problems. More fluids can help loosen the mucus.  · Use your inhalers and nebulizer, if you have one, as you have been told to.  · If you were given antibiotics, take them until they are used up or your doctor tells you to stop. Its important to finish the antibiotics, even though you feel better. This will make sure the infection has cleared.  · If you were given prednisone or another steroid, finish it even if you feel better.  Preventing a flare-up  Even though flare-ups happen, the best way to treat one is to prevent it before it starts. Here are some pointers:  · Dont smoke or be around others who are smoking.  · Take your medicines as you have been told.  · Talk with your doctor about getting a flu shot every year. Also find out if you need a pneumonia shot.  · If there is a weather advisory warning to stay indoors, try to stay inside when possible.  · Try to eat healthy and get plenty of sleep.  · Try to avoid things that usually set you off, like dust, chemical fumes, hairsprays, or strong perfumes.  Follow-up care  Follow up with your healthcare provider, or as advised.  If a culture was done, you will be told if your treatment needs to be changed. You can call as directed for the results.  If X-rays were done, you will be notified of any new findings that may affect your care.  Call 911  Call 911 if any of these occur:  · You have trouble  breathing  · You feel confused or its difficult to wake you up  · You faint or lose consciousness  · You have a rapid heart rate  · You have new pain in your chest, arm, shoulder, neck or upper back  When to seek medical advice  Call your healthcare provider right away if any of these occur:  · Wheezing or shortness of breath gets worse  · You need to use your inhalers more often than usual without relief  · Fever of 100.4°F (38ºC) or higher, or as directed by your healthcare provider  · Coughing up lots of dark-colored or bloody mucus (sputum)  · Chest pain with each breath  · You do not start to get better within 24 hours  · Swelling of your ankles gets worse  · Dizziness or weakness  Date Last Reviewed: 9/1/2016  © 8211-7005 [employer]. [address] [place] [address], [place] [address]. All rights reserved. This information is not intended as a substitute for professional medical care. Always follow your healthcare professional's instructions.

## 2020-03-08 NOTE — PATIENT INSTRUCTIONS
Okay to continue with Mucinex.  Follow up this week with PCP for recheck.      Please drink plenty of fluids.  Please get plenty of rest.  Please return here or go to the Emergency Department for any concerns or worsening of condition.  If you were prescribed antibiotics, please take them to completion.  If you were given wait & see antibiotics, please wait 5-7 days before taking them, and only take them if your symptoms have worsened or not improved.  If you do begin taking the antibiotics, please take them to completion.  If you were prescribed a narcotic medication, do not drive or operate heavy equipment or machinery while taking these medications.  If you were given a steroid shot in the clinic and have also been given a prescription for a steroid such as Prednisone or a Medrol Dose Pack, please begin taking them tomorrow.  If you do not have Hypertension or any history of palpitations, it is ok to take over the counter Sudafed or Mucinex D or Allegra-D or Claritin-D or Zyrtec-D.  If you do take one of the above, it is ok to combine that with plain over the counter Mucinex or Allegra or Claritin or Zyrtec.  If for example you are taking Zyrtec -D, you can combine that with Mucinex, but not Mucinex-D.  If you are taking Mucinex-D, you can combine that with plain Allegra or Claritin or Zyrtec.   If you do have Hypertension or palpitations, it is safe to take Coricidin HBP for relief of sinus symptoms.  If not allergic, please take over the counter Tylenol (Acetaminophen) and/or Motrin (Ibuprofen) as directed for control of pain and/or fever.  Please follow up with your primary care doctor or specialist as needed.    If you  smoke, please stop smoking.  COPD Flare    You have had a flare-up of your COPD.  COPD, or chronic obstructive pulmonary disease, is a common lung disease. It causes your airways to become irritated and narrower. This makes it harder for you to breathe. Emphysema and chronic bronchitis are  both types of COPD. This is a chronic condition, which means you always have it. Sometimes it gets worse. When this happens, it is called a flare-up.  Symptoms of COPD  People with COPD may have symptoms most of the time. In a flare-up, your symptoms get worse. These symptoms may mean you are having a flare-up:  · Shortness of breath, shallow or rapid breathing, or wheezing that gets worse  · Lung infection  · Cough that gets worse  · More mucus, thicker mucus or mucus of a different color  · Tiredness, decreased energy, or trouble doing your usual activities  · Fever  · Chest tightness  · Your symptoms dont get better even when you use your usual medicines, inhalers, and nebulizer  · Trouble talking  · You feel confused  Causes of flare-ups  Unfortunately, a flare-up can happen even though you did everything right, and you followed your doctors instructions. Some causes of flare-ups are:  · Smoking or secondhand smoke  · Colds, the flu, or respiratory infections  · Air pollution  · Sudden change in the weather  · Dust, irritating chemicals, or strong fumes  · Not taking your medicines as prescribed  Home care  Here are some things you can do at home to treat a flare-up:  · Try not to panic. This makes it harder to breathe, and keeps you from doing the right things.  · Dont smoke or be around others who are smoking.  · Try to drink more fluids than usual during a flare-up, unless your doctor has told you not to because of heart and kidney problems. More fluids can help loosen the mucus.  · Use your inhalers and nebulizer, if you have one, as you have been told to.  · If you were given antibiotics, take them until they are used up or your doctor tells you to stop. Its important to finish the antibiotics, even though you feel better. This will make sure the infection has cleared.  · If you were given prednisone or another steroid, finish it even if you feel better.  Preventing a flare-up  Even though flare-ups  happen, the best way to treat one is to prevent it before it starts. Here are some pointers:  · Dont smoke or be around others who are smoking.  · Take your medicines as you have been told.  · Talk with your doctor about getting a flu shot every year. Also find out if you need a pneumonia shot.  · If there is a weather advisory warning to stay indoors, try to stay inside when possible.  · Try to eat healthy and get plenty of sleep.  · Try to avoid things that usually set you off, like dust, chemical fumes, hairsprays, or strong perfumes.  Follow-up care  Follow up with your healthcare provider, or as advised.  If a culture was done, you will be told if your treatment needs to be changed. You can call as directed for the results.  If X-rays were done, you will be notified of any new findings that may affect your care.  Call 911  Call 911 if any of these occur:  · You have trouble breathing  · You feel confused or its difficult to wake you up  · You faint or lose consciousness  · You have a rapid heart rate  · You have new pain in your chest, arm, shoulder, neck or upper back  When to seek medical advice  Call your healthcare provider right away if any of these occur:  · Wheezing or shortness of breath gets worse  · You need to use your inhalers more often than usual without relief  · Fever of 100.4°F (38ºC) or higher, or as directed by your healthcare provider  · Coughing up lots of dark-colored or bloody mucus (sputum)  · Chest pain with each breath  · You do not start to get better within 24 hours  · Swelling of your ankles gets worse  · Dizziness or weakness  Date Last Reviewed: 9/1/2016  © 2166-4029 [employer]. [address] [place] [address], [place] [address]. All rights reserved. This information is not intended as a substitute for professional medical care. Always follow your healthcare professional's instructions.

## 2020-03-16 ENCOUNTER — TELEPHONE (OUTPATIENT)
Dept: URGENT CARE | Facility: CLINIC | Age: 85
End: 2020-03-16

## 2020-03-16 RX ORDER — CODEINE PHOSPHATE AND GUAIFENESIN 10; 100 MG/5ML; MG/5ML
2.5 SOLUTION ORAL EVERY 6 HOURS PRN
Qty: 100 ML | Refills: 0 | Status: SHIPPED | OUTPATIENT
Start: 2020-03-16 | End: 2020-03-26

## 2020-03-16 NOTE — TELEPHONE ENCOUNTER
Patient's daughter recalled requesting a stronger cough medicine the Mucinex.  Patient will be given a prescription for Robitussin with codeine which she has taken in the past.  His recommended she take half a tsp every 12 hr for severe symptoms only.  Patient given worsening signs and symptoms for which to go to the emergency room.  She should follow up with her primary care physician otherwise.

## 2020-04-06 ENCOUNTER — OFFICE VISIT (OUTPATIENT)
Dept: URGENT CARE | Facility: CLINIC | Age: 85
End: 2020-04-06
Payer: MEDICARE

## 2020-04-06 VITALS — RESPIRATION RATE: 24 BRPM | TEMPERATURE: 98 F | OXYGEN SATURATION: 90 % | HEART RATE: 86 BPM

## 2020-04-06 DIAGNOSIS — R05.9 COUGH: ICD-10-CM

## 2020-04-06 DIAGNOSIS — R09.02 HYPOXIA: Primary | ICD-10-CM

## 2020-04-06 DIAGNOSIS — R06.00 DYSPNEA, UNSPECIFIED TYPE: ICD-10-CM

## 2020-04-06 PROCEDURE — 99215 OFFICE O/P EST HI 40 MIN: CPT | Mod: S$GLB,,, | Performed by: EMERGENCY MEDICINE

## 2020-04-06 PROCEDURE — 99215 PR OFFICE/OUTPT VISIT, EST, LEVL V, 40-54 MIN: ICD-10-PCS | Mod: S$GLB,,, | Performed by: EMERGENCY MEDICINE

## 2020-04-06 RX ORDER — CODEINE PHOSPHATE AND GUAIFENESIN 10; 100 MG/5ML; MG/5ML
5 SOLUTION ORAL EVERY 6 HOURS PRN
Qty: 150 ML | Refills: 0 | Status: SHIPPED | OUTPATIENT
Start: 2020-04-06 | End: 2020-04-16

## 2020-04-06 RX ORDER — AZITHROMYCIN 250 MG/1
TABLET, FILM COATED ORAL
Qty: 6 TABLET | Refills: 0 | Status: SHIPPED | OUTPATIENT
Start: 2020-04-06 | End: 2022-06-17

## 2020-04-06 NOTE — PATIENT INSTRUCTIONS
Go to ER NOW      Shortness of Breath (Dyspnea)  Shortness of breath is the feeling that you can't catch your breath or get enough air. It is also known as dyspnea.  Dyspnea can be caused by many different conditions. They include:  · Acute asthma attack.  · Worsening of chronic lung diseases such as chronic bronchitis and emphysema.  · Heart failure. This is when weak heart muscle allows extra fluid to collect in the lungs.  · Panic attacks or anxiety. Fear can cause rapid breathing (hyperventilation).  · Pneumonia, or an infection in the lung tissue.  · Exposure to toxic substances, fumes, smoke, or certain medicines.  · Blood clot in the lung (pulmonary embolism). This is often from a piece of blood clot in a deep vein of the leg (deep vein thrombosis) that breaks off and travels to the lungs.  · Heart attack or heart-related chest pain (angina).  · Anemia.  · Collapsed lung (pneumothorax).  · Dehydration.  · Pregnancy.  Based on your visit today, the exact cause of your shortness of breath is not certain. Your tests dont show any of the serious causes of dyspnea. You may need other tests to find out if you have a serious problem. Its important to watch for any new symptoms or symptoms that get worse. Follow up with your healthcare provider as directed.  Home care  Follow these tips to take care of yourself at home:  · When your symptoms are better, go back to your usual activities.  · If you smoke, you should stop. Join a quit-smoking program or ask your healthcare provider for help.  · Eat a healthy diet and get plenty of sleep.  · Get regular exercise. Talk with your healthcare provider before starting to exercise, especially if you have other medical problems.  · Cut down on the amount of caffeine and stimulants you consume.  Follow-up care  Follow up with your healthcare provider, or as advised.  If tests were done, you will be told if your treatment needs to be changed. You can call as directed for the  results.  (Note: If an X-ray was taken, a specialist will review it. You will be notified of any new findings that may affect your care.)  Call 911 or get immediate medical care  Shortness of breath may be a sign of a serious medical problem. For example, it may be a problem with your heart or lungs. Call 911 if you have worsening shortness of breath or trouble breathing, especially with any of the symptoms below:  · You are confused or its difficult to wake you.  · You faint or lose consciousness.  · You have a fast heartbeat, or your heartbeat is irregular.  · You are coughing up blood.  · You have pain in your chest, arm, shoulder, neck, or upper back.  · You break out in a sweat.  When to seek medical advice  Call your healthcare provider right away if any of these occur:  · Slight shortness of breath or wheezing  · Redness, pain or swelling in your leg, arm, or other body area  · Swelling in both legs or ankles  · Fast weight gain  · Dizziness or weakness  · Fever of 100.4ºF (38ºC) or higher, or as directed by your healthcare provider  Date Last Reviewed: 9/13/2015  © 7029-1735 [employer]. [address] [place] [address], [place] [address]. All rights reserved. This information is not intended as a substitute for professional medical care. Always follow your healthcare professional's instructions.

## 2020-04-06 NOTE — PROGRESS NOTES
Subjective:       Patient ID: Susanna Byrnes is a 85 y.o. female.    Chief Complaint: No chief complaint on file.    This 85-year-old female brought to the hospital by caregiver for evaluation of cough and shortness of breath.  Patient has a history of asthma and has been using her inhaler at home.  Patient was also seen in the urgent care approximately 1 month ago and was prescribed antibiotics and cough medication.  Family states that they have noticed her work of breathing increased and they brought her here for re-evaluation.  They deny fever.  Patient herself has a history of dementia so the details of her HPI from her are very difficult to obtain.    Review of Systems   Unable to perform ROS: dementia       Objective:      Physical Exam   Constitutional: She appears well-developed and well-nourished. She is cooperative.  Non-toxic appearance. She does not appear ill. She appears distressed (mild).   Obese, elderly, chronically ill   HENT:   Right Ear: Hearing, tympanic membrane, external ear and ear canal normal.   Left Ear: Hearing, tympanic membrane, external ear and ear canal normal.   Nose: No mucosal edema, rhinorrhea or nasal deformity. No epistaxis. Right sinus exhibits no maxillary sinus tenderness and no frontal sinus tenderness. Left sinus exhibits no maxillary sinus tenderness and no frontal sinus tenderness.   Oropharyngeal exam not performed due to risk of viral transmission during global pandemic-- risks outweigh benefits of exam     Eyes: Conjunctivae and lids are normal. No scleral icterus.   Sclera clear bilat   Neck: Trachea normal, full passive range of motion without pain and phonation normal. Neck supple.   Cardiovascular: Normal rate, regular rhythm, normal heart sounds, intact distal pulses and normal pulses.   Pulmonary/Chest: Accessory muscle usage present. Tachypnea noted. She is in respiratory distress. She has decreased breath sounds. She has rhonchi. She has rales.   Abdominal:  Soft. Normal appearance and bowel sounds are normal. She exhibits no distension. There is no tenderness.   Musculoskeletal: Normal range of motion. She exhibits no edema or deformity.   Neurological: She is alert. She is disoriented. She exhibits normal muscle tone. Coordination normal. GCS eye subscore is 4. GCS verbal subscore is 5. GCS motor subscore is 5.   Skin: Skin is warm, dry and intact. She is not diaphoretic. No pallor.   Psychiatric: She has a normal mood and affect. Her speech is normal and behavior is normal. Judgment and thought content normal. Cognition and memory are normal.   Nursing note and vitals reviewed.      Assessment:       1. Hypoxia    2. Cough    3. Dyspnea, unspecified type        Plan:       Diagnoses and all orders for this visit:    Hypoxia    Cough    Dyspnea, unspecified type    Other orders  -     azithromycin (ZITHROMAX Z-DAVID) 250 MG tablet; Take 2 tablets (500 mg) on  Day 1,  followed by 1 tablet (250 mg) once daily on Days 2 through 5.  -     guaifenesin-codeine 100-10 mg/5 ml (GUAIFENESIN AC)  mg/5 mL syrup; Take 5 mLs by mouth every 6 (six) hours as needed for Cough.          Medical decision makin-year-old female in mild respiratory distress with tachypnea or hypoxia and rhonchi/rales on examination.  Concerning included multiple conditions including pneumonia pulmonary edema COVID-19 infection congestive heart failure acute kidney injury all considered.  I strongly recommended that family bring patient to the hospital emergently for further testing and treatment.  Family refuses stating they do not wish for unnecessary testing at the hospital.  I have advised family that given patient's age and her level of hypoxia that respiratory failure may be imminent and she may suffer cardiac arrest or death at home.  They verbalized understanding these instructions and states they will call in EMS unit if her condition worsens.  Family is insists the patient requires  antibiotics.  Patient will be given a course of azithromycin but is unlikely that will make much benefit in this patient.  Patient also given cough medication.  Patient again strongly advised to go to the emergency room at this time

## 2021-01-24 ENCOUNTER — HOSPITAL ENCOUNTER (EMERGENCY)
Facility: HOSPITAL | Age: 86
Discharge: HOME | End: 2021-01-25
Attending: EMERGENCY MEDICINE
Payer: MEDICARE

## 2021-01-24 DIAGNOSIS — S42.309A HUMERUS FRACTURE: Primary | ICD-10-CM

## 2021-01-24 DIAGNOSIS — M25.511 ACUTE PAIN OF RIGHT SHOULDER: ICD-10-CM

## 2021-01-24 DIAGNOSIS — S49.91XA RIGHT SHOULDER INJURY: ICD-10-CM

## 2021-01-24 LAB
BASOPHILS # BLD AUTO: 0.02 K/UL (ref 0–0.2)
BASOPHILS NFR BLD: 0.2 % (ref 0–1.9)
DIFFERENTIAL METHOD: ABNORMAL
EOSINOPHIL # BLD AUTO: 0.3 K/UL (ref 0–0.5)
EOSINOPHIL NFR BLD: 3.1 % (ref 0–8)
ERYTHROCYTE [DISTWIDTH] IN BLOOD BY AUTOMATED COUNT: 18.1 % (ref 11.5–14.5)
HCT VFR BLD AUTO: 26.1 % (ref 37–48.5)
HGB BLD-MCNC: 7.6 G/DL (ref 12–16)
IMM GRANULOCYTES # BLD AUTO: 0.02 K/UL (ref 0–0.04)
IMM GRANULOCYTES NFR BLD AUTO: 0.2 % (ref 0–0.5)
LYMPHOCYTES # BLD AUTO: 3 K/UL (ref 1–4.8)
LYMPHOCYTES NFR BLD: 36.6 % (ref 18–48)
MCH RBC QN AUTO: 22.6 PG (ref 27–31)
MCHC RBC AUTO-ENTMCNC: 29.1 G/DL (ref 32–36)
MCV RBC AUTO: 77 FL (ref 82–98)
MONOCYTES # BLD AUTO: 0.7 K/UL (ref 0.3–1)
MONOCYTES NFR BLD: 8.6 % (ref 4–15)
NEUTROPHILS # BLD AUTO: 4.3 K/UL (ref 1.8–7.7)
NEUTROPHILS NFR BLD: 51.3 % (ref 38–73)
NRBC BLD-RTO: 0 /100 WBC
PLATELET # BLD AUTO: 285 K/UL (ref 150–350)
PMV BLD AUTO: 10.4 FL (ref 9.2–12.9)
RBC # BLD AUTO: 3.37 M/UL (ref 4–5.4)
WBC # BLD AUTO: 8.3 K/UL (ref 3.9–12.7)

## 2021-01-24 PROCEDURE — 93005 ELECTROCARDIOGRAM TRACING: CPT

## 2021-01-24 PROCEDURE — 85610 PROTHROMBIN TIME: CPT

## 2021-01-24 PROCEDURE — 85025 COMPLETE CBC W/AUTO DIFF WBC: CPT

## 2021-01-24 PROCEDURE — 99285 EMERGENCY DEPT VISIT HI MDM: CPT | Mod: 25

## 2021-01-24 PROCEDURE — 93010 EKG 12-LEAD: ICD-10-PCS | Mod: ,,, | Performed by: INTERNAL MEDICINE

## 2021-01-24 PROCEDURE — 99285 EMERGENCY DEPT VISIT HI MDM: CPT | Mod: ,,, | Performed by: PHYSICIAN ASSISTANT

## 2021-01-24 PROCEDURE — 96374 THER/PROPH/DIAG INJ IV PUSH: CPT

## 2021-01-24 PROCEDURE — 99285 PR EMERGENCY DEPT VISIT,LEVEL V: ICD-10-PCS | Mod: ,,, | Performed by: PHYSICIAN ASSISTANT

## 2021-01-24 PROCEDURE — 63600175 PHARM REV CODE 636 W HCPCS: Performed by: PHYSICIAN ASSISTANT

## 2021-01-24 PROCEDURE — 84134 ASSAY OF PREALBUMIN: CPT

## 2021-01-24 PROCEDURE — 93010 ELECTROCARDIOGRAM REPORT: CPT | Mod: ,,, | Performed by: INTERNAL MEDICINE

## 2021-01-24 PROCEDURE — 86900 BLOOD TYPING SEROLOGIC ABO: CPT

## 2021-01-24 PROCEDURE — 84466 ASSAY OF TRANSFERRIN: CPT

## 2021-01-24 PROCEDURE — 80053 COMPREHEN METABOLIC PANEL: CPT

## 2021-01-24 RX ORDER — MORPHINE SULFATE 2 MG/ML
2 INJECTION, SOLUTION INTRAMUSCULAR; INTRAVENOUS
Status: COMPLETED | OUTPATIENT
Start: 2021-01-24 | End: 2021-01-24

## 2021-01-24 RX ADMIN — MORPHINE SULFATE 2 MG: 2 INJECTION, SOLUTION INTRAMUSCULAR; INTRAVENOUS at 09:01

## 2021-01-25 VITALS
TEMPERATURE: 99 F | WEIGHT: 208 LBS | HEART RATE: 62 BPM | BODY MASS INDEX: 38.28 KG/M2 | OXYGEN SATURATION: 97 % | DIASTOLIC BLOOD PRESSURE: 51 MMHG | SYSTOLIC BLOOD PRESSURE: 106 MMHG | RESPIRATION RATE: 20 BRPM | HEIGHT: 62 IN

## 2021-01-25 PROBLEM — S42.301A CLOSED FRACTURE OF SHAFT OF RIGHT HUMERUS: Status: ACTIVE | Noted: 2021-01-25

## 2021-01-25 LAB
ABO + RH BLD: NORMAL
ALBUMIN SERPL BCP-MCNC: 2.1 G/DL (ref 3.5–5.2)
ALP SERPL-CCNC: 66 U/L (ref 55–135)
ALT SERPL W/O P-5'-P-CCNC: 8 U/L (ref 10–44)
ANION GAP SERPL CALC-SCNC: 11 MMOL/L (ref 8–16)
AST SERPL-CCNC: 23 U/L (ref 10–40)
BILIRUB SERPL-MCNC: 0.4 MG/DL (ref 0.1–1)
BLD GP AB SCN CELLS X3 SERPL QL: NORMAL
BUN SERPL-MCNC: 19 MG/DL (ref 8–23)
CALCIUM SERPL-MCNC: 8.5 MG/DL (ref 8.7–10.5)
CHLORIDE SERPL-SCNC: 101 MMOL/L (ref 95–110)
CO2 SERPL-SCNC: 27 MMOL/L (ref 23–29)
CREAT SERPL-MCNC: 0.5 MG/DL (ref 0.5–1.4)
EST. GFR  (AFRICAN AMERICAN): >60 ML/MIN/1.73 M^2
EST. GFR  (NON AFRICAN AMERICAN): >60 ML/MIN/1.73 M^2
GLUCOSE SERPL-MCNC: 84 MG/DL (ref 70–110)
INR PPP: 1.2 (ref 0.8–1.2)
POTASSIUM SERPL-SCNC: 3.5 MMOL/L (ref 3.5–5.1)
PREALB SERPL-MCNC: 12 MG/DL (ref 20–43)
PROT SERPL-MCNC: 7 G/DL (ref 6–8.4)
PROTHROMBIN TIME: 13.2 SEC (ref 9–12.5)
SODIUM SERPL-SCNC: 139 MMOL/L (ref 136–145)
TRANSFERRIN SERPL-MCNC: 137 MG/DL (ref 200–375)

## 2021-01-25 PROCEDURE — 25000003 PHARM REV CODE 250: Performed by: PHYSICIAN ASSISTANT

## 2021-01-25 RX ORDER — ACETAMINOPHEN AND CODEINE PHOSPHATE 300; 30 MG/1; MG/1
1 TABLET ORAL EVERY 8 HOURS PRN
Qty: 9 TABLET | Refills: 0 | Status: SHIPPED | OUTPATIENT
Start: 2021-01-25 | End: 2021-01-25

## 2021-01-25 RX ORDER — HYDROCODONE BITARTRATE AND ACETAMINOPHEN 5; 325 MG/1; MG/1
1 TABLET ORAL EVERY 6 HOURS PRN
Qty: 11 TABLET | Refills: 0 | Status: SHIPPED | OUTPATIENT
Start: 2021-01-25 | End: 2021-01-28

## 2021-01-25 RX ORDER — HYDROCODONE BITARTRATE AND ACETAMINOPHEN 5; 325 MG/1; MG/1
1 TABLET ORAL
Status: COMPLETED | OUTPATIENT
Start: 2021-01-25 | End: 2021-01-25

## 2021-01-25 RX ADMIN — HYDROCODONE BITARTRATE AND ACETAMINOPHEN 1 TABLET: 5; 325 TABLET ORAL at 02:01

## 2021-02-01 ENCOUNTER — TELEPHONE (OUTPATIENT)
Dept: ORTHOPEDICS | Facility: CLINIC | Age: 86
End: 2021-02-01

## 2021-02-02 ENCOUNTER — TELEPHONE (OUTPATIENT)
Dept: ORTHOPEDICS | Facility: CLINIC | Age: 86
End: 2021-02-02

## 2021-02-12 ENCOUNTER — OFFICE VISIT (OUTPATIENT)
Dept: ORTHOPEDICS | Facility: CLINIC | Age: 86
End: 2021-02-12
Payer: MEDICARE

## 2021-02-12 ENCOUNTER — HOSPITAL ENCOUNTER (OUTPATIENT)
Dept: RADIOLOGY | Facility: HOSPITAL | Age: 86
Discharge: HOME | End: 2021-02-12
Attending: PHYSICIAN ASSISTANT
Payer: MEDICARE

## 2021-02-12 DIAGNOSIS — S42.331A CLOSED DISPLACED OBLIQUE FRACTURE OF SHAFT OF RIGHT HUMERUS, INITIAL ENCOUNTER: Primary | ICD-10-CM

## 2021-02-12 DIAGNOSIS — M89.8X2 PAIN OF RIGHT HUMERUS: ICD-10-CM

## 2021-02-12 PROCEDURE — 99203 OFFICE O/P NEW LOW 30 MIN: CPT | Mod: S$GLB,,, | Performed by: PHYSICIAN ASSISTANT

## 2021-02-12 PROCEDURE — 1125F PR PAIN SEVERITY QUANTIFIED, PAIN PRESENT: ICD-10-PCS | Mod: S$GLB,,, | Performed by: PHYSICIAN ASSISTANT

## 2021-02-12 PROCEDURE — 73060 XR HUMERUS 2 VIEW RIGHT: ICD-10-PCS | Mod: 26,RT,, | Performed by: RADIOLOGY

## 2021-02-12 PROCEDURE — 99999 PR PBB SHADOW E&M-EST. PATIENT-LVL III: ICD-10-PCS | Mod: PBBFAC,,, | Performed by: PHYSICIAN ASSISTANT

## 2021-02-12 PROCEDURE — 1125F AMNT PAIN NOTED PAIN PRSNT: CPT | Mod: S$GLB,,, | Performed by: PHYSICIAN ASSISTANT

## 2021-02-12 PROCEDURE — 99999 PR PBB SHADOW E&M-EST. PATIENT-LVL III: CPT | Mod: PBBFAC,,, | Performed by: PHYSICIAN ASSISTANT

## 2021-02-12 PROCEDURE — 1159F MED LIST DOCD IN RCRD: CPT | Mod: S$GLB,,, | Performed by: PHYSICIAN ASSISTANT

## 2021-02-12 PROCEDURE — 73060 X-RAY EXAM OF HUMERUS: CPT | Mod: 26,RT,, | Performed by: RADIOLOGY

## 2021-02-12 PROCEDURE — 1159F PR MEDICATION LIST DOCUMENTED IN MEDICAL RECORD: ICD-10-PCS | Mod: S$GLB,,, | Performed by: PHYSICIAN ASSISTANT

## 2021-02-12 PROCEDURE — 73060 X-RAY EXAM OF HUMERUS: CPT | Mod: TC,RT

## 2021-02-12 PROCEDURE — 99203 PR OFFICE/OUTPT VISIT, NEW, LEVL III, 30-44 MIN: ICD-10-PCS | Mod: S$GLB,,, | Performed by: PHYSICIAN ASSISTANT

## 2021-02-12 RX ORDER — GABAPENTIN 100 MG/1
100 CAPSULE ORAL 3 TIMES DAILY
Qty: 90 CAPSULE | Refills: 11 | Status: CANCELLED | OUTPATIENT
Start: 2021-02-12 | End: 2022-02-12

## 2021-02-12 RX ORDER — OXYCODONE HYDROCHLORIDE 5 MG/1
5 TABLET ORAL EVERY 6 HOURS PRN
Qty: 28 TABLET | Refills: 0 | Status: CANCELLED | OUTPATIENT
Start: 2021-02-12

## 2021-02-12 RX ORDER — METHOCARBAMOL 500 MG/1
500 TABLET, FILM COATED ORAL 4 TIMES DAILY
Qty: 40 TABLET | Refills: 0 | Status: CANCELLED | OUTPATIENT
Start: 2021-02-12 | End: 2021-02-22

## 2021-02-26 ENCOUNTER — HOSPITAL ENCOUNTER (OUTPATIENT)
Dept: RADIOLOGY | Facility: HOSPITAL | Age: 86
Discharge: HOME | End: 2021-02-26
Attending: PHYSICIAN ASSISTANT
Payer: MEDICARE

## 2021-02-26 ENCOUNTER — OFFICE VISIT (OUTPATIENT)
Dept: ORTHOPEDICS | Facility: CLINIC | Age: 86
End: 2021-02-26
Payer: MEDICARE

## 2021-02-26 ENCOUNTER — TELEPHONE (OUTPATIENT)
Dept: ORTHOPEDICS | Facility: CLINIC | Age: 86
End: 2021-02-26

## 2021-02-26 VITALS — TEMPERATURE: 98 F

## 2021-02-26 DIAGNOSIS — S42.331D CLOSED DISPLACED OBLIQUE FRACTURE OF SHAFT OF RIGHT HUMERUS WITH ROUTINE HEALING, SUBSEQUENT ENCOUNTER: ICD-10-CM

## 2021-02-26 DIAGNOSIS — S42.331D CLOSED DISPLACED OBLIQUE FRACTURE OF SHAFT OF RIGHT HUMERUS WITH ROUTINE HEALING, SUBSEQUENT ENCOUNTER: Primary | ICD-10-CM

## 2021-02-26 PROCEDURE — 73060 X-RAY EXAM OF HUMERUS: CPT | Mod: 26,RT,, | Performed by: RADIOLOGY

## 2021-02-26 PROCEDURE — 1125F AMNT PAIN NOTED PAIN PRSNT: CPT | Mod: S$GLB,,, | Performed by: PHYSICIAN ASSISTANT

## 2021-02-26 PROCEDURE — 99999 PR PBB SHADOW E&M-EST. PATIENT-LVL III: CPT | Mod: PBBFAC,,, | Performed by: PHYSICIAN ASSISTANT

## 2021-02-26 PROCEDURE — 73060 X-RAY EXAM OF HUMERUS: CPT | Mod: TC,RT

## 2021-02-26 PROCEDURE — 99213 OFFICE O/P EST LOW 20 MIN: CPT | Mod: S$GLB,,, | Performed by: PHYSICIAN ASSISTANT

## 2021-02-26 PROCEDURE — 99213 PR OFFICE/OUTPT VISIT, EST, LEVL III, 20-29 MIN: ICD-10-PCS | Mod: S$GLB,,, | Performed by: PHYSICIAN ASSISTANT

## 2021-02-26 PROCEDURE — 1125F PR PAIN SEVERITY QUANTIFIED, PAIN PRESENT: ICD-10-PCS | Mod: S$GLB,,, | Performed by: PHYSICIAN ASSISTANT

## 2021-02-26 PROCEDURE — 1159F PR MEDICATION LIST DOCUMENTED IN MEDICAL RECORD: ICD-10-PCS | Mod: S$GLB,,, | Performed by: PHYSICIAN ASSISTANT

## 2021-02-26 PROCEDURE — 99999 PR PBB SHADOW E&M-EST. PATIENT-LVL III: ICD-10-PCS | Mod: PBBFAC,,, | Performed by: PHYSICIAN ASSISTANT

## 2021-02-26 PROCEDURE — 1159F MED LIST DOCD IN RCRD: CPT | Mod: S$GLB,,, | Performed by: PHYSICIAN ASSISTANT

## 2021-02-26 PROCEDURE — 73060 XR HUMERUS 2 VIEW RIGHT: ICD-10-PCS | Mod: 26,RT,, | Performed by: RADIOLOGY

## 2021-02-26 RX ORDER — HYDROCODONE BITARTRATE AND ACETAMINOPHEN 5; 325 MG/1; MG/1
1 TABLET ORAL
Qty: 42 TABLET | Refills: 0 | Status: SHIPPED | OUTPATIENT
Start: 2021-02-26

## 2021-03-19 ENCOUNTER — OFFICE VISIT (OUTPATIENT)
Dept: ORTHOPEDICS | Facility: CLINIC | Age: 86
End: 2021-03-19
Payer: MEDICARE

## 2021-03-19 ENCOUNTER — HOSPITAL ENCOUNTER (OUTPATIENT)
Dept: RADIOLOGY | Facility: HOSPITAL | Age: 86
Discharge: HOME | End: 2021-03-19
Attending: PHYSICIAN ASSISTANT
Payer: MEDICARE

## 2021-03-19 DIAGNOSIS — S42.331D CLOSED DISPLACED OBLIQUE FRACTURE OF SHAFT OF RIGHT HUMERUS WITH ROUTINE HEALING, SUBSEQUENT ENCOUNTER: Primary | ICD-10-CM

## 2021-03-19 DIAGNOSIS — S42.331D CLOSED DISPLACED OBLIQUE FRACTURE OF SHAFT OF RIGHT HUMERUS WITH ROUTINE HEALING, SUBSEQUENT ENCOUNTER: ICD-10-CM

## 2021-03-19 PROCEDURE — 99999 PR PBB SHADOW E&M-EST. PATIENT-LVL III: ICD-10-PCS | Mod: PBBFAC,,, | Performed by: PHYSICIAN ASSISTANT

## 2021-03-19 PROCEDURE — 99213 OFFICE O/P EST LOW 20 MIN: CPT | Mod: S$GLB,,, | Performed by: PHYSICIAN ASSISTANT

## 2021-03-19 PROCEDURE — 73060 X-RAY EXAM OF HUMERUS: CPT | Mod: TC,RT

## 2021-03-19 PROCEDURE — 1159F MED LIST DOCD IN RCRD: CPT | Mod: S$GLB,,, | Performed by: PHYSICIAN ASSISTANT

## 2021-03-19 PROCEDURE — 1159F PR MEDICATION LIST DOCUMENTED IN MEDICAL RECORD: ICD-10-PCS | Mod: S$GLB,,, | Performed by: PHYSICIAN ASSISTANT

## 2021-03-19 PROCEDURE — 99999 PR PBB SHADOW E&M-EST. PATIENT-LVL III: CPT | Mod: PBBFAC,,, | Performed by: PHYSICIAN ASSISTANT

## 2021-03-19 PROCEDURE — 73060 XR HUMERUS 2 VIEW RIGHT: ICD-10-PCS | Mod: 26,RT,, | Performed by: RADIOLOGY

## 2021-03-19 PROCEDURE — 99213 PR OFFICE/OUTPT VISIT, EST, LEVL III, 20-29 MIN: ICD-10-PCS | Mod: S$GLB,,, | Performed by: PHYSICIAN ASSISTANT

## 2021-03-19 PROCEDURE — 73060 X-RAY EXAM OF HUMERUS: CPT | Mod: 26,RT,, | Performed by: RADIOLOGY

## 2021-04-16 ENCOUNTER — HOSPITAL ENCOUNTER (OUTPATIENT)
Dept: RADIOLOGY | Facility: HOSPITAL | Age: 86
Discharge: HOME | End: 2021-04-16
Attending: PHYSICIAN ASSISTANT
Payer: MEDICARE

## 2021-04-16 ENCOUNTER — OFFICE VISIT (OUTPATIENT)
Dept: ORTHOPEDICS | Facility: CLINIC | Age: 86
End: 2021-04-16
Payer: MEDICARE

## 2021-04-16 DIAGNOSIS — S42.331D CLOSED DISPLACED OBLIQUE FRACTURE OF SHAFT OF RIGHT HUMERUS WITH ROUTINE HEALING, SUBSEQUENT ENCOUNTER: ICD-10-CM

## 2021-04-16 DIAGNOSIS — S42.331D CLOSED DISPLACED OBLIQUE FRACTURE OF SHAFT OF RIGHT HUMERUS WITH ROUTINE HEALING, SUBSEQUENT ENCOUNTER: Primary | ICD-10-CM

## 2021-04-16 PROCEDURE — 99213 OFFICE O/P EST LOW 20 MIN: CPT | Mod: S$GLB,,, | Performed by: PHYSICIAN ASSISTANT

## 2021-04-16 PROCEDURE — 1159F MED LIST DOCD IN RCRD: CPT | Mod: S$GLB,,, | Performed by: PHYSICIAN ASSISTANT

## 2021-04-16 PROCEDURE — 99213 PR OFFICE/OUTPT VISIT, EST, LEVL III, 20-29 MIN: ICD-10-PCS | Mod: S$GLB,,, | Performed by: PHYSICIAN ASSISTANT

## 2021-04-16 PROCEDURE — 99999 PR PBB SHADOW E&M-EST. PATIENT-LVL III: ICD-10-PCS | Mod: PBBFAC,,, | Performed by: PHYSICIAN ASSISTANT

## 2021-04-16 PROCEDURE — 1159F PR MEDICATION LIST DOCUMENTED IN MEDICAL RECORD: ICD-10-PCS | Mod: S$GLB,,, | Performed by: PHYSICIAN ASSISTANT

## 2021-04-16 PROCEDURE — 73060 XR HUMERUS 2 VIEW RIGHT: ICD-10-PCS | Mod: 26,RT,, | Performed by: RADIOLOGY

## 2021-04-16 PROCEDURE — 73060 X-RAY EXAM OF HUMERUS: CPT | Mod: 26,RT,, | Performed by: RADIOLOGY

## 2021-04-16 PROCEDURE — 73060 X-RAY EXAM OF HUMERUS: CPT | Mod: TC,RT

## 2021-04-16 PROCEDURE — 99999 PR PBB SHADOW E&M-EST. PATIENT-LVL III: CPT | Mod: PBBFAC,,, | Performed by: PHYSICIAN ASSISTANT

## 2021-04-16 RX ORDER — NYSTATIN 100000 [USP'U]/ML
SUSPENSION ORAL
COMMUNITY

## 2021-07-13 DIAGNOSIS — S42.331D CLOSED DISPLACED OBLIQUE FRACTURE OF SHAFT OF RIGHT HUMERUS WITH ROUTINE HEALING, SUBSEQUENT ENCOUNTER: Primary | ICD-10-CM

## 2021-08-09 DIAGNOSIS — J44.89 COPD (CHRONIC OBSTRUCTIVE PULMONARY DISEASE) WITH CHRONIC BRONCHITIS: Primary | ICD-10-CM

## 2021-08-09 RX ORDER — BUDESONIDE AND FORMOTEROL FUMARATE DIHYDRATE 80; 4.5 UG/1; UG/1
2 AEROSOL RESPIRATORY (INHALATION) EVERY 12 HOURS
Qty: 3 INHALER | Refills: 2 | Status: SHIPPED | OUTPATIENT
Start: 2021-08-09 | End: 2022-08-30

## 2021-08-09 RX ORDER — BUDESONIDE AND FORMOTEROL FUMARATE DIHYDRATE 160; 4.5 UG/1; UG/1
2 AEROSOL RESPIRATORY (INHALATION) EVERY 12 HOURS
Status: CANCELLED | OUTPATIENT
Start: 2021-08-09

## 2021-08-12 ENCOUNTER — TELEPHONE (OUTPATIENT)
Dept: ORTHOPEDICS | Facility: CLINIC | Age: 86
End: 2021-08-12

## 2022-05-17 ENCOUNTER — OFFICE VISIT (OUTPATIENT)
Dept: ORTHOPEDICS | Facility: CLINIC | Age: 87
End: 2022-05-17
Payer: MEDICARE

## 2022-05-17 ENCOUNTER — TELEPHONE (OUTPATIENT)
Dept: ORTHOPEDICS | Facility: CLINIC | Age: 87
End: 2022-05-17
Payer: MEDICARE

## 2022-05-17 ENCOUNTER — HOSPITAL ENCOUNTER (OUTPATIENT)
Dept: RADIOLOGY | Facility: HOSPITAL | Age: 87
Discharge: HOME | End: 2022-05-17
Attending: ORTHOPAEDIC SURGERY
Payer: MEDICARE

## 2022-05-17 VITALS — BODY MASS INDEX: 38.28 KG/M2 | WEIGHT: 208 LBS | HEIGHT: 62 IN

## 2022-05-17 DIAGNOSIS — M79.642 BILATERAL HAND PAIN: ICD-10-CM

## 2022-05-17 DIAGNOSIS — M79.642 BILATERAL HAND PAIN: Primary | ICD-10-CM

## 2022-05-17 DIAGNOSIS — M24.549 CONTRACTURE OF FINGER JOINT, UNSPECIFIED LATERALITY: Primary | ICD-10-CM

## 2022-05-17 DIAGNOSIS — M79.641 BILATERAL HAND PAIN: Primary | ICD-10-CM

## 2022-05-17 DIAGNOSIS — M79.641 BILATERAL HAND PAIN: ICD-10-CM

## 2022-05-17 PROCEDURE — 99999 PR PBB SHADOW E&M-EST. PATIENT-LVL III: CPT | Mod: PBBFAC,,, | Performed by: ORTHOPAEDIC SURGERY

## 2022-05-17 PROCEDURE — 99999 PR PBB SHADOW E&M-EST. PATIENT-LVL III: ICD-10-PCS | Mod: PBBFAC,,, | Performed by: ORTHOPAEDIC SURGERY

## 2022-05-17 PROCEDURE — 73130 X-RAY EXAM OF HAND: CPT | Mod: 26,50,, | Performed by: RADIOLOGY

## 2022-05-17 PROCEDURE — 99203 OFFICE O/P NEW LOW 30 MIN: CPT | Mod: S$GLB,,, | Performed by: ORTHOPAEDIC SURGERY

## 2022-05-17 PROCEDURE — 1159F MED LIST DOCD IN RCRD: CPT | Mod: CPTII,S$GLB,, | Performed by: ORTHOPAEDIC SURGERY

## 2022-05-17 PROCEDURE — 1159F PR MEDICATION LIST DOCUMENTED IN MEDICAL RECORD: ICD-10-PCS | Mod: CPTII,S$GLB,, | Performed by: ORTHOPAEDIC SURGERY

## 2022-05-17 PROCEDURE — 99203 PR OFFICE/OUTPT VISIT, NEW, LEVL III, 30-44 MIN: ICD-10-PCS | Mod: S$GLB,,, | Performed by: ORTHOPAEDIC SURGERY

## 2022-05-17 PROCEDURE — 1126F AMNT PAIN NOTED NONE PRSNT: CPT | Mod: CPTII,S$GLB,, | Performed by: ORTHOPAEDIC SURGERY

## 2022-05-17 PROCEDURE — 73130 XR HAND COMPLETE 3 VIEWS BILATERAL: ICD-10-PCS | Mod: 26,50,, | Performed by: RADIOLOGY

## 2022-05-17 PROCEDURE — 73130 X-RAY EXAM OF HAND: CPT | Mod: TC,50,PN

## 2022-05-17 PROCEDURE — 1126F PR PAIN SEVERITY QUANTIFIED, NO PAIN PRESENT: ICD-10-PCS | Mod: CPTII,S$GLB,, | Performed by: ORTHOPAEDIC SURGERY

## 2022-05-17 NOTE — TELEPHONE ENCOUNTER
----- Message from Sharifa Davis sent at 5/17/2022 10:08 AM CDT -----  Regarding: call back  Contact: [phone]  Who Called: PT     Patient is running 10-15 minutes late due to traffic.

## 2022-05-17 NOTE — PROGRESS NOTES
Subjective:      Patient ID: Susanna Byrnes is a 87 y.o. female.    Chief Complaint: Hand Problem (Bilateral hands)      HPI  Susanna Byrnes is a  87 y.o. female presenting today for bilateral hand and finger contractures.  There was not a history of trauma.  Onset of symptoms began several months ago although does getting worse  The patient does live at home with her family members were very attentive to her care and they are concerned about skin breakdown as well as infection in the palm side  They are doing a good job of keeping her skin clean currently but is getting difficult  She does not really use her hands and she can feed herself has very limited use of her arm.      Review of patient's allergies indicates:   Allergen Reactions    Donepezil Anaphylaxis    Exelon [rivastigmine] Anaphylaxis    Lisinopril Other (See Comments)     ANGIOEDEMA    Memantine Anaphylaxis    Haldol [haloperidol lactate]          Current Outpatient Medications   Medication Sig Dispense Refill    acetaminophen (TYLENOL) 325 MG tablet Take 2 tablets (650 mg total) by mouth every 8 (eight) hours as needed for Pain.  0    albuterol 90 mcg/actuation inhaler Inhale 1-2 puffs into the lungs every 6 (six) hours as needed for Wheezing. Rescue 1 Inhaler 0    albuterol-ipratropium (DUO-NEB) 2.5 mg-0.5 mg/3 mL nebulizer solution Take 3 mLs by nebulization every 6 (six) hours as needed (allergies, congestion). Rescue      amLODIPine (NORVASC) 5 MG tablet Take 5 mg by mouth once daily.      aspirin 81 MG Chew Take 1 tablet (81 mg total) by mouth once daily. 30 tablet 1    azithromycin (ZITHROMAX Z-DAVID) 250 MG tablet Take 2 tablets (500 mg) on  Day 1,  followed by 1 tablet (250 mg) once daily on Days 2 through 5. (Patient taking differently: Take 2 tablets (500 mg) on  Day 1,  followed by 1 tablet (250 mg) once daily on Days 2 through 5.) 6 tablet 0    budesonide-formoterol 80-4.5 mcg (SYMBICORT) 80-4.5 mcg/actuation HFAA Inhale 2 puffs  "into the lungs every 12 (twelve) hours. Controller 3 Inhaler 2    cetirizine (ZYRTEC) 10 MG tablet Take 10 mg by mouth once daily.      chlorthalidone (HYGROTEN) 25 MG Tab Take 1 tablet (25 mg total) by mouth once daily. 30 tablet 3    escitalopram oxalate (LEXAPRO) 5 MG Tab Take 5 mg by mouth once daily.      fluticasone (FLONASE) 50 mcg/actuation nasal spray Spray 2 puffs into each nare daily as needed for allergies  5    HYDROcodone-acetaminophen (NORCO) 5-325 mg per tablet Take 1 tablet by mouth every 4 to 6 hours as needed for Pain. 42 tablet 0    levETIRAcetam (KEPPRA) 100 mg/mL Soln       montelukast (SINGULAIR) 10 mg tablet TAKE 1 TABLET BY MOUTH EVERY EVENING 90 tablet 11    nystatin (MYCOSTATIN) 100,000 unit/mL suspension nystatin 100,000 unit/mL oral suspension      polyethylene glycol (GLYCOLAX) 17 gram PwPk Take 17 g by mouth 2 (two) times daily.  0    potassium chloride SA (K-DUR,KLOR-CON) 20 MEQ tablet Take 1 tablet (20 mEq total) by mouth once daily. 30 tablet 3    triamcinolone acetonide 0.1% (KENALOG) 0.1 % cream Apply topically to affected area as needed for skin rash       No current facility-administered medications for this visit.       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-s/p right distal femur ORIF 6/5/15 6/5/2015    Seizure disorder     Seizures        Past Surgical History:   Procedure Laterality Date    EYE SURGERY      JOINT REPLACEMENT      TOTAL KNEE ARTHROPLASTY  Left       Review of Systems:  ROS    OBJECTIVE:     PHYSICAL EXAM:  Height: 5' 2" (157.5 cm) Weight: 94.3 kg (208 lb)  Vitals:    05/17/22 1159   Weight: 94.3 kg (208 lb)   Height: 5' 2" (1.575 m)   PainSc: 0-No pain     Well developed, well nourished female in no acute distress  Alert and oriented x 3  HEENT- Normal exam  Lungs- Clear to auscultation  Heart- Regular rate and " rhythm  Abdomen- Soft nontender  Extremity exam- examination hands demonstrates severe contractures of all digits both hands with finger and palm deformities it is somewhat passively correctable and I am able to passively extend the fingers although no active function of the fingers is noted  Sensation hard to assess  No skin breakdown but there is some thickening of skin in the palm from chronic contracture    RADIOGRAPHS:  AP lateral x-rays demonstrate severe arthritic changes of the joints and contractures of the digits  Comments: I have personally reviewed the imaging and I agree with the above radiologist's report.    ASSESSMENT/PLAN:     IMPRESSION:  Bilateral finger contractures into the palm    PLAN:  I agree with the family the hygiene is an important issue here and we need to prevent skin breakdown and infection  I have made referral to OT to have some padded splints made that would give some extension and protect the palmar skin  We can also try a towel at home for the time being  In the future if this fails to relieve the symptoms we might do a tendon release of the finger flexor tendons       - We talked at length about the anatomy and pathophysiology of   Encounter Diagnosis   Name Primary?    Contracture of finger joint, unspecified laterality Yes           Disclaimer: This note has been generated using voice-recognition software. There may be typographical errors that have been missed during proof-reading.

## 2022-05-31 ENCOUNTER — CLINICAL SUPPORT (OUTPATIENT)
Dept: REHABILITATION | Facility: HOSPITAL | Age: 87
End: 2022-05-31
Attending: ORTHOPAEDIC SURGERY
Payer: MEDICARE

## 2022-05-31 DIAGNOSIS — M24.549 CONTRACTURE OF FINGER JOINT, UNSPECIFIED LATERALITY: ICD-10-CM

## 2022-05-31 PROCEDURE — 97530 THERAPEUTIC ACTIVITIES: CPT

## 2022-05-31 PROCEDURE — 97165 OT EVAL LOW COMPLEX 30 MIN: CPT

## 2022-05-31 NOTE — PLAN OF CARE
"[facility]  Occupational Therapy Initial Evaluation    Date: 5/31/2022  Name: Susanna Byrnes  Clinic Number: [id number]    Therapy Diagnosis:   Encounter Diagnosis   Name Primary?    Contracture of finger joint, unspecified laterality      Physician: Anthony Solis Jr., *    Physician Orders: Please make special padded splints for bilateral hands to help finger contractures and skin breakdown  Medical Diagnosis: M24.549 (ICD-10-CM) - Contracture of finger joint, unspecified laterality  Surgical Procedure and Date: n/a, n/a / Date of Injury/Onset: several months ago  Evaluation Date: 5?31/22  Insurance Authorization Period Expiration: 5/17/23  Plan of Care Expiration: 7/29/22  Progress Note Due: 7/11/22   Date of Return to MD: 7/11/22  Visit # / Visits authorized: 1 / 1  FOTO: initial eval     Precautions:  Standard    Time In: 2:00  Time Out: 3:03  Total Appointment Time (timed & untimed codes): 63 minutes      SUBJECTIVE     History of Current Condition/Mechanism of Injury: Susanna reports: Per Dr. Solis on 5/17/22, "Susanna Byrnes is a  87 y.o. female presenting today for bilateral hand and finger contractures.  There was not a history of trauma.  Onset of symptoms began several months ago although does getting worse  The patient does live at home with her family members were very attentive to her care and they are concerned about skin breakdown as well as infection in the palm side  They are doing a good job of keeping her skin clean currently but is getting difficult  She does not really use her hands and she can feed herself has very limited use of her arm.  "    Family reports giving patient tylenol prior to visit today to assist with pain when stretching digits.    Falls: 0    Involved Side: bilateral  Dominant Side: pt doesn't use hands functionally   Imaging: x-ray 5/17/22 Flexion of the metacarpophalangeal and proximal interphalangeal joints bilaterally, limiting evaluation.  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  No acute, displaced fracture.  No dislocation.     Functional Limitations/Social History:    Previous functional status includes: Independent with all ADLs.     Current Functional Status   Home/Living environment: lives with their family      Limitation of Functional Status as follows:   ADLs/IADLs:     - dependent for all ADLs with help of family/daughters    Pain:  Functional Pain Scale Rating 0-10: Current 0/10, worst no rating given/10, best 0/10   Location: bilateral   Description: Aching, Dull and Tight  Aggravating Factors: contracture progression of all digits  Easing Factors: massage, pain medication, ice, hot bath and splinting to prevent contracture    Patient's Goals for Therapy: to reduce contractures of all digits for improved hygiene     Medical History:   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    Medications:   has a current medication list which includes the following prescription(s): acetaminophen, albuterol, albuterol-ipratropium, amlodipine, aspirin, azithromycin, budesonide-formoterol 80-4.5 mcg, cetirizine, chlorthalidone, escitalopram oxalate, fluticasone propionate, hydrocodone-acetaminophen, levetiracetam, montelukast, nystatin, polyethylene glycol, potassium chloride sa, and triamcinolone acetonide 0.1%.    Allergies:   Review of patient's allergies indicates:   Allergen Reactions    Donepezil Anaphylaxis    Exelon [rivastigmine] Anaphylaxis    Lisinopril Other (See Comments)     ANGIOEDEMA     Memantine Anaphylaxis    Haldol [haloperidol lactate]           OBJECTIVE     Observation/Appearance: severe flexion contracture of digits 1-5 bilaterally; worse on left side, hyperextension of MP of thumb on L hand; pt presents; bilaterally thumb palmarly adducted in contracture formation, ulnar drifting at MPs demonstrated bilaterally   - significant time spent stretching all digits in extension   Treatment   Total Treatment time (time-based codes) separate from Evaluation: 20 minutes    Susanna received the treatments listed below:     Therapeutic activities to improve functional performance for 20  minutes, including:  Stretching all digits in extension for proper fitting of splint and for proper placement of foam tubing in hand to prevent flexion contracture of digits.  Pt and family educated on stretching hand/digits in extension and monitoring hygiene of inside of palm and between digits.  Pt and family educated to gradually progress diameter of foam tubing in palm of handwith towel or various soft objects to increase extension, so adaptable resting hand splint can fit properly over the next couple of weeks.  Family educated to call and update on progression if resting hand splint fits right hand properly so one can be ordered for the left.  - Issued Small adaptable resting hand splint for the right hand with written instructions.  Patient Education and Home Exercises      Education provided:   - see above    Written Home Exercises Provided: yes.  Exercises were reviewed and Susanna was able to demonstrate them prior to the end of the session.  Susanna demonstrated good  understanding of the education provided. See EMR under Patient Instructions for exercises provided during therapy sessions.     Pt was advised to perform these exercises free of pain, and to stop performing them if pain occurs.    Patient/Family Education: role of OT, goals for OT, scheduling/cancellations - pt verbalized understanding. Discussed  insurance limitations with patient.    ASSESSMENT     Susanna Byrnes is a 87 y.o. female referred to outpatient occupational therapy and presents with a medical diagnosis of flexion contracture of all digits bilaterally  Patient presents with the following therapy deficits: Decreased ROM, Increased pain and Joint Stiffness and demonstrates limitations as described in the chart below. Following medical record review it is determined that pt will benefit from occupational therapy services in order to maximize prevention of flexion contractures, improve pain and hand hygiene, and/or functional use of bilateral hand. The following goals were discussed with the patient and patient is in agreement with them as to be addressed in the treatment plan. The patient's rehab potential is Good.     Anticipated barriers to occupational therapy: none at this time  Pt has no cultural, educational or language barriers to learning provided.    Profile and History Assessment of Occupational Performance Level of Clinical Decision Making Complexity Score   Occupational Profile:   Susanna Byrnes is a 87 y.o. female who lives with their family and is not working Susanna Byrnes has difficulty with  ADLs and IADLs as listed previously, which  Affecting herdaily functional abilities.      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    Medical and Therapy History Review:   Brief               Performance Deficits    Physical:  Joint Mobility  Muscle Tone  Pain  severe digit flexion contracture    Cognitive:  Attention  Orientation  Communication    Psychosocial:    Habits  Routines  Rituals     Clinical Decision Making:  low    Assessment Process:  Problem-Focused Assessments    Modification/Need for Assistance:  Not Necessary    Intervention Selection:  Limited  Treatment Options       low  Based on PMHX, co morbidities , data from assessments and functional level of assistance required with task and clinical presentation directly impacting function.         Goals:   The following goals were discussed with the patient and patient is in agreement with them as to be addressed in the treatment plan.   Long Term Goals (LTGs); to be met by discharge.  LTG #1:  Family to demonstrate independence with splint wear and wear schedule for prevention of flexion contracture.   LTG #2:  Family to demonstrate independence with hand hygiene practice for prevention of skin break down.  LTG #3: Family to demonstrate independence for monitoring pressure points for splint wear for prevent skin breakdown.  LTG #4: Family to demonstrate independence with prevention of flexion contracture and hygiene of patients hand with keeping communication open with therapists/phyisican for potential splint adjustments or new splint fabrication.    PLAN   Plan of Care Certification: 5/31/2022 to 7/29/22.     Outpatient Occupational Therapy to follow up as needed for splint checks for 8 weeks to include the following interventions: Paraffin, Fluidotherapy, Manual therapy/joint mobilizations, Modalities for pain management, US 3 mhz, Therapeutic exercises/activities., Iontophoresis with 2.0 cc Dexamethasone, Orthotic Fabrication/Fit/Training, Electrical Modalities, Joint Protection and Energy Conservation.      All [facility] OT      I CERTIFY THE NEED FOR THESE SERVICES FURNISHED UNDER THIS PLAN OF TREATMENT AND WHILE UNDER MY CARE  Physician's comments:      Physician's Signature: ___________________________________________________

## 2022-06-17 ENCOUNTER — HOSPITAL ENCOUNTER (OUTPATIENT)
Facility: OTHER | Age: 87
Discharge: HOME | End: 2022-06-18
Attending: EMERGENCY MEDICINE | Admitting: EMERGENCY MEDICINE
Payer: MEDICARE

## 2022-06-17 DIAGNOSIS — R11.10 VOMITING: Primary | ICD-10-CM

## 2022-06-17 LAB
ALBUMIN SERPL BCP-MCNC: 2.9 G/DL (ref 3.5–5.2)
ALP SERPL-CCNC: 93 U/L (ref 55–135)
ALT SERPL W/O P-5'-P-CCNC: 10 U/L (ref 10–44)
ANION GAP SERPL CALC-SCNC: 10 MMOL/L (ref 8–16)
AST SERPL-CCNC: 20 U/L (ref 10–40)
BACTERIA #/AREA URNS HPF: ABNORMAL /HPF
BASOPHILS # BLD AUTO: 0.01 K/UL (ref 0–0.2)
BASOPHILS NFR BLD: 0.2 % (ref 0–1.9)
BILIRUB SERPL-MCNC: 0.4 MG/DL (ref 0.1–1)
BILIRUB UR QL STRIP: NEGATIVE
BUN SERPL-MCNC: 10 MG/DL (ref 8–23)
CALCIUM SERPL-MCNC: 9.6 MG/DL (ref 8.7–10.5)
CHLORIDE SERPL-SCNC: 96 MMOL/L (ref 95–110)
CLARITY UR: CLEAR
CO2 SERPL-SCNC: 35 MMOL/L (ref 23–29)
COLOR UR: YELLOW
CREAT SERPL-MCNC: 0.3 MG/DL (ref 0.5–1.4)
CREAT SERPL-MCNC: 0.5 MG/DL (ref 0.5–1.4)
CTP QC/QA: YES
DIFFERENTIAL METHOD: ABNORMAL
EOSINOPHIL # BLD AUTO: 0 K/UL (ref 0–0.5)
EOSINOPHIL NFR BLD: 0.7 % (ref 0–8)
ERYTHROCYTE [DISTWIDTH] IN BLOOD BY AUTOMATED COUNT: 15 % (ref 11.5–14.5)
EST. GFR  (AFRICAN AMERICAN): >60 ML/MIN/1.73 M^2
EST. GFR  (NON AFRICAN AMERICAN): >60 ML/MIN/1.73 M^2
GLUCOSE SERPL-MCNC: 93 MG/DL (ref 70–110)
GLUCOSE UR QL STRIP: NEGATIVE
HCT VFR BLD AUTO: 36.1 % (ref 37–48.5)
HGB BLD-MCNC: 10.7 G/DL (ref 12–16)
HGB UR QL STRIP: ABNORMAL
HYALINE CASTS #/AREA URNS LPF: 0 /LPF
IMM GRANULOCYTES # BLD AUTO: 0.01 K/UL (ref 0–0.04)
IMM GRANULOCYTES NFR BLD AUTO: 0.2 % (ref 0–0.5)
KETONES UR QL STRIP: ABNORMAL
LEUKOCYTE ESTERASE UR QL STRIP: NEGATIVE
LIPASE SERPL-CCNC: <3 U/L (ref 4–60)
LYMPHOCYTES # BLD AUTO: 1.4 K/UL (ref 1–4.8)
LYMPHOCYTES NFR BLD: 30.9 % (ref 18–48)
MCH RBC QN AUTO: 23.6 PG (ref 27–31)
MCHC RBC AUTO-ENTMCNC: 29.6 G/DL (ref 32–36)
MCV RBC AUTO: 80 FL (ref 82–98)
MICROSCOPIC COMMENT: ABNORMAL
MONOCYTES # BLD AUTO: 0.3 K/UL (ref 0.3–1)
MONOCYTES NFR BLD: 6.6 % (ref 4–15)
NEUTROPHILS # BLD AUTO: 2.8 K/UL (ref 1.8–7.7)
NEUTROPHILS NFR BLD: 61.4 % (ref 38–73)
NITRITE UR QL STRIP: POSITIVE
NRBC BLD-RTO: 0 /100 WBC
PH UR STRIP: 8 [PH] (ref 5–8)
PLATELET # BLD AUTO: 216 K/UL (ref 150–450)
PMV BLD AUTO: 10.1 FL (ref 9.2–12.9)
POTASSIUM SERPL-SCNC: 3.2 MMOL/L (ref 3.5–5.1)
PROT SERPL-MCNC: 8.2 G/DL (ref 6–8.4)
PROT UR QL STRIP: ABNORMAL
RBC # BLD AUTO: 4.54 M/UL (ref 4–5.4)
RBC #/AREA URNS HPF: 11 /HPF (ref 0–4)
SAMPLE: ABNORMAL
SARS-COV-2 RDRP RESP QL NAA+PROBE: NEGATIVE
SODIUM SERPL-SCNC: 141 MMOL/L (ref 136–145)
SP GR UR STRIP: 1.01 (ref 1–1.03)
SQUAMOUS #/AREA URNS HPF: 2 /HPF
URN SPEC COLLECT METH UR: ABNORMAL
UROBILINOGEN UR STRIP-ACNC: NEGATIVE EU/DL
WBC # BLD AUTO: 4.56 K/UL (ref 3.9–12.7)
WBC #/AREA URNS HPF: 20 /HPF (ref 0–5)

## 2022-06-17 PROCEDURE — 93005 ELECTROCARDIOGRAM TRACING: CPT

## 2022-06-17 PROCEDURE — 87077 CULTURE AEROBIC IDENTIFY: CPT | Performed by: EMERGENCY MEDICINE

## 2022-06-17 PROCEDURE — 81000 URINALYSIS NONAUTO W/SCOPE: CPT | Performed by: EMERGENCY MEDICINE

## 2022-06-17 PROCEDURE — 93010 ELECTROCARDIOGRAM REPORT: CPT | Mod: ,,, | Performed by: INTERNAL MEDICINE

## 2022-06-17 PROCEDURE — 25500020 PHARM REV CODE 255: Performed by: EMERGENCY MEDICINE

## 2022-06-17 PROCEDURE — 87086 URINE CULTURE/COLONY COUNT: CPT | Performed by: EMERGENCY MEDICINE

## 2022-06-17 PROCEDURE — 96365 THER/PROPH/DIAG IV INF INIT: CPT | Mod: 59

## 2022-06-17 PROCEDURE — 25000003 PHARM REV CODE 250: Performed by: NURSE PRACTITIONER

## 2022-06-17 PROCEDURE — 63600175 PHARM REV CODE 636 W HCPCS: Performed by: NURSE PRACTITIONER

## 2022-06-17 PROCEDURE — 80053 COMPREHEN METABOLIC PANEL: CPT | Performed by: EMERGENCY MEDICINE

## 2022-06-17 PROCEDURE — 87186 SC STD MICRODIL/AGAR DIL: CPT | Performed by: EMERGENCY MEDICINE

## 2022-06-17 PROCEDURE — 96361 HYDRATE IV INFUSION ADD-ON: CPT

## 2022-06-17 PROCEDURE — 25000003 PHARM REV CODE 250: Performed by: EMERGENCY MEDICINE

## 2022-06-17 PROCEDURE — 63600175 PHARM REV CODE 636 W HCPCS: Performed by: EMERGENCY MEDICINE

## 2022-06-17 PROCEDURE — 93010 EKG 12-LEAD: ICD-10-PCS | Mod: ,,, | Performed by: INTERNAL MEDICINE

## 2022-06-17 PROCEDURE — P9612 CATHETERIZE FOR URINE SPEC: HCPCS

## 2022-06-17 PROCEDURE — G0378 HOSPITAL OBSERVATION PER HR: HCPCS

## 2022-06-17 PROCEDURE — 99285 EMERGENCY DEPT VISIT HI MDM: CPT | Mod: 25

## 2022-06-17 PROCEDURE — 85025 COMPLETE CBC W/AUTO DIFF WBC: CPT | Performed by: EMERGENCY MEDICINE

## 2022-06-17 PROCEDURE — 83690 ASSAY OF LIPASE: CPT | Performed by: EMERGENCY MEDICINE

## 2022-06-17 PROCEDURE — 87088 URINE BACTERIA CULTURE: CPT | Performed by: EMERGENCY MEDICINE

## 2022-06-17 PROCEDURE — U0002 COVID-19 LAB TEST NON-CDC: HCPCS | Performed by: NURSE PRACTITIONER

## 2022-06-17 PROCEDURE — 96375 TX/PRO/DX INJ NEW DRUG ADDON: CPT

## 2022-06-17 RX ORDER — TALC
6 POWDER (GRAM) TOPICAL NIGHTLY PRN
Status: CANCELLED | OUTPATIENT
Start: 2022-06-17

## 2022-06-17 RX ORDER — POTASSIUM CHLORIDE 20 MEQ/1
40 TABLET, EXTENDED RELEASE ORAL ONCE
Status: DISCONTINUED | OUTPATIENT
Start: 2022-06-17 | End: 2022-06-18

## 2022-06-17 RX ORDER — HYDRALAZINE HYDROCHLORIDE 20 MG/ML
10 INJECTION INTRAMUSCULAR; INTRAVENOUS EVERY 8 HOURS PRN
Status: DISCONTINUED | OUTPATIENT
Start: 2022-06-17 | End: 2022-06-18 | Stop reason: HOSPADM

## 2022-06-17 RX ORDER — SODIUM CHLORIDE 0.9 % (FLUSH) 0.9 %
10 SYRINGE (ML) INJECTION EVERY 8 HOURS PRN
Status: DISCONTINUED | OUTPATIENT
Start: 2022-06-17 | End: 2022-06-18 | Stop reason: HOSPADM

## 2022-06-17 RX ORDER — MIRTAZAPINE 15 MG/1
15 TABLET, FILM COATED ORAL NIGHTLY
Status: DISCONTINUED | OUTPATIENT
Start: 2022-06-17 | End: 2022-06-18

## 2022-06-17 RX ORDER — SODIUM CHLORIDE 9 MG/ML
500 INJECTION, SOLUTION INTRAVENOUS
Status: COMPLETED | OUTPATIENT
Start: 2022-06-17 | End: 2022-06-17

## 2022-06-17 RX ORDER — NAPROXEN SODIUM 220 MG/1
81 TABLET, FILM COATED ORAL DAILY
Status: DISCONTINUED | OUTPATIENT
Start: 2022-06-18 | End: 2022-06-17

## 2022-06-17 RX ORDER — ONDANSETRON 2 MG/ML
4 INJECTION INTRAMUSCULAR; INTRAVENOUS EVERY 8 HOURS PRN
Status: DISCONTINUED | OUTPATIENT
Start: 2022-06-17 | End: 2022-06-18 | Stop reason: HOSPADM

## 2022-06-17 RX ORDER — FLUTICASONE FUROATE AND VILANTEROL 100; 25 UG/1; UG/1
1 POWDER RESPIRATORY (INHALATION) DAILY
Refills: 2 | Status: DISCONTINUED | OUTPATIENT
Start: 2022-06-18 | End: 2022-06-17

## 2022-06-17 RX ORDER — CHLORTHALIDONE 25 MG/1
25 TABLET ORAL DAILY
Status: DISCONTINUED | OUTPATIENT
Start: 2022-06-18 | End: 2022-06-18

## 2022-06-17 RX ORDER — ESCITALOPRAM OXALATE 5 MG/1
5 TABLET ORAL DAILY
Status: DISCONTINUED | OUTPATIENT
Start: 2022-06-18 | End: 2022-06-17

## 2022-06-17 RX ORDER — AMLODIPINE BESYLATE 5 MG/1
5 TABLET ORAL DAILY
Status: DISCONTINUED | OUTPATIENT
Start: 2022-06-18 | End: 2022-06-17

## 2022-06-17 RX ORDER — CHLORTHALIDONE 25 MG/1
25 TABLET ORAL ONCE
Status: COMPLETED | OUTPATIENT
Start: 2022-06-17 | End: 2022-06-17

## 2022-06-17 RX ORDER — NALOXONE HCL 0.4 MG/ML
0.02 VIAL (ML) INJECTION
Status: DISCONTINUED | OUTPATIENT
Start: 2022-06-17 | End: 2022-06-18 | Stop reason: HOSPADM

## 2022-06-17 RX ORDER — CHLORTHALIDONE 25 MG/1
25 TABLET ORAL ONCE
Status: DISCONTINUED | OUTPATIENT
Start: 2022-06-17 | End: 2022-06-17

## 2022-06-17 RX ORDER — LEVETIRACETAM 100 MG/ML
1000 SOLUTION ORAL NIGHTLY
Status: DISCONTINUED | OUTPATIENT
Start: 2022-06-17 | End: 2022-06-18 | Stop reason: HOSPADM

## 2022-06-17 RX ORDER — ONDANSETRON 2 MG/ML
4 INJECTION INTRAMUSCULAR; INTRAVENOUS
Status: COMPLETED | OUTPATIENT
Start: 2022-06-17 | End: 2022-06-17

## 2022-06-17 RX ORDER — HYDRALAZINE HYDROCHLORIDE 20 MG/ML
10 INJECTION INTRAMUSCULAR; INTRAVENOUS
Status: DISCONTINUED | OUTPATIENT
Start: 2022-06-17 | End: 2022-06-18

## 2022-06-17 RX ORDER — SODIUM CHLORIDE 9 MG/ML
INJECTION, SOLUTION INTRAVENOUS CONTINUOUS
Status: DISCONTINUED | OUTPATIENT
Start: 2022-06-17 | End: 2022-06-18

## 2022-06-17 RX ORDER — ACETAMINOPHEN 325 MG/1
650 TABLET ORAL EVERY 4 HOURS PRN
Status: DISCONTINUED | OUTPATIENT
Start: 2022-06-17 | End: 2022-06-18 | Stop reason: HOSPADM

## 2022-06-17 RX ORDER — SODIUM CHLORIDE 0.9 % (FLUSH) 0.9 %
10 SYRINGE (ML) INJECTION
Status: DISCONTINUED | OUTPATIENT
Start: 2022-06-17 | End: 2022-06-18 | Stop reason: HOSPADM

## 2022-06-17 RX ORDER — MIRTAZAPINE 15 MG/1
15 TABLET, FILM COATED ORAL NIGHTLY
COMMUNITY
Start: 2022-05-16

## 2022-06-17 RX ADMIN — ONDANSETRON 4 MG: 2 INJECTION INTRAMUSCULAR; INTRAVENOUS at 07:06

## 2022-06-17 RX ADMIN — CHLORTHALIDONE 25 MG: 25 TABLET ORAL at 08:06

## 2022-06-17 RX ADMIN — IOHEXOL 100 ML: 350 INJECTION, SOLUTION INTRAVENOUS at 08:06

## 2022-06-17 RX ADMIN — SODIUM CHLORIDE: 0.9 INJECTION, SOLUTION INTRAVENOUS at 11:06

## 2022-06-17 RX ADMIN — CEFTRIAXONE 1 G: 1 INJECTION, SOLUTION INTRAVENOUS at 09:06

## 2022-06-17 RX ADMIN — HYDRALAZINE HYDROCHLORIDE 10 MG: 20 INJECTION INTRAMUSCULAR; INTRAVENOUS at 10:06

## 2022-06-17 RX ADMIN — SODIUM CHLORIDE 500 ML: 0.9 INJECTION, SOLUTION INTRAVENOUS at 09:06

## 2022-06-17 RX ADMIN — LEVETIRACETAM 1000 MG: 500 SOLUTION ORAL at 10:06

## 2022-06-17 RX ADMIN — MIRTAZAPINE 15 MG: 15 TABLET, FILM COATED ORAL at 10:06

## 2022-06-17 NOTE — ED PROVIDER NOTES
"Encounter Date: 6/17/2022    SCRIBE #1 NOTE: I, Richard Ibrahim, am scribing for, and in the presence of, Ernestina Livingston MD.       History     Chief Complaint   Patient presents with    Emesis     Emesis x3 today per family with reported "dark blood". No active emesis with EMS. At baseline per EMS and family.      Time seen by provider: 5:45 PM    This is a 87 y.o. female with PMHx of HTN, seizures, arthritis, and dementia who presents with complaint of vomiting that began this morning. The patient reports 3 episodes of vomiting today. She states the first two episodes were a dark brown color and the most recent episode had drops of red in it. The patient denies any current abdominal pain, fever, or chills.  Denies chest pain or shortness of breath.  The patient is slightly confused but that is consistent with baseline. The patient is currently on aspirin. This is the extent of the patient's complaints at this time.    The history is provided by the patient.     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-s/p right distal femur ORIF 6/5/15 6/5/2015    Seizure disorder     Seizures      Past Surgical History:   Procedure Laterality Date    EYE SURGERY      JOINT REPLACEMENT      TOTAL KNEE ARTHROPLASTY  Left     Family History   Problem Relation Age of Onset    Seizures Sister     Dementia Brother     Seizures Brother      Social History     Tobacco Use    Smoking status: Former Smoker     Types: Cigarettes     Quit date: 9/4/2011     Years since quitting: 10.7    Smokeless tobacco: Former User   Substance Use Topics    Alcohol use: No    " Drug use: No     Review of Systems   Constitutional: Negative for chills and fever.   HENT: Negative for congestion, sore throat and trouble swallowing.    Respiratory: Negative for cough, chest tightness and shortness of breath.    Cardiovascular: Negative for chest pain.   Gastrointestinal: Positive for vomiting. Negative for abdominal pain and nausea.   Genitourinary: Negative for dysuria and flank pain.   Musculoskeletal: Negative for back pain and neck pain.   Skin: Negative for rash.   Neurological: Negative for dizziness, weakness and headaches.       Physical Exam     Initial Vitals [06/17/22 1706]   BP Pulse Resp Temp SpO2   (!) 188/75 70 16 98.7 °F (37.1 °C) 100 %      MAP       --         Physical Exam    Nursing note and vitals reviewed.  Constitutional: Vital signs are normal. She appears well-developed and well-nourished.  Non-toxic appearance. She does not appear ill.   HENT:   Head: Normocephalic and atraumatic.   Mouth/Throat: Mucous membranes are normal. Mucous membranes are not dry.   Dry mucous membranes   Eyes: Conjunctivae and lids are normal.   Neck: Neck supple.   Normal range of motion.  Cardiovascular: Normal rate, regular rhythm and normal heart sounds.   Pulmonary/Chest: Breath sounds normal. No respiratory distress. She has no wheezes. She has no rales.   Abdominal: Abdomen is soft. Bowel sounds are normal. She exhibits no distension. There is no abdominal tenderness. There is no rebound.   Genitourinary:    Genitourinary Comments: Trace guaiac-positive stool.  No gross blood.  RN present at bedside.     Musculoskeletal:         General: Edema present.      Cervical back: Normal range of motion and neck supple.      Comments: Trace edema to lower extremities.  Contracted     Neurological: She is alert.   Oriented x2   Skin: Skin is warm, dry and intact. Capillary refill takes less than 2 seconds. No pallor.   Psychiatric: She has a normal mood and affect. Her speech is normal and  behavior is normal.         ED Course   Procedures  Labs Reviewed   COMPREHENSIVE METABOLIC PANEL - Abnormal; Notable for the following components:       Result Value    Potassium 3.2 (*)     CO2 35 (*)     Albumin 2.9 (*)     All other components within normal limits   CBC W/ AUTO DIFFERENTIAL - Abnormal; Notable for the following components:    Hemoglobin 10.7 (*)     Hematocrit 36.1 (*)     MCV 80 (*)     MCH 23.6 (*)     MCHC 29.6 (*)     RDW 15.0 (*)     All other components within normal limits   LIPASE - Abnormal; Notable for the following components:    Lipase <3 (*)     All other components within normal limits   URINALYSIS - Abnormal; Notable for the following components:    Protein, UA 1+ (*)     Ketones, UA 1+ (*)     Occult Blood UA 2+ (*)     Nitrite, UA Positive (*)     All other components within normal limits   URINALYSIS MICROSCOPIC - Abnormal; Notable for the following components:    RBC, UA 11 (*)     WBC, UA 20 (*)     Bacteria Few (*)     All other components within normal limits   ISTAT CREATININE - Abnormal; Notable for the following components:    POC Creatinine 0.3 (*)     All other components within normal limits   CULTURE, URINE   SARS-COV-2 RDRP GENE     EKG Readings: (Independently Interpreted)   6:51PM:  Rate of 81. Normal sinus rhythm.  Normal axis.  Normal intervals.  No ST or ischemic changes.         Imaging Results          CT Abdomen Pelvis With Contrast (Final result)  Result time 06/17/22 20:34:13    Final result by Blake Mock MD (06/17/22 20:34:13)                 Impression:      1. Bibasilar pleural effusions with associated atelectasis.  Possible mild consolidation at the left lung base also.  2. Constipation.  3. Possible mild wall thickening of the distal rectum.  Recommend clinical correlation and follow-up.  4. Multilevel chronic changes of the lumbar spine.  5. Few calcified uterine fibroids.      Electronically signed by: Blake  Bassam  Date:    06/17/2022  Time:    20:34             Narrative:    EXAMINATION:  CT ABDOMEN PELVIS WITH CONTRAST    CLINICAL HISTORY:  Nausea/vomiting;Abdominal pain, acute, nonlocalized;    TECHNIQUE:  Low dose axial images, sagittal and coronal reformations were obtained from the lung bases to the pubic symphysis following the IV administration of 100 mL of Omnipaque 350 .  Oral contrast was not administered.    COMPARISON:  03/25/2009    FINDINGS:  Abdomen:    -    - Lung bases: Bibasilar small pleural effusions with associated atelectasis. Possible mild consolidation at the left lung base also.    - Liver: No focal mass.    - Gallbladder: No calcified gallstones.    - Bile Ducts: No evidence of intra or extra hepatic biliary ductal dilation.    - Spleen: Negative.    - Kidneys: No mass or hydronephrosis.    - Adrenals: Unremarkable.    - Pancreas: No mass or peripancreatic fat stranding.    - Retroperitoneum:  No significant adenopathy.    - Vascular: No abdominal aortic aneurysm.    - Abdominal wall:  Unremarkable.    Pelvis:    Urinary bladder is within normal limits.    The uterus is present.  Few small calcified uterine fibroids are noted.    Bowel/Mesentery:    No evidence of bowel obstruction.  Retained feces throughout the colon and rectum.  Possible mild wall thickening of the distal rectum on axial 127-130.  Correlation with rectal exam may be helpful.  Mild inflammatory, infectious or neoplastic process cannot be excluded.  Follow-up recommended.    The appendix is within normal limits.    Bones:  No acute osseous abnormality and no suspicious lytic or blastic lesion.    There is slight straightening of normal lumbar lordosis.  Grade 1 retrolisthesis of L4 on L5.  Grade 1 spondylolisthesis of L3 on L4.    Mild compression fractures of superior endplates L2 and L1.    Moderate central canal narrowing at L2-3.                                 Medications   cefTRIAXone (ROCEPHIN) 1 g/50 mL D5W IVPB  (has no administration in time range)   ondansetron injection 4 mg (4 mg Intravenous Given 6/17/22 1902)   iohexoL (OMNIPAQUE 350) injection 100 mL (100 mLs Intravenous Given 6/17/22 2005)   chlorthalidone tablet 25 mg (25 mg Oral Given 6/17/22 2015)   0.9%  NaCl infusion (500 mLs Intravenous New Bag 6/17/22 2106)     Medical Decision Making:   History:   I obtained history from: EMS provider.  Old Medical Records: I decided to obtain old medical records.  Old Records Summarized: other records and records from clinic visits.  Initial Assessment:   5:45PM:  Patient is an 87-year-old female who presents to the emergency department with nausea/vomiting.  Patient appears well, nontoxic.  She does appear slightly dry.  Her abdomen is soft, nontender.  Will plan for labs, imaging, will continue to follow and reassess.  Independently Interpreted Test(s):   I have ordered and independently interpreted EKG Reading(s) - see prior notes  Clinical Tests:   Lab Tests: Ordered and Reviewed  Radiological Study: Ordered and Reviewed  Medical Tests: Ordered and Reviewed  Other:   I have discussed this case with another health care provider.    9:16 PM:  Patient did well, remained stable.  Her UA is suggestive of an infection and therefore will plan for IV antibiotics.  She is trace guaiac positive from the bottom and she does also seem dehydrated.  Given these findings, we will plan to admit to the hospital for further observation and management.  I updated the patient and family regarding the results along with plan for admission.  Agreeable to plan and all questions answered.  I discussed with Hernando Niño NP, who is agreeable to plan for admission and all questions answered.            Scribe Attestation:   Scribe #1: I performed the above scribed service and the documentation accurately describes the services I performed. I attest to the accuracy of the note.           Physician Attestation for Scribe: I, Ernestina Livingston, reviewed  documentation as scribed in my presence, which is both accurate and complete.         Clinical Impression:   Final diagnoses:  [R11.10] Vomiting (Primary)          ED Disposition Condition    Observation               Ernestina Livingston MD  06/17/22 0770

## 2022-06-18 VITALS
DIASTOLIC BLOOD PRESSURE: 57 MMHG | SYSTOLIC BLOOD PRESSURE: 120 MMHG | TEMPERATURE: 99 F | OXYGEN SATURATION: 100 % | WEIGHT: 146.19 LBS | RESPIRATION RATE: 16 BRPM | HEART RATE: 63 BPM | HEIGHT: 62 IN | BODY MASS INDEX: 26.9 KG/M2

## 2022-06-18 PROBLEM — R11.10 VOMITING: Status: RESOLVED | Noted: 2022-06-17 | Resolved: 2022-06-18

## 2022-06-18 PROBLEM — E87.6 HYPOKALEMIA: Status: ACTIVE | Noted: 2022-06-18

## 2022-06-18 LAB
ALBUMIN SERPL BCP-MCNC: 2.7 G/DL (ref 3.5–5.2)
ALP SERPL-CCNC: 83 U/L (ref 55–135)
ALT SERPL W/O P-5'-P-CCNC: 10 U/L (ref 10–44)
ANION GAP SERPL CALC-SCNC: 13 MMOL/L (ref 8–16)
AST SERPL-CCNC: 17 U/L (ref 10–40)
BASOPHILS # BLD AUTO: 0.01 K/UL (ref 0–0.2)
BASOPHILS NFR BLD: 0.2 % (ref 0–1.9)
BILIRUB SERPL-MCNC: 0.3 MG/DL (ref 0.1–1)
BUN SERPL-MCNC: 9 MG/DL (ref 8–23)
CALCIUM SERPL-MCNC: 8.8 MG/DL (ref 8.7–10.5)
CHLORIDE SERPL-SCNC: 96 MMOL/L (ref 95–110)
CO2 SERPL-SCNC: 33 MMOL/L (ref 23–29)
CREAT SERPL-MCNC: 0.4 MG/DL (ref 0.5–1.4)
DIFFERENTIAL METHOD: ABNORMAL
EOSINOPHIL # BLD AUTO: 0.2 K/UL (ref 0–0.5)
EOSINOPHIL NFR BLD: 2.8 % (ref 0–8)
ERYTHROCYTE [DISTWIDTH] IN BLOOD BY AUTOMATED COUNT: 15.1 % (ref 11.5–14.5)
EST. GFR  (AFRICAN AMERICAN): >60 ML/MIN/1.73 M^2
EST. GFR  (NON AFRICAN AMERICAN): >60 ML/MIN/1.73 M^2
GLUCOSE SERPL-MCNC: 100 MG/DL (ref 70–110)
HCT VFR BLD AUTO: 34.6 % (ref 37–48.5)
HGB BLD-MCNC: 10.3 G/DL (ref 12–16)
IMM GRANULOCYTES # BLD AUTO: 0.02 K/UL (ref 0–0.04)
IMM GRANULOCYTES NFR BLD AUTO: 0.3 % (ref 0–0.5)
LACTATE SERPL-SCNC: 0.6 MMOL/L (ref 0.5–2.2)
LYMPHOCYTES # BLD AUTO: 1.7 K/UL (ref 1–4.8)
LYMPHOCYTES NFR BLD: 28 % (ref 18–48)
MAGNESIUM SERPL-MCNC: 1.6 MG/DL (ref 1.6–2.6)
MCH RBC QN AUTO: 23.7 PG (ref 27–31)
MCHC RBC AUTO-ENTMCNC: 29.8 G/DL (ref 32–36)
MCV RBC AUTO: 80 FL (ref 82–98)
MONOCYTES # BLD AUTO: 0.5 K/UL (ref 0.3–1)
MONOCYTES NFR BLD: 8.2 % (ref 4–15)
NEUTROPHILS # BLD AUTO: 3.7 K/UL (ref 1.8–7.7)
NEUTROPHILS NFR BLD: 60.5 % (ref 38–73)
NRBC BLD-RTO: 0 /100 WBC
PHOSPHATE SERPL-MCNC: 3.3 MG/DL (ref 2.7–4.5)
PLATELET # BLD AUTO: 203 K/UL (ref 150–450)
PMV BLD AUTO: 10 FL (ref 9.2–12.9)
POTASSIUM SERPL-SCNC: 3 MMOL/L (ref 3.5–5.1)
PROT SERPL-MCNC: 7.1 G/DL (ref 6–8.4)
RBC # BLD AUTO: 4.34 M/UL (ref 4–5.4)
SODIUM SERPL-SCNC: 142 MMOL/L (ref 136–145)
WBC # BLD AUTO: 6.08 K/UL (ref 3.9–12.7)

## 2022-06-18 PROCEDURE — 99234 HOSP IP/OBS SM DT SF/LOW 45: CPT | Mod: ,,, | Performed by: NURSE PRACTITIONER

## 2022-06-18 PROCEDURE — 99234 PR OBSERV/HOSP SAME DATE,LEVL III: ICD-10-PCS | Mod: ,,, | Performed by: NURSE PRACTITIONER

## 2022-06-18 PROCEDURE — 83605 ASSAY OF LACTIC ACID: CPT | Performed by: NURSE PRACTITIONER

## 2022-06-18 PROCEDURE — 96361 HYDRATE IV INFUSION ADD-ON: CPT | Performed by: EMERGENCY MEDICINE

## 2022-06-18 PROCEDURE — 99204 OFFICE O/P NEW MOD 45 MIN: CPT | Mod: ,,, | Performed by: INTERNAL MEDICINE

## 2022-06-18 PROCEDURE — 84100 ASSAY OF PHOSPHORUS: CPT | Performed by: NURSE PRACTITIONER

## 2022-06-18 PROCEDURE — 99204 PR OFFICE/OUTPT VISIT, NEW, LEVL IV, 45-59 MIN: ICD-10-PCS | Mod: ,,, | Performed by: INTERNAL MEDICINE

## 2022-06-18 PROCEDURE — 96366 THER/PROPH/DIAG IV INF ADDON: CPT | Performed by: EMERGENCY MEDICINE

## 2022-06-18 PROCEDURE — 63600175 PHARM REV CODE 636 W HCPCS: Performed by: NURSE PRACTITIONER

## 2022-06-18 PROCEDURE — 96367 TX/PROPH/DG ADDL SEQ IV INF: CPT | Performed by: EMERGENCY MEDICINE

## 2022-06-18 PROCEDURE — 99499 UNLISTED E&M SERVICE: CPT | Mod: ,,, | Performed by: PHYSICIAN ASSISTANT

## 2022-06-18 PROCEDURE — 83735 ASSAY OF MAGNESIUM: CPT | Performed by: NURSE PRACTITIONER

## 2022-06-18 PROCEDURE — 25000003 PHARM REV CODE 250: Performed by: PHYSICIAN ASSISTANT

## 2022-06-18 PROCEDURE — 85025 COMPLETE CBC W/AUTO DIFF WBC: CPT | Performed by: NURSE PRACTITIONER

## 2022-06-18 PROCEDURE — 63600175 PHARM REV CODE 636 W HCPCS: Performed by: PHYSICIAN ASSISTANT

## 2022-06-18 PROCEDURE — 99499 NO LOS: ICD-10-PCS | Mod: ,,, | Performed by: PHYSICIAN ASSISTANT

## 2022-06-18 PROCEDURE — G0378 HOSPITAL OBSERVATION PER HR: HCPCS

## 2022-06-18 PROCEDURE — 80053 COMPREHEN METABOLIC PANEL: CPT | Performed by: NURSE PRACTITIONER

## 2022-06-18 RX ORDER — MIRTAZAPINE 7.5 MG/1
7.5 TABLET, FILM COATED ORAL NIGHTLY
Status: DISCONTINUED | OUTPATIENT
Start: 2022-06-18 | End: 2022-06-18 | Stop reason: HOSPADM

## 2022-06-18 RX ORDER — MAGNESIUM SULFATE HEPTAHYDRATE 40 MG/ML
2 INJECTION, SOLUTION INTRAVENOUS ONCE
Status: COMPLETED | OUTPATIENT
Start: 2022-06-18 | End: 2022-06-18

## 2022-06-18 RX ORDER — ONDANSETRON 4 MG/1
4 TABLET, ORALLY DISINTEGRATING ORAL EVERY 8 HOURS PRN
Qty: 10 TABLET | Refills: 0 | Status: SHIPPED | OUTPATIENT
Start: 2022-06-18

## 2022-06-18 RX ORDER — POTASSIUM CHLORIDE 7.45 MG/ML
10 INJECTION INTRAVENOUS
Status: DISCONTINUED | OUTPATIENT
Start: 2022-06-18 | End: 2022-06-18

## 2022-06-18 RX ORDER — SODIUM CHLORIDE AND POTASSIUM CHLORIDE 150; 900 MG/100ML; MG/100ML
INJECTION, SOLUTION INTRAVENOUS CONTINUOUS
Status: DISCONTINUED | OUTPATIENT
Start: 2022-06-18 | End: 2022-06-18

## 2022-06-18 RX ORDER — POTASSIUM CHLORIDE 20 MEQ/1
20 TABLET, EXTENDED RELEASE ORAL 2 TIMES DAILY
Qty: 60 TABLET | Refills: 0 | Status: SHIPPED | OUTPATIENT
Start: 2022-06-18

## 2022-06-18 RX ADMIN — SODIUM CHLORIDE AND POTASSIUM CHLORIDE: .9; .15 SOLUTION INTRAVENOUS at 01:06

## 2022-06-18 RX ADMIN — POTASSIUM BICARBONATE 25 MEQ: 978 TABLET, EFFERVESCENT ORAL at 09:06

## 2022-06-18 RX ADMIN — MAGNESIUM SULFATE 2 G: 2 INJECTION INTRAVENOUS at 09:06

## 2022-06-18 NOTE — PLAN OF CARE
06/18/22 1602   Final Note   Assessment Type Final Discharge Note   Anticipated Discharge Disposition Home   Post-Acute Status   Discharge Delays None known at this time     Transportation has been set up.  No needs.

## 2022-06-18 NOTE — H&P
"[facility] Medicine  History & Physical    Patient Name: Susanna Byrnes  MRN: [id number]  Patient Class: OP- Observation  Admission Date: 6/17/2022  Attending Physician: Adalberto Null MD   Primary Care Provider: Ignacia River MD         Patient information was obtained from patient, past medical records and ER records.     Subjective:     Principal Problem:Vomiting    Chief Complaint:   Chief Complaint   Patient presents with    Emesis     Emesis x3 today per family with reported "dark blood". No active emesis with EMS. At baseline per EMS and family.         HPI: The patient is a 87 y.o. female with a past medical history of HTN, seizures, and dementia who presents with complaint of vomiting that began this morning. The patient reports 3 episodes of vomiting today. She states the first two episodes were a dark brown color and the most recent episode had drops of red in it. The patient denies any current abdominal pain, fever, or chills.  Denies chest pain or shortness of breath.  The patient is slightly confused but that is consistent with baseline.  The patient will be admitted for further management of her acute vomiting episodes and concern for developing GI bleed.      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     Current " Outpatient Medications on File Prior to Encounter   Medication Sig    acetaminophen (TYLENOL) 325 MG tablet Take 2 tablets (650 mg total) by mouth every 8 (eight) hours as needed for Pain.    albuterol 90 mcg/actuation inhaler Inhale 1-2 puffs into the lungs every 6 (six) hours as needed for Wheezing. Rescue    albuterol-ipratropium (DUO-NEB) 2.5 mg-0.5 mg/3 mL nebulizer solution Take 3 mLs by nebulization every 6 (six) hours as needed (allergies, congestion). Rescue    amLODIPine (NORVASC) 5 MG tablet Take 5 mg by mouth once daily.    aspirin 81 MG Chew Take 1 tablet (81 mg total) by mouth once daily.    budesonide-formoterol 80-4.5 mcg (SYMBICORT) 80-4.5 mcg/actuation HFAA Inhale 2 puffs into the lungs every 12 (twelve) hours. Controller    cetirizine (ZYRTEC) 10 MG tablet Take 10 mg by mouth once daily.    chlorthalidone (HYGROTEN) 25 MG Tab Take 1 tablet (25 mg total) by mouth once daily.    escitalopram oxalate (LEXAPRO) 5 MG Tab Take 5 mg by mouth once daily.    fluticasone (FLONASE) 50 mcg/actuation nasal spray Spray 2 puffs into each nare daily as needed for allergies    HYDROcodone-acetaminophen (NORCO) 5-325 mg per tablet Take 1 tablet by mouth every 4 to 6 hours as needed for Pain.    levETIRAcetam (KEPPRA) 100 mg/mL Soln     mirtazapine (REMERON) 15 MG tablet Take 15 mg by mouth nightly.    montelukast (SINGULAIR) 10 mg tablet TAKE 1 TABLET BY MOUTH EVERY EVENING    nystatin (MYCOSTATIN) 100,000 unit/mL suspension nystatin 100,000 unit/mL oral suspension    polyethylene glycol (GLYCOLAX) 17 gram PwPk Take 17 g by mouth 2 (two) times daily.    potassium chloride SA (K-DUR,KLOR-CON) 20 MEQ tablet Take 1 tablet (20 mEq total) by mouth once daily.    triamcinolone acetonide 0.1% (KENALOG) 0.1 % cream Apply topically to affected area as needed for skin rash     Family History       Problem Relation (Age of Onset)    Dementia Brother    Seizures Sister, Brother          Tobacco Use     Smoking status: Former Smoker     Types: Cigarettes     Quit date: 9/4/2011     Years since quitting: 10.7    Smokeless tobacco: Former User   Substance and Sexual Activity    Alcohol use: No    Drug use: No    Sexual activity: Never     Review of Systems   Unable to perform ROS: Acuity of condition   Objective:     Vital Signs (Most Recent):  Temp: 98.9 °F (37.2 °C) (06/17/22 2339)  Pulse: 73 (06/17/22 2339)  Resp: 20 (06/17/22 2339)  BP: (!) 167/72 (06/17/22 2339)  SpO2: 95 % (06/17/22 2339)   Vital Signs (24h Range):  Temp:  [98.7 °F (37.1 °C)-98.9 °F (37.2 °C)] 98.9 °F (37.2 °C)  Pulse:  [69-73] 73  Resp:  [16-20] 20  SpO2:  [95 %-100 %] 95 %  BP: (167-236)/() 167/72        There is no height or weight on file to calculate BMI.    Physical Exam  Constitutional:       Appearance: She is well-developed and normal weight. She is ill-appearing.   HENT:      Head: Normocephalic.   Eyes:      General:         Right eye: No discharge.         Left eye: No discharge.      Conjunctiva/sclera: Conjunctivae normal.   Cardiovascular:      Rate and Rhythm: Normal rate.      Pulses:           Radial pulses are 1+ on the right side and 1+ on the left side.      Heart sounds: Normal heart sounds.   Pulmonary:      Effort: Pulmonary effort is normal. Tachypnea present. No respiratory distress.      Breath sounds: Examination of the right-middle field reveals decreased breath sounds. Examination of the right-lower field reveals decreased breath sounds. Decreased breath sounds present.   Abdominal:      General: Bowel sounds are decreased. There is no distension.      Palpations: Abdomen is soft.      Tenderness: There is no abdominal tenderness.   Musculoskeletal:         General: Normal range of motion.      Cervical back: Normal range of motion and neck supple.   Skin:     General: Skin is warm and dry.      Capillary Refill: Capillary refill takes 2 to 3 seconds.      Coloration: Skin is pale.   Neurological:       Mental Status: She is confused.      GCS: GCS eye subscore is 3. GCS verbal subscore is 4. GCS motor subscore is 5.           Significant Labs: All pertinent labs within the past 24 hours have been reviewed.  CBC:   Recent Labs   Lab 06/17/22  1813   WBC 4.56   HGB 10.7*   HCT 36.1*        CMP:   Recent Labs   Lab 06/17/22  1813      K 3.2*   CL 96   CO2 35*   GLU 93   BUN 10   CREATININE 0.5   CALCIUM 9.6   PROT 8.2   ALBUMIN 2.9*   BILITOT 0.4   ALKPHOS 93   AST 20   ALT 10   ANIONGAP 10   EGFRNONAA >60       Significant Imaging: I have reviewed all pertinent imaging results/findings within the past 24 hours.    Assessment/Plan:     * Vomiting  Patient's family reports 3 episodes of vomiting today.  First 2 concerning for coffee ground emesis, third episode with bright red blood. H/H stable    Zofran/Phenergan  Slow IVF  CBC in AM, monitoring for bleeding        Chronic combined systolic and diastolic CHF (congestive heart failure)  Appears well compensated    Monitor for acute decompensation      Dementia without behavioral disturbance  At current baseline    Monitor for decompensation  Delirium precautions      Seizure disorder  Continue Keppra      Essential hypertension  Hypertensive currently    Continue chlorthalidone  Hydralazine PRN        VTE Risk Mitigation (From admission, onward)         Ordered     IP VTE HIGH RISK PATIENT  Once         06/17/22 2137     Place sequential compression device  Until discontinued         06/17/22 2137     Reason for No Pharmacological VTE Prophylaxis  Once        Question:  Reasons:  Answer:  Risk of Bleeding    06/17/22 2137     Place sequential compression device  Until discontinued         06/17/22 2131                   Abdon Niño NP  Department of Hospital Medicine   [facility]

## 2022-06-18 NOTE — NURSING
"At 2339 Patient A&Ox3 , afebrile received from ED via stretcher complaints of N/V. Per daughter the episodes of emesis were "red dark blood". Vss /72 , pulse 73 on 3 L nasal cannula with SOp2 95%. No S/S of acute distress noted at this time. Call light within reach educated patient on how to use the call bell. Safety precautions are in place. We will continue to monitor.   "

## 2022-06-18 NOTE — HPI
The patient is a 87 y.o. female with a past medical history of HTN, seizures, and dementia who presents with complaint of vomiting that began this morning. The patient reports 3 episodes of vomiting today. She states the first two episodes were a dark brown color and the most recent episode had drops of red in it. The patient denies any current abdominal pain, fever, or chills.  Denies chest pain or shortness of breath.  The patient is slightly confused but that is consistent with baseline.  The patient will be admitted for further management of her acute vomiting episodes and concern for developing GI bleed.

## 2022-06-18 NOTE — ASSESSMENT & PLAN NOTE
Patient's family reports 3 episodes of vomiting today.  First 2 concerning for coffee ground emesis, third episode with bright red blood. H/H stable    Zofran/Phenergan  Slow IVF  CBC in AM, monitoring for bleeding

## 2022-06-18 NOTE — NURSING
06/18/2022      Pt verbalized understanding D/C instructions and education provided pertinent to D/C needs. IV cath removed fully intact. No distress noted. [facility].              Madyson Braxton RN

## 2022-06-18 NOTE — SUBJECTIVE & OBJECTIVE
Past Medical History:   Diagnosis Date    Anemia     Arthritis     Dementia     Hypertension     Periprosthetic fracture around internal prosthetic right knee joint-s/p right distal femur ORIF 6/5/15 6/5/2015    Seizure disorder     Seizures        Past Surgical History:   Procedure Laterality Date    EYE SURGERY      JOINT REPLACEMENT      TOTAL KNEE ARTHROPLASTY  Left       Review of patient's allergies indicates:   Allergen Reactions    Donepezil Anaphylaxis    Exelon [rivastigmine] Anaphylaxis    Lisinopril Other (See Comments)     ANGIOEDEMA    Memantine Anaphylaxis    Haldol [haloperidol lactate]        No current facility-administered medications on file prior to encounter.     Current Outpatient Medications on File Prior to Encounter   Medication Sig    acetaminophen (TYLENOL) 325 MG tablet Take 2 tablets (650 mg total) by mouth every 8 (eight) hours as needed for Pain.    albuterol 90 mcg/actuation inhaler Inhale 1-2 puffs into the lungs every 6 (six) hours as needed for Wheezing. Rescue    albuterol-ipratropium (DUO-NEB) 2.5 mg-0.5 mg/3 mL nebulizer solution Take 3 mLs by nebulization every 6 (six) hours as needed (allergies, congestion). Rescue    amLODIPine (NORVASC) 5 MG tablet Take 5 mg by mouth once daily.    aspirin 81 MG Chew Take 1 tablet (81 mg total) by mouth once daily.    budesonide-formoterol 80-4.5 mcg (SYMBICORT) 80-4.5 mcg/actuation HFAA Inhale 2 puffs into the lungs every 12 (twelve) hours. Controller    cetirizine (ZYRTEC) 10 MG tablet Take 10 mg by mouth once daily.    chlorthalidone (HYGROTEN) 25 MG Tab Take 1 tablet (25 mg total) by mouth once daily.    escitalopram oxalate (LEXAPRO) 5 MG Tab Take 5 mg by mouth once daily.    fluticasone (FLONASE) 50 mcg/actuation nasal spray Spray 2 puffs into each nare daily as needed for allergies    HYDROcodone-acetaminophen (NORCO) 5-325 mg per tablet Take 1 tablet by mouth every 4 to 6 hours as needed for Pain.    levETIRAcetam (KEPPRA) 100 mg/mL  Soln     mirtazapine (REMERON) 15 MG tablet Take 15 mg by mouth nightly.    montelukast (SINGULAIR) 10 mg tablet TAKE 1 TABLET BY MOUTH EVERY EVENING    nystatin (MYCOSTATIN) 100,000 unit/mL suspension nystatin 100,000 unit/mL oral suspension    polyethylene glycol (GLYCOLAX) 17 gram PwPk Take 17 g by mouth 2 (two) times daily.    potassium chloride SA (K-DUR,KLOR-CON) 20 MEQ tablet Take 1 tablet (20 mEq total) by mouth once daily.    triamcinolone acetonide 0.1% (KENALOG) 0.1 % cream Apply topically to affected area as needed for skin rash     Family History       Problem Relation (Age of Onset)    Dementia Brother    Seizures Sister, Brother          Tobacco Use    Smoking status: Former Smoker     Types: Cigarettes     Quit date: 9/4/2011     Years since quitting: 10.7    Smokeless tobacco: Former User   Substance and Sexual Activity    Alcohol use: No    Drug use: No    Sexual activity: Never     Review of Systems   Unable to perform ROS: Acuity of condition   Objective:     Vital Signs (Most Recent):  Temp: 98.9 °F (37.2 °C) (06/17/22 2339)  Pulse: 73 (06/17/22 2339)  Resp: 20 (06/17/22 2339)  BP: (!) 167/72 (06/17/22 2339)  SpO2: 95 % (06/17/22 2339)   Vital Signs (24h Range):  Temp:  [98.7 °F (37.1 °C)-98.9 °F (37.2 °C)] 98.9 °F (37.2 °C)  Pulse:  [69-73] 73  Resp:  [16-20] 20  SpO2:  [95 %-100 %] 95 %  BP: (167-236)/() 167/72        There is no height or weight on file to calculate BMI.    Physical Exam  Constitutional:       Appearance: She is well-developed and normal weight. She is ill-appearing.   HENT:      Head: Normocephalic.   Eyes:      General:         Right eye: No discharge.         Left eye: No discharge.      Conjunctiva/sclera: Conjunctivae normal.   Cardiovascular:      Rate and Rhythm: Normal rate.      Pulses:           Radial pulses are 1+ on the right side and 1+ on the left side.      Heart sounds: Normal heart sounds.   Pulmonary:      Effort: Pulmonary effort is normal. Tachypnea  present. No respiratory distress.      Breath sounds: Examination of the right-middle field reveals decreased breath sounds. Examination of the right-lower field reveals decreased breath sounds. Decreased breath sounds present.   Abdominal:      General: Bowel sounds are decreased. There is no distension.      Palpations: Abdomen is soft.      Tenderness: There is no abdominal tenderness.   Musculoskeletal:         General: Normal range of motion.      Cervical back: Normal range of motion and neck supple.   Skin:     General: Skin is warm and dry.      Capillary Refill: Capillary refill takes 2 to 3 seconds.      Coloration: Skin is pale.   Neurological:      Mental Status: She is confused.      GCS: GCS eye subscore is 3. GCS verbal subscore is 4. GCS motor subscore is 5.           Significant Labs: All pertinent labs within the past 24 hours have been reviewed.  CBC:   Recent Labs   Lab 06/17/22  1813   WBC 4.56   HGB 10.7*   HCT 36.1*        CMP:   Recent Labs   Lab 06/17/22  1813      K 3.2*   CL 96   CO2 35*   GLU 93   BUN 10   CREATININE 0.5   CALCIUM 9.6   PROT 8.2   ALBUMIN 2.9*   BILITOT 0.4   ALKPHOS 93   AST 20   ALT 10   ANIONGAP 10   EGFRNONAA >60       Significant Imaging: I have reviewed all pertinent imaging results/findings within the past 24 hours.

## 2022-06-18 NOTE — DISCHARGE SUMMARY
"[facility] [place] [facility] Medicine  Discharge Summary      Patient Name: Susanna Byrnes  MRN: [id number]  Patient Class: OP- Observation  Admission Date: 6/17/2022  Hospital Length of Stay: 0 days  Discharge Date and Time:  06/18/2022 4:34 PM  Attending Physician: Aba Cisneros MD   Discharging Provider: Rita Hurd PA-C  Primary Care Provider: Ignacia River MD      HPI:   Per Abdon Niño NP:  "The patient is a 87 y.o. female with a past medical history of HTN, seizures, and dementia who presents with complaint of vomiting that began this morning. The patient reports 3 episodes of vomiting today. She states the first two episodes were a dark brown color and the most recent episode had drops of red in it. The patient denies any current abdominal pain, fever, or chills.  Denies chest pain or shortness of breath.  The patient is slightly confused but that is consistent with baseline."            Hospital Course:   Patient had no further nausea or vomiting.  Daughter at bedside reported she had eaten prunes the evening before and wondered whether the dark emesis was from the prunes.  H/H remained stable and at baseline.  Electrolytes replaced.  Patient tolerating regular diet day of discharge.  Also of note, patient had no urinary symptoms and no systemic signs of infection, making UA results most consistent with colonization/dirty sample (squamous cells present).      Patient seen and examined by me on discharge day.  Questions were sought and answered to patient's satisfaction.  Patient (and family, daughter at bedside) aware and in agreement with discharge plan.       Goals of Care Treatment Preferences:  Code Status: Full Code      Consults: none      Final Active Diagnoses:    Diagnosis Date Noted POA    Chronic combined systolic and diastolic CHF (congestive heart failure) [I50.42] 04/26/2014 Yes    Dementia without behavioral disturbance [F03.90] 08/02/2013 Yes    Seizure " disorder [G40.909] 01/22/2013 Yes    Essential hypertension [I10] 01/04/2013 Yes      Problems Resolved During this Admission:    Diagnosis Date Noted Date Resolved POA    PRINCIPAL PROBLEM:  Vomiting [R11.10] 06/17/2022 06/18/2022 Yes       Discharged Condition: good    Disposition: Home or Self Care    Follow Up:   Follow-up Information     Ignacia River MD. Schedule an appointment as soon as possible for a visit in 1 week(s).    Specialty: Family Medicine  Why: Hospital follow-up  Contact information:  [address]  [address]  [place] [address]  [phone]                       Patient Instructions:      Basic Metabolic Panel   Standing Status: Future Standing Exp. Date: 08/17/23     Diet Cardiac     Notify your health care provider if you experience any of the following:  temperature >100.4     Notify your health care provider if you experience any of the following:  persistent nausea and vomiting or diarrhea     Activity as tolerated       Significant Diagnostic Studies: Labs:   CMP   Recent Labs   Lab 06/17/22  1813 06/18/22  0515    142   K 3.2* 3.0*   CL 96 96   CO2 35* 33*   GLU 93 100   BUN 10 9   CREATININE 0.5 0.4*   CALCIUM 9.6 8.8   PROT 8.2 7.1   ALBUMIN 2.9* 2.7*   BILITOT 0.4 0.3   ALKPHOS 93 83   AST 20 17   ALT 10 10   ANIONGAP 10 13   ESTGFRAFRICA >60 >60   EGFRNONAA >60 >60   , CBC   Recent Labs   Lab 06/17/22  1813 06/18/22  0515   WBC 4.56 6.08   HGB 10.7* 10.3*   HCT 36.1* 34.6*    203    and All labs within the past 24 hours have been reviewed    EXAMINATION:   CT ABDOMEN PELVIS WITH CONTRAST     CLINICAL HISTORY:   Nausea/vomiting;Abdominal pain, acute, nonlocalized;     TECHNIQUE:   Low dose axial images, sagittal and coronal reformations were obtained from the lung bases to the pubic symphysis following the IV administration of 100 mL of Omnipaque 350 .  Oral contrast was not administered.     COMPARISON:   03/25/2009     FINDINGS:   Abdomen:     -     - Lung  bases: Bibasilar small pleural effusions with associated atelectasis. Possible mild consolidation at the left lung base also.     - Liver: No focal mass.     - Gallbladder: No calcified gallstones.     - Bile Ducts: No evidence of intra or extra hepatic biliary ductal dilation.     - Spleen: Negative.     - Kidneys: No mass or hydronephrosis.     - Adrenals: Unremarkable.     - Pancreas: No mass or peripancreatic fat stranding.     - Retroperitoneum:  No significant adenopathy.     - Vascular: No abdominal aortic aneurysm.     - Abdominal wall:  Unremarkable.     Pelvis:     Urinary bladder is within normal limits.     The uterus is present.  Few small calcified uterine fibroids are noted.     Bowel/Mesentery:     No evidence of bowel obstruction.  Retained feces throughout the colon and rectum.  Possible mild wall thickening of the distal rectum on axial 127-130.  Correlation with rectal exam may be helpful.  Mild inflammatory, infectious or neoplastic process cannot be excluded.  Follow-up recommended.     The appendix is within normal limits.     Bones:  No acute osseous abnormality and no suspicious lytic or blastic lesion.     There is slight straightening of normal lumbar lordosis.  Grade 1 retrolisthesis of L4 on L5.  Grade 1 spondylolisthesis of L3 on L4.     Mild compression fractures of superior endplates L2 and L1.     Moderate central canal narrowing at L2-3.    Impression:       1. Bibasilar pleural effusions with associated atelectasis.  Possible mild consolidation at the left lung base also.   2. Constipation.   3. Possible mild wall thickening of the distal rectum.  Recommend clinical correlation and follow-up.   4. Multilevel chronic changes of the lumbar spine.   5. Few calcified uterine fibroids.       Electronically signed by: Blake Banegas   Date: 06/17/2022          Pending Diagnostic Studies:     None         Medications:  Reconciled Home Medications:      Medication List      CHANGE how you  take these medications    potassium chloride SA 20 MEQ tablet  Commonly known as: K-DUR,KLOR-CON  Take 1 tablet (20 mEq total) by mouth 2 (two) times daily.  What changed: when to take this        CONTINUE taking these medications    acetaminophen 325 MG tablet  Commonly known as: TYLENOL  Take 2 tablets (650 mg total) by mouth every 8 (eight) hours as needed for Pain.     albuterol 90 mcg/actuation inhaler  Commonly known as: PROVENTIL/VENTOLIN HFA  Inhale 1-2 puffs into the lungs every 6 (six) hours as needed for Wheezing. Rescue     albuterol-ipratropium 2.5 mg-0.5 mg/3 mL nebulizer solution  Commonly known as: DUO-NEB  Take 3 mLs by nebulization every 6 (six) hours as needed (allergies, congestion). Rescue     amLODIPine 5 MG tablet  Commonly known as: NORVASC  Take 5 mg by mouth once daily.     aspirin 81 MG Chew  Take 1 tablet (81 mg total) by mouth once daily.     budesonide-formoterol 80-4.5 mcg 80-4.5 mcg/actuation Hfaa  Commonly known as: SYMBICORT  Inhale 2 puffs into the lungs every 12 (twelve) hours. Controller     cetirizine 10 MG tablet  Commonly known as: ZYRTEC  Take 10 mg by mouth once daily.     chlorthalidone 25 MG Tab  Commonly known as: HYGROTEN  Take 1 tablet (25 mg total) by mouth once daily.     EScitalopram oxalate 5 MG Tab  Commonly known as: LEXAPRO  Take 5 mg by mouth once daily.     fluticasone propionate 50 mcg/actuation nasal spray  Commonly known as: FLONASE  Spray 2 puffs into each nare daily as needed for allergies     HYDROcodone-acetaminophen 5-325 mg per tablet  Commonly known as: NORCO  Take 1 tablet by mouth every 4 to 6 hours as needed for Pain.     levETIRAcetam 100 mg/mL Soln  Commonly known as: KEPPRA     mirtazapine 15 MG tablet  Commonly known as: REMERON  Take 15 mg by mouth nightly.     montelukast 10 mg tablet  Commonly known as: SINGULAIR  TAKE 1 TABLET BY MOUTH EVERY EVENING     nystatin 100,000 unit/mL suspension  Commonly known as: MYCOSTATIN  nystatin 100,000  unit/mL oral suspension     polyethylene glycol 17 gram Pwpk  Commonly known as: GLYCOLAX  Take 17 g by mouth 2 (two) times daily.     triamcinolone acetonide 0.1% 0.1 % cream  Commonly known as: KENALOG  Apply topically to affected area as needed for skin rash        STOP taking these medications    azithromycin 250 MG tablet  Commonly known as: ZITHROMAX Z-DAVID            Indwelling Lines/Drains at time of discharge:   Lines/Drains/Airways     None                 Time spent on the discharge of patient: 40 minutes         Rita Hurd PA-C  Department of Hospital Medicine  [facility] ([facility]

## 2022-06-18 NOTE — ED TRIAGE NOTES
"Pt brought to the ED via EMS c/o emesis. Per family pt had x3 episodes of "dark blood" emesis earlier today and no episodes since then. Denies any other complaints at this time. Pt at baseline per family  "

## 2022-06-20 LAB — BACTERIA UR CULT: ABNORMAL

## 2022-07-11 ENCOUNTER — OFFICE VISIT (OUTPATIENT)
Dept: ORTHOPEDICS | Facility: CLINIC | Age: 87
End: 2022-07-11
Payer: MEDICARE

## 2022-07-11 VITALS — HEIGHT: 62 IN | BODY MASS INDEX: 26.9 KG/M2 | WEIGHT: 146.19 LBS

## 2022-07-11 DIAGNOSIS — M79.672 PAIN IN BOTH FEET: Primary | ICD-10-CM

## 2022-07-11 DIAGNOSIS — M24.542 CONTRACTURE OF JOINT OF BOTH HANDS: ICD-10-CM

## 2022-07-11 DIAGNOSIS — M79.671 PAIN IN BOTH FEET: Primary | ICD-10-CM

## 2022-07-11 DIAGNOSIS — M24.541 CONTRACTURE OF JOINT OF BOTH HANDS: ICD-10-CM

## 2022-07-11 PROCEDURE — 99999 PR PBB SHADOW E&M-EST. PATIENT-LVL V: CPT | Mod: PBBFAC,,, | Performed by: ORTHOPAEDIC SURGERY

## 2022-07-11 PROCEDURE — 1159F MED LIST DOCD IN RCRD: CPT | Mod: CPTII,S$GLB,, | Performed by: ORTHOPAEDIC SURGERY

## 2022-07-11 PROCEDURE — 1159F PR MEDICATION LIST DOCUMENTED IN MEDICAL RECORD: ICD-10-PCS | Mod: CPTII,S$GLB,, | Performed by: ORTHOPAEDIC SURGERY

## 2022-07-11 PROCEDURE — 3288F PR FALLS RISK ASSESSMENT DOCUMENTED: ICD-10-PCS | Mod: CPTII,S$GLB,, | Performed by: ORTHOPAEDIC SURGERY

## 2022-07-11 PROCEDURE — 1101F PR PT FALLS ASSESS DOC 0-1 FALLS W/OUT INJ PAST YR: ICD-10-PCS | Mod: CPTII,S$GLB,, | Performed by: ORTHOPAEDIC SURGERY

## 2022-07-11 PROCEDURE — 1126F PR PAIN SEVERITY QUANTIFIED, NO PAIN PRESENT: ICD-10-PCS | Mod: CPTII,S$GLB,, | Performed by: ORTHOPAEDIC SURGERY

## 2022-07-11 PROCEDURE — 1126F AMNT PAIN NOTED NONE PRSNT: CPT | Mod: CPTII,S$GLB,, | Performed by: ORTHOPAEDIC SURGERY

## 2022-07-11 PROCEDURE — 1101F PT FALLS ASSESS-DOCD LE1/YR: CPT | Mod: CPTII,S$GLB,, | Performed by: ORTHOPAEDIC SURGERY

## 2022-07-11 PROCEDURE — 99999 PR PBB SHADOW E&M-EST. PATIENT-LVL V: ICD-10-PCS | Mod: PBBFAC,,, | Performed by: ORTHOPAEDIC SURGERY

## 2022-07-11 PROCEDURE — 99213 OFFICE O/P EST LOW 20 MIN: CPT | Mod: S$GLB,,, | Performed by: ORTHOPAEDIC SURGERY

## 2022-07-11 PROCEDURE — 3288F FALL RISK ASSESSMENT DOCD: CPT | Mod: CPTII,S$GLB,, | Performed by: ORTHOPAEDIC SURGERY

## 2022-07-11 PROCEDURE — 99213 PR OFFICE/OUTPT VISIT, EST, LEVL III, 20-29 MIN: ICD-10-PCS | Mod: S$GLB,,, | Performed by: ORTHOPAEDIC SURGERY

## 2022-07-11 RX ORDER — RISPERIDONE 0.25 MG/1
TABLET ORAL
COMMUNITY

## 2022-07-11 RX ORDER — LORAZEPAM 1 MG/1
1 TABLET ORAL DAILY PRN
COMMUNITY
Start: 2022-07-08

## 2022-07-11 NOTE — PROGRESS NOTES
Subjective:      Patient ID: Susanna Byrnes is a 87 y.o. female.  Chief Complaint: Follow-up and Hand Pain (BILATERAL )      HPI  Susanna Byrnes is a  87 y.o. female presenting today for follow up of bilateral hand contractures which are severe.  She reports that she is still having difficulty with using her hands  She is not have any home therapy the patient tried going to outpatient therapy but it was very difficult because of her immobility issues they would like to try some home therapy for both hands  She also has wrist braces the main need some modifications will set that up  On unrelated matter she does have bilateral foot problems with deformity in would like referral to podiatry.    Review of patient's allergies indicates:   Allergen Reactions    Donepezil Anaphylaxis    Exelon [rivastigmine] Anaphylaxis    Lisinopril Other (See Comments)     ANGIOEDEMA    Memantine Anaphylaxis    Haldol [haloperidol lactate]     Lexapro [escitalopram]          Current Outpatient Medications   Medication Sig Dispense Refill    cetirizine (ZYRTEC) 10 MG tablet Take 10 mg by mouth once daily.      chlorthalidone (HYGROTEN) 25 MG Tab Take 1 tablet (25 mg total) by mouth once daily. 30 tablet 3    levETIRAcetam (KEPPRA) 100 mg/mL Soln       mirtazapine (REMERON) 15 MG tablet Take 15 mg by mouth nightly.      potassium chloride SA (K-DUR,KLOR-CON) 20 MEQ tablet Take 1 tablet (20 mEq total) by mouth 2 (two) times daily. 60 tablet 0    risperiDONE (RISPERDAL) 0.25 MG Tab risperidone 0.25 mg tablet   TAKE ONE-HALF TABLET BY MOUTH EVERY MORNING AND 1/2 TO 1 TABLET BY MOUTH AT BEDTIME      acetaminophen (TYLENOL) 325 MG tablet Take 2 tablets (650 mg total) by mouth every 8 (eight) hours as needed for Pain. (Patient not taking: Reported on 7/11/2022)  0    albuterol 90 mcg/actuation inhaler Inhale 1-2 puffs into the lungs every 6 (six) hours as needed for Wheezing. Rescue (Patient not taking: Reported on 7/11/2022) 1  Inhaler 0    albuterol-ipratropium (DUO-NEB) 2.5 mg-0.5 mg/3 mL nebulizer solution Take 3 mLs by nebulization every 6 (six) hours as needed (allergies, congestion). Rescue      amLODIPine (NORVASC) 5 MG tablet Take 5 mg by mouth once daily.      aspirin 81 MG Chew Take 1 tablet (81 mg total) by mouth once daily. 30 tablet 1    budesonide-formoterol 80-4.5 mcg (SYMBICORT) 80-4.5 mcg/actuation HFAA Inhale 2 puffs into the lungs every 12 (twelve) hours. Controller (Patient not taking: Reported on 7/11/2022) 3 Inhaler 2    escitalopram oxalate (LEXAPRO) 5 MG Tab Take 5 mg by mouth once daily.      fluticasone (FLONASE) 50 mcg/actuation nasal spray Spray 2 puffs into each nare daily as needed for allergies  5    HYDROcodone-acetaminophen (NORCO) 5-325 mg per tablet Take 1 tablet by mouth every 4 to 6 hours as needed for Pain. (Patient not taking: Reported on 7/11/2022) 42 tablet 0    LORazepam (ATIVAN) 1 MG tablet Take 1 mg by mouth daily as needed.      montelukast (SINGULAIR) 10 mg tablet TAKE 1 TABLET BY MOUTH EVERY EVENING (Patient not taking: Reported on 7/11/2022) 90 tablet 11    nystatin (MYCOSTATIN) 100,000 unit/mL suspension nystatin 100,000 unit/mL oral suspension      ondansetron (ZOFRAN-ODT) 4 MG TbDL Take 1 tablet (4 mg total) by mouth every 8 (eight) hours as needed (nausea/vomiting). (Patient not taking: Reported on 7/11/2022) 10 tablet 0    polyethylene glycol (GLYCOLAX) 17 gram PwPk Take 17 g by mouth 2 (two) times daily. (Patient not taking: Reported on 7/11/2022)  0    triamcinolone acetonide 0.1% (KENALOG) 0.1 % cream Apply topically to affected area as needed for skin rash       No current facility-administered medications for this visit.       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-s/p right distal femur ORIF 6/5/15 6/5/2015    Seizure disorder      "Seizures        Past Surgical History:   Procedure Laterality Date    EYE SURGERY      JOINT REPLACEMENT      TOTAL KNEE ARTHROPLASTY  Left       OBJECTIVE:   PHYSICAL EXAM:  Height: 5' 2" (157.5 cm) Weight: 66.3 kg (146 lb 2.6 oz)  Vitals:    07/11/22 1104   Weight: 66.3 kg (146 lb 2.6 oz)   Height: 5' 2" (1.575 m)   PainSc: 0-No pain     Ortho/SPM Exam  Examination hands both hands have significant contractures of all digits finger into the palm no active extension passive extension limited  No evidence of infection skin breakdown is minimal    RADIOGRAPHS:  None  Comments: I have personally reviewed the imaging and I agree with the above radiologist's report.    ASSESSMENT/PLAN:     IMPRESSION:  Bilateral hand and finger contractures    PLAN:  I have ordered home health for OT PT both upper extremities  I would also like to braces modified as needed  We also have surgery as an option but she would need clearance from her PCP prior to surgery for tendon releases  Referral to podiatry      FOLLOW UP:  2-3 months    Disclaimer: This note has been generated using voice-recognition software. There may be typographical errors that have been missed during proof-reading.    "

## 2022-07-19 ENCOUNTER — CLINICAL SUPPORT (OUTPATIENT)
Dept: REHABILITATION | Facility: HOSPITAL | Age: 87
End: 2022-07-19
Attending: ORTHOPAEDIC SURGERY
Payer: MEDICARE

## 2022-07-19 DIAGNOSIS — M24.541 CONTRACTURE OF JOINT OF BOTH HANDS: ICD-10-CM

## 2022-07-19 DIAGNOSIS — M24.542 CONTRACTURE OF JOINT OF BOTH HANDS: ICD-10-CM

## 2022-07-19 PROCEDURE — 97110 THERAPEUTIC EXERCISES: CPT

## 2022-07-19 PROCEDURE — 97530 THERAPEUTIC ACTIVITIES: CPT

## 2022-07-19 PROCEDURE — 97165 OT EVAL LOW COMPLEX 30 MIN: CPT

## 2022-07-19 PROCEDURE — 97140 MANUAL THERAPY 1/> REGIONS: CPT

## 2022-07-19 NOTE — PLAN OF CARE
[facility] Occupational Therapy  Initial Evaluation     Name: Susanna Byrnes  Clinic Number: [id number]    Therapy Diagnosis:   Encounter Diagnosis   Name Primary?    Contracture of joint of both hands      Physician: Anthony Solis Jr., *   eval and treat   Physician Orders: eval and treat   Medical Diagnosis: M24.541,M24.542 (ICD-10-CM) - Contracture of joint of both hands     Surgical Procedure/ Date :none   Evaluation Date: 7/19/2022  Insurance:[employer]    Insurance Authorization period Expiration: 7/16/23   Plan of Care Certification Period: 8/30/22     Visit # / Visits Authortized: 1 / 20   Time In:2;00 pm   Time Out: 3;00 pm   Total Billable Time: 50  minutes    Precautions: Standard    Subjective     Involved Side:  Bilateral hands   Dominant Side: Right  Date of Onset: 1.5 years   Mechanism of Injury:  Had right shoulder fracture and then never really used her hands much  after that. Reports that she is dependent with all self care and hannah daughter feeds her. She is functionally unable to touch her face and has limited elbow flexion bilaterally of apprx 80-90 degrees limiting functional use of hands .    History of Current Condition: she has right resting hand splint and soft foam roll splints that were made several months ago to place in both hands to avoid further finger flexion. She attends therapy with her daughter and daughter wants to know what she can do to prevent further  Finger flexion.  Susanna is set up to start OT home health for Bilateral Arms.        Imaging: none   Previous Therapy: learn what she can do at home to prevent her hands from fisting     Patient's Goals for Therapy: daughter spoke on her behalf and daughter wants to learn what to do to help hands from increased finger flexion n     Pain:  Functional Pain Scale Rating 0-10: pt unable to answer   n/a/10 on average  n/a/10 at best  n/a/10 at worst  Locationbilateral:hands       Occupation:  Retired   Working  presently: unemployed  Duties: she is cared for my her daughter 100% with all self care and mobility     Functional Limitations/Social History: pt presents to clinic in wheel chair and she requires full dependence with transfers.     Previous functional status includes: Independent with all ADLs.     Current FunctionalStatus   Home/Living environment : lives with their family      Limitation of Functional Status as follows:   ADLs/IADLs:     - Feeding:Significant Modifications/Assistance    - Bathing:Significant Modifications/Assistance    - Dressing/Grooming: Significant Modifications/Assistance    - Driving: Significant Modifications/Assistance           Past Medical History/Physical Systems Review:   Susanna Byrnes  has a past medical history of Anemia, Arthritis, Dementia, Hypertension, Periprosthetic fracture around internal prosthetic right knee joint-s/p right distal femur ORIF 6/5/15, Seizure disorder, and Seizures.    Susanna Byrnes  has a past surgical history that includes Total knee arthroplasty (Left); Eye surgery; and Joint replacement.    Susanna has a current medication list which includes the following prescription(s): acetaminophen, albuterol, albuterol-ipratropium, amlodipine, aspirin, budesonide-formoterol 80-4.5 mcg, cetirizine, chlorthalidone, escitalopram oxalate, fluticasone propionate, hydrocodone-acetaminophen, levetiracetam, lorazepam, mirtazapine, montelukast, nystatin, ondansetron, polyethylene glycol, potassium chloride sa, risperidone, and triamcinolone acetonide 0.1%.    Review of patient's allergies indicates:   Allergen Reactions    Donepezil Anaphylaxis    Exelon [rivastigmine] Anaphylaxis    Lisinopril Other (See Comments)     ANGIOEDEMA    Memantine Anaphylaxis    Haldol [haloperidol lactate]     Lexapro [escitalopram]           Objective     Observation/Appearance:  Joint contractures, pt has minimal  Active motion in hands, upon command she is unable to flex, or extend her  fingers , fingers positioned right and  left  in MP flexion 60*, IP's 20*and left thumb MP joint collapsed in hyperextension, right thumb in -10 extension and both thumbs fully adducted .        Edema. Measured in centimeters. None noted      7/19/22 7/19/22        WRIST  AROM   right  Left        flexion 10 10      Ext  5 5      RD 5 5      UD 5 5      Pro 10 10      Sup  60 60                             Sensation not assessed due to cognition        NT Distribution 7/19/2022 7/19/2022    Left Right   [place] [facility]hav[facility]     Normal 1.65-2.83 n/a N/a    Diminished Light Touch 3.22-3.61     Diminished Protective 3.84-4.31     Loss of Protective 4.56-6.65     Untestable >6.65     2 Point Discrimination     Static     Dynamic       Special Tests:  Unable to assess due to cognition        Strength (Dyanmometer) and Pinch Strength (Pinch Gauge)  Measured in pounds and psi. Average of three trials.   7/19/2022 7/19/2022        Left Right        Rung II n/a n/a       Gray Pinch n/a n/a       3pt Pinch n/a n/a       2pt Pinch N/a  N/a            CMS Impairment/Limitation/Restriction for FOTO initial  Survey    Therapist reviewed FOTO scores for Susanna Byrnes on 7/19/2022.   FOTO documents entered into [employer] - see Media section.                    7/19/2022   Limitation Score: unable to assess   Category: Self care            Treatment     Treatment Time In: 2:00 pm   Treatment Time Out: 3:00 pm   Total Treatment time separate from Evaluation time:60     Susanna received the following supervised modalities after being cleared for contradictions for 10 minutes:   -moist heat      family did not bring splints in however states that she can wear them ,       Susanna received therapeutic exercises for 10 minutes including:  -gentle PROM to open hands from fisted position , education was provided with family to open thumb from thenar area and do not pull MP  into further extension      demonstrated additional strapping for current  "foam splints as well as issued small 1" cylindrical roll cover in moleskin to use with velcro strapping to prevent full finger flexion.  Provided extra strapping an educated on use of "inflated carrot splint" to use as another method to prevent finger flexion, discussed the importance of keeping fingers open for skin hygiene and integrity.      daughter asked about option to feed her mom if pt wanted to try on her own , discussed that pt would need more elbow flexion but provided with picture of universal cuff to hold spoon.     Home Exercise Program/Education:  Issued HEP: pt's daughter demostarted independence with use of cylindrical splint roll as well as adaptations to  Other splints that they have at home      and educated on modality use for pain management . Exercises were reviewed and Susanna was able to demonstrate them prior to the end of the session.   Pt received a written copy of exercises to perform at home. Susanna 's family demonstrated good  understanding of the education provided.  Pt was advised to perform these exercises free of pain, and to stop performing them if pain occurs.    Patient/Family Education: role of OT, goals for OT, scheduling/cancellations - pt verbalized understanding. Discussed insurance limitations with patient.    Additional Education provided:  None     Assessment     Susanna Byrnes is a 87 y.o. female referred to outpatient occupational/hand therapy and presents with a medical diagnosis of bilateral hand flexion contractures  , resulting in decreased function and  and demonstrates limitations as described in the chart below. Following  medical record review it is determined that pt will benefit from occupational therapy services in order to maximize pain free and/or functional use of bilateral hands .     The patient's rehab potential is Good.     Anticipated barriers to occupational therapy:  None   Pt has no cultural, educational or language barriers to learning " provided.    Profile and History Assessment of Occupational Performance Level of Clinical Decision Making Complexity Score   Occupational Profile:   Susanna Byrnes is a 87 y.o. female who lives with their family and is currently unemployed . Susanna Byrnes has difficulty with  Dressing, fine motor to manage clothing   housework/household chores  affecting his/her daily functional abilities. His/her main goal for therapy is  Prevent further finger flexed postioning .     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    Medical and Therapy History Review:   Brief               Performance Deficits    Physical:  Joint Mobility    Cognitive:  Communication    Psychosocial:    No Deficits     Clinical Decision Making:  low    Assessment Process:  Problem-Focused Assessments    Modification/Need for Assistance:  Not Necessary    Intervention Selection:  Limited Treatment Options       low  Based on PMHX, co morbidities , data from assessments and functional level of assistance required with task and clinical presentation directly impacting function.       The following goals were discussed with the patient and patient is in agreement with them as to be addressed in the treatment plan.     Family discussed that they may not come back since they are getting home health for general mobility issues .      GOALS: 6 weeks. Pt agrees with goals set.  Goals:     Short Term (6 weeks on 8/30/22 ):  1)   Patient to be IND with use of splints to care for pt .  Long Term (by discharge):    3)   Pt will demonstrate feeding self with universal cuff .           Plan   Certification Period/Plan of care expiration: 7/19/2022 to 8/30/22 .    Pt may attend one more session , if pt does not return by 8/30/22 then plan for discharge         Arin Rudolph OT

## 2022-08-09 ENCOUNTER — TELEPHONE (OUTPATIENT)
Dept: PULMONOLOGY | Facility: CLINIC | Age: 87
End: 2022-08-09
Payer: MEDICARE

## 2022-08-09 NOTE — TELEPHONE ENCOUNTER
Called patient's daughter Katie to get patient scheduled with  with [facility] and address her health concerns regarding patient. Pt scheduled for 1pm on 8/30 with . Advised pt's dtr to referral to patient PCP for health concerns until pt sees

## 2022-08-12 ENCOUNTER — TELEPHONE (OUTPATIENT)
Dept: PULMONOLOGY | Facility: CLINIC | Age: 87
End: 2022-08-12
Payer: MEDICARE

## 2022-08-12 NOTE — TELEPHONE ENCOUNTER
----- Message from Lizeth Avila MA sent at 8/12/2022 11:29 AM CDT -----  Regarding: FW: Orders  Contact: Sharifa luis/[facility] [phone]    ----- Message -----  From: Mavis Joshua  Sent: 8/12/2022  11:22 AM CDT  To: Romario MANE Staff  Subject: Orders                                           Calling in regards to getting orders for mini portable oxygen concentrator due to size and transport [facility] with [facility] form to [phone]. Please all to confirm, patient requesting before appointment on 8/30/22.

## 2022-08-12 NOTE — TELEPHONE ENCOUNTER
Called Sharifa with [facility] regarding message request for a portable O2 concentrator. Nurse informed Sharifa that Dr. Doss is not seen patient yet in clinic to order portable concentrator for patient. Sharifa requests to have MD order portable O2 concentrator for transport and complete PH[employer] medical necessity form for portable O2 concentrator after having pulmonary appointment with patient. Order and medical necessity form to be faxed to [facility] [phone]

## 2022-08-30 ENCOUNTER — OFFICE VISIT (OUTPATIENT)
Dept: PULMONOLOGY | Facility: CLINIC | Age: 87
End: 2022-08-30
Payer: MEDICARE

## 2022-08-30 VITALS
SYSTOLIC BLOOD PRESSURE: 114 MMHG | RESPIRATION RATE: 16 BRPM | HEART RATE: 85 BPM | BODY MASS INDEX: 26.9 KG/M2 | DIASTOLIC BLOOD PRESSURE: 78 MMHG | HEIGHT: 62 IN | WEIGHT: 146.19 LBS

## 2022-08-30 DIAGNOSIS — J42 CHRONIC BRONCHITIS, UNSPECIFIED CHRONIC BRONCHITIS TYPE: ICD-10-CM

## 2022-08-30 PROCEDURE — 1160F PR REVIEW ALL MEDS BY PRESCRIBER/CLIN PHARMACIST DOCUMENTED: ICD-10-PCS | Mod: CPTII,S$GLB,, | Performed by: INTERNAL MEDICINE

## 2022-08-30 PROCEDURE — 1160F RVW MEDS BY RX/DR IN RCRD: CPT | Mod: CPTII,S$GLB,, | Performed by: INTERNAL MEDICINE

## 2022-08-30 PROCEDURE — 99999 PR PBB SHADOW E&M-EST. PATIENT-LVL III: CPT | Mod: PBBFAC,,, | Performed by: INTERNAL MEDICINE

## 2022-08-30 PROCEDURE — 99999 PR PBB SHADOW E&M-EST. PATIENT-LVL III: ICD-10-PCS | Mod: PBBFAC,,, | Performed by: INTERNAL MEDICINE

## 2022-08-30 PROCEDURE — 99213 OFFICE O/P EST LOW 20 MIN: CPT | Mod: S$GLB,,, | Performed by: INTERNAL MEDICINE

## 2022-08-30 PROCEDURE — 1159F MED LIST DOCD IN RCRD: CPT | Mod: CPTII,S$GLB,, | Performed by: INTERNAL MEDICINE

## 2022-08-30 PROCEDURE — 1159F PR MEDICATION LIST DOCUMENTED IN MEDICAL RECORD: ICD-10-PCS | Mod: CPTII,S$GLB,, | Performed by: INTERNAL MEDICINE

## 2022-08-30 PROCEDURE — 99213 PR OFFICE/OUTPT VISIT, EST, LEVL III, 20-29 MIN: ICD-10-PCS | Mod: S$GLB,,, | Performed by: INTERNAL MEDICINE

## 2022-08-30 NOTE — PROGRESS NOTES
Subjective:       Patient ID: Susanna Byrnes is a 87 y.o. female.    Chief Complaint: No chief complaint on file.    87 year old patient of Dr. Looney,    Per Dr. Lawrence who saw patient last.   assistance to maintain ADLs. Has previously been evaluated by Dr. Benita Looney at [facility]"" for questionable COPD.. Due to advanced dementia no PFTs can be obtained and has been maintained on LABA/ICS and prn albuterol which appears to adequately control her intermittent wheezing.. Suspect overall clinical picture confounded by repeated aspiration (wheezing worse with eating) and likely some superimposed restrictive ventilatory defect from significant kyphosis and wheelchair bound status.. Certainly no quick fix.. Daughter seems reluctant to address goals of care and limitations at this point.. Maintain current therapy for now. Palliative care involvement seems appropriate.    Interval hx 8/30/2022: Patient is doing well. On oxygen. Occasional aspiration.   Patient with non verbal and with daughter today.   Review of Systems   Unable to perform ROS: Dementia     Objective:      Physical Exam   Constitutional: She appears well-nourished.   Cardiovascular: Normal rate and regular rhythm.   Pulmonary/Chest: Normal expansion. She has no rhonchi. She has no rales.   Skin: Skin is warm and dry.   Nursing note and vitals reviewed.  Personal Diagnostic Review  none pertinent  No flowsheet data found.      Assessment:       No diagnosis found.    Outpatient Encounter Medications as of 8/30/2022   Medication Sig Dispense Refill    acetaminophen (TYLENOL) 325 MG tablet Take 2 tablets (650 mg total) by mouth every 8 (eight) hours as needed for Pain.  0    albuterol 90 mcg/actuation inhaler Inhale 1-2 puffs into the lungs every 6 (six) hours as needed for Wheezing. Rescue 1 Inhaler 0    albuterol-ipratropium (DUO-NEB) 2.5 mg-0.5 mg/3 mL nebulizer solution Take 3 mLs by nebulization every 6 (six) hours as needed (allergies, congestion). Rescue       budesonide-formoterol 80-4.5 mcg (SYMBICORT) 80-4.5 mcg/actuation HFAA Inhale 2 puffs into the lungs every 12 (twelve) hours. Controller 3 Inhaler 2    cetirizine (ZYRTEC) 10 MG tablet Take 10 mg by mouth once daily.      chlorthalidone (HYGROTEN) 25 MG Tab Take 1 tablet (25 mg total) by mouth once daily. 30 tablet 3    fluticasone (FLONASE) 50 mcg/actuation nasal spray Spray 2 puffs into each nare daily as needed for allergies  5    HYDROcodone-acetaminophen (NORCO) 5-325 mg per tablet Take 1 tablet by mouth every 4 to 6 hours as needed for Pain. 42 tablet 0    levETIRAcetam (KEPPRA) 100 mg/mL Soln       LORazepam (ATIVAN) 1 MG tablet Take 1 mg by mouth daily as needed.      mirtazapine (REMERON) 15 MG tablet Take 15 mg by mouth nightly.      nystatin (MYCOSTATIN) 100,000 unit/mL suspension nystatin 100,000 unit/mL oral suspension      potassium chloride SA (K-DUR,KLOR-CON) 20 MEQ tablet Take 1 tablet (20 mEq total) by mouth 2 (two) times daily. 60 tablet 0    risperiDONE (RISPERDAL) 0.25 MG Tab risperidone 0.25 mg tablet   TAKE ONE-HALF TABLET BY MOUTH EVERY MORNING AND 1/2 TO 1 TABLET BY MOUTH AT BEDTIME      triamcinolone acetonide 0.1% (KENALOG) 0.1 % cream Apply topically to affected area as needed for skin rash      amLODIPine (NORVASC) 5 MG tablet Take 5 mg by mouth once daily.      aspirin 81 MG Chew Take 1 tablet (81 mg total) by mouth once daily. (Patient not taking: Reported on 8/30/2022) 30 tablet 1    escitalopram oxalate (LEXAPRO) 5 MG Tab Take 5 mg by mouth once daily.      montelukast (SINGULAIR) 10 mg tablet TAKE 1 TABLET BY MOUTH EVERY EVENING (Patient not taking: No sig reported) 90 tablet 11    ondansetron (ZOFRAN-ODT) 4 MG TbDL Take 1 tablet (4 mg total) by mouth every 8 (eight) hours as needed (nausea/vomiting). (Patient not taking: No sig reported) 10 tablet 0    polyethylene glycol (GLYCOLAX) 17 gram PwPk Take 17 g by mouth 2 (two) times daily. (Patient not taking: No sig reported)  0      No facility-administered encounter medications on file as of 8/30/2022.     No orders of the defined types were placed in this encounter.      Plan:        Chronic bronchitis  LABA/ICS.   Duoneb  Aspiration risk.   Continue goals of care.     Yearly follow up.     Katlyn Doss MD

## 2022-09-09 ENCOUNTER — TELEPHONE (OUTPATIENT)
Dept: PODIATRY | Facility: CLINIC | Age: 87
End: 2022-09-09
Payer: MEDICARE

## 2022-09-09 ENCOUNTER — OFFICE VISIT (OUTPATIENT)
Dept: PODIATRY | Facility: CLINIC | Age: 87
End: 2022-09-09
Payer: MEDICARE

## 2022-09-09 VITALS
SYSTOLIC BLOOD PRESSURE: 101 MMHG | WEIGHT: 146.19 LBS | BODY MASS INDEX: 26.9 KG/M2 | HEIGHT: 62 IN | DIASTOLIC BLOOD PRESSURE: 68 MMHG | HEART RATE: 71 BPM

## 2022-09-09 DIAGNOSIS — M62.81 ABNORMAL GAIT DUE TO MUSCLE WEAKNESS: ICD-10-CM

## 2022-09-09 DIAGNOSIS — M79.671 PAIN IN BOTH FEET: ICD-10-CM

## 2022-09-09 DIAGNOSIS — M79.672 PAIN IN BOTH FEET: ICD-10-CM

## 2022-09-09 DIAGNOSIS — F03.90 DEMENTIA WITHOUT BEHAVIORAL DISTURBANCE, UNSPECIFIED DEMENTIA TYPE: Primary | ICD-10-CM

## 2022-09-09 DIAGNOSIS — R26.9 ABNORMAL GAIT DUE TO MUSCLE WEAKNESS: ICD-10-CM

## 2022-09-09 PROCEDURE — 99204 OFFICE O/P NEW MOD 45 MIN: CPT | Mod: S$GLB,,, | Performed by: PODIATRIST

## 2022-09-09 PROCEDURE — 99999 PR PBB SHADOW E&M-EST. PATIENT-LVL IV: ICD-10-PCS | Mod: PBBFAC,,, | Performed by: PODIATRIST

## 2022-09-09 PROCEDURE — 1159F PR MEDICATION LIST DOCUMENTED IN MEDICAL RECORD: ICD-10-PCS | Mod: CPTII,S$GLB,, | Performed by: PODIATRIST

## 2022-09-09 PROCEDURE — 1126F PR PAIN SEVERITY QUANTIFIED, NO PAIN PRESENT: ICD-10-PCS | Mod: CPTII,S$GLB,, | Performed by: PODIATRIST

## 2022-09-09 PROCEDURE — 1126F AMNT PAIN NOTED NONE PRSNT: CPT | Mod: CPTII,S$GLB,, | Performed by: PODIATRIST

## 2022-09-09 PROCEDURE — 99204 PR OFFICE/OUTPT VISIT, NEW, LEVL IV, 45-59 MIN: ICD-10-PCS | Mod: S$GLB,,, | Performed by: PODIATRIST

## 2022-09-09 PROCEDURE — 1159F MED LIST DOCD IN RCRD: CPT | Mod: CPTII,S$GLB,, | Performed by: PODIATRIST

## 2022-09-09 PROCEDURE — 99999 PR PBB SHADOW E&M-EST. PATIENT-LVL IV: CPT | Mod: PBBFAC,,, | Performed by: PODIATRIST

## 2022-09-09 NOTE — PROGRESS NOTES
Subjective:      Patient ID: Susanna Byrnes is a 87 y.o. female.    Chief Complaint:   foot drop ([place] feet)    Susanna is a 87 y.o. female who presents to the clinic for evaluation and treatment of high risk feet. 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     Patient presents with daughter as well as granddaughter who both help provide history for patient.  Patient has dementia and is not able to contribute to the history    Patient was referred by orthopedic surgeon Dr. Solis for lower extremity foot pain.  Patient's Family relates that patient is not having any foot pain denies any open lesions denies any issues with the nails.  They relate they are here to treat in fix her foot drop.  There unclear why patient is unable to ambulate however relate over the last 2 years patient has been wheelchair bound  They have a [employer] lift at home today patient remains in the wheelchair as unable to transfer to exam  When patient is in bed they are floating her heels    Daughter expresses concern she is not sure if patient is unable to walk secondary to the mental dementia or if this is a physical disability  She is inquiring if there any devices that would help her lower extremities    If not followed up with her primary care in some time as possibly she is a distance away  They do have care from Neurology but relate that mostly they are treating her dementia symptoms and not weakness/gait    Patient does require lift services to leave the home    Patient does have some upper extremity issues with a fracture that did have some physical therapy.  Daughter relates she became frustrated with them taking her blood pressure and other issues so that was discontinued    She also expresses concern that patient can not turn her head to the right and would  like to know what to do          PCP: Ignacia River MD    Date Last Seen by PCP:     Current shoe gear:  Affected Foot: Tennis shoes     Unaffected Foot: Tennis shoes       Hemoglobin A1C   Date Value Ref Range Status   04/27/2014 5.6 4.5 - 6.2 % Final        Past Medical History:   Diagnosis Date    Anemia     Arthritis     Dementia     Hypertension     Periprosthetic fracture around internal prosthetic right knee joint-s/p right distal femur ORIF 6/5/15 6/5/2015    Seizure disorder     Seizures      Past Surgical History:   Procedure Laterality Date    EYE SURGERY      JOINT REPLACEMENT      TOTAL KNEE ARTHROPLASTY  Left     Current Outpatient Medications on File Prior to Visit   Medication Sig Dispense Refill    acetaminophen (TYLENOL) 325 MG tablet Take 2 tablets (650 mg total) by mouth every 8 (eight) hours as needed for Pain.  0    albuterol-ipratropium (DUO-NEB) 2.5 mg-0.5 mg/3 mL nebulizer solution Take 3 mLs by nebulization every 6 (six) hours as needed (allergies, congestion). Rescue      cetirizine (ZYRTEC) 10 MG tablet Take 10 mg by mouth once daily.      chlorthalidone (HYGROTEN) 25 MG Tab Take 1 tablet (25 mg total) by mouth once daily. 30 tablet 3    fluticasone (FLONASE) 50 mcg/actuation nasal spray Spray 2 puffs into each nare daily as needed for allergies  5    levETIRAcetam (KEPPRA) 100 mg/mL Soln       LORazepam (ATIVAN) 1 MG tablet Take 1 mg by mouth daily as needed.      mirtazapine (REMERON) 15 MG tablet Take 15 mg by mouth nightly.      montelukast (SINGULAIR) 10 mg tablet TAKE 1 TABLET BY MOUTH EVERY EVENING 90 tablet 11    polyethylene glycol (GLYCOLAX) 17 gram PwPk Take 17 g by mouth 2 (two) times daily.  0    potassium chloride SA (K-DUR,KLOR-CON) 20 MEQ tablet Take 1 tablet (20 mEq total) by mouth 2 (two) times daily. 60 tablet 0    risperiDONE (RISPERDAL) 0.25 MG Tab risperidone 0.25 mg tablet   TAKE ONE-HALF TABLET BY MOUTH EVERY MORNING AND 1/2 TO 1 TABLET BY MOUTH AT BEDTIME       "triamcinolone acetonide 0.1% (KENALOG) 0.1 % cream Apply topically to affected area as needed for skin rash      albuterol 90 mcg/actuation inhaler Inhale 1-2 puffs into the lungs every 6 (six) hours as needed for Wheezing. Rescue (Patient not taking: Reported on 9/9/2022) 1 Inhaler 0    amLODIPine (NORVASC) 5 MG tablet Take 5 mg by mouth once daily.      aspirin 81 MG Chew Take 1 tablet (81 mg total) by mouth once daily. (Patient not taking: Reported on 8/30/2022) 30 tablet 1    budesonide-formoterol 80-4.5 mcg (SYMBICORT) 80-4.5 mcg/actuation HFAA Inhale 2 puffs into the lungs every 12 (twelve) hours. Controller 3 Inhaler 2    escitalopram oxalate (LEXAPRO) 5 MG Tab Take 5 mg by mouth once daily.      HYDROcodone-acetaminophen (NORCO) 5-325 mg per tablet Take 1 tablet by mouth every 4 to 6 hours as needed for Pain. (Patient not taking: Reported on 9/9/2022) 42 tablet 0    nystatin (MYCOSTATIN) 100,000 unit/mL suspension nystatin 100,000 unit/mL oral suspension      ondansetron (ZOFRAN-ODT) 4 MG TbDL Take 1 tablet (4 mg total) by mouth every 8 (eight) hours as needed (nausea/vomiting). (Patient not taking: Reported on 9/9/2022) 10 tablet 0     No current facility-administered medications on file prior to visit.     Review of patient's allergies indicates:   Allergen Reactions    Donepezil Anaphylaxis    Exelon [rivastigmine] Anaphylaxis    Lisinopril Other (See Comments)     ANGIOEDEMA    Memantine Anaphylaxis    Haldol [haloperidol lactate]     Lexapro [escitalopram]        Review of Systems   Unable to perform ROS: Dementia         Objective:       Vitals:    09/09/22 1135   BP: 101/68   Pulse: 71   Weight: 66.3 kg (146 lb 2.6 oz)   Height: 5' 2" (1.575 m)   PainSc: 0-No pain   66.3 kg (146 lb 2.6 oz)     Physical Exam  Vitals reviewed.   Constitutional:       General: She is not in acute distress.     Appearance: She is well-developed. She is not ill-appearing, toxic-appearing or diaphoretic.      Comments: " Proper supportive shoegear  Wheelchair-bound    Cardiovascular:      Pulses:           Dorsalis pedis pulses are 2+ on the right side and 2+ on the left side.        Posterior tibial pulses are 1+ on the right side and 1+ on the left side.      Comments: Palpable pulses    Minimal to mild nonpitting edema  Musculoskeletal:         General: No tenderness.      Right lower le+ Edema present.      Left lower le+ Edema present.      Right ankle: Deformity present.      Left ankle: Deformity present.      Right foot: Decreased range of motion. Deformity present.      Left foot: Decreased range of motion. Deformity present.      Comments: Ankle contractures patient is unable to be passively moved to 90°  Prominent metatarsal heads  Contracted digits semi flexible    Strength 0/0 (also unclear if patient understood instructions of range of motion)    No pain on palpation   Feet:      Right foot:      Skin integrity: No ulcer, blister, skin breakdown, erythema, warmth, callus or dry skin.      Left foot:      Skin integrity: No ulcer, blister, skin breakdown, erythema, warmth, callus or dry skin.      Comments: Skin is supple there is no dry skin there is no breakdown to heels feet appear pristine    Nails are short and well maintained minimal thickening  Skin:     General: Skin is warm.      Capillary Refill: Capillary refill takes 2 to 3 seconds.      Coloration: Skin is not pale.      Findings: No erythema or rash.   Neurological:      Gait: Gait abnormal.   Psychiatric:         Attention and Perception: Attention normal.         Behavior: Behavior is cooperative.         Cognition and Memory: Cognition is impaired.             Assessment:       Encounter Diagnoses   Name Primary?    Pain in both feet     Dementia without behavioral disturbance, unspecified dementia type Yes    Abnormal gait due to muscle weakness          Plan:       Susanna was seen today for foot drop.    Diagnoses and all orders for this  visit:    Dementia without behavioral disturbance, unspecified dementia type  -     Ambulatory referral/consult to Home Health; Future    Pain in both feet  -     Ambulatory referral/consult to Podiatry  -     Ambulatory referral/consult to Home Health; Future    Abnormal gait due to muscle weakness  -     Ambulatory referral/consult to Home Health; Future    I counseled the patient on her conditions, their implications and medical management.    - discussed with family unfortunately some of the major concerns they have our out of my scope of practice for foot and ankle.  Patient is not having any pain or skin breakdown.  Patient has not ambulated in 2 years.  Unclear if this is due to physical deconditioning or mental decline  Their major goal is to have patient walk    Recommend following up with primary care doctor and or neurology for further assessment and treatment plan    - continue to monitor for any ulcers/pressure sores    - recommend home p.therapy; family agrees to try it  Is not feasible to transfer patient to and from a physical therapy facility  Patient can not do water therapy secondary to incontinence    If pt can get contractures to reduce to at the ankles 90 degrees... consider AFO braces    - d/w neurologist... may benefit from botox.  May need a musculoskeletal specialists    -there no podiatry needs at this time if patient should develop any issues with nails skin or other foot ankle issues please let us know and we can reappoint    Also discussed recommend seeing if insurance would cover a motorized wheelchair recommend talking with primary care doctor     Greater than 45 minute spent with patient care and  treatment planning          No follow-ups on file.

## 2022-09-09 NOTE — TELEPHONE ENCOUNTER
called pt on 9/9/22 spoke with pt updated pt about physical therapy, home health  insurance etc    E.S.

## 2022-09-09 NOTE — TELEPHONE ENCOUNTER
----- Message from Liseth Hoyos sent at 9/9/2022 12:24 PM CDT -----  Contact: [place]  Pt grand daughter requesting call back RE: they are right outside the doors, and they wanted to know if there is boot or cushion that could be given to pt please call      Confirmed contact below:  Contact Name:Susanna Byrnes  Phone Number: [phone]

## 2022-09-13 ENCOUNTER — TELEPHONE (OUTPATIENT)
Dept: PULMONOLOGY | Facility: CLINIC | Age: 87
End: 2022-09-13
Payer: MEDICARE

## 2022-09-14 ENCOUNTER — TELEPHONE (OUTPATIENT)
Dept: PODIATRY | Facility: CLINIC | Age: 87
End: 2022-09-14
Payer: MEDICARE

## 2022-09-14 NOTE — TELEPHONE ENCOUNTER
----- Message from Yordy Dooley sent at 9/14/2022  8:22 AM CDT -----  Contact: [facility]/ [place] [phone]  She need to speak to you about changing the order for home health to a participating provider. She said she can change it verbally.    Thank you

## 2022-09-14 NOTE — TELEPHONE ENCOUNTER
"Spoke with daughter, she does not want PT at this time as she is not doing well herself and does not want any additional issues.  She will contact the PCP of Ms Byrnes for possible home PT in the future "when she is up to it".  "

## 2022-09-14 NOTE — TELEPHONE ENCOUNTER
----- Message from Yordy Dooley sent at 9/14/2022  8:22 AM CDT -----  Contact: [facility]/ [place] [phone]  She need to speak to you about changing the order for home health to a participating provider. She said she can change it verbally.    Thank you

## 2022-09-15 ENCOUNTER — TELEPHONE (OUTPATIENT)
Dept: PULMONOLOGY | Facility: CLINIC | Age: 87
End: 2022-09-15
Payer: MEDICARE

## 2023-02-14 NOTE — CONSULTS
[facility]  Adult Nutrition  Consult Note    SUMMARY       Recommendations    Recommendation/Intervention:     1. Continue cardiac diet + Boost Plus as tolerated with texture per SLP.   2. Encourage po intake.   3. RD following.     Goals: consume >75% of all meals  Nutrition Goal Status: new  Communication of RD Recs: (POC)    Reason for Assessment    Reason For Assessment: consult  Diagnosis: infection/sepsis  Relevant Medical History: severe dementia, HTN, arthritis, suspected COPD, seizures  General Information Comments: Pt sleeping with no family members at bedside. Per chart review, wt stable. Unable to complete NFPE. Unable to fully assess for malnutrition at this time. Will follow-up. Note: Albumin and prealbumin should not be used as indicators of nutritional status. Both can be affected by inflammation and fluid status.   Nutrition Discharge Planning: adequate po intake    Nutrition Risk Screen    Nutrition Risk Screen: no indicators present    Nutrition/Diet History    Spiritual, Cultural Beliefs, Advent Practices, Values that Affect Care: no  Factors Affecting Nutritional Intake: impaired cognitive status/motor control    Anthropometrics    Temp: 97.2 °F (36.2 °C)  Height: 5' (152.4 cm)  Height (inches): 60 in  Weight: 79.4 kg (175 lb)  Weight (lb): 175 lb  Ideal Body Weight (IBW), Female: 100 lb  % Ideal Body Weight, Female (lb): 175 lb  BMI (Calculated): 34.2  BMI Grade: 30 - 34.9- obesity - grade I     Lab/Procedures/Meds    Pertinent Labs Reviewed: reviewed  Pertinent Medications Reviewed: reviewed  Pertinent Medications Comments: amlodipine, aspirin, montelukast    Estimated/Assessed Needs    Weight Used For Calorie Calculations: 79.4 kg (175 lb 0.7 oz)  Energy Calorie Requirements (kcal): 1456 kcal/day  Energy Need Method: Kcal/kg(x 1.25)  Protein Requirements:  gm/day(1.3 gm/kg)  Weight Used For Protein Calculations: 79.4 kg (175 lb 0.7 oz)  Fluid Requirements (mL): 1  Rx Refill Note  Requested Prescriptions     Pending Prescriptions Disp Refills   • amphetamine-dextroamphetamine (Adderall) 10 MG tablet 60 tablet 0     Sig: Take 1 tablet by mouth 2 (Two) Times a Day. Need appt for further refills.      Last office visit with prescribing clinician: 7/20/2021   Last telemedicine visit with prescribing clinician: 2/24/2023   Next office visit with prescribing clinician: 2/24/2023   Office Visit with Crystal Phoenix APRN (01/28/2022)    Appointment with Don Blackmon MD (02/24/2023)}  23}                  Would you like a call back once the refill request has been completed: [] Yes [] No    If the office needs to give you a call back, can they leave a voicemail: [] Yes [] No    Gabe Bridges MA  02/14/23, 08:24 EST   mL/kcal or per MD     RDA Method (mL): 1456     Nutrition Prescription Ordered    Current Diet Order: Cardiac  Oral Nutrition Supplement: Boost Plus    Evaluation of Received Nutrient/Fluid Intake    % Intake of Estimated Energy Needs: Other: ROSA  % Meal Intake: Other: ROSA    Nutrition Risk    Level of Risk/Frequency of Follow-up: (f/u 2 x wk)     Assessment and Plan    Nutrition Problem  Increased Nutrient Needs: Protein    Related to (etiology):   Physiological demands    Signs and Symptoms (as evidenced by):   Sepsis      Intervention:  Collaboration and Referral of Nutrition Care  Commercial Beverage    Nutrition Diagnosis Status:   New     Monitor and Evaluation    Food and Nutrient Intake: energy intake, food and beverage intake  Food and Nutrient Adminstration: diet order  Physical Activity and Function: nutrition-related ADLs and IADLs  Anthropometric Measurements: weight, weight change, body mass index  Biochemical Data, Medical Tests and Procedures: electrolyte and renal panel, gastrointestinal profile, glucose/endocrine profile, inflammatory profile, lipid profile  Nutrition-Focused Physical Findings: overall appearance     Nutrition Follow-Up    RD Follow-up?: Yes